# Patient Record
Sex: FEMALE | Race: WHITE | Employment: OTHER | URBAN - METROPOLITAN AREA
[De-identification: names, ages, dates, MRNs, and addresses within clinical notes are randomized per-mention and may not be internally consistent; named-entity substitution may affect disease eponyms.]

---

## 2017-01-05 ENCOUNTER — OFFICE VISIT (OUTPATIENT)
Dept: FAMILY MEDICINE CLINIC | Facility: CLINIC | Age: 67
End: 2017-01-05

## 2017-01-05 ENCOUNTER — LAB ENCOUNTER (OUTPATIENT)
Dept: LAB | Age: 67
End: 2017-01-05
Attending: FAMILY MEDICINE
Payer: MEDICARE

## 2017-01-05 ENCOUNTER — HOSPITAL ENCOUNTER (OUTPATIENT)
Dept: GENERAL RADIOLOGY | Age: 67
Discharge: HOME | End: 2017-01-05
Attending: FAMILY MEDICINE
Payer: MEDICARE

## 2017-01-05 VITALS
OXYGEN SATURATION: 98 % | HEART RATE: 92 BPM | DIASTOLIC BLOOD PRESSURE: 92 MMHG | RESPIRATION RATE: 20 BRPM | TEMPERATURE: 98 F | BODY MASS INDEX: 35 KG/M2 | WEIGHT: 232 LBS | SYSTOLIC BLOOD PRESSURE: 140 MMHG

## 2017-01-05 DIAGNOSIS — Z01.818 PRE-OP TESTING: ICD-10-CM

## 2017-01-05 DIAGNOSIS — M79.606 PAIN OF LOWER EXTREMITY, UNSPECIFIED LATERALITY: ICD-10-CM

## 2017-01-05 DIAGNOSIS — D50.0 BLOOD LOSS ANEMIA: ICD-10-CM

## 2017-01-05 DIAGNOSIS — R53.83 FATIGUE, UNSPECIFIED TYPE: ICD-10-CM

## 2017-01-05 DIAGNOSIS — E66.9 OBESITY, UNSPECIFIED: ICD-10-CM

## 2017-01-05 DIAGNOSIS — M23.91 INTERNAL DERANGEMENT OF KNEE JOINT, RIGHT: Primary | ICD-10-CM

## 2017-01-05 PROCEDURE — 87086 URINE CULTURE/COLONY COUNT: CPT

## 2017-01-05 PROCEDURE — 87081 CULTURE SCREEN ONLY: CPT

## 2017-01-05 PROCEDURE — 71020 XR CHEST PA + LAT CHEST (CPT=71020): CPT

## 2017-01-05 PROCEDURE — 99214 OFFICE O/P EST MOD 30 MIN: CPT | Performed by: FAMILY MEDICINE

## 2017-01-05 NOTE — PROGRESS NOTES
Patient has disabling and progressive right knee pain with osteoarthritis. It is failed any and all conservative remedies.   Ultimately he is scheduled for January 23 right total knee replacement by Dr. Beatris Cunningham in [place].    Patient history includes that o laboratory. Her mental status was normal.    He assessment is severe internal derangement of right knee failing any and all conservative measures. Pending normal blood work the patient will be cleared successfully for surgery.     My recommendation is

## 2017-01-06 ENCOUNTER — EKG ENCOUNTER (OUTPATIENT)
Dept: LAB | Facility: HOSPITAL | Age: 67
End: 2017-01-06
Attending: FAMILY MEDICINE
Payer: MEDICARE

## 2017-01-06 DIAGNOSIS — D50.0 BLOOD LOSS ANEMIA: ICD-10-CM

## 2017-01-06 DIAGNOSIS — R53.83 FATIGUE, UNSPECIFIED TYPE: ICD-10-CM

## 2017-01-06 DIAGNOSIS — Z01.818 PRE-OP TESTING: ICD-10-CM

## 2017-01-06 DIAGNOSIS — M79.606 PAIN OF LOWER EXTREMITY, UNSPECIFIED LATERALITY: ICD-10-CM

## 2017-01-06 LAB
APTT PPP: 29.2 SECONDS (ref 25–34)
ATRIAL RATE: 85 BPM
BASOPHILS # BLD AUTO: 0.02 X10(3) UL (ref 0–0.1)
BASOPHILS NFR BLD AUTO: 0.5 %
BUN BLD-MCNC: 20 MG/DL (ref 8–20)
C-REACTIVE PROTEIN: <0.29 MG/DL (ref ?–1)
CALCIUM BLD-MCNC: 8.9 MG/DL (ref 8.3–10.3)
CHLORIDE: 106 MMOL/L (ref 101–111)
CO2: 29 MMOL/L (ref 22–32)
CREAT BLD-MCNC: 0.83 MG/DL (ref 0.55–1.02)
EOSINOPHIL # BLD AUTO: 0.09 X10(3) UL (ref 0–0.3)
EOSINOPHIL NFR BLD AUTO: 2.3 %
ERYTHROCYTE [DISTWIDTH] IN BLOOD BY AUTOMATED COUNT: 13.2 % (ref 11.5–16)
EST. AVERAGE GLUCOSE BLD GHB EST-MCNC: 111 MG/DL (ref 68–126)
GLUCOSE BLD-MCNC: 128 MG/DL (ref 70–99)
HBA1C MFR BLD HPLC: 5.5 % (ref ?–5.7)
HCT VFR BLD AUTO: 38.3 % (ref 34–50)
HGB BLD-MCNC: 12.8 G/DL (ref 12–16)
IMMATURE GRANULOCYTE COUNT: 0 X10(3) UL (ref 0–1)
IMMATURE GRANULOCYTE RATIO %: 0 %
INR BLD: 0.95 (ref 0.89–1.12)
LYMPHOCYTES # BLD AUTO: 1.16 X10(3) UL (ref 0.9–4)
LYMPHOCYTES NFR BLD AUTO: 29.6 %
MCH RBC QN AUTO: 30.3 PG (ref 27–33.2)
MCHC RBC AUTO-ENTMCNC: 33.4 G/DL (ref 31–37)
MCV RBC AUTO: 90.8 FL (ref 81–100)
MONOCYTES # BLD AUTO: 0.3 X10(3) UL (ref 0.1–0.6)
MONOCYTES NFR BLD AUTO: 7.7 %
NEUTROPHIL ABS PRELIM: 2.35 X10 (3) UL (ref 1.3–6.7)
NEUTROPHILS # BLD AUTO: 2.35 X10(3) UL (ref 1.3–6.7)
NEUTROPHILS NFR BLD AUTO: 59.9 %
P AXIS: 48 DEGREES
P-R INTERVAL: 138 MS
PLATELET # BLD AUTO: 134 10(3)UL (ref 150–450)
POTASSIUM SERPL-SCNC: 3.3 MMOL/L (ref 3.6–5.1)
PSA SERPL DL<=0.01 NG/ML-MCNC: 13 SECONDS (ref 12.3–14.8)
Q-T INTERVAL: 392 MS
QRS DURATION: 94 MS
QTC CALCULATION (BEZET): 466 MS
R AXIS: 37 DEGREES
RBC # BLD AUTO: 4.22 X10(6)UL (ref 3.8–5.1)
RED CELL DISTRIBUTION WIDTH-SD: 43.2 FL (ref 35.1–46.3)
SED RATE-ML: 9 MM/HR (ref 0–25)
SODIUM SERPL-SCNC: 142 MMOL/L (ref 136–144)
T AXIS: -13 DEGREES
VENTRICULAR RATE: 85 BPM
WBC # BLD AUTO: 3.9 X10(3) UL (ref 4–13)

## 2017-01-06 PROCEDURE — 93010 ELECTROCARDIOGRAM REPORT: CPT | Performed by: INTERNAL MEDICINE

## 2017-01-06 PROCEDURE — 93005 ELECTROCARDIOGRAM TRACING: CPT

## 2017-01-06 PROCEDURE — 85025 COMPLETE CBC W/AUTO DIFF WBC: CPT

## 2017-01-06 PROCEDURE — 36415 COLL VENOUS BLD VENIPUNCTURE: CPT

## 2017-01-06 PROCEDURE — 80048 BASIC METABOLIC PNL TOTAL CA: CPT

## 2017-01-06 PROCEDURE — 85652 RBC SED RATE AUTOMATED: CPT

## 2017-01-06 PROCEDURE — 85730 THROMBOPLASTIN TIME PARTIAL: CPT

## 2017-01-06 PROCEDURE — 83036 HEMOGLOBIN GLYCOSYLATED A1C: CPT

## 2017-01-06 PROCEDURE — 85610 PROTHROMBIN TIME: CPT

## 2017-01-06 PROCEDURE — 86140 C-REACTIVE PROTEIN: CPT

## 2017-01-10 ENCOUNTER — OFFICE VISIT (OUTPATIENT)
Dept: FAMILY MEDICINE CLINIC | Facility: CLINIC | Age: 67
End: 2017-01-10

## 2017-01-10 VITALS
TEMPERATURE: 98 F | HEIGHT: 68 IN | WEIGHT: 229 LBS | RESPIRATION RATE: 16 BRPM | HEART RATE: 88 BPM | DIASTOLIC BLOOD PRESSURE: 86 MMHG | SYSTOLIC BLOOD PRESSURE: 136 MMHG | BODY MASS INDEX: 34.71 KG/M2

## 2017-01-10 DIAGNOSIS — J06.9 ACUTE URI: ICD-10-CM

## 2017-01-10 DIAGNOSIS — E87.6 HYPOKALEMIA, EXCESSIVE RENAL LOSSES: Primary | ICD-10-CM

## 2017-01-10 PROCEDURE — 99213 OFFICE O/P EST LOW 20 MIN: CPT | Performed by: FAMILY MEDICINE

## 2017-01-10 RX ORDER — SIMVASTATIN 10 MG
TABLET ORAL
Qty: 90 TABLET | Refills: 0 | Status: SHIPPED | OUTPATIENT
Start: 2017-01-10 | End: 2017-03-23

## 2017-01-10 NOTE — PROGRESS NOTES
Patient is here on my request to review some changes in her preoperative testing she is scheduled for January 23 left knee replacement. Patient's preoperative laboratory testing discovered a potassium of 3.3.   There was some change in her EKG that was m

## 2017-01-12 ENCOUNTER — TELEPHONE (OUTPATIENT)
Dept: FAMILY MEDICINE CLINIC | Facility: CLINIC | Age: 67
End: 2017-01-12

## 2017-01-12 NOTE — TELEPHONE ENCOUNTER
FYI- \"pt wants SEAN to know her condition is \"going in the right direction\"  feeling better and lungs are good\"

## 2017-01-17 ENCOUNTER — TELEPHONE (OUTPATIENT)
Dept: FAMILY MEDICINE CLINIC | Facility: CLINIC | Age: 67
End: 2017-01-17

## 2017-01-17 NOTE — TELEPHONE ENCOUNTER
Would like to speak with nurse regarding UA that was faxed over to pre-admission today.    Please call

## 2017-01-18 RX ORDER — TRIAMTERENE AND HYDROCHLOROTHIAZIDE 37.5; 25 MG/1; MG/1
TABLET ORAL
Qty: 90 TABLET | Refills: 0 | Status: ON HOLD | COMMUNITY
Start: 2017-01-18 | End: 2017-09-25

## 2017-01-21 ENCOUNTER — LAB ENCOUNTER (OUTPATIENT)
Dept: LAB | Facility: HOSPITAL | Age: 67
End: 2017-01-21
Attending: FAMILY MEDICINE
Payer: MEDICARE

## 2017-01-21 ENCOUNTER — TELEPHONE (OUTPATIENT)
Dept: FAMILY MEDICINE CLINIC | Facility: CLINIC | Age: 67
End: 2017-01-21

## 2017-01-21 DIAGNOSIS — E87.6 LOW BLOOD POTASSIUM: ICD-10-CM

## 2017-01-21 DIAGNOSIS — R79.0 LOW MAGNESIUM LEVEL: ICD-10-CM

## 2017-01-21 DIAGNOSIS — R79.0 LOW MAGNESIUM LEVEL: Primary | ICD-10-CM

## 2017-01-21 DIAGNOSIS — Z01.818 PRE-OP TESTING: ICD-10-CM

## 2017-01-21 LAB
ALBUMIN SERPL-MCNC: 4.1 G/DL (ref 3.5–4.8)
ALP LIVER SERPL-CCNC: 83 U/L (ref 55–142)
ALT SERPL-CCNC: 40 U/L (ref 14–54)
AST SERPL-CCNC: 32 U/L (ref 15–41)
ATRIAL RATE: 80 BPM
BILIRUB SERPL-MCNC: 0.3 MG/DL (ref 0.1–2)
BUN BLD-MCNC: 23 MG/DL (ref 8–20)
CALCIUM BLD-MCNC: 9.5 MG/DL (ref 8.3–10.3)
CHLORIDE: 105 MMOL/L (ref 101–111)
CO2: 26 MMOL/L (ref 22–32)
CREAT BLD-MCNC: 0.87 MG/DL (ref 0.55–1.02)
GLUCOSE BLD-MCNC: 98 MG/DL (ref 70–99)
HAV IGM SER QL: 1.6 MG/DL (ref 1.7–3)
M PROTEIN MFR SERPL ELPH: 7.6 G/DL (ref 6.1–8.3)
P AXIS: 57 DEGREES
P-R INTERVAL: 132 MS
POTASSIUM SERPL-SCNC: 3.3 MMOL/L (ref 3.6–5.1)
Q-T INTERVAL: 396 MS
QRS DURATION: 90 MS
QTC CALCULATION (BEZET): 456 MS
R AXIS: 27 DEGREES
SODIUM SERPL-SCNC: 140 MMOL/L (ref 136–144)
T AXIS: 8 DEGREES
VENTRICULAR RATE: 80 BPM

## 2017-01-21 PROCEDURE — 93005 ELECTROCARDIOGRAM TRACING: CPT

## 2017-01-21 PROCEDURE — 83735 ASSAY OF MAGNESIUM: CPT

## 2017-01-21 PROCEDURE — 80053 COMPREHEN METABOLIC PANEL: CPT

## 2017-01-21 PROCEDURE — 93010 ELECTROCARDIOGRAM REPORT: CPT | Performed by: INTERNAL MEDICINE

## 2017-01-21 NOTE — TELEPHONE ENCOUNTER
Pt called ; no orders placed for repeat k as per last note.   Orders placed ; pt and Dr. Jerry Barragan notified

## 2017-01-23 ENCOUNTER — ANESTHESIA (OUTPATIENT)
Dept: SURGERY | Facility: HOSPITAL | Age: 67
End: 2017-01-23

## 2017-01-23 ENCOUNTER — SURGERY (OUTPATIENT)
Age: 67
End: 2017-01-23

## 2017-01-23 ENCOUNTER — ANESTHESIA EVENT (OUTPATIENT)
Dept: SURGERY | Facility: HOSPITAL | Age: 67
End: 2017-01-23

## 2017-01-23 ENCOUNTER — APPOINTMENT (OUTPATIENT)
Dept: GENERAL RADIOLOGY | Facility: HOSPITAL | Age: 67
DRG: 470 | End: 2017-01-23
Attending: ORTHOPAEDIC SURGERY
Payer: MEDICARE

## 2017-01-23 PROBLEM — M17.9 ARTHRITIS OF KNEE, DEGENERATIVE: Status: ACTIVE | Noted: 2017-01-23

## 2017-01-23 PROBLEM — M17.10 ARTHRITIS OF KNEE, DEGENERATIVE: Status: ACTIVE | Noted: 2017-01-23

## 2017-01-23 PROCEDURE — 73560 X-RAY EXAM OF KNEE 1 OR 2: CPT

## 2017-01-23 RX ORDER — DEXAMETHASONE SODIUM PHOSPHATE 4 MG/ML
VIAL (ML) INJECTION AS NEEDED
Status: DISCONTINUED | OUTPATIENT
Start: 2017-01-23 | End: 2017-01-23 | Stop reason: SURG

## 2017-01-23 RX ORDER — NEOSTIGMINE METHYLSULFATE 0.5 MG/ML
INJECTION INTRAVENOUS AS NEEDED
Status: DISCONTINUED | OUTPATIENT
Start: 2017-01-23 | End: 2017-01-23 | Stop reason: SURG

## 2017-01-23 RX ORDER — GLYCOPYRROLATE 0.2 MG/ML
INJECTION INTRAMUSCULAR; INTRAVENOUS AS NEEDED
Status: DISCONTINUED | OUTPATIENT
Start: 2017-01-23 | End: 2017-01-23 | Stop reason: SURG

## 2017-01-23 RX ORDER — SODIUM CHLORIDE, SODIUM LACTATE, POTASSIUM CHLORIDE, CALCIUM CHLORIDE 600; 310; 30; 20 MG/100ML; MG/100ML; MG/100ML; MG/100ML
INJECTION, SOLUTION INTRAVENOUS CONTINUOUS PRN
Status: DISCONTINUED | OUTPATIENT
Start: 2017-01-23 | End: 2017-01-23 | Stop reason: SURG

## 2017-01-23 RX ORDER — ONDANSETRON 2 MG/ML
INJECTION INTRAMUSCULAR; INTRAVENOUS AS NEEDED
Status: DISCONTINUED | OUTPATIENT
Start: 2017-01-23 | End: 2017-01-23 | Stop reason: SURG

## 2017-01-23 RX ORDER — LIDOCAINE HYDROCHLORIDE 10 MG/ML
INJECTION, SOLUTION EPIDURAL; INFILTRATION; INTRACAUDAL; PERINEURAL AS NEEDED
Status: DISCONTINUED | OUTPATIENT
Start: 2017-01-23 | End: 2017-01-23 | Stop reason: SURG

## 2017-01-23 RX ORDER — MIDAZOLAM HYDROCHLORIDE 1 MG/ML
INJECTION INTRAMUSCULAR; INTRAVENOUS AS NEEDED
Status: DISCONTINUED | OUTPATIENT
Start: 2017-01-23 | End: 2017-01-23 | Stop reason: SURG

## 2017-01-23 RX ORDER — ROCURONIUM BROMIDE 10 MG/ML
INJECTION, SOLUTION INTRAVENOUS AS NEEDED
Status: DISCONTINUED | OUTPATIENT
Start: 2017-01-23 | End: 2017-01-23 | Stop reason: SURG

## 2017-01-23 RX ADMIN — SODIUM CHLORIDE, SODIUM LACTATE, POTASSIUM CHLORIDE, CALCIUM CHLORIDE: 600; 310; 30; 20 INJECTION, SOLUTION INTRAVENOUS at 15:13:00

## 2017-01-23 RX ADMIN — LIDOCAINE HYDROCHLORIDE 50 MG: 10 INJECTION, SOLUTION EPIDURAL; INFILTRATION; INTRACAUDAL; PERINEURAL at 15:17:00

## 2017-01-23 RX ADMIN — MIDAZOLAM HYDROCHLORIDE 2 MG: 1 INJECTION INTRAMUSCULAR; INTRAVENOUS at 15:13:00

## 2017-01-23 RX ADMIN — ROCURONIUM BROMIDE 30 MG: 10 INJECTION, SOLUTION INTRAVENOUS at 15:17:00

## 2017-01-23 RX ADMIN — NEOSTIGMINE METHYLSULFATE 4 MG: 0.5 INJECTION INTRAVENOUS at 16:50:00

## 2017-01-23 RX ADMIN — SODIUM CHLORIDE, SODIUM LACTATE, POTASSIUM CHLORIDE, CALCIUM CHLORIDE: 600; 310; 30; 20 INJECTION, SOLUTION INTRAVENOUS at 17:29:00

## 2017-01-23 RX ADMIN — GLYCOPYRROLATE 0.8 MG: 0.2 INJECTION INTRAMUSCULAR; INTRAVENOUS at 16:50:00

## 2017-01-23 RX ADMIN — SODIUM CHLORIDE, SODIUM LACTATE, POTASSIUM CHLORIDE, CALCIUM CHLORIDE: 600; 310; 30; 20 INJECTION, SOLUTION INTRAVENOUS at 16:45:00

## 2017-01-23 RX ADMIN — DEXAMETHASONE SODIUM PHOSPHATE 4 MG: 4 MG/ML VIAL (ML) INJECTION at 15:17:00

## 2017-01-23 RX ADMIN — ONDANSETRON 4 MG: 2 INJECTION INTRAMUSCULAR; INTRAVENOUS at 16:50:00

## 2017-01-23 NOTE — ANESTHESIA PREPROCEDURE EVALUATION
Anesthesia PreOp Note    HPI:     Mikayla Bermudez is a 78year old female who presents for preoperative consultation requested by: Maryan Charles MD    Date of Surgery: 1/23/2017    Procedure(s):  KNEE TOTAL REPLACEMENT  Indication: RIGHT KNEE OSTEOARTHRITI postmenopausal status (age-related) (natural)    • Congenital pes planus    • Unspecified essential hypertension    • Asymptomatic varicose veins    • HIGH BLOOD PRESSURE    • Breast CA ([facility] [address] 1990     Left mastctomy for DCIS   • Visual impairment      glas 100 MG Oral Cap TAKE 1 CAPSULE EVERY MORNING, 1 CAPSULE AT NOON, AND 2 CAPSULES AT BEDTIME Disp: 360 capsule Rfl: 0 1/22/2017 at 2345   Multiple Vitamins-Minerals (CENTRUM SILVER ULTRA WOMENS) Oral Tab Take  by mouth.  Disp:  Rfl:  1/15/2017   Horse Chestnu Itching, Other (See Comments)    Comment:Blistering and oozing    Family History   Problem Relation Age of Onset   • Depression Mother    • Diabetes Mother    • Breast Cancer Mother 52   • Depression Maternal Aunt    • Breast Cancer Maternal Aunt 39   • >3 FB  Neck ROM: full  Dental - normal exam     Pulmonary - normal exam   (+) asthma (cold induced),   Cardiovascular - normal exam  (+) hypertension,     ECG reviewed  ROS comment: Ekg: SR. NSSTT abn    Neuro/Psych      GI/Hepatic/Renal      Endo/Other

## 2017-01-25 ENCOUNTER — APPOINTMENT (OUTPATIENT)
Dept: PHYSICAL THERAPY | Facility: HOSPITAL | Age: 67
DRG: 470 | End: 2017-01-25
Attending: ORTHOPAEDIC SURGERY
Payer: MEDICARE

## 2017-02-27 ENCOUNTER — PATIENT OUTREACH (OUTPATIENT)
Dept: CASE MANAGEMENT | Age: 67
End: 2017-02-27

## 2017-02-27 NOTE — PROGRESS NOTES
Spoke to patient. Patient relates had a knee replacement. Patient wanted more information mailed to her and she will call once she gets information in the mail.

## 2017-03-10 ENCOUNTER — MED REC SCAN ONLY (OUTPATIENT)
Dept: FAMILY MEDICINE CLINIC | Facility: CLINIC | Age: 67
End: 2017-03-10

## 2017-03-23 RX ORDER — SERTRALINE HYDROCHLORIDE 100 MG/1
TABLET, FILM COATED ORAL
Qty: 90 TABLET | Refills: 0 | Status: SHIPPED | OUTPATIENT
Start: 2017-03-23 | End: 2017-09-18

## 2017-03-23 RX ORDER — SERTRALINE HYDROCHLORIDE 100 MG/1
50 TABLET, FILM COATED ORAL DAILY
Refills: 0 | Status: CANCELLED | OUTPATIENT
Start: 2017-03-23

## 2017-03-23 RX ORDER — CLONIDINE HYDROCHLORIDE 0.1 MG/1
TABLET ORAL
Qty: 90 TABLET | Refills: 0 | Status: SHIPPED | OUTPATIENT
Start: 2017-03-23 | End: 2017-07-25

## 2017-03-23 RX ORDER — SIMVASTATIN 10 MG
TABLET ORAL
Qty: 90 TABLET | Refills: 0 | Status: SHIPPED | OUTPATIENT
Start: 2017-03-23 | End: 2017-07-25

## 2017-03-23 RX ORDER — CLONIDINE HYDROCHLORIDE 0.1 MG/1
TABLET ORAL
Qty: 90 TABLET | Refills: 0 | Status: SHIPPED | OUTPATIENT
Start: 2017-03-23 | End: 2017-04-27

## 2017-03-23 RX ORDER — SERTRALINE HYDROCHLORIDE 100 MG/1
TABLET, FILM COATED ORAL
Qty: 90 TABLET | Refills: 0 | OUTPATIENT
Start: 2017-03-23

## 2017-04-10 RX ORDER — GABAPENTIN 100 MG/1
CAPSULE ORAL
Qty: 360 CAPSULE | Refills: 0 | Status: SHIPPED | OUTPATIENT
Start: 2017-04-10 | End: 2017-05-09 | Stop reason: DRUGHIGH

## 2017-04-24 ENCOUNTER — HOSPITAL ENCOUNTER (EMERGENCY)
Facility: HOSPITAL | Age: 67
Discharge: HOME | End: 2017-04-24
Attending: EMERGENCY MEDICINE
Payer: MEDICARE

## 2017-04-24 VITALS
WEIGHT: 202 LBS | DIASTOLIC BLOOD PRESSURE: 105 MMHG | OXYGEN SATURATION: 100 % | HEART RATE: 91 BPM | BODY MASS INDEX: 29.92 KG/M2 | HEIGHT: 69 IN | RESPIRATION RATE: 16 BRPM | SYSTOLIC BLOOD PRESSURE: 155 MMHG | TEMPERATURE: 98 F

## 2017-04-24 DIAGNOSIS — M54.40 BACK PAIN OF LUMBAR REGION WITH SCIATICA: Primary | ICD-10-CM

## 2017-04-24 PROCEDURE — 96375 TX/PRO/DX INJ NEW DRUG ADDON: CPT

## 2017-04-24 PROCEDURE — 99284 EMERGENCY DEPT VISIT MOD MDM: CPT

## 2017-04-24 PROCEDURE — 96374 THER/PROPH/DIAG INJ IV PUSH: CPT

## 2017-04-24 PROCEDURE — 96376 TX/PRO/DX INJ SAME DRUG ADON: CPT

## 2017-04-24 RX ORDER — HYDROMORPHONE HYDROCHLORIDE 1 MG/ML
0.5 INJECTION, SOLUTION INTRAMUSCULAR; INTRAVENOUS; SUBCUTANEOUS EVERY 30 MIN PRN
Status: COMPLETED | OUTPATIENT
Start: 2017-04-24 | End: 2017-04-24

## 2017-04-24 RX ORDER — KETOROLAC TROMETHAMINE 30 MG/ML
30 INJECTION, SOLUTION INTRAMUSCULAR; INTRAVENOUS ONCE
Status: COMPLETED | OUTPATIENT
Start: 2017-04-24 | End: 2017-04-24

## 2017-04-24 RX ORDER — OXYCODONE HYDROCHLORIDE AND ACETAMINOPHEN 5; 325 MG/1; MG/1
1-2 TABLET ORAL EVERY 4 HOURS PRN
Qty: 20 TABLET | Refills: 0 | Status: SHIPPED | OUTPATIENT
Start: 2017-04-24 | End: 2017-05-01

## 2017-04-24 RX ORDER — METHYLPREDNISOLONE 4 MG/1
TABLET ORAL
Qty: 1 PACKAGE | Refills: 0 | Status: SHIPPED | OUTPATIENT
Start: 2017-04-24 | End: 2017-04-29

## 2017-04-24 NOTE — ED INITIAL ASSESSMENT (HPI)
Patient had total knee replacement on her right, in January. Has had pain issues since then. Today after PT had an exacerbation of the pain. Pt unable to put weight on leg. Denies n/t. Pt states it feels like \"typical sciatica stuff. \"

## 2017-04-25 ENCOUNTER — TELEPHONE (OUTPATIENT)
Dept: FAMILY MEDICINE CLINIC | Facility: CLINIC | Age: 67
End: 2017-04-25

## 2017-04-25 NOTE — TELEPHONE ENCOUNTER
Patient was seen in the ER on 4-24-17 called patient to see if she needed a follow up appointment. Patient states that right now she would like a recommendation of another doctor to handle her sciatica.    Will check with Dr Pete Garcias and call patient back

## 2017-04-25 NOTE — TELEPHONE ENCOUNTER
Pt called stated she was at the er and while there she was recommended to go see a back doctor/ortho, pt is asking if maritza Jones can please recommend an specialist? Please call and advise.

## 2017-04-25 NOTE — ED PROVIDER NOTES
Patient Seen in: [facility] Emergency Department    History   Patient presents with:  Pain (neurologic)    Stated Complaint:     HPI  Patient is a 23-year-old female with history of knee replacement in January.   Patient has had intermittent sciatica CLONIDINE HCL 0.1 MG Oral Tab,  TAKE 1 TABLET EVERY EVENING (MD VISIT REQUIRED PRIOR TO FUTURE REFILL)   SIMVASTATIN 10 MG Oral Tab,  TAKE 1 TABLET EVERY NIGHT AT BEDTIME (MD VISIT REQUIRED PRIOR TO FUTURE REFILL)   CLONIDINE HCL 0.1 MG Oral Tab,  TAKE 1 T Vitals   BP 04/24/17 1851 155/105 mmHg   Pulse 04/24/17 1844 96   Resp 04/24/17 1844 20   Temp 04/24/17 1844 98.3 °F (36.8 °C)   Temp src --    SpO2 04/24/17 1844 100 %   O2 Device 04/24/17 1844 None (Room air)       Current:/105 mmHg  Pulse 91  Temp 4/24/2017  9:36 PM    START taking these medications    methylPREDNISolone 4 MG Oral Tablet Therapy Pack  Dosepack: use as directed on packaging, Normal, Disp-1 Package, R-0    oxyCODONE-acetaminophen 5-325 MG Oral Tab  Take 1-2 tablets by mouth every 4 (f

## 2017-04-27 ENCOUNTER — TELEPHONE (OUTPATIENT)
Dept: FAMILY MEDICINE CLINIC | Facility: CLINIC | Age: 67
End: 2017-04-27

## 2017-04-27 ENCOUNTER — OFFICE VISIT (OUTPATIENT)
Dept: FAMILY MEDICINE CLINIC | Facility: CLINIC | Age: 67
End: 2017-04-27

## 2017-04-27 VITALS
OXYGEN SATURATION: 98 % | SYSTOLIC BLOOD PRESSURE: 132 MMHG | BODY MASS INDEX: 31.22 KG/M2 | DIASTOLIC BLOOD PRESSURE: 80 MMHG | WEIGHT: 206 LBS | RESPIRATION RATE: 18 BRPM | HEART RATE: 87 BPM | HEIGHT: 68 IN

## 2017-04-27 DIAGNOSIS — M54.15 RADICULOPATHY OF THORACOLUMBAR REGION: Primary | ICD-10-CM

## 2017-04-27 PROCEDURE — 99213 OFFICE O/P EST LOW 20 MIN: CPT | Performed by: FAMILY MEDICINE

## 2017-04-27 RX ORDER — CYCLOBENZAPRINE HCL 10 MG
10 TABLET ORAL 3 TIMES DAILY PRN
Qty: 90 TABLET | Refills: 0 | Status: ON HOLD | OUTPATIENT
Start: 2017-04-27 | End: 2017-06-22

## 2017-04-27 RX ORDER — CYCLOBENZAPRINE HCL 10 MG
10 TABLET ORAL 3 TIMES DAILY PRN
Qty: 90 TABLET | Refills: 0 | Status: SHIPPED | OUTPATIENT
Start: 2017-04-27 | End: 2017-04-27

## 2017-04-27 NOTE — TELEPHONE ENCOUNTER
Flexeril was sent to mail order.  Please send 30 days to erika on [facility] in [place]

## 2017-04-27 NOTE — PROGRESS NOTES
Here with  had a right knee replacement,'s successfully, in January and has been doing reasonably well until she developed spasmodic pain in her right knee but also in the right lateral leg radiating toward the right sciatic area she was seen by joanna

## 2017-05-03 ENCOUNTER — TELEPHONE (OUTPATIENT)
Dept: FAMILY MEDICINE CLINIC | Facility: CLINIC | Age: 67
End: 2017-05-03

## 2017-05-03 DIAGNOSIS — M25.569 KNEE PAIN, UNSPECIFIED CHRONICITY, UNSPECIFIED LATERALITY: Primary | ICD-10-CM

## 2017-05-03 RX ORDER — OXYCODONE HYDROCHLORIDE AND ACETAMINOPHEN 5; 325 MG/1; MG/1
1 TABLET ORAL EVERY 4 HOURS PRN
Qty: 20 TABLET | Refills: 0 | Status: SHIPPED | OUTPATIENT
Start: 2017-05-03 | End: 2017-05-04

## 2017-05-03 NOTE — TELEPHONE ENCOUNTER
Patient was seen in the ER for knee pain. Was given Oxycodone and only has 1 pill left. She just scheduled an appointment with Dr Laine Cook for Friday for the knee pain. She would like to know if she can get a refill on the Oxycodone?     Please call

## 2017-05-03 NOTE — TELEPHONE ENCOUNTER
Leatha Erazo at [facility] physical therapy has recommended that [address] follow up with Becky Robles in regard to knee pain/ issues. Knee replacememt was done about 2-3 months ago. Her knee is seizing up. PT might not be in her best interest at this time.  He reyna

## 2017-05-03 NOTE — TELEPHONE ENCOUNTER
Patient was seen in the ER for knee pain.   Was given Oxycodone and only has 1 pill left.   She just scheduled an appointment with Dr Natacha Manrique for Friday for the knee pain.   She would like to know if she can get a refill on the Oxycodone?    Please call

## 2017-05-03 NOTE — TELEPHONE ENCOUNTER
Gómez from [facility] wants to discuss patients course of treatment with nishi or his nurse.  Please advise

## 2017-05-04 RX ORDER — OXYCODONE HYDROCHLORIDE AND ACETAMINOPHEN 5; 325 MG/1; MG/1
1 TABLET ORAL EVERY 4 HOURS PRN
Qty: 20 TABLET | Refills: 0 | Status: ON HOLD | OUTPATIENT
Start: 2017-05-04 | End: 2017-06-22

## 2017-05-04 NOTE — TELEPHONE ENCOUNTER
RX has been signed. Is up at the  for Pt to . She will pick it up on 5/5/17. She has an appointment.

## 2017-05-05 ENCOUNTER — OFFICE VISIT (OUTPATIENT)
Dept: FAMILY MEDICINE CLINIC | Facility: CLINIC | Age: 67
End: 2017-05-05

## 2017-05-05 VITALS
TEMPERATURE: 99 F | BODY MASS INDEX: 32 KG/M2 | RESPIRATION RATE: 18 BRPM | HEART RATE: 104 BPM | OXYGEN SATURATION: 99 % | WEIGHT: 208.63 LBS

## 2017-05-05 DIAGNOSIS — M71.21 BAKER'S CYST, RIGHT: Primary | ICD-10-CM

## 2017-05-05 PROCEDURE — 99213 OFFICE O/P EST LOW 20 MIN: CPT | Performed by: FAMILY MEDICINE

## 2017-05-05 RX ORDER — HYDROCODONE BITARTRATE AND ACETAMINOPHEN 10; 325 MG/1; MG/1
TABLET ORAL
Refills: 0 | Status: ON HOLD | COMMUNITY
Start: 2017-04-20 | End: 2017-05-12

## 2017-05-05 NOTE — PROGRESS NOTES
Here for recheck. Still having disappointing pain management of her right knee. External rotation of the lower extremity seems to be the worst posture for her. Denies chills fever nausea or vomiting. She is on Vicodin which seems to be helping.     She

## 2017-05-09 RX ORDER — ACETAMINOPHEN 325 MG/1
650 TABLET ORAL ONCE
Status: CANCELLED | OUTPATIENT
Start: 2017-05-09 | End: 2017-05-09

## 2017-05-09 RX ORDER — GABAPENTIN 300 MG/1
300 CAPSULE ORAL 3 TIMES DAILY
COMMUNITY
End: 2017-09-18

## 2017-05-10 ENCOUNTER — APPOINTMENT (OUTPATIENT)
Dept: GENERAL RADIOLOGY | Facility: HOSPITAL | Age: 67
DRG: 464 | End: 2017-05-10
Attending: ORTHOPAEDIC SURGERY
Payer: MEDICARE

## 2017-05-10 ENCOUNTER — SURGERY (OUTPATIENT)
Age: 67
End: 2017-05-10

## 2017-05-10 ENCOUNTER — LAB ENCOUNTER (OUTPATIENT)
Dept: LAB | Facility: HOSPITAL | Age: 67
DRG: 464 | End: 2017-05-10
Attending: ORTHOPAEDIC SURGERY
Payer: MEDICARE

## 2017-05-10 ENCOUNTER — ANESTHESIA EVENT (OUTPATIENT)
Dept: SURGERY | Facility: HOSPITAL | Age: 67
DRG: 464 | End: 2017-05-10
Payer: MEDICARE

## 2017-05-10 ENCOUNTER — ANESTHESIA (OUTPATIENT)
Dept: SURGERY | Facility: HOSPITAL | Age: 67
DRG: 464 | End: 2017-05-10
Payer: MEDICARE

## 2017-05-10 ENCOUNTER — HOSPITAL ENCOUNTER (INPATIENT)
Facility: HOSPITAL | Age: 67
LOS: 2 days | Discharge: HOME HEALTH | DRG: 464 | End: 2017-05-12
Attending: ORTHOPAEDIC SURGERY | Admitting: ORTHOPAEDIC SURGERY
Payer: MEDICARE

## 2017-05-10 DIAGNOSIS — T84.032A MECHANICAL LOOSENING OF INTERNAL RIGHT KNEE PROSTHETIC JOINT (HCC): Primary | ICD-10-CM

## 2017-05-10 DIAGNOSIS — T84.032A MECHANICAL LOOSENING OF INTERNAL RIGHT KNEE PROSTHETIC JOINT (HCC): ICD-10-CM

## 2017-05-10 PROCEDURE — 36415 COLL VENOUS BLD VENIPUNCTURE: CPT

## 2017-05-10 PROCEDURE — 0SPC09Z REMOVAL OF LINER FROM RIGHT KNEE JOINT, OPEN APPROACH: ICD-10-PCS | Performed by: ORTHOPAEDIC SURGERY

## 2017-05-10 PROCEDURE — 88300 SURGICAL PATH GROSS: CPT | Performed by: ORTHOPAEDIC SURGERY

## 2017-05-10 PROCEDURE — C9290 INJ, BUPIVACAINE LIPOSOME: HCPCS | Performed by: ORTHOPAEDIC SURGERY

## 2017-05-10 PROCEDURE — 0SBC0ZZ EXCISION OF RIGHT KNEE JOINT, OPEN APPROACH: ICD-10-PCS | Performed by: ORTHOPAEDIC SURGERY

## 2017-05-10 PROCEDURE — 73560 X-RAY EXAM OF KNEE 1 OR 2: CPT | Performed by: ORTHOPAEDIC SURGERY

## 2017-05-10 PROCEDURE — 86901 BLOOD TYPING SEROLOGIC RH(D): CPT

## 2017-05-10 PROCEDURE — 86850 RBC ANTIBODY SCREEN: CPT

## 2017-05-10 PROCEDURE — 0SCC0ZZ EXTIRPATION OF MATTER FROM RIGHT KNEE JOINT, OPEN APPROACH: ICD-10-PCS | Performed by: ORTHOPAEDIC SURGERY

## 2017-05-10 PROCEDURE — 88305 TISSUE EXAM BY PATHOLOGIST: CPT | Performed by: ORTHOPAEDIC SURGERY

## 2017-05-10 PROCEDURE — 86900 BLOOD TYPING SEROLOGIC ABO: CPT

## 2017-05-10 PROCEDURE — 0SUC09Z SUPPLEMENT RIGHT KNEE JOINT WITH LINER, OPEN APPROACH: ICD-10-PCS | Performed by: ORTHOPAEDIC SURGERY

## 2017-05-10 PROCEDURE — 87641 MR-STAPH DNA AMP PROBE: CPT

## 2017-05-10 PROCEDURE — 0JDN0ZZ EXTRACTION OF RIGHT LOWER LEG SUBCUTANEOUS TISSUE AND FASCIA, OPEN APPROACH: ICD-10-PCS | Performed by: ORTHOPAEDIC SURGERY

## 2017-05-10 PROCEDURE — 80048 BASIC METABOLIC PNL TOTAL CA: CPT

## 2017-05-10 DEVICE — TIBIAL INSERT FIXED SPHERE FLEX  #4/11 MM L
Type: IMPLANTABLE DEVICE | Site: KNEE | Status: FUNCTIONAL
Brand: GMK SPHERE TOTAL KNEE SYSTEM

## 2017-05-10 RX ORDER — ONDANSETRON 2 MG/ML
4 INJECTION INTRAMUSCULAR; INTRAVENOUS ONCE AS NEEDED
Status: DISCONTINUED | OUTPATIENT
Start: 2017-05-10 | End: 2017-05-10 | Stop reason: HOSPADM

## 2017-05-10 RX ORDER — ONDANSETRON 2 MG/ML
INJECTION INTRAMUSCULAR; INTRAVENOUS AS NEEDED
Status: DISCONTINUED | OUTPATIENT
Start: 2017-05-10 | End: 2017-05-10 | Stop reason: SURG

## 2017-05-10 RX ORDER — SODIUM CHLORIDE 9 MG/ML
INJECTION, SOLUTION INTRAVENOUS CONTINUOUS PRN
Status: DISCONTINUED | OUTPATIENT
Start: 2017-05-10 | End: 2017-05-10

## 2017-05-10 RX ORDER — HYDROCODONE BITARTRATE AND ACETAMINOPHEN 5; 325 MG/1; MG/1
1 TABLET ORAL AS NEEDED
Status: DISCONTINUED | OUTPATIENT
Start: 2017-05-10 | End: 2017-05-10 | Stop reason: HOSPADM

## 2017-05-10 RX ORDER — MORPHINE SULFATE 2 MG/ML
2 INJECTION, SOLUTION INTRAMUSCULAR; INTRAVENOUS EVERY 10 MIN PRN
Status: DISCONTINUED | OUTPATIENT
Start: 2017-05-10 | End: 2017-05-10 | Stop reason: HOSPADM

## 2017-05-10 RX ORDER — GABAPENTIN 300 MG/1
600 CAPSULE ORAL ONCE
Status: COMPLETED | OUTPATIENT
Start: 2017-05-10 | End: 2017-05-10

## 2017-05-10 RX ORDER — MORPHINE SULFATE 10 MG/ML
6 INJECTION, SOLUTION INTRAMUSCULAR; INTRAVENOUS EVERY 10 MIN PRN
Status: DISCONTINUED | OUTPATIENT
Start: 2017-05-10 | End: 2017-05-10 | Stop reason: HOSPADM

## 2017-05-10 RX ORDER — HYDROMORPHONE HYDROCHLORIDE 1 MG/ML
0.2 INJECTION, SOLUTION INTRAMUSCULAR; INTRAVENOUS; SUBCUTANEOUS EVERY 2 HOUR PRN
Status: DISCONTINUED | OUTPATIENT
Start: 2017-05-10 | End: 2017-05-12

## 2017-05-10 RX ORDER — HYDROCODONE BITARTRATE AND ACETAMINOPHEN 5; 325 MG/1; MG/1
2 TABLET ORAL AS NEEDED
Status: DISCONTINUED | OUTPATIENT
Start: 2017-05-10 | End: 2017-05-10 | Stop reason: HOSPADM

## 2017-05-10 RX ORDER — FAMOTIDINE 10 MG/ML
20 INJECTION, SOLUTION INTRAVENOUS 2 TIMES DAILY
Status: DISCONTINUED | OUTPATIENT
Start: 2017-05-10 | End: 2017-05-12

## 2017-05-10 RX ORDER — ACETAMINOPHEN 500 MG
1000 TABLET ORAL ONCE
Status: COMPLETED | OUTPATIENT
Start: 2017-05-10 | End: 2017-05-10

## 2017-05-10 RX ORDER — SODIUM CHLORIDE 9 MG/ML
INJECTION, SOLUTION INTRAVENOUS CONTINUOUS
Status: DISCONTINUED | OUTPATIENT
Start: 2017-05-10 | End: 2017-05-12

## 2017-05-10 RX ORDER — BISACODYL 10 MG
10 SUPPOSITORY, RECTAL RECTAL
Status: DISCONTINUED | OUTPATIENT
Start: 2017-05-10 | End: 2017-05-12

## 2017-05-10 RX ORDER — DOCUSATE SODIUM 100 MG/1
100 CAPSULE, LIQUID FILLED ORAL 2 TIMES DAILY
Status: DISCONTINUED | OUTPATIENT
Start: 2017-05-10 | End: 2017-05-12

## 2017-05-10 RX ORDER — DIPHENHYDRAMINE HYDROCHLORIDE 50 MG/ML
25 INJECTION INTRAMUSCULAR; INTRAVENOUS ONCE AS NEEDED
Status: ACTIVE | OUTPATIENT
Start: 2017-05-10 | End: 2017-05-10

## 2017-05-10 RX ORDER — POLYETHYLENE GLYCOL 3350 17 G/17G
17 POWDER, FOR SOLUTION ORAL DAILY PRN
Status: DISCONTINUED | OUTPATIENT
Start: 2017-05-10 | End: 2017-05-12

## 2017-05-10 RX ORDER — HYDROMORPHONE HYDROCHLORIDE 1 MG/ML
0.6 INJECTION, SOLUTION INTRAMUSCULAR; INTRAVENOUS; SUBCUTANEOUS EVERY 5 MIN PRN
Status: DISCONTINUED | OUTPATIENT
Start: 2017-05-10 | End: 2017-05-10 | Stop reason: HOSPADM

## 2017-05-10 RX ORDER — HYDROMORPHONE HYDROCHLORIDE 1 MG/ML
0.4 INJECTION, SOLUTION INTRAMUSCULAR; INTRAVENOUS; SUBCUTANEOUS EVERY 2 HOUR PRN
Status: DISCONTINUED | OUTPATIENT
Start: 2017-05-10 | End: 2017-05-12

## 2017-05-10 RX ORDER — LABETALOL HYDROCHLORIDE 5 MG/ML
10 INJECTION, SOLUTION INTRAVENOUS ONCE
Status: COMPLETED | OUTPATIENT
Start: 2017-05-10 | End: 2017-05-10

## 2017-05-10 RX ORDER — MORPHINE SULFATE 4 MG/ML
4 INJECTION, SOLUTION INTRAMUSCULAR; INTRAVENOUS EVERY 10 MIN PRN
Status: DISCONTINUED | OUTPATIENT
Start: 2017-05-10 | End: 2017-05-10 | Stop reason: HOSPADM

## 2017-05-10 RX ORDER — LIDOCAINE HYDROCHLORIDE 40 MG/ML
SOLUTION TOPICAL AS NEEDED
Status: DISCONTINUED | OUTPATIENT
Start: 2017-05-10 | End: 2017-05-10 | Stop reason: SURG

## 2017-05-10 RX ORDER — HYDROMORPHONE HYDROCHLORIDE 1 MG/ML
0.8 INJECTION, SOLUTION INTRAMUSCULAR; INTRAVENOUS; SUBCUTANEOUS EVERY 2 HOUR PRN
Status: DISCONTINUED | OUTPATIENT
Start: 2017-05-10 | End: 2017-05-12

## 2017-05-10 RX ORDER — NALOXONE HYDROCHLORIDE 0.4 MG/ML
80 INJECTION, SOLUTION INTRAMUSCULAR; INTRAVENOUS; SUBCUTANEOUS AS NEEDED
Status: DISCONTINUED | OUTPATIENT
Start: 2017-05-10 | End: 2017-05-10 | Stop reason: HOSPADM

## 2017-05-10 RX ORDER — TAMOXIFEN CITRATE 20 MG/1
20 TABLET ORAL NIGHTLY
Status: DISCONTINUED | OUTPATIENT
Start: 2017-05-10 | End: 2017-05-10

## 2017-05-10 RX ORDER — FAMOTIDINE 20 MG/1
20 TABLET ORAL 2 TIMES DAILY
Status: DISCONTINUED | OUTPATIENT
Start: 2017-05-10 | End: 2017-05-12

## 2017-05-10 RX ORDER — ONDANSETRON 2 MG/ML
4 INJECTION INTRAMUSCULAR; INTRAVENOUS EVERY 4 HOURS PRN
Status: DISCONTINUED | OUTPATIENT
Start: 2017-05-10 | End: 2017-05-12

## 2017-05-10 RX ORDER — TRIAMTERENE AND HYDROCHLOROTHIAZIDE 75; 50 MG/1; MG/1
0.5 TABLET ORAL DAILY
Status: DISCONTINUED | OUTPATIENT
Start: 2017-05-11 | End: 2017-05-12

## 2017-05-10 RX ORDER — FAMOTIDINE 20 MG/1
20 TABLET ORAL ONCE
Status: DISCONTINUED | OUTPATIENT
Start: 2017-05-10 | End: 2017-05-10 | Stop reason: HOSPADM

## 2017-05-10 RX ORDER — MIDAZOLAM HYDROCHLORIDE 1 MG/ML
INJECTION INTRAMUSCULAR; INTRAVENOUS AS NEEDED
Status: DISCONTINUED | OUTPATIENT
Start: 2017-05-10 | End: 2017-05-10 | Stop reason: SURG

## 2017-05-10 RX ORDER — DIPHENHYDRAMINE HYDROCHLORIDE 50 MG/ML
12.5 INJECTION INTRAMUSCULAR; INTRAVENOUS EVERY 4 HOURS PRN
Status: DISCONTINUED | OUTPATIENT
Start: 2017-05-10 | End: 2017-05-12

## 2017-05-10 RX ORDER — HALOPERIDOL 5 MG/ML
0.25 INJECTION INTRAMUSCULAR ONCE AS NEEDED
Status: DISCONTINUED | OUTPATIENT
Start: 2017-05-10 | End: 2017-05-10 | Stop reason: HOSPADM

## 2017-05-10 RX ORDER — HYDROCODONE BITARTRATE AND ACETAMINOPHEN 7.5; 325 MG/1; MG/1
1 TABLET ORAL EVERY 4 HOURS PRN
Status: DISCONTINUED | OUTPATIENT
Start: 2017-05-10 | End: 2017-05-12

## 2017-05-10 RX ORDER — NEOSTIGMINE METHYLSULFATE 0.5 MG/ML
INJECTION INTRAVENOUS AS NEEDED
Status: DISCONTINUED | OUTPATIENT
Start: 2017-05-10 | End: 2017-05-10 | Stop reason: SURG

## 2017-05-10 RX ORDER — GLYCOPYRROLATE 0.2 MG/ML
INJECTION INTRAMUSCULAR; INTRAVENOUS AS NEEDED
Status: DISCONTINUED | OUTPATIENT
Start: 2017-05-10 | End: 2017-05-10 | Stop reason: SURG

## 2017-05-10 RX ORDER — CYCLOBENZAPRINE HCL 10 MG
10 TABLET ORAL 3 TIMES DAILY PRN
Status: DISCONTINUED | OUTPATIENT
Start: 2017-05-10 | End: 2017-05-10

## 2017-05-10 RX ORDER — HYDROMORPHONE HYDROCHLORIDE 1 MG/ML
0.4 INJECTION, SOLUTION INTRAMUSCULAR; INTRAVENOUS; SUBCUTANEOUS EVERY 5 MIN PRN
Status: DISCONTINUED | OUTPATIENT
Start: 2017-05-10 | End: 2017-05-10 | Stop reason: HOSPADM

## 2017-05-10 RX ORDER — DEXAMETHASONE SODIUM PHOSPHATE 4 MG/ML
VIAL (ML) INJECTION AS NEEDED
Status: DISCONTINUED | OUTPATIENT
Start: 2017-05-10 | End: 2017-05-10 | Stop reason: SURG

## 2017-05-10 RX ORDER — SCOLOPAMINE TRANSDERMAL SYSTEM 1 MG/1
1 PATCH, EXTENDED RELEASE TRANSDERMAL ONCE
Status: DISCONTINUED | OUTPATIENT
Start: 2017-05-10 | End: 2017-05-10

## 2017-05-10 RX ORDER — METOCLOPRAMIDE HYDROCHLORIDE 5 MG/ML
10 INJECTION INTRAMUSCULAR; INTRAVENOUS EVERY 6 HOURS PRN
Status: DISCONTINUED | OUTPATIENT
Start: 2017-05-10 | End: 2017-05-12

## 2017-05-10 RX ORDER — ACETAMINOPHEN 325 MG/1
650 TABLET ORAL EVERY 4 HOURS PRN
Status: DISCONTINUED | OUTPATIENT
Start: 2017-05-10 | End: 2017-05-12

## 2017-05-10 RX ORDER — HYDROCODONE BITARTRATE AND ACETAMINOPHEN 7.5; 325 MG/1; MG/1
2 TABLET ORAL EVERY 4 HOURS PRN
Status: DISCONTINUED | OUTPATIENT
Start: 2017-05-10 | End: 2017-05-12

## 2017-05-10 RX ORDER — CYCLOBENZAPRINE HCL 10 MG
10 TABLET ORAL EVERY 8 HOURS PRN
Status: DISCONTINUED | OUTPATIENT
Start: 2017-05-10 | End: 2017-05-12

## 2017-05-10 RX ORDER — ROCURONIUM BROMIDE 10 MG/ML
INJECTION, SOLUTION INTRAVENOUS AS NEEDED
Status: DISCONTINUED | OUTPATIENT
Start: 2017-05-10 | End: 2017-05-10 | Stop reason: SURG

## 2017-05-10 RX ORDER — HYDROMORPHONE HYDROCHLORIDE 1 MG/ML
0.2 INJECTION, SOLUTION INTRAMUSCULAR; INTRAVENOUS; SUBCUTANEOUS EVERY 5 MIN PRN
Status: DISCONTINUED | OUTPATIENT
Start: 2017-05-10 | End: 2017-05-10 | Stop reason: HOSPADM

## 2017-05-10 RX ORDER — SODIUM PHOSPHATE, DIBASIC AND SODIUM PHOSPHATE, MONOBASIC 7; 19 G/133ML; G/133ML
1 ENEMA RECTAL ONCE AS NEEDED
Status: DISCONTINUED | OUTPATIENT
Start: 2017-05-10 | End: 2017-05-12

## 2017-05-10 RX ORDER — GABAPENTIN 300 MG/1
300 CAPSULE ORAL 3 TIMES DAILY
Status: DISCONTINUED | OUTPATIENT
Start: 2017-05-10 | End: 2017-05-12

## 2017-05-10 RX ORDER — SODIUM CHLORIDE, SODIUM LACTATE, POTASSIUM CHLORIDE, CALCIUM CHLORIDE 600; 310; 30; 20 MG/100ML; MG/100ML; MG/100ML; MG/100ML
INJECTION, SOLUTION INTRAVENOUS CONTINUOUS
Status: DISCONTINUED | OUTPATIENT
Start: 2017-05-10 | End: 2017-05-10 | Stop reason: ALTCHOICE

## 2017-05-10 RX ORDER — POTASSIUM CHLORIDE 20 MEQ/1
20 TABLET, EXTENDED RELEASE ORAL 2 TIMES DAILY
Status: DISCONTINUED | OUTPATIENT
Start: 2017-05-10 | End: 2017-05-12

## 2017-05-10 RX ORDER — CLONIDINE HYDROCHLORIDE 0.1 MG/1
0.1 TABLET ORAL EVERY MORNING
Status: DISCONTINUED | OUTPATIENT
Start: 2017-05-11 | End: 2017-05-12

## 2017-05-10 RX ORDER — PRAVASTATIN SODIUM 20 MG
20 TABLET ORAL NIGHTLY
Status: DISCONTINUED | OUTPATIENT
Start: 2017-05-10 | End: 2017-05-12

## 2017-05-10 RX ORDER — METOCLOPRAMIDE 10 MG/1
10 TABLET ORAL ONCE
Status: DISCONTINUED | OUTPATIENT
Start: 2017-05-10 | End: 2017-05-10

## 2017-05-10 RX ORDER — SODIUM CHLORIDE, SODIUM LACTATE, POTASSIUM CHLORIDE, CALCIUM CHLORIDE 600; 310; 30; 20 MG/100ML; MG/100ML; MG/100ML; MG/100ML
INJECTION, SOLUTION INTRAVENOUS CONTINUOUS
Status: DISCONTINUED | OUTPATIENT
Start: 2017-05-10 | End: 2017-05-10

## 2017-05-10 RX ORDER — SODIUM CHLORIDE 0.9 % (FLUSH) 0.9 %
10 SYRINGE (ML) INJECTION AS NEEDED
Status: DISCONTINUED | OUTPATIENT
Start: 2017-05-10 | End: 2017-05-12

## 2017-05-10 RX ORDER — DIPHENHYDRAMINE HCL 25 MG
25 CAPSULE ORAL EVERY 4 HOURS PRN
Status: DISCONTINUED | OUTPATIENT
Start: 2017-05-10 | End: 2017-05-12

## 2017-05-10 RX ORDER — MAGNESIUM HYDROXIDE 1200 MG/15ML
LIQUID ORAL CONTINUOUS PRN
Status: DISCONTINUED | OUTPATIENT
Start: 2017-05-10 | End: 2017-05-10

## 2017-05-10 RX ORDER — LIDOCAINE HYDROCHLORIDE 10 MG/ML
INJECTION, SOLUTION EPIDURAL; INFILTRATION; INTRACAUDAL; PERINEURAL AS NEEDED
Status: DISCONTINUED | OUTPATIENT
Start: 2017-05-10 | End: 2017-05-10 | Stop reason: SURG

## 2017-05-10 RX ADMIN — LIDOCAINE HYDROCHLORIDE 4 ML: 40 SOLUTION TOPICAL at 14:00:00

## 2017-05-10 RX ADMIN — NEOSTIGMINE METHYLSULFATE 4 MG: 0.5 INJECTION INTRAVENOUS at 15:06:00

## 2017-05-10 RX ADMIN — SODIUM CHLORIDE, SODIUM LACTATE, POTASSIUM CHLORIDE, CALCIUM CHLORIDE: 600; 310; 30; 20 INJECTION, SOLUTION INTRAVENOUS at 15:10:00

## 2017-05-10 RX ADMIN — DEXAMETHASONE SODIUM PHOSPHATE 4 MG: 4 MG/ML VIAL (ML) INJECTION at 14:00:00

## 2017-05-10 RX ADMIN — ONDANSETRON 4 MG: 2 INJECTION INTRAMUSCULAR; INTRAVENOUS at 14:55:00

## 2017-05-10 RX ADMIN — MIDAZOLAM HYDROCHLORIDE 2 MG: 1 INJECTION INTRAMUSCULAR; INTRAVENOUS at 13:53:00

## 2017-05-10 RX ADMIN — LIDOCAINE HYDROCHLORIDE 50 MG: 10 INJECTION, SOLUTION EPIDURAL; INFILTRATION; INTRACAUDAL; PERINEURAL at 14:00:00

## 2017-05-10 RX ADMIN — SODIUM CHLORIDE, SODIUM LACTATE, POTASSIUM CHLORIDE, CALCIUM CHLORIDE: 600; 310; 30; 20 INJECTION, SOLUTION INTRAVENOUS at 14:00:00

## 2017-05-10 RX ADMIN — ROCURONIUM BROMIDE 50 MG: 10 INJECTION, SOLUTION INTRAVENOUS at 14:00:00

## 2017-05-10 RX ADMIN — GLYCOPYRROLATE 0.4 MG: 0.2 INJECTION INTRAMUSCULAR; INTRAVENOUS at 15:06:00

## 2017-05-10 RX ADMIN — SODIUM CHLORIDE, SODIUM LACTATE, POTASSIUM CHLORIDE, CALCIUM CHLORIDE: 600; 310; 30; 20 INJECTION, SOLUTION INTRAVENOUS at 15:25:00

## 2017-05-10 NOTE — ANESTHESIA PREPROCEDURE EVALUATION
Anesthesia PreOp Note    HPI:     Tanisha Zarco is a 78year old female who presents for preoperative consultation requested by: Malika Hill MD    Date of Surgery: 5/10/2017    Procedure(s):  KNEE TOTAL REVISION  Indication: Loosening of knee joint pro Date Noted: 06/29/2012      Unspecified vitamin D deficiency         Class: Chronic         Date Noted: 06/29/2012        Past Medical History   Diagnosis Date   • Acute sinusitis, unspecified    • Unspecified asthma(493.90)    • Asymptomatic postmenopaus TO FUTURE REFILL) Disp: 90 tablet Rfl: 0 5/9/2017 at Unknown time   SERTRALINE  MG Oral Tab TAKE 1 TABLET ONE TIME DAILY (Patient taking differently: TAKE 1 TABLET 2-3 TIMES DAILY, SOMETIMES TAKES ONLY 50MG BUT MAX 150MG/DAY) Disp: 90 tablet Rfl: 0 Epic:  [DISCONTINUED] HYDROcodone-acetaminophen 5-325 MG Oral Tab Take 1 tablet by mouth every 6 (six) hours as needed for Pain.  Disp: 90 tablet Rfl: 0         Latex                   Itching, Other (See Comments)    Comment:Blistering and oozing    Family clear to auscultation  Cardiovascular - negative ROS and normal exam  Exercise tolerance: good  (+) hypertension,     Rhythm: regular  Rate: normal    Neuro/Psych - negative ROS     GI/Hepatic/Renal - negative ROS     Endo/Other - negative ROS   Abdominal

## 2017-05-10 NOTE — INTERVAL H&P NOTE
Pre-op Diagnosis: Loosening of knee joint prosthesis ([facility] [address] [Y88.817H, Z96.659]    The above referenced H&P was reviewed by Nasima Bains MD on 5/10/2017, the patient was examined and no significant changes have occurred in the patient's condition since the H

## 2017-05-10 NOTE — ANESTHESIA POSTPROCEDURE EVALUATION
Patient: Chepe Sheets    Procedure Summary     Date Anesthesia Start Anesthesia Stop Room / Location    05/10/17 1353 1540 300 [facility] MAIN OR 11 / 300 [facility] MAIN OR       Procedure Diagnosis Surgeon Responsible Provider    KNEE TOTAL REVISION (Right ) Loosening of

## 2017-05-11 PROCEDURE — 84443 ASSAY THYROID STIM HORMONE: CPT | Performed by: FAMILY MEDICINE

## 2017-05-11 PROCEDURE — 82607 VITAMIN B-12: CPT | Performed by: FAMILY MEDICINE

## 2017-05-11 PROCEDURE — 80061 LIPID PANEL: CPT | Performed by: FAMILY MEDICINE

## 2017-05-11 PROCEDURE — 97161 PT EVAL LOW COMPLEX 20 MIN: CPT

## 2017-05-11 PROCEDURE — 97535 SELF CARE MNGMENT TRAINING: CPT

## 2017-05-11 PROCEDURE — 80053 COMPREHEN METABOLIC PANEL: CPT | Performed by: ORTHOPAEDIC SURGERY

## 2017-05-11 PROCEDURE — 97110 THERAPEUTIC EXERCISES: CPT

## 2017-05-11 PROCEDURE — 97116 GAIT TRAINING THERAPY: CPT

## 2017-05-11 PROCEDURE — 85025 COMPLETE CBC W/AUTO DIFF WBC: CPT | Performed by: FAMILY MEDICINE

## 2017-05-11 PROCEDURE — 97530 THERAPEUTIC ACTIVITIES: CPT

## 2017-05-11 PROCEDURE — 97165 OT EVAL LOW COMPLEX 30 MIN: CPT

## 2017-05-11 RX ORDER — CYANOCOBALAMIN 1000 UG/ML
1000 INJECTION INTRAMUSCULAR; SUBCUTANEOUS
Status: DISCONTINUED | OUTPATIENT
Start: 2017-05-11 | End: 2017-05-12

## 2017-05-11 NOTE — PHYSICAL THERAPY NOTE
PHYSICAL THERAPY EVALUATION - INPATIENT     Room Number: 050/206-H  Evaluation Date: 5/11/2017  Type of Evaluation: Initial          Reason for Therapy: Mobility Dysfunction and Discharge Planning    PHYSICAL THERAPY ASSESSMENT     Patient is a 78 year Right mastectomy with sentinel lymph node biopsy, injection of blue dye for sentinel lymph node identification, completion axillary lymph node dissection, and advancement flap mastopexy    TOTAL KNEE REPLACEMENT Right 1/2017    TOTAL KNEE REPLACEMENT Right ABILITY STATUS  Gait Assessment   Gait Assistance: Supervision  Distance (ft):  (200)  Assistive Device: Rolling walker        Comment : decreased R knee flexion    Bed Mobility:sba    Transfers:CGA    Exercise/Education Provided:  Bed mobility  Gait train

## 2017-05-11 NOTE — PHYSICAL THERAPY NOTE
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  Pt elif decreased R knee flexion      THERAPEUTIC EXERCISES  Lower Extremity Ankle pumps  Glut sets  Heel raises  Heel slides  Knee extension  LAQ  Quad sets  10xea   Position Sitting and Supine       Patient End of Session: In bed    CURRENT GOALS   Goals to be m

## 2017-05-11 NOTE — PLAN OF CARE
This nurse paged Dr. Viviana Clifford as pt wanted her clonidine at night likes she takes it at home d/t it making her sleep, also she needed her Zoloft tonight 150 mg instead of the 50 mg that is scheduled for tomorrow a.m. like the MD ordered, and finally she wa

## 2017-05-11 NOTE — OCCUPATIONAL THERAPY NOTE
..   OCCUPATIONAL THERAPY EVALUATION - INPATIENT      Room Number: 676/546-S  Evaluation Date: 5/11/2017  Type of Evaluation: Initial  Presenting Problem: S/P I&D Rt TKA    Physician Order: IP Consult to Occupational Therapy  Reason for Therapy: ADL/IADL Dy of internal right knee prosthetic joint [facility])      Past Medical History  Past Medical History   Diagnosis Date   • Acute sinusitis, unspecified    • Unspecified asthma(493.90)    • Asymptomatic postmenopausal status (age-related) (natural)    • Congenital Fall Risk: Standard fall risk    WEIGHT BEARING RESTRICTION  Weight Bearing Restriction: R lower extremity        R Lower Extremity: Weight Bearing as Tolerated       PAIN ASSESSMENT  Ratin  Location: Rt hip       ACTIVITY TOLERANCE  Room air    COGN Patient will complete toilet transfer modified independently  Comment:     Patient will complete self care task at sink level modified independently  Comment:             Goals  on:  17  Frequency: 1-2 more sessions

## 2017-05-11 NOTE — H&P
[facility]    PATIENT'S NAME: Juan J Calvo   ATTENDING PHYSICIAN: Yassine Thornton.  Giacomo Orozco MD   PATIENT ACCOUNT#:   [id number]    LOCATION:  [address] [place] [address] #:   [id number]       YOB: 1950  ADMISSION DATE:       05/10/20 was noted. LUNGS:  Clear to auscultation and percussion. HEART:  Regular rate and rhythm, S1, S2. No murmurs. EXTREMITIES:  No cyanosis, clubbing, or edema. Right knee with dressing that was dry. ABDOMEN:  Positive bowel sounds. Soft, nontender.   NE

## 2017-05-11 NOTE — PLAN OF CARE
PAIN - ADULT    • Verbalizes/displays adequate comfort level or patient's stated pain goal Progressing    This nurse has been medicating pt through the night for pain and keeping pain score in pt goal range.     RISK FOR INFECTION - ADULT    • Absence of fe

## 2017-05-11 NOTE — DISCHARGE PLANNING
SUZAN met with the pt. At bedside. The pt. Lives with her  in a 2 level home with the bedrooms on the 2nd floor. The pt. Reports being independent prior to admission with adls, ambulation and driving. The pt's  also drives. The pt.  Has a dtr

## 2017-05-12 VITALS
TEMPERATURE: 99 F | OXYGEN SATURATION: 95 % | DIASTOLIC BLOOD PRESSURE: 58 MMHG | HEART RATE: 97 BPM | BODY MASS INDEX: 30.96 KG/M2 | HEIGHT: 69 IN | RESPIRATION RATE: 18 BRPM | SYSTOLIC BLOOD PRESSURE: 126 MMHG | WEIGHT: 209 LBS

## 2017-05-12 PROCEDURE — 97110 THERAPEUTIC EXERCISES: CPT

## 2017-05-12 PROCEDURE — 85027 COMPLETE CBC AUTOMATED: CPT | Performed by: ORTHOPAEDIC SURGERY

## 2017-05-12 PROCEDURE — 97116 GAIT TRAINING THERAPY: CPT

## 2017-05-12 PROCEDURE — 97535 SELF CARE MNGMENT TRAINING: CPT

## 2017-05-12 RX ORDER — HYDROCODONE BITARTRATE AND ACETAMINOPHEN 10; 325 MG/1; MG/1
1 TABLET ORAL EVERY 4 HOURS PRN
Qty: 1 TABLET | Refills: 0 | Status: ON HOLD | OUTPATIENT
Start: 2017-05-12 | End: 2017-09-28

## 2017-05-12 NOTE — PROGRESS NOTES
[address] Patient Status:  Inpatient    1950 MRN [id number]   Location [facility] 4W/SW/SE Attending Taylor Eng MD   [address] Day # 1 PCP Mansoor Dailey DO     Subjective:  Post-Operative Day: 2 Status Post right

## 2017-05-12 NOTE — OCCUPATIONAL THERAPY NOTE
OCCUPATIONAL THERAPY TREATMENT NOTE - INPATIENT     Room Number: 510/479-C         Presenting Problem: S/P I&D Rt TKA    Problem List  Active Problems:    Mechanical loosening of internal right knee prosthetic joint ([address]      ASSESSMENT   Pt seen bedside None    AM-PAC Score:  Score: 22  Approx Degree of Impairment: 25.8%  Standardized Score (AM-PAC Scale): 47.1  CMS Modifier (G-Code): CJ    FUNCTIONAL TRANSFER ASSESSMENT  Supine to Sit : Modified independent  Sit to Stand: Modified independent    Toilet T

## 2017-05-12 NOTE — PROGRESS NOTES
[facility]    Progress Note    Senait Osei Patient Status:  Inpatient    1950 MRN [id number]   Location [facility] 4W/SW/SE Attending Yannick Barker MD   [facility] Day # 2 PCP Sivakumar Dudleych, DO       Subjective:   Chava Stevenson 3.7 05/11/2017    05/11/2017    05/11/2017   CO2 28 05/11/2017   CO2 28 05/11/2017   * 05/11/2017   * 05/11/2017   CA 8.2* 05/11/2017   CA 8.2* 05/11/2017   ALB 2.8* 05/11/2017   ALKPHO 53 05/11/2017   BILT 0.4 05/11/2017   TP 5.3

## 2017-05-12 NOTE — CM/SW NOTE
CTL met with patient prior to discharge to explain, sign and provide copy of IM to patient; copy placed in chart.   [address] CTL [id number]

## 2017-05-12 NOTE — PHYSICAL THERAPY NOTE
PHYSICAL THERAPY KNEE TREATMENT NOTE - INPATIENT     Room Number: 682/753-Y       Number of Visits to Meet Established Goals: 6          Problem List  Active Problems:    Mechanical loosening of internal right knee prosthetic joint ([address]      ASSESSMENT reps *** reps   Hip Abd/Add *** reps *** reps   Heel slides *** reps *** reps   Saq *** reps *** reps   SLR *** reps *** reps   Sitting Knee Flexion *** reps *** reps   Standing heel/toe raises *** reps *** reps   Standing knee flexion *** reps *** reps

## 2017-05-12 NOTE — DISCHARGE PLANNING
SW was notified the pt. Will be discharging home today 5/12.   SUZAN notified VEENA [address]    VEENA [address] [facility] ext [phone]

## 2017-05-15 ENCOUNTER — TELEPHONE (OUTPATIENT)
Dept: FAMILY MEDICINE CLINIC | Facility: CLINIC | Age: 67
End: 2017-05-15

## 2017-05-15 ENCOUNTER — PATIENT OUTREACH (OUTPATIENT)
Dept: CASE MANAGEMENT | Age: 67
End: 2017-05-15

## 2017-05-15 DIAGNOSIS — Z02.9 ENCOUNTERS FOR ADMINISTRATIVE PURPOSE: Primary | ICD-10-CM

## 2017-05-15 NOTE — PROGRESS NOTES
Initial Post Discharge Follow Up   Discharge Date: 5/12/17  Contact Date: 5/15/2017    Consent Verification:  Assessment Completed With: Patient  HIPAA Verified?   Yes    Discharge Dx:  Right total knee replacement revision    General:   • How have you b (three) times daily as needed for Muscle spasms.  Disp: 90 tablet Rfl: 0   CLONIDINE HCL 0.1 MG Oral Tab TAKE 1 TABLET EVERY EVENING (MD VISIT REQUIRED PRIOR TO FUTURE REFILL) Disp: 90 tablet Rfl: 0   SIMVASTATIN 10 MG Oral Tab TAKE 1 TABLET EVERY NIGHT AT No     DX: specifics:  Please enter following SmartPhrase as applicable:  AMI:  .TCMAMITEMP  CHF:  .TCMCHFTEMP  COPD: .TCMCOPDTEMP  CVA: .TCMCVATEMP  CV Surgery:  . TCMCVSURGERY  Pneumonia: . TCMPNEUMONIATEMP  Ortho/Spine:  TCMORTHOSPINETEMP  Re-admit:  . TC Wednesday to change wound vac dressing. Reviewed when to call MD vs when to go to ER/call 911. Pt verbalized understanding. Pt states Tamoxifen was discontinued at d/c and is not sure why or when she can resume this.   TE sent to PCP office regarding med

## 2017-05-15 NOTE — TELEPHONE ENCOUNTER
Pt states she is feeling fine, not using walker or cane. No pain, she does not want to schedule appt at this time. Pt was told to hold tamoxifen for now. Advised pt to notify her oncologist as well , Dr. Claudio Brandon.   Pt would like JG to review hospital recor

## 2017-05-15 NOTE — TELEPHONE ENCOUNTER
Spoke to pt for TCM today. Pt does not have HFU appt scheduled at this time. HFU appt recommended by 5/26 as pt is a moderate risk for readmission. While on the phone, pt expressed concerns about Tamoxifen being discontinued at discharge.   Pt states she

## 2017-05-16 ENCOUNTER — TELEPHONE (OUTPATIENT)
Dept: HEMATOLOGY/ONCOLOGY | Facility: HOSPITAL | Age: 67
End: 2017-05-16

## 2017-05-16 NOTE — TELEPHONE ENCOUNTER
Spoke with pt, she has heard from Dr. Shane Hernandez nurse with Dr. Shane Hernandez input. Task completed.

## 2017-05-16 NOTE — TELEPHONE ENCOUNTER
Pt states she had knee replacement surgery on January 23. Since surgery she started having problems and fluid had to aspirated from the knee. Last week she had surgery done by Dr. Isaias Sullivan to \"clean up\" the knee.   She states she was told to hold her tamo

## 2017-05-29 NOTE — DISCHARGE SUMMARY
[facility]    PATIENT'S NAME: Leti Pritchard   ATTENDING PHYSICIAN: Brian Fernandez.  Dagmar Andrade MD   PATIENT ACCOUNT#:   [id number]    LOCATION:  [address] [place] [address] #:   [id number]       YOB: 1950  ADMISSION DATE:       05/10/20

## 2017-06-17 ENCOUNTER — APPOINTMENT (OUTPATIENT)
Dept: GENERAL RADIOLOGY | Facility: HOSPITAL | Age: 67
DRG: 464 | End: 2017-06-17
Attending: EMERGENCY MEDICINE
Payer: MEDICARE

## 2017-06-17 ENCOUNTER — HOSPITAL ENCOUNTER (INPATIENT)
Facility: HOSPITAL | Age: 67
LOS: 5 days | Discharge: HOME HEALTH | DRG: 464 | End: 2017-06-22
Attending: EMERGENCY MEDICINE | Admitting: INTERNAL MEDICINE
Payer: MEDICARE

## 2017-06-17 DIAGNOSIS — L03.115 CELLULITIS OF RIGHT LOWER EXTREMITY: Primary | ICD-10-CM

## 2017-06-17 DIAGNOSIS — T84.032D MECHANICAL LOOSENING OF INTERNAL RIGHT KNEE PROSTHETIC JOINT, SUBSEQUENT ENCOUNTER: ICD-10-CM

## 2017-06-17 DIAGNOSIS — M00.9 SEPTIC JOINT OF RIGHT KNEE JOINT (HCC): ICD-10-CM

## 2017-06-17 PROCEDURE — 99222 1ST HOSP IP/OBS MODERATE 55: CPT | Performed by: INTERNAL MEDICINE

## 2017-06-17 PROCEDURE — 73560 X-RAY EXAM OF KNEE 1 OR 2: CPT | Performed by: EMERGENCY MEDICINE

## 2017-06-17 RX ORDER — HYDROCODONE BITARTRATE AND ACETAMINOPHEN 10; 325 MG/1; MG/1
1 TABLET ORAL EVERY 6 HOURS PRN
Status: DISCONTINUED | OUTPATIENT
Start: 2017-06-17 | End: 2017-06-22

## 2017-06-17 RX ORDER — TRIAMTERENE AND HYDROCHLOROTHIAZIDE 37.5; 25 MG/1; MG/1
1 CAPSULE ORAL DAILY
Status: DISCONTINUED | OUTPATIENT
Start: 2017-06-17 | End: 2017-06-22

## 2017-06-17 RX ORDER — HEPARIN SODIUM 5000 [USP'U]/ML
5000 INJECTION, SOLUTION INTRAVENOUS; SUBCUTANEOUS EVERY 8 HOURS SCHEDULED
Status: DISCONTINUED | OUTPATIENT
Start: 2017-06-17 | End: 2017-06-17

## 2017-06-17 RX ORDER — PRAVASTATIN SODIUM 10 MG
10 TABLET ORAL NIGHTLY
Status: DISCONTINUED | OUTPATIENT
Start: 2017-06-17 | End: 2017-06-22

## 2017-06-17 RX ORDER — POTASSIUM CHLORIDE 20 MEQ/1
40 TABLET, EXTENDED RELEASE ORAL ONCE
Status: COMPLETED | OUTPATIENT
Start: 2017-06-17 | End: 2017-06-17

## 2017-06-17 RX ORDER — GABAPENTIN 300 MG/1
300 CAPSULE ORAL 3 TIMES DAILY
Status: DISCONTINUED | OUTPATIENT
Start: 2017-06-17 | End: 2017-06-18

## 2017-06-17 RX ORDER — ASPIRIN 325 MG
325 TABLET, DELAYED RELEASE (ENTERIC COATED) ORAL DAILY
Status: DISCONTINUED | OUTPATIENT
Start: 2017-06-17 | End: 2017-06-18

## 2017-06-17 RX ORDER — POTASSIUM CHLORIDE 14.9 MG/ML
20 INJECTION INTRAVENOUS ONCE
Status: DISCONTINUED | OUTPATIENT
Start: 2017-06-17 | End: 2017-06-17

## 2017-06-17 RX ORDER — POTASSIUM CHLORIDE 14.9 MG/ML
20 INJECTION INTRAVENOUS ONCE
Status: COMPLETED | OUTPATIENT
Start: 2017-06-17 | End: 2017-06-17

## 2017-06-17 RX ORDER — 0.9 % SODIUM CHLORIDE 0.9 %
VIAL (ML) INJECTION
Status: COMPLETED
Start: 2017-06-17 | End: 2017-06-17

## 2017-06-17 RX ORDER — CLONIDINE HYDROCHLORIDE 0.1 MG/1
0.1 TABLET ORAL EVERY 24 HOURS
Status: DISCONTINUED | OUTPATIENT
Start: 2017-06-17 | End: 2017-06-22

## 2017-06-17 NOTE — PROGRESS NOTES
[facility] dosing service    Initial Pharmacokinetic Consult for Vancomycin Dosing     Belle Grimes is a 78year old female who is being treated for R knee wound infection. Pharmacy has been asked to dose Vancomycin by Dr. Moe Callejas.     She is allerg Vancomycin trough level(s) prior to 4th dose. 3.  Pharmacy will need BUN/Scr daily while on Vancomycin to assess renal function. Pharmacy will continue to follow her. We appreciate the opportunity to assist in her care.     Toby Saavedra, SerafinD  6

## 2017-06-17 NOTE — H&P
[facility]    PATIENT'S NAME: Kameron HARDY   ATTENDING PHYSICIAN: Harini Cameron MD   PATIENT ACCOUNT#:   [id number]    LOCATION:  [address] RECORD #:   [id number]       YOB: 1950  ADMISSION DATE:       06/17/2017 usage.    REVIEW OF SYSTEMS:  Fever and right knee pain, worsened with ambulation. No recent fall. Denies chest pain. No dizzy spells. No abdominal pain. No difficulty with urination.       PHYSICAL EXAMINATION:    GENERAL:  She is obese, alert, orient Hyperlipidemia. Resume simvastatin 10 mg at night. 6.   Pain control. 7.   DVT prophylaxis. Dictated By Blas Cameron MD  d: 06/17/2017 14:40:24  t: 06/17/2017 17:04:13  [facility] [id number]  GDRICHARDSON/

## 2017-06-17 NOTE — ED PROVIDER NOTES
Patient Seen in: [facility] AND [facility] Emergency Department    History   Patient presents with:  Cellulitis (integumentary, infectious)    Stated Complaint:     HPI    Patient complains of increased pain, swelling and drainage from r knee surgical site done (four) hours as needed for Pain.   gabapentin 300 MG Oral Cap,  Take 300 mg by mouth 3 (three) times daily. oxyCODONE-acetaminophen 5-325 MG Oral Tab,  Take 1 tablet by mouth every 4 (four) hours as needed for Pain.    Cyclobenzaprine HCl (FLEXERIL) 10 MG BP 06/17/17 1213 133/55 mmHg   Pulse 06/17/17 1213 61   Resp 06/17/17 1213 20   Temp 06/17/17 1213 98.8 °F (37.1 °C)   Temp src 06/17/17 1213 Oral   SpO2 06/17/17 1213 97 %   O2 Device 06/17/17 1213 None (Room air)       Current:/69 mmHg  Pulse 105 Abnormality         Status                     ---------                               -----------         ------                     CBC W/ DIFFERENTIAL[[id number]]          Abnormal            Final result                 Please view results for these hailey

## 2017-06-17 NOTE — ED NOTES
Patient states she needs to eat her lunch prior to taking potassium. Endorsed to [employer], RN.

## 2017-06-17 NOTE — CONSULTS
[facility]    Report of Consultation    Noman Lynn Patient Status:  Emergency    1950 MRN [id number]   Location [address] Attending Morelia Silva MD   [facility] Day # 0 PCP Susie Arteaga DO     Da Revision-Dr. Heck       Family History  Family History   Problem Relation Age of Onset   • Depression Mother    • Diabetes Mother    • Breast Cancer Mother 52   • Depression Maternal Aunt    • Breast Cancer Maternal Aunt 39   • Heart Attack Father    • Br 01/06/2017   INR 0.95 01/06/2017   PTP 13.0 01/06/2017   T4F 0.9 11/14/2016   TSH 0.59 05/11/2017   ESRML 9 01/06/2017   CRP <0.29 01/06/2017   MG 1.6* 01/21/2017   B12 176* 05/11/2017         Imaging:  Xr Knee (1 Or 2 Views), Right ([id number])    6/17/201

## 2017-06-17 NOTE — ED INITIAL ASSESSMENT (HPI)
Pt reports knee replacement sx in January. PT reports knee was drained b/c \"filled with blood\" in May. Pt reports, \"I noticed on Monday I had blood coming out of my knee. I went and saw Dr. Heck on Tuesday\".  Pt reports increase pain, swelling, and

## 2017-06-18 ENCOUNTER — ANESTHESIA (OUTPATIENT)
Dept: SURGERY | Facility: HOSPITAL | Age: 67
End: 2017-06-18

## 2017-06-18 ENCOUNTER — SURGERY (OUTPATIENT)
Age: 67
End: 2017-06-18

## 2017-06-18 ENCOUNTER — ANESTHESIA (OUTPATIENT)
Dept: SURGERY | Facility: HOSPITAL | Age: 67
DRG: 464 | End: 2017-06-18
Payer: MEDICARE

## 2017-06-18 ENCOUNTER — ANESTHESIA EVENT (OUTPATIENT)
Dept: SURGERY | Facility: HOSPITAL | Age: 67
End: 2017-06-18

## 2017-06-18 ENCOUNTER — APPOINTMENT (OUTPATIENT)
Dept: GENERAL RADIOLOGY | Facility: HOSPITAL | Age: 67
DRG: 464 | End: 2017-06-18
Attending: ORTHOPAEDIC SURGERY
Payer: MEDICARE

## 2017-06-18 ENCOUNTER — ANESTHESIA EVENT (OUTPATIENT)
Dept: SURGERY | Facility: HOSPITAL | Age: 67
DRG: 464 | End: 2017-06-18
Payer: MEDICARE

## 2017-06-18 PROCEDURE — 0SPC0JZ REMOVAL OF SYNTHETIC SUBSTITUTE FROM RIGHT KNEE JOINT, OPEN APPROACH: ICD-10-PCS | Performed by: ORTHOPAEDIC SURGERY

## 2017-06-18 PROCEDURE — 73560 X-RAY EXAM OF KNEE 1 OR 2: CPT | Performed by: ORTHOPAEDIC SURGERY

## 2017-06-18 PROCEDURE — 0SHC08Z INSERTION OF SPACER INTO RIGHT KNEE JOINT, OPEN APPROACH: ICD-10-PCS | Performed by: ORTHOPAEDIC SURGERY

## 2017-06-18 PROCEDURE — 99232 SBSQ HOSP IP/OBS MODERATE 35: CPT | Performed by: INTERNAL MEDICINE

## 2017-06-18 DEVICE — CEMENT BONE ZIM PALICOS R +G: Type: IMPLANTABLE DEVICE | Site: KNEE | Status: FUNCTIONAL

## 2017-06-18 RX ORDER — HYDROCODONE BITARTRATE AND ACETAMINOPHEN 7.5; 325 MG/1; MG/1
2 TABLET ORAL EVERY 4 HOURS PRN
Status: DISCONTINUED | OUTPATIENT
Start: 2017-06-18 | End: 2017-06-22

## 2017-06-18 RX ORDER — MORPHINE SULFATE 2 MG/ML
2 INJECTION, SOLUTION INTRAMUSCULAR; INTRAVENOUS EVERY 10 MIN PRN
Status: DISCONTINUED | OUTPATIENT
Start: 2017-06-18 | End: 2017-06-18 | Stop reason: HOSPADM

## 2017-06-18 RX ORDER — TOBRAMYCIN 1.2 G/30ML
INJECTION, POWDER, LYOPHILIZED, FOR SOLUTION INTRAVENOUS AS NEEDED
Status: DISCONTINUED | OUTPATIENT
Start: 2017-06-18 | End: 2017-06-18 | Stop reason: HOSPADM

## 2017-06-18 RX ORDER — MORPHINE SULFATE 4 MG/ML
4 INJECTION, SOLUTION INTRAMUSCULAR; INTRAVENOUS EVERY 10 MIN PRN
Status: DISCONTINUED | OUTPATIENT
Start: 2017-06-18 | End: 2017-06-18 | Stop reason: HOSPADM

## 2017-06-18 RX ORDER — ACETAMINOPHEN 325 MG/1
650 TABLET ORAL EVERY 4 HOURS PRN
Status: DISCONTINUED | OUTPATIENT
Start: 2017-06-18 | End: 2017-06-22

## 2017-06-18 RX ORDER — MORPHINE SULFATE 10 MG/ML
6 INJECTION, SOLUTION INTRAMUSCULAR; INTRAVENOUS EVERY 10 MIN PRN
Status: DISCONTINUED | OUTPATIENT
Start: 2017-06-18 | End: 2017-06-18 | Stop reason: HOSPADM

## 2017-06-18 RX ORDER — SODIUM CHLORIDE, SODIUM LACTATE, POTASSIUM CHLORIDE, CALCIUM CHLORIDE 600; 310; 30; 20 MG/100ML; MG/100ML; MG/100ML; MG/100ML
INJECTION, SOLUTION INTRAVENOUS CONTINUOUS PRN
Status: DISCONTINUED | OUTPATIENT
Start: 2017-06-18 | End: 2017-06-18 | Stop reason: SURG

## 2017-06-18 RX ORDER — GLYCOPYRROLATE 0.2 MG/ML
INJECTION INTRAMUSCULAR; INTRAVENOUS AS NEEDED
Status: DISCONTINUED | OUTPATIENT
Start: 2017-06-18 | End: 2017-06-18 | Stop reason: SURG

## 2017-06-18 RX ORDER — HYDROMORPHONE HYDROCHLORIDE 1 MG/ML
0.4 INJECTION, SOLUTION INTRAMUSCULAR; INTRAVENOUS; SUBCUTANEOUS EVERY 2 HOUR PRN
Status: DISCONTINUED | OUTPATIENT
Start: 2017-06-18 | End: 2017-06-22

## 2017-06-18 RX ORDER — SODIUM PHOSPHATE, DIBASIC AND SODIUM PHOSPHATE, MONOBASIC 7; 19 G/133ML; G/133ML
1 ENEMA RECTAL ONCE AS NEEDED
Status: DISCONTINUED | OUTPATIENT
Start: 2017-06-18 | End: 2017-06-22

## 2017-06-18 RX ORDER — METOCLOPRAMIDE HYDROCHLORIDE 5 MG/ML
10 INJECTION INTRAMUSCULAR; INTRAVENOUS EVERY 6 HOURS PRN
Status: ACTIVE | OUTPATIENT
Start: 2017-06-18 | End: 2017-06-20

## 2017-06-18 RX ORDER — POTASSIUM CHLORIDE 14.9 MG/ML
20 INJECTION INTRAVENOUS ONCE
Status: COMPLETED | OUTPATIENT
Start: 2017-06-18 | End: 2017-06-18

## 2017-06-18 RX ORDER — FAMOTIDINE 10 MG/ML
20 INJECTION, SOLUTION INTRAVENOUS 2 TIMES DAILY
Status: DISCONTINUED | OUTPATIENT
Start: 2017-06-18 | End: 2017-06-22

## 2017-06-18 RX ORDER — BISACODYL 10 MG
10 SUPPOSITORY, RECTAL RECTAL
Status: DISCONTINUED | OUTPATIENT
Start: 2017-06-18 | End: 2017-06-22

## 2017-06-18 RX ORDER — DOCUSATE SODIUM 100 MG/1
100 CAPSULE, LIQUID FILLED ORAL 2 TIMES DAILY
Status: DISCONTINUED | OUTPATIENT
Start: 2017-06-18 | End: 2017-06-22

## 2017-06-18 RX ORDER — ONDANSETRON 2 MG/ML
INJECTION INTRAMUSCULAR; INTRAVENOUS AS NEEDED
Status: DISCONTINUED | OUTPATIENT
Start: 2017-06-18 | End: 2017-06-18 | Stop reason: SURG

## 2017-06-18 RX ORDER — HALOPERIDOL 5 MG/ML
0.25 INJECTION INTRAMUSCULAR ONCE AS NEEDED
Status: DISCONTINUED | OUTPATIENT
Start: 2017-06-18 | End: 2017-06-18 | Stop reason: HOSPADM

## 2017-06-18 RX ORDER — SODIUM CHLORIDE 0.9 % (FLUSH) 0.9 %
10 SYRINGE (ML) INJECTION AS NEEDED
Status: DISCONTINUED | OUTPATIENT
Start: 2017-06-18 | End: 2017-06-22

## 2017-06-18 RX ORDER — DIPHENHYDRAMINE HYDROCHLORIDE 50 MG/ML
12.5 INJECTION INTRAMUSCULAR; INTRAVENOUS EVERY 4 HOURS PRN
Status: DISCONTINUED | OUTPATIENT
Start: 2017-06-18 | End: 2017-06-22

## 2017-06-18 RX ORDER — MAGNESIUM OXIDE 400 MG (241.3 MG MAGNESIUM) TABLET
400 TABLET ONCE
Status: COMPLETED | OUTPATIENT
Start: 2017-06-18 | End: 2017-06-18

## 2017-06-18 RX ORDER — HYDROMORPHONE HYDROCHLORIDE 1 MG/ML
0.4 INJECTION, SOLUTION INTRAMUSCULAR; INTRAVENOUS; SUBCUTANEOUS EVERY 5 MIN PRN
Status: DISCONTINUED | OUTPATIENT
Start: 2017-06-18 | End: 2017-06-18 | Stop reason: HOSPADM

## 2017-06-18 RX ORDER — HYDROMORPHONE HYDROCHLORIDE 1 MG/ML
0.8 INJECTION, SOLUTION INTRAMUSCULAR; INTRAVENOUS; SUBCUTANEOUS EVERY 2 HOUR PRN
Status: DISCONTINUED | OUTPATIENT
Start: 2017-06-18 | End: 2017-06-22

## 2017-06-18 RX ORDER — FAMOTIDINE 20 MG/1
20 TABLET ORAL 2 TIMES DAILY
Status: DISCONTINUED | OUTPATIENT
Start: 2017-06-18 | End: 2017-06-22

## 2017-06-18 RX ORDER — POTASSIUM CHLORIDE 20 MEQ/1
40 TABLET, EXTENDED RELEASE ORAL EVERY 4 HOURS
Status: DISPENSED | OUTPATIENT
Start: 2017-06-18 | End: 2017-06-19

## 2017-06-18 RX ORDER — ONDANSETRON 2 MG/ML
4 INJECTION INTRAMUSCULAR; INTRAVENOUS ONCE AS NEEDED
Status: DISCONTINUED | OUTPATIENT
Start: 2017-06-18 | End: 2017-06-18 | Stop reason: HOSPADM

## 2017-06-18 RX ORDER — HYDROMORPHONE HYDROCHLORIDE 1 MG/ML
0.6 INJECTION, SOLUTION INTRAMUSCULAR; INTRAVENOUS; SUBCUTANEOUS EVERY 5 MIN PRN
Status: DISCONTINUED | OUTPATIENT
Start: 2017-06-18 | End: 2017-06-18 | Stop reason: HOSPADM

## 2017-06-18 RX ORDER — HYDROMORPHONE HYDROCHLORIDE 1 MG/ML
0.2 INJECTION, SOLUTION INTRAMUSCULAR; INTRAVENOUS; SUBCUTANEOUS EVERY 5 MIN PRN
Status: DISCONTINUED | OUTPATIENT
Start: 2017-06-18 | End: 2017-06-18 | Stop reason: HOSPADM

## 2017-06-18 RX ORDER — CYCLOBENZAPRINE HCL 10 MG
10 TABLET ORAL EVERY 8 HOURS PRN
Status: DISCONTINUED | OUTPATIENT
Start: 2017-06-18 | End: 2017-06-22

## 2017-06-18 RX ORDER — DEXAMETHASONE SODIUM PHOSPHATE 4 MG/ML
VIAL (ML) INJECTION AS NEEDED
Status: DISCONTINUED | OUTPATIENT
Start: 2017-06-18 | End: 2017-06-18 | Stop reason: SURG

## 2017-06-18 RX ORDER — TRANEXAMIC ACID 100 MG/ML
INJECTION, SOLUTION INTRAVENOUS AS NEEDED
Status: DISCONTINUED | OUTPATIENT
Start: 2017-06-18 | End: 2017-06-18 | Stop reason: SURG

## 2017-06-18 RX ORDER — HYDROMORPHONE HYDROCHLORIDE 1 MG/ML
0.2 INJECTION, SOLUTION INTRAMUSCULAR; INTRAVENOUS; SUBCUTANEOUS EVERY 2 HOUR PRN
Status: DISCONTINUED | OUTPATIENT
Start: 2017-06-18 | End: 2017-06-22

## 2017-06-18 RX ORDER — DIPHENHYDRAMINE HYDROCHLORIDE 50 MG/ML
25 INJECTION INTRAMUSCULAR; INTRAVENOUS ONCE AS NEEDED
Status: ACTIVE | OUTPATIENT
Start: 2017-06-18 | End: 2017-06-18

## 2017-06-18 RX ORDER — DIPHENHYDRAMINE HCL 25 MG
25 CAPSULE ORAL EVERY 4 HOURS PRN
Status: DISCONTINUED | OUTPATIENT
Start: 2017-06-18 | End: 2017-06-22

## 2017-06-18 RX ORDER — PHENYLEPHRINE HCL 10 MG/ML
VIAL (ML) INJECTION AS NEEDED
Status: DISCONTINUED | OUTPATIENT
Start: 2017-06-18 | End: 2017-06-18 | Stop reason: SURG

## 2017-06-18 RX ORDER — POLYETHYLENE GLYCOL 3350 17 G/17G
17 POWDER, FOR SOLUTION ORAL DAILY PRN
Status: DISCONTINUED | OUTPATIENT
Start: 2017-06-18 | End: 2017-06-22

## 2017-06-18 RX ORDER — HYDROCODONE BITARTRATE AND ACETAMINOPHEN 7.5; 325 MG/1; MG/1
1 TABLET ORAL EVERY 4 HOURS PRN
Status: DISCONTINUED | OUTPATIENT
Start: 2017-06-18 | End: 2017-06-22

## 2017-06-18 RX ORDER — SODIUM CHLORIDE 9 MG/ML
INJECTION, SOLUTION INTRAVENOUS CONTINUOUS
Status: DISCONTINUED | OUTPATIENT
Start: 2017-06-18 | End: 2017-06-20

## 2017-06-18 RX ORDER — LIDOCAINE HYDROCHLORIDE 10 MG/ML
INJECTION, SOLUTION EPIDURAL; INFILTRATION; INTRACAUDAL; PERINEURAL AS NEEDED
Status: DISCONTINUED | OUTPATIENT
Start: 2017-06-18 | End: 2017-06-18 | Stop reason: SURG

## 2017-06-18 RX ORDER — NALOXONE HYDROCHLORIDE 0.4 MG/ML
80 INJECTION, SOLUTION INTRAMUSCULAR; INTRAVENOUS; SUBCUTANEOUS AS NEEDED
Status: DISCONTINUED | OUTPATIENT
Start: 2017-06-18 | End: 2017-06-18 | Stop reason: HOSPADM

## 2017-06-18 RX ORDER — ONDANSETRON 2 MG/ML
4 INJECTION INTRAMUSCULAR; INTRAVENOUS EVERY 4 HOURS PRN
Status: DISCONTINUED | OUTPATIENT
Start: 2017-06-18 | End: 2017-06-22

## 2017-06-18 RX ORDER — GABAPENTIN 300 MG/1
300 CAPSULE ORAL 3 TIMES DAILY
Status: DISCONTINUED | OUTPATIENT
Start: 2017-06-18 | End: 2017-06-22

## 2017-06-18 RX ORDER — HYDROCODONE BITARTRATE AND ACETAMINOPHEN 5; 325 MG/1; MG/1
1 TABLET ORAL AS NEEDED
Status: DISCONTINUED | OUTPATIENT
Start: 2017-06-18 | End: 2017-06-18 | Stop reason: HOSPADM

## 2017-06-18 RX ORDER — ROCURONIUM BROMIDE 10 MG/ML
INJECTION, SOLUTION INTRAVENOUS AS NEEDED
Status: DISCONTINUED | OUTPATIENT
Start: 2017-06-18 | End: 2017-06-18 | Stop reason: SURG

## 2017-06-18 RX ORDER — NEOSTIGMINE METHYLSULFATE 0.5 MG/ML
INJECTION INTRAVENOUS AS NEEDED
Status: DISCONTINUED | OUTPATIENT
Start: 2017-06-18 | End: 2017-06-18 | Stop reason: SURG

## 2017-06-18 RX ORDER — HYDROCODONE BITARTRATE AND ACETAMINOPHEN 5; 325 MG/1; MG/1
2 TABLET ORAL AS NEEDED
Status: DISCONTINUED | OUTPATIENT
Start: 2017-06-18 | End: 2017-06-18 | Stop reason: HOSPADM

## 2017-06-18 RX ADMIN — SODIUM CHLORIDE, SODIUM LACTATE, POTASSIUM CHLORIDE, CALCIUM CHLORIDE: 600; 310; 30; 20 INJECTION, SOLUTION INTRAVENOUS at 09:30:00

## 2017-06-18 RX ADMIN — ROCURONIUM BROMIDE 20 MG: 10 INJECTION, SOLUTION INTRAVENOUS at 10:08:00

## 2017-06-18 RX ADMIN — NEOSTIGMINE METHYLSULFATE 3 MG: 0.5 INJECTION INTRAVENOUS at 11:45:00

## 2017-06-18 RX ADMIN — TRANEXAMIC ACID 1000 MG: 100 INJECTION, SOLUTION INTRAVENOUS at 11:25:00

## 2017-06-18 RX ADMIN — TRANEXAMIC ACID 1000 MG: 100 INJECTION, SOLUTION INTRAVENOUS at 09:45:00

## 2017-06-18 RX ADMIN — SODIUM CHLORIDE, SODIUM LACTATE, POTASSIUM CHLORIDE, CALCIUM CHLORIDE: 600; 310; 30; 20 INJECTION, SOLUTION INTRAVENOUS at 10:12:00

## 2017-06-18 RX ADMIN — ONDANSETRON 4 MG: 2 INJECTION INTRAMUSCULAR; INTRAVENOUS at 11:05:00

## 2017-06-18 RX ADMIN — DEXAMETHASONE SODIUM PHOSPHATE 4 MG: 4 MG/ML VIAL (ML) INJECTION at 10:30:00

## 2017-06-18 RX ADMIN — PHENYLEPHRINE HCL 50 MCG: 10 MG/ML VIAL (ML) INJECTION at 11:15:00

## 2017-06-18 RX ADMIN — LIDOCAINE HYDROCHLORIDE 50 MG: 10 INJECTION, SOLUTION EPIDURAL; INFILTRATION; INTRACAUDAL; PERINEURAL at 09:37:00

## 2017-06-18 RX ADMIN — GLYCOPYRROLATE 0.6 MG: 0.2 INJECTION INTRAMUSCULAR; INTRAVENOUS at 11:45:00

## 2017-06-18 RX ADMIN — PHENYLEPHRINE HCL 100 MCG: 10 MG/ML VIAL (ML) INJECTION at 10:55:00

## 2017-06-18 RX ADMIN — ROCURONIUM BROMIDE 30 MG: 10 INJECTION, SOLUTION INTRAVENOUS at 09:37:00

## 2017-06-18 RX ADMIN — SODIUM CHLORIDE, SODIUM LACTATE, POTASSIUM CHLORIDE, CALCIUM CHLORIDE: 600; 310; 30; 20 INJECTION, SOLUTION INTRAVENOUS at 11:05:00

## 2017-06-18 RX ADMIN — SODIUM CHLORIDE, SODIUM LACTATE, POTASSIUM CHLORIDE, CALCIUM CHLORIDE: 600; 310; 30; 20 INJECTION, SOLUTION INTRAVENOUS at 11:45:00

## 2017-06-18 NOTE — PROGRESS NOTES
Ancef (cefazolin) 2000 mg IV q8h was ordered for [address] by Dr. Meggan Tim. Body mass index is 29.3 kg/(m^2).   Wt Readings from Last 6 Encounters:  06/17/17 : 198 lb 8 oz  05/10/17 : 209 lb  05/05/17 : 208 lb 9.6 oz  04/27/17 : 206 lb  04/24/17 :

## 2017-06-18 NOTE — CONSULTS
[facility]    PATIENT'S NAME: Nick HARDY   ATTENDING PHYSICIAN: Harini Cameron MD   CONSULTING PHYSICIAN: Ching Paz.  MD Victoria   PATIENT ACCOUNT#:   [de-identified]    LOCATION:  [address] [place] [address] #:   [id number]       DATE OF BIRTH:  [birth year] enlarged to where it is at this time. The open wound that she describes is considerably increased over what it was 24 hours ago. She does not describe any shaking chills.   On presentation to the emergency department at this time, x-ray of the studies of pretibial area. There is a mild to moderate joint effusion. Any suprapatellar pressure on the joint produces significant increase in purulent drainage from the distal right knee wound defect.   Proximal to that defect by about 1 cm is a small pinhole defe

## 2017-06-18 NOTE — PLAN OF CARE
Problem: Patient/Family Goals  Goal: Patient/Family Long Term Goal  Patient’s Long Term Goal:Heal and go home  Interventions:  - Sx in am   -IV ABX  -MONITOR LABS  -PAIN MANAGEMENT  - See additional Care Plan goals for specific interventions   Outcome: Pro Progressing  VSS, PAIN MANAGEMENT, PATIENT CALLING APPROPRIATELY, IV ABX, DRESSING DRY AND INTACT, CHANGING DRESSING PRN, I&D SCHEDULED IN AM, NPO, WILL CONTINUE TO MONITOR.

## 2017-06-18 NOTE — ANESTHESIA POSTPROCEDURE EVALUATION
Patient: Noman Lynn    Procedure Summary     Date Anesthesia Start Anesthesia Stop Room / Location    06/18/17 0928 1203 300 [facility] MAIN OR 06 / 300 [facility] MAIN OR       Procedure Diagnosis Surgeon Responsible Provider    KNEE TOTAL REVISION (Right Knee) Kristaica

## 2017-06-18 NOTE — PROGRESS NOTES
INFECTIOUS DISEASE PROGRESS NOTE    Chepe Sheets Patient Status:  Inpatient    1950 MRN [id number]   Location [place] [address] Attending Co, Ana María Tatum MD   Hosp Day # 1 PCP Amber Reeves DO     Subjective:  Patient in [address] [place] [address] complicated by hemarthrosis R knee, s/p I&D on 5/10/17.  Developed dehiscence of lower aspect wound, developed fever, started on cephalexin on 6/14.  Now presents with increasing pain, purulent drainage from R knee wound.  Drainage sent for culture.  ID co

## 2017-06-18 NOTE — DISCHARGE PLANNING
SW met w/ pt and pt's  to discuss d/c planning. Pt lives at home w/  and has 13 steps to use. Pt reported hx of [facility] HHC. Pt reported having a walker and cane. Pt reported being independent w/ ADL's.  SW discussed pt needing LT IV Abx - SW disc

## 2017-06-18 NOTE — CONSULTS
[facility] - [facility]    Report of Consultation    Joan Flood Patient Status:  Inpatient    1950 MRN [id number]   Location [place] [address] Attending Co, Rosie King MD   Hosp Day # 1 PCP Leyla Melton,      Date of A Family History   Problem Relation Age of Onset   • Depression Mother    • Diabetes Mother    • Breast Cancer Mother 52   • Depression Maternal Aunt    • Breast Cancer Maternal Aunt 39   • Heart Attack Father    • Breast Cancer Self 39   • Ovarian Cance Intravenous, Once PRN  •  Prochlorperazine Edisylate (COMPAZINE) injection 5 mg, 5 mg, Intravenous, Once PRN  •  haloperidol lactate (HALDOL) 5 MG/ML injection 0.25 mg, 0.25 mg, Intravenous, Once PRN  •  Naloxone HCl (NARCAN) 0.4 MG/ML injection 80 mcg, 80 palpable cervical lymphadenopathy. Neurologic: This patient is alert and orientated x 3. Speech fluent. Able to follow simple commands. Face is symmetrical.  No Drift. Gait deferred.      Impression and Plan:  Patient Active Problem List:     Screening

## 2017-06-18 NOTE — OPERATIVE REPORT
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 We opened up the knee and made a medial parapatellar arthrotomy. We opened up the knee and noted severe exuberant  synovium with purulent material from the knee. We took cultures at that point.  We did a synovectomy and then knee.      IMPLANTS:  OsteoRemedies large femur and large tibia    POSTOPERATIVE PLAN:   Weight bearing as tolerated. Deep venous thrombosis prophylaxis. Immobilization. Perioperative antibiotics. ID consultation.

## 2017-06-18 NOTE — ANESTHESIA PREPROCEDURE EVALUATION
Anesthesia PreOp Note    HPI:     Kel Enriquez is a 78year old female who presents for preoperative consultation requested by: Pauly Zhou MD    Date of Surgery: 6/17/2017 - 6/18/2017    Procedure(s):  KNEE TOTAL REVISION  Indication: Mechanical lo Other screening mammogram         Class: Chronic         Date Noted: 06/29/2012      Laboratory examination ordered as part of a routine general medical examination         Class: Chronic         Date Noted: 06/29/2012      Osteoporosis, unspecified Cyclobenzaprine HCl (FLEXERIL) 10 MG Oral Tab Take 1 tablet (10 mg total) by mouth 3 (three) times daily as needed for Muscle spasms.  Disp: 90 tablet Rfl: 0 Past Week at Unknown time   CLONIDINE HCL 0.1 MG Oral Tab TAKE 1 TABLET EVERY EVENING (MD VISIT REQ [MAR Hold] Vancomycin HCl (VANCOCIN) 1,250 mg in sodium chloride 0.9 % 500 mL IVPB 15 mg/kg Intravenous Q12H Co, Harini HARDY MD Last Rate: 333.3 mL/hr at 06/18/17 0508 1,250 mg at 06/18/17 4547   aspirin EC EC tab 325 mg 325 mg Oral Daily CoSeth MD Social History Narrative       Available pre-op labs reviewed.     Lab Results  Component Value Date   WBC 5.6 06/18/2017   RBC 3.34* 06/18/2017   HGB 9.4* 06/18/2017   HCT 28.1* 06/18/2017   MCV 84.0 06/18/2017   MCH 28.3 06/18/2017   MCHC 33.7 06/18/2017

## 2017-06-18 NOTE — PROGRESS NOTES
[place] [facility]    Progress Note    Chang Renner Patient Status:  Inpatient    1950 MRN [id number]   Location [facility] 4W/SW/SE Attending Co, Palak Gasca MD   Hosp Day # 1 PCP Sally Weller DO       SUBJECTIVE:  Feels w g Oral Daily PRN   magnesium hydroxide (MILK OF MAGNESIA) 400 MG/5ML suspension 30 mL 30 mL Oral Daily PRN   bisacodyl (DULCOLAX) rectal suppository 10 mg 10 mg Rectal Daily PRN   FLEET ENEMA (FLEET) 7-19 GM/118ML enema 133 mL 1 enema Rectal Once PRN   ond HYDROcodone-acetaminophen (NORCO)  MG per tab 1 tablet 1 tablet Oral Q6H PRN   Pravastatin Sodium (PRAVACHOL) tab 10 mg 10 mg Oral Nightly   Triamterene-HCTZ (DYAZIDE) 37.5-25 MG per cap 1 capsule 1 capsule Oral Daily   Sertraline HCl (ZOLOFT) tab clinical documentation in H+P. Based on patients current state of illness, I anticipate that, after discharge, patient will require TBD.         GUERLINE GREEN  6/18/2017  2:10 PM

## 2017-06-18 NOTE — PROGRESS NOTES
ORTHO SURG:  CONSULT DICTATED. INFECTED RIGHT TOTAL KNEE. OR AT 0800 HRS FOR INCISION, DRAINAGE & DEBRIDEMENT WITH POLYETHYLENE EXCHANGE. CALLED DR. GREEN AND INFORMED HER OF SAME.

## 2017-06-19 RX ORDER — MAGNESIUM OXIDE 400 MG (241.3 MG MAGNESIUM) TABLET
400 TABLET 2 TIMES DAILY WITH MEALS
Status: DISCONTINUED | OUTPATIENT
Start: 2017-06-19 | End: 2017-06-22

## 2017-06-19 NOTE — OCCUPATIONAL THERAPY NOTE
OCCUPATIONAL THERAPY EVALUATION - INPATIENT      Room Number: 407/407-A  Evaluation Date: 6/19/2017  Type of Evaluation: Initial  Presenting Problem:  (s/p resection arthroplasty and antibiotic spacer placement)    Physician Order: IP Consult to Occupation left arm and right arm       Past Surgical History      Past Surgical History      25    KNEE ARTHROSCP HARV Left     TONSILLECTOMY      MAGGIE NEEDLE LOCALIZATION W/ SPECIMEN 1 SITE RIGHT      MASTECTOMY LEFT  1990    BREAST BIOPSY  2014    MA urinal? : A Lot  -   Putting on and taking off regular upper body clothing?: A Little  -   Taking care of personal grooming such as brushing teeth?: A Little  -   Eating meals?: None    AM-PAC Score:  Score: 16  Approx Degree of Impairment: 53.32%  Standar

## 2017-06-19 NOTE — PAYOR COMM NOTE
--------------  ADMISSION REVIEW     Payor: MEDICARE PART A&B  Subscriber #:  [id number]  Authorization Number: N/A    Admit date: 6/17/2017 12:15 PM       Admitting Physician: Xavier Alvarado MD  Attending Physician:  Xavier Alvarado MD  Primary Care Physic SITE RIGHT      MASTECTOMY LEFT  1990    BREAST BIOPSY  4/9/2014    MASTECTOMY RIGHT  04/08/2014    Comment Right mastectomy with sentinel lymph node biopsy, injection of blue dye for sentinel lymph node identification, completion axillary lymph node disse Depression Maternal Aunt    • Breast Cancer Maternal Aunt 39   • Heart Attack Father    • Breast Cancer Self 41   • Ovarian Cancer Cousin          Smoking Status: Never Smoker                      Smokeless Status: Never Used                        Alcohol vancomycin the patient is receiving    ED Course     Labs Reviewed   BASIC METABOLIC PANEL (8) - Abnormal; Notable for the following:     Glucose 171 (*)     Sodium 135 (*)     Potassium 2.7 (*)     All other components within normal limits   CBC W/ DIFFER [facility]    PATIENT'S NAME: Melo Garcia ANTONY   ATTENDING PHYSICIAN: Harini Cameron MD   PATIENT ACCOUNT#:   [id number]    LOCATION:  [address] RECORD #:   [id number]       YOB: 1950  ADMISSION DATE:       06/17/20 usage.    REVIEW OF SYSTEMS:  Fever and right knee pain, worsened with ambulation. No recent fall. Denies chest pain. No dizzy spells. No abdominal pain. No difficulty with urination.       PHYSICAL EXAMINATION:    GENERAL:  She is obese, alert, orient Hyperlipidemia. Resume simvastatin 10 mg at night. 6.   Pain control. 7.   DVT prophylaxis. Dictated By Shane Cameron MD  d: 06/17/2017 14:40:24  t: 06/17/2017 17:04:13  Job [id number]  FPS/     Electronically signed by Francie Spence Kamille Jj RN    2017 1455 Given 1 tablet Oral Yolie Nicolas RN      hydrocodone-acetaminophen [facility]) 7.5-325 MG per tab 2 tablet     Date Action Dose Route User    2017 0859 Given 2 tablet Oral Dary Leslie RN      magnesium oxide (MAG-OX) tab 40 increasing pain, purulent drainage from R knee wound.  Drainage sent for culture.  ID consulted.  Seen in ER.     Past Medical History  Past Medical History    Diagnosis  Date    •  Acute sinusitis, unspecified      •  Unspecified asthma(493.90)      •  Asy sodium chloride 0.9 % 250 mL IVPB add-vantage  1,000 mg  Intravenous  Once    Potassium Chloride ER (K-DUR M20) CR tab 40 mEq  40 mEq  Oral  Once    potassium chloride IVPB premix 20 mEq  20 mEq  Intravenous  Once    CefTRIAXone Sodium (ROCEPHIN) 1 g in so of infection in this region.  2.  Prior right knee arthroplasty.  No acute osseous abnormality of the right knee.               Impression:         79 y/o female s/p R TKR 6/93/73.  This was complicated by hemarthrosis R knee, s/p I&D on 5/10/17.  Developed IV Abx - SW discussed rehab, at home infusion, and infusion center. Due to Medicare not covering at home, pt would prefer to go to infusion center @ [facility] due to pt living in [facility], if possible. SW to follow up on recommendations.     Hosp Day #  2  P 06/19/2017    K  4.1  06/19/2017    K  4.1  06/19/2017    CL  108  06/19/2017    CO2  23  06/19/2017    GLU  155*  06/19/2017    CA  8.1*  06/19/2017    ALB  2.8*  05/11/2017    ALKPHO  53  05/11/2017    BILT  0.4  05/11/2017    TP  5.3*  05/11/2017    AST

## 2017-06-19 NOTE — PROGRESS NOTES
[facility] access hospital Pharmacy Note:  Renal Adjustment for Ancef (cefazolin)    Giorgi Maria is a 78year old female who has been prescribed Ancef (cefazolin) 2000 mg every 8 hrs. CrCl is estimated creatinine clearance is 93.5 mL/min (based on Cr of 0.61).  so the dose

## 2017-06-19 NOTE — PROGRESS NOTES
[address] Patient Status:  Inpatient    1950 MRN [id number]   Location [facility] 4W/SW/SE Attending Co, [address] [facility] Day # 2 PCP Nacho Bledsoe DO     Subjective:  Post-Operative Day: 1 Status Post rig PRN   apixaban (ELIQUIS) tab 2.5 mg 2.5 mg Oral BID   acetaminophen (TYLENOL) tab 650 mg 650 mg Oral Q4H PRN   Or      hydrocodone-acetaminophen (NORCO) 7.5-325 MG per tab 1 tablet 1 tablet Oral Q4H PRN   Or      hydrocodone-acetaminophen (NORCO) 7.5-325 M current post-op course  WBAT  PT/OT  Mobilize  Norco for pain control PRN  Eliquis for DVT prophylaxis in hospital  Continue antibiotics     LOS: 2 days

## 2017-06-19 NOTE — PROGRESS NOTES
[place] [facility] - [facility]    Progress Note    Gaurav Montoya Patient Status:  Inpatient    1950 MRN [id number]   Location [facility] 4W/SW/SE Attending Co, [address] [facility] Day # 2 PCP Keenan Dave DO       Subjective:   [address]* 06/19/2017   CA 8.1* 06/19/2017   ALB 2.8* 05/11/2017   ALKPHO 53 05/11/2017   BILT 0.4 05/11/2017   TP 5.3* 05/11/2017   AST 20 05/11/2017   ALT 11* 05/11/2017   PTT 29.2 01/06/2017   INR 0.95 01/06/2017   T4F 0.9 11/14/2016   TSH 0.59 05/11/2017   E

## 2017-06-19 NOTE — PROGRESS NOTES
INFECTIOUS DISEASE PROGRESS NOTE    Tyrone Vital Patient Status:  Inpatient    1950 MRN [id number]   [place] [address] Attending Co, Rik Blizzard, MD   Hosp Day # 2 PCP Rinku Reilly DO     Subjective:  Patient in [address] [place] [address] right knee prosthetic joint ([facility] [address]     Cellulitis of right lower extremity     Joint prosthesis infection or inflammation (HCC)     Hypokalemia      ASSESSMENT/PLAN:    Antibiotics:  Ancef 6/18; Vancomycin 6/17,  (ceftriaxone 6/17-18)    77 y/o female s/p R

## 2017-06-20 RX ORDER — MAGNESIUM OXIDE 400 MG (241.3 MG MAGNESIUM) TABLET
800 TABLET ONCE
Status: COMPLETED | OUTPATIENT
Start: 2017-06-20 | End: 2017-06-20

## 2017-06-20 RX ORDER — POTASSIUM CHLORIDE 20 MEQ/1
40 TABLET, EXTENDED RELEASE ORAL EVERY 4 HOURS
Status: DISPENSED | OUTPATIENT
Start: 2017-06-20 | End: 2017-06-20

## 2017-06-20 NOTE — PHYSICAL THERAPY NOTE
PHYSICAL THERAPY KNEE TREATMENT NOTE - INPATIENT     Room Number: 407/407-A             Presenting Problem: R TKA revision    Problem List  Principal Problem:    Cellulitis of right lower extremity  Active Problems:    Joint prosthesis infection or inflam Little   -   Climbing 3-5 steps with a railing?: A Little    AM-PAC Score:  Raw Score: 18   PT Approx Degree of Impairment Score: 46.58%   Standardized Score (AM-PAC Scale): 43.63   CMS Modifier (G-Code): CK    FUNCTIONAL ABILITY STATUS  Gait Assessment Goal #6    Goal #6  Current Status

## 2017-06-20 NOTE — DISCHARGE PLANNING
The pt. Is aware she will need IV abx upon discharge. The pt. Would like to see if she has coverage for IV abx at home. [facility] stated the pt. Is approved to come to their office, or if home with Medicare Part D provider.   Referral has been made to [facility]

## 2017-06-20 NOTE — PLAN OF CARE
DISCHARGE PLANNING    • Discharge to home or other facility with appropriate resources Progressing    Patient to discharge home with home health resources when medically cleared.      PAIN - ADULT    • Verbalizes/displays adequate comfort level or patient's

## 2017-06-20 NOTE — PHYSICAL THERAPY NOTE
PHYSICAL THERAPY KNEE EVALUATION - INPATIENT     Room Number: 407/407-A  Evaluation Date: 6/19/2017  Type of Evaluation: Initial  Physician Order: PT Eval and Treat (KI for all OOB activity, transfers & gait; NO AROM knee flex)    Presenting Problem: R TKA care/supervision    PLAN  PT Treatment Plan: Bed mobility;Transfer training;Gait training;Stair training;Strengthening;Patient education  Rehab Potential : Good  Frequency (Obs): BID       PHYSICAL THERAPY MEDICAL/SOCIAL HISTORY     History related to curr identification, completion axillary lymph node dissection, and advancement flap mastopexy    TOTAL KNEE REPLACEMENT Right 1/2017    TOTAL KNEE REPLACEMENT Right 5/10/17    Comment Revision-Dr. Saldivar Dates SITUATION  Type of Home: House   Home Layout: PT Approx Degree of Impairment Score: 50.57%   Standardized Score (AM-PAC Scale): 42.13   CMS Modifier (G-Code): CK    FUNCTIONAL ABILITY STATUS  Gait Assessment   Gait Assistance: Other (Comment) (CGA)  Distance (ft): 20  Assistive Device: Rolling walke

## 2017-06-20 NOTE — PROGRESS NOTES
[facility]    Progress Note    Tanisha Zarco Patient Status:  Inpatient    1950 MRN [id number]   Location [facility] 4W/SW/SE Attending Co, [address] [facility] Day # 3 PCP Addie Mchugh DO       Subjective:   Tamiko Roman 29.2 01/06/2017   INR 0.95 01/06/2017   T4F 0.9 11/14/2016   TSH 0.59 05/11/2017   ESRML 75* 06/17/2017   CRP 21.9* 06/17/2017   MG 1.5* 06/20/2017   B12 176* 05/11/2017       No procedure found.                 Magdalena Perez MD  6/20/2017

## 2017-06-20 NOTE — CM/SW NOTE
Patient requested update on her discharge plans. CTL informed patient that a referral has been made to [facility] and [facility] to check benefits for home IVABX w/p R knee MSSA infection.    Patient states either she or her  are willing to

## 2017-06-20 NOTE — PROGRESS NOTES
INFECTIOUS DISEASE PROGRESS NOTE    Ailynciera Love Patient Status:  Inpatient    1950 MRN [id number]   Location [place] [address] Attending Co, Charlene Green MD   Hosp Day # 3 PCP Toribio Rice DO     Subjective:  Patient in [address] [place] [address] disorder ([address]     History of breast cancer     Mechanical loosening of internal right knee prosthetic joint (HCC)     Cellulitis of right lower extremity     Joint prosthesis infection or inflammation (HCC)     Hypokalemia      ASSESSMENT/PLAN:    Antibio

## 2017-06-20 NOTE — PROGRESS NOTES
Dr Ray Laureano informed patient has bilateral mastectomies with lymphedema and bilateral arm BP restrictions.  Dr Ray Laureano informed PICC orders needed and he stated to ask PICC RN if PICC can still be inserted with bilateral mastectomy lymphedema history and i

## 2017-06-21 ENCOUNTER — APPOINTMENT (OUTPATIENT)
Dept: INTERVENTIONAL RADIOLOGY/VASCULAR | Facility: HOSPITAL | Age: 67
DRG: 464 | End: 2017-06-21
Attending: FAMILY MEDICINE
Payer: MEDICARE

## 2017-06-21 PROCEDURE — 02H633Z INSERTION OF INFUSION DEVICE INTO RIGHT ATRIUM, PERCUTANEOUS APPROACH: ICD-10-PCS | Performed by: RADIOLOGY

## 2017-06-21 RX ORDER — HEPARIN SODIUM (PORCINE) LOCK FLUSH IV SOLN 100 UNIT/ML 100 UNIT/ML
SOLUTION INTRAVENOUS
Status: COMPLETED
Start: 2017-06-21 | End: 2017-06-21

## 2017-06-21 RX ORDER — MIDAZOLAM HYDROCHLORIDE 1 MG/ML
INJECTION INTRAMUSCULAR; INTRAVENOUS
Status: COMPLETED
Start: 2017-06-21 | End: 2017-06-21

## 2017-06-21 RX ORDER — LIDOCAINE HYDROCHLORIDE 20 MG/ML
INJECTION, SOLUTION EPIDURAL; INFILTRATION; INTRACAUDAL; PERINEURAL
Status: COMPLETED
Start: 2017-06-21 | End: 2017-06-21

## 2017-06-21 RX ORDER — SODIUM CHLORIDE 9 MG/ML
INJECTION, SOLUTION INTRAVENOUS
Status: COMPLETED
Start: 2017-06-21 | End: 2017-06-21

## 2017-06-21 RX ORDER — 0.9 % SODIUM CHLORIDE 0.9 %
VIAL (ML) INJECTION
Status: COMPLETED
Start: 2017-06-21 | End: 2017-06-21

## 2017-06-21 NOTE — PROGRESS NOTES
INFECTIOUS DISEASE PROGRESS NOTE    Irena Zavala Patient Status:  Inpatient    1950 MRN [id number]   [place] [address] Attending Co, Nilda Krueger MD   [address] [facility] Day # 4 PCP Vahe Charles DO     Subjective:  Patient seen and depressive disorder ([facility] [address]     History of breast cancer     Mechanical loosening of internal right knee prosthetic joint (HCC)     Cellulitis of right lower extremity     Joint prosthesis infection or inflammation (HCC)     Hypokalemia      ASSESSMENT/PLAN:

## 2017-06-21 NOTE — PHYSICAL THERAPY NOTE
PHYSICAL THERAPY KNEE TREATMENT NOTE - INPATIENT     Room Number: 407/407-A             Presenting Problem: R TKA revision    Problem List  Principal Problem:    Cellulitis of right lower extremity  Active Problems:    Joint prosthesis infection or inflam None    AM-PAC Score:  Raw Score: 19   PT Approx Degree of Impairment Score: 41.77%   Standardized Score (AM-PAC Scale): 45.44   CMS Modifier (G-Code): CK    FUNCTIONAL ABILITY STATUS  Gait Assessment   Gait Assistance: Not tested  Distance (ft): 0  Assist

## 2017-06-21 NOTE — PHYSICAL THERAPY NOTE
Pt with RN in room complains very weak d/t NPO for procedure unable to participate with therapy activity. Will try to see her later.

## 2017-06-21 NOTE — PROGRESS NOTES
[place] [facility] - [facility]    Progress Note    Carmita Zarco Patient Status:  Inpatient    1950 MRN [id number]   Location [facility] 4W/SW/SE Attending Co, [address] [facility] # 4 PCP        Subjective:   Enmanuel Ross 05/11/2017   TP 5.3* 05/11/2017   AST 20 05/11/2017   ALT 11* 05/11/2017   PTT 26.9 06/21/2017   INR 1.1 06/21/2017   T4F 0.9 11/14/2016   TSH 0.59 05/11/2017   ESRML 75* 06/17/2017   CRP 21.9* 06/17/2017   MG 1.6* 06/21/2017   B12 176* 05/11/2017       No

## 2017-06-21 NOTE — OCCUPATIONAL THERAPY NOTE
PT not seen for OT at this time secondary to per pt, feeling weak from NPO prep for procedure this date. Will attempt to see pt next date. Nursing aware.

## 2017-06-21 NOTE — PROCEDURES
[facility] - [facility]  Procedure Note    Kel Enriquez Patient Status:  Inpatient    1950 MRN [id number]   Location [facility] Attending Co, Abena Fountain MD   [address] [facility] [address] Day # 4 PCP Sarika Garcia DO     Procedure: t

## 2017-06-22 ENCOUNTER — TELEPHONE (OUTPATIENT)
Dept: FAMILY MEDICINE CLINIC | Facility: CLINIC | Age: 67
End: 2017-06-22

## 2017-06-22 VITALS
DIASTOLIC BLOOD PRESSURE: 53 MMHG | SYSTOLIC BLOOD PRESSURE: 133 MMHG | RESPIRATION RATE: 16 BRPM | TEMPERATURE: 99 F | WEIGHT: 198.5 LBS | OXYGEN SATURATION: 94 % | BODY MASS INDEX: 29.4 KG/M2 | HEART RATE: 104 BPM | HEIGHT: 69 IN

## 2017-06-22 RX ORDER — 0.9 % SODIUM CHLORIDE 0.9 %
VIAL (ML) INJECTION
Status: COMPLETED
Start: 2017-06-22 | End: 2017-06-22

## 2017-06-22 RX ORDER — MAGNESIUM OXIDE 400 MG (241.3 MG MAGNESIUM) TABLET
400 TABLET 2 TIMES DAILY WITH MEALS
Qty: 60 TABLET | Refills: 0 | Status: SHIPPED | OUTPATIENT
Start: 2017-06-22 | End: 2019-01-07 | Stop reason: ALTCHOICE

## 2017-06-22 NOTE — DISCHARGE PLANNING
The pt. Has been set up with infusion services through [facility] the [facility] And her  and discussed pricing. The pt.  Will receive her 4p dose today prior to discharge and then will discharge home and start of care will be tomorro

## 2017-06-22 NOTE — OCCUPATIONAL THERAPY NOTE
OCCUPATIONAL THERAPY TREATMENT NOTE - INPATIENT     Room Number: 407/407-A         Presenting Problem:  (s/p resection arthroplasty and antibiotic spacer placement)    Problem List  Principal Problem:    Cellulitis of right lower extremity  Active Problems A Little  -   Bathing (including washing, rinsing, drying)?: A Little  -   Toileting, which includes using toilet, bedpan or urinal? : None  -   Putting on and taking off regular upper body clothing?: None  -   Taking care of personal grooming such as brus

## 2017-06-22 NOTE — PHYSICAL THERAPY NOTE
PHYSICAL THERAPY KNEE TREATMENT NOTE - INPATIENT     Room Number: 407/407-A             Presenting Problem: R TKA revision    Problem List  Principal Problem:    Cellulitis of right lower extremity  Active Problems:    Joint prosthesis infection or inflam wheelchair)?: A Little   -   Need to walk in hospital room?: A Little   -   Climbing 3-5 steps with a railing?: A Little    AM-PAC Score:  Raw Score: 18   PT Approx Degree of Impairment Score: 46.58%   Standardized Score (AM-PAC Scale): 43.63   CMS Modifie per the instructions provided in preparation for discharge.    Goal #5   Current Status IN PROGRESS   Goal #6    Goal #6  Current Status

## 2017-06-22 NOTE — PROGRESS NOTES
[facility]    Progress Note    Roosevelt Kyle Patient Status:  Inpatient    1950 MRN [id number]   Location [facility] 4W/SW/SE Attending Co, [address] Day # 5 PCP Trina Acosta DO       Subjective:   Nick Narayanan times daily, Zoloft 100 mg daily, simvastatin 10 mg daily, Catapres 0.1 mg daily, triamterene hydrochlorothiazide 37.5/25 every morning, potassium chloride extended release 20 mEq daily, vitamin D3 3000 IUs daily, and she is to take Ancef 2 g IV every 8 ho

## 2017-06-22 NOTE — TELEPHONE ENCOUNTER
Please advise on recommendations on \"care facilitis\" post total knee revision in [facility].

## 2017-06-22 NOTE — PROGRESS NOTES
INFECTIOUS DISEASE PROGRESS NOTE    Gloria Mildrednatali Patient Status:  Inpatient    1950 MRN [id number]   [place] [address] Attending Co, Syed Roberson MD   Hosp Day # 5 PCP Zurdo Hays DO     Subjective:  Patient seen and joint ([address]     Cellulitis of right lower extremity     Joint prosthesis infection or inflammation (HCC)     Hypokalemia      ASSESSMENT/PLAN:    Antibiotics:  Ancef 6/18;   (Vancomycin 6/17-19, ceftriaxone 6/17-18)    79 y/o female s/p R TKR 1/23/17.  Thi

## 2017-06-23 NOTE — DISCHARGE SUMMARY
[facility]    PATIENT'S NAME: Kayode HARDY   ATTENDING PHYSICIAN: Harini Cameron MD   PATIENT ACCOUNT#:   [id number]    LOCATION:  [facility] [address] RECORD #:   [id number]       YOB: 1950  ADMISSION DATE:       06/17/20 daily. ALLERGIES:  Latex. FAMILY HISTORY:  Mother had depression and diabetes and breast cancer. Maternal aunt had breast cancer. Father had coronary artery disease. Cousin has ovarian cancer.     SOCIAL HISTORY:  She denied any tobacco or alcohol vitamin D3, 3000 International Units daily; take her Ancef 2 g IV q.8 h. for an additional 38 days, ending on July 30, 2017.     DISCHARGE INSTRUCTIONS:  The plan is for the patient to be discharged today and follow up with Dr. Heck in 2 weeks, follow up w

## 2017-06-26 ENCOUNTER — LAB REQUISITION (OUTPATIENT)
Dept: LAB | Facility: HOSPITAL | Age: 67
End: 2017-06-26
Attending: INTERNAL MEDICINE
Payer: MEDICARE

## 2017-06-26 ENCOUNTER — TELEPHONE (OUTPATIENT)
Dept: FAMILY MEDICINE CLINIC | Facility: CLINIC | Age: 67
End: 2017-06-26

## 2017-06-26 ENCOUNTER — PATIENT OUTREACH (OUTPATIENT)
Dept: CASE MANAGEMENT | Age: 67
End: 2017-06-26

## 2017-06-26 DIAGNOSIS — L03.115 CELLULITIS OF RIGHT LOWER EXTREMITY: ICD-10-CM

## 2017-06-26 DIAGNOSIS — M89.8X6 OTHER SPECIFIED DISORDERS OF BONE, LOWER LEG: ICD-10-CM

## 2017-06-26 PROCEDURE — 85652 RBC SED RATE AUTOMATED: CPT | Performed by: INTERNAL MEDICINE

## 2017-06-26 PROCEDURE — 86140 C-REACTIVE PROTEIN: CPT | Performed by: INTERNAL MEDICINE

## 2017-06-26 PROCEDURE — 80053 COMPREHEN METABOLIC PANEL: CPT | Performed by: INTERNAL MEDICINE

## 2017-06-26 PROCEDURE — 85025 COMPLETE CBC W/AUTO DIFF WBC: CPT | Performed by: INTERNAL MEDICINE

## 2017-06-26 NOTE — PROGRESS NOTES
Initial Post Discharge Follow Up   Discharge Date: 6/22/17  Contact Date: 6/26/2017    Consent Verification:  Assessment Completed With: Patient  HIPAA Verified? Yes    Discharge Dx: Infected right knee;  HX: Right total knee arthroplasty in Jan. 2017 Oral Tab TAKE 1 TABLET EVERY EVENING (MD VISIT REQUIRED PRIOR TO FUTURE REFILL) Disp: 90 tablet Rfl: 0   SIMVASTATIN 10 MG Oral Tab TAKE 1 TABLET EVERY NIGHT AT BEDTIME (MD VISIT REQUIRED PRIOR TO FUTURE REFILL) Disp: 90 tablet Rfl: 0   SERTRALINE  to get up to the bathroom. [facility] sent message to PCP. Referrals/orders at D/C:  Home Health ordered at D/C? Yes   Has HH been set up?   Yes   If Yes: With Whom: [facility] [address] [facility]   When: started on 6/23/17     [phone] for [facility]    DME

## 2017-06-26 NOTE — TELEPHONE ENCOUNTER
Further note from Ky Hernandez RN:  Note: per patient she is currently not taking the potassium chloride, she has not started the Mag ox as her  just picked it up, she has not restarted her Vit D3, but plans to do so.  She has also stopped taking the her TRI

## 2017-06-26 NOTE — TELEPHONE ENCOUNTER
Note: per patient she is currently not taking the potassium chloride, she has not started the Mag ox as her  just picked it up, she has not restarted her Vit D3, but plans to do so.  She has also stopped taking the her TRIAMTERENE-HCTZ because it is

## 2017-06-27 RX ORDER — GABAPENTIN 100 MG/1
CAPSULE ORAL
Qty: 360 CAPSULE | Refills: 0 | Status: SHIPPED | OUTPATIENT
Start: 2017-06-27 | End: 2017-10-19

## 2017-07-03 ENCOUNTER — LAB REQUISITION (OUTPATIENT)
Dept: LAB | Facility: HOSPITAL | Age: 67
End: 2017-07-03
Payer: MEDICARE

## 2017-07-03 DIAGNOSIS — E78.00 PURE HYPERCHOLESTEROLEMIA: ICD-10-CM

## 2017-07-03 DIAGNOSIS — T84.53XS INFECTION AND INFLAMMATORY REACTION DUE TO INTERNAL RIGHT KNEE PROSTHESIS, SEQUELA: ICD-10-CM

## 2017-07-03 LAB
ALBUMIN SERPL-MCNC: 2.8 G/DL (ref 3.5–4.8)
ALP LIVER SERPL-CCNC: 108 U/L (ref 55–142)
ALT SERPL-CCNC: 7 U/L (ref 14–54)
AST SERPL-CCNC: 20 U/L (ref 15–41)
BASOPHILS # BLD AUTO: 0.04 X10(3) UL (ref 0–0.1)
BASOPHILS NFR BLD AUTO: 0.9 %
BILIRUB SERPL-MCNC: 0.2 MG/DL (ref 0.1–2)
BUN BLD-MCNC: 21 MG/DL (ref 8–20)
C-REACTIVE PROTEIN: 0.6 MG/DL (ref ?–1)
CALCIUM BLD-MCNC: 8.8 MG/DL (ref 8.3–10.3)
CHLORIDE: 104 MMOL/L (ref 101–111)
CO2: 29 MMOL/L (ref 22–32)
CREAT BLD-MCNC: 0.68 MG/DL (ref 0.55–1.02)
EOSINOPHIL # BLD AUTO: 0.13 X10(3) UL (ref 0–0.3)
EOSINOPHIL NFR BLD AUTO: 3.1 %
ERYTHROCYTE [DISTWIDTH] IN BLOOD BY AUTOMATED COUNT: 14.6 % (ref 11.5–16)
GLUCOSE BLD-MCNC: 85 MG/DL (ref 70–99)
HCT VFR BLD AUTO: 31.1 % (ref 34–50)
HGB BLD-MCNC: 9.5 G/DL (ref 12–16)
IMMATURE GRANULOCYTE COUNT: 0.01 X10(3) UL (ref 0–1)
IMMATURE GRANULOCYTE RATIO %: 0.2 %
LYMPHOCYTES # BLD AUTO: 0.93 X10(3) UL (ref 0.9–4)
LYMPHOCYTES NFR BLD AUTO: 21.9 %
M PROTEIN MFR SERPL ELPH: 7.7 G/DL (ref 6.1–8.3)
MCH RBC QN AUTO: 26.5 PG (ref 27–33.2)
MCHC RBC AUTO-ENTMCNC: 30.5 G/DL (ref 31–37)
MCV RBC AUTO: 86.6 FL (ref 81–100)
MONOCYTES # BLD AUTO: 0.73 X10(3) UL (ref 0.1–0.6)
MONOCYTES NFR BLD AUTO: 17.2 %
NEUTROPHIL ABS PRELIM: 2.4 X10 (3) UL (ref 1.3–6.7)
NEUTROPHILS # BLD AUTO: 2.4 X10(3) UL (ref 1.3–6.7)
NEUTROPHILS NFR BLD AUTO: 56.7 %
PLATELET # BLD AUTO: 248 10(3)UL (ref 150–450)
POTASSIUM SERPL-SCNC: 3.4 MMOL/L (ref 3.6–5.1)
RBC # BLD AUTO: 3.59 X10(6)UL (ref 3.8–5.1)
RED CELL DISTRIBUTION WIDTH-SD: 46.9 FL (ref 35.1–46.3)
SED RATE-ML: 81 MM/HR (ref 0–25)
SODIUM SERPL-SCNC: 139 MMOL/L (ref 136–144)
WBC # BLD AUTO: 4.2 X10(3) UL (ref 4–13)

## 2017-07-03 PROCEDURE — 86140 C-REACTIVE PROTEIN: CPT | Performed by: INTERNAL MEDICINE

## 2017-07-03 PROCEDURE — 85025 COMPLETE CBC W/AUTO DIFF WBC: CPT | Performed by: INTERNAL MEDICINE

## 2017-07-03 PROCEDURE — 85652 RBC SED RATE AUTOMATED: CPT | Performed by: INTERNAL MEDICINE

## 2017-07-03 PROCEDURE — 80053 COMPREHEN METABOLIC PANEL: CPT | Performed by: INTERNAL MEDICINE

## 2017-07-10 ENCOUNTER — LAB REQUISITION (OUTPATIENT)
Dept: LAB | Facility: HOSPITAL | Age: 67
End: 2017-07-10
Attending: INTERNAL MEDICINE
Payer: MEDICARE

## 2017-07-10 DIAGNOSIS — E78.00 PURE HYPERCHOLESTEROLEMIA: ICD-10-CM

## 2017-07-10 DIAGNOSIS — T84.53XS INFECTION AND INFLAMMATORY REACTION DUE TO INTERNAL RIGHT KNEE PROSTHESIS, SEQUELA: ICD-10-CM

## 2017-07-10 LAB
ALBUMIN SERPL-MCNC: 2.8 G/DL (ref 3.5–4.8)
ALP LIVER SERPL-CCNC: 110 U/L (ref 55–142)
ALT SERPL-CCNC: <6 U/L (ref 14–54)
AST SERPL-CCNC: 16 U/L (ref 15–41)
BASOPHILS # BLD AUTO: 0.02 X10(3) UL (ref 0–0.1)
BASOPHILS NFR BLD AUTO: 0.5 %
BILIRUB SERPL-MCNC: 0.2 MG/DL (ref 0.1–2)
BUN BLD-MCNC: 14 MG/DL (ref 8–20)
C-REACTIVE PROTEIN: <0.29 MG/DL (ref ?–1)
CALCIUM BLD-MCNC: 8.8 MG/DL (ref 8.3–10.3)
CHLORIDE: 107 MMOL/L (ref 101–111)
CO2: 25 MMOL/L (ref 22–32)
CREAT BLD-MCNC: 0.69 MG/DL (ref 0.55–1.02)
EOSINOPHIL # BLD AUTO: 0.14 X10(3) UL (ref 0–0.3)
EOSINOPHIL NFR BLD AUTO: 3.7 %
ERYTHROCYTE [DISTWIDTH] IN BLOOD BY AUTOMATED COUNT: 14.7 % (ref 11.5–16)
GLUCOSE BLD-MCNC: 105 MG/DL (ref 70–99)
HCT VFR BLD AUTO: 28.9 % (ref 34–50)
HGB BLD-MCNC: 9.2 G/DL (ref 12–16)
IMMATURE GRANULOCYTE COUNT: 0.01 X10(3) UL (ref 0–1)
IMMATURE GRANULOCYTE RATIO %: 0.3 %
LYMPHOCYTES # BLD AUTO: 0.81 X10(3) UL (ref 0.9–4)
LYMPHOCYTES NFR BLD AUTO: 21.4 %
M PROTEIN MFR SERPL ELPH: 7.4 G/DL (ref 6.1–8.3)
MCH RBC QN AUTO: 27.1 PG (ref 27–33.2)
MCHC RBC AUTO-ENTMCNC: 31.8 G/DL (ref 31–37)
MCV RBC AUTO: 85 FL (ref 81–100)
MONOCYTES # BLD AUTO: 0.32 X10(3) UL (ref 0.1–0.6)
MONOCYTES NFR BLD AUTO: 8.4 %
NEUTROPHIL ABS PRELIM: 2.49 X10 (3) UL (ref 1.3–6.7)
NEUTROPHILS # BLD AUTO: 2.49 X10(3) UL (ref 1.3–6.7)
NEUTROPHILS NFR BLD AUTO: 65.7 %
PLATELET # BLD AUTO: 146 10(3)UL (ref 150–450)
POTASSIUM SERPL-SCNC: 3.1 MMOL/L (ref 3.6–5.1)
RBC # BLD AUTO: 3.4 X10(6)UL (ref 3.8–5.1)
RED CELL DISTRIBUTION WIDTH-SD: 45.1 FL (ref 35.1–46.3)
SED RATE-ML: 76 MM/HR (ref 0–25)
SODIUM SERPL-SCNC: 141 MMOL/L (ref 136–144)
WBC # BLD AUTO: 3.8 X10(3) UL (ref 4–13)

## 2017-07-10 PROCEDURE — 85025 COMPLETE CBC W/AUTO DIFF WBC: CPT | Performed by: INTERNAL MEDICINE

## 2017-07-10 PROCEDURE — 80053 COMPREHEN METABOLIC PANEL: CPT | Performed by: INTERNAL MEDICINE

## 2017-07-10 PROCEDURE — 85652 RBC SED RATE AUTOMATED: CPT | Performed by: INTERNAL MEDICINE

## 2017-07-10 PROCEDURE — 86140 C-REACTIVE PROTEIN: CPT | Performed by: INTERNAL MEDICINE

## 2017-07-17 ENCOUNTER — LAB REQUISITION (OUTPATIENT)
Dept: LAB | Facility: HOSPITAL | Age: 67
End: 2017-07-17
Attending: INTERNAL MEDICINE
Payer: MEDICARE

## 2017-07-17 DIAGNOSIS — T78.00XA ANAPHYLACTIC REACTION DUE TO FOOD: ICD-10-CM

## 2017-07-17 LAB
ALBUMIN SERPL-MCNC: 2.9 G/DL (ref 3.5–4.8)
ALP LIVER SERPL-CCNC: 96 U/L (ref 55–142)
ALT SERPL-CCNC: 9 U/L (ref 14–54)
AST SERPL-CCNC: 25 U/L (ref 15–41)
BASOPHILS # BLD AUTO: 0.01 X10(3) UL (ref 0–0.1)
BASOPHILS NFR BLD AUTO: 0.3 %
BILIRUB SERPL-MCNC: 0.2 MG/DL (ref 0.1–2)
BUN BLD-MCNC: 11 MG/DL (ref 8–20)
C-REACTIVE PROTEIN: <0.29 MG/DL (ref ?–1)
CALCIUM BLD-MCNC: 9.1 MG/DL (ref 8.3–10.3)
CHLORIDE: 106 MMOL/L (ref 101–111)
CO2: 25 MMOL/L (ref 22–32)
CREAT BLD-MCNC: 0.67 MG/DL (ref 0.55–1.02)
EOSINOPHIL # BLD AUTO: 0.17 X10(3) UL (ref 0–0.3)
EOSINOPHIL NFR BLD AUTO: 5.5 %
ERYTHROCYTE [DISTWIDTH] IN BLOOD BY AUTOMATED COUNT: 15 % (ref 11.5–16)
GLUCOSE BLD-MCNC: 122 MG/DL (ref 70–99)
HCT VFR BLD AUTO: 31.3 % (ref 34–50)
HGB BLD-MCNC: 9.6 G/DL (ref 12–16)
IMMATURE GRANULOCYTE COUNT: 0 X10(3) UL (ref 0–1)
IMMATURE GRANULOCYTE RATIO %: 0 %
LYMPHOCYTES # BLD AUTO: 0.84 X10(3) UL (ref 0.9–4)
LYMPHOCYTES NFR BLD AUTO: 27.1 %
M PROTEIN MFR SERPL ELPH: 7.2 G/DL (ref 6.1–8.3)
MCH RBC QN AUTO: 26.4 PG (ref 27–33.2)
MCHC RBC AUTO-ENTMCNC: 30.7 G/DL (ref 31–37)
MCV RBC AUTO: 86 FL (ref 81–100)
MONOCYTES # BLD AUTO: 0.39 X10(3) UL (ref 0.1–0.6)
MONOCYTES NFR BLD AUTO: 12.6 %
NEUTROPHIL ABS PRELIM: 1.69 X10 (3) UL (ref 1.3–6.7)
NEUTROPHILS # BLD AUTO: 1.69 X10(3) UL (ref 1.3–6.7)
NEUTROPHILS NFR BLD AUTO: 54.5 %
PLATELET # BLD AUTO: 144 10(3)UL (ref 150–450)
POTASSIUM SERPL-SCNC: 3.1 MMOL/L (ref 3.6–5.1)
RBC # BLD AUTO: 3.64 X10(6)UL (ref 3.8–5.1)
RED CELL DISTRIBUTION WIDTH-SD: 47.1 FL (ref 35.1–46.3)
SED RATE-ML: 60 MM/HR (ref 0–25)
SODIUM SERPL-SCNC: 139 MMOL/L (ref 136–144)
WBC # BLD AUTO: 3.1 X10(3) UL (ref 4–13)

## 2017-07-17 PROCEDURE — 80053 COMPREHEN METABOLIC PANEL: CPT | Performed by: INTERNAL MEDICINE

## 2017-07-17 PROCEDURE — 86140 C-REACTIVE PROTEIN: CPT | Performed by: INTERNAL MEDICINE

## 2017-07-17 PROCEDURE — 85652 RBC SED RATE AUTOMATED: CPT | Performed by: INTERNAL MEDICINE

## 2017-07-17 PROCEDURE — 85025 COMPLETE CBC W/AUTO DIFF WBC: CPT | Performed by: INTERNAL MEDICINE

## 2017-07-25 RX ORDER — CLONIDINE HYDROCHLORIDE 0.1 MG/1
TABLET ORAL
Qty: 90 TABLET | Refills: 0 | Status: SHIPPED | OUTPATIENT
Start: 2017-07-25 | End: 2017-09-15

## 2017-07-25 RX ORDER — SIMVASTATIN 10 MG
TABLET ORAL
Qty: 90 TABLET | Refills: 0 | Status: SHIPPED | OUTPATIENT
Start: 2017-07-25 | End: 2017-09-15

## 2017-08-01 ENCOUNTER — LAB REQUISITION (OUTPATIENT)
Dept: LAB | Facility: HOSPITAL | Age: 67
End: 2017-08-01
Payer: MEDICARE

## 2017-08-01 DIAGNOSIS — M01.X61: ICD-10-CM

## 2017-08-01 LAB
ALBUMIN SERPL-MCNC: 3.1 G/DL (ref 3.5–4.8)
ALP LIVER SERPL-CCNC: 79 U/L (ref 55–142)
ALT SERPL-CCNC: 9 U/L (ref 14–54)
AST SERPL-CCNC: 25 U/L (ref 15–41)
BASOPHILS # BLD AUTO: 0.02 X10(3) UL (ref 0–0.1)
BASOPHILS NFR BLD AUTO: 0.6 %
BILIRUB SERPL-MCNC: 0.3 MG/DL (ref 0.1–2)
BUN BLD-MCNC: 12 MG/DL (ref 8–20)
C-REACTIVE PROTEIN: <0.29 MG/DL (ref ?–1)
CALCIUM BLD-MCNC: 8.4 MG/DL (ref 8.3–10.3)
CHLORIDE: 108 MMOL/L (ref 101–111)
CO2: 25 MMOL/L (ref 22–32)
CREAT BLD-MCNC: 0.62 MG/DL (ref 0.55–1.02)
EOSINOPHIL # BLD AUTO: 0.12 X10(3) UL (ref 0–0.3)
EOSINOPHIL NFR BLD AUTO: 3.7 %
ERYTHROCYTE [DISTWIDTH] IN BLOOD BY AUTOMATED COUNT: 14.7 % (ref 11.5–16)
GLUCOSE BLD-MCNC: 104 MG/DL (ref 70–99)
HCT VFR BLD AUTO: 31.4 % (ref 34–50)
HGB BLD-MCNC: 9.6 G/DL (ref 12–16)
IMMATURE GRANULOCYTE COUNT: 0.01 X10(3) UL (ref 0–1)
IMMATURE GRANULOCYTE RATIO %: 0.3 %
LYMPHOCYTES # BLD AUTO: 0.84 X10(3) UL (ref 0.9–4)
LYMPHOCYTES NFR BLD AUTO: 26 %
M PROTEIN MFR SERPL ELPH: 6.8 G/DL (ref 6.1–8.3)
MCH RBC QN AUTO: 26.7 PG (ref 27–33.2)
MCHC RBC AUTO-ENTMCNC: 30.6 G/DL (ref 31–37)
MCV RBC AUTO: 87.2 FL (ref 81–100)
MONOCYTES # BLD AUTO: 0.39 X10(3) UL (ref 0.1–0.6)
MONOCYTES NFR BLD AUTO: 12.1 %
NEUTROPHIL ABS PRELIM: 1.85 X10 (3) UL (ref 1.3–6.7)
NEUTROPHILS # BLD AUTO: 1.85 X10(3) UL (ref 1.3–6.7)
NEUTROPHILS NFR BLD AUTO: 57.3 %
PLATELET # BLD AUTO: 129 10(3)UL (ref 150–450)
POTASSIUM SERPL-SCNC: 3.5 MMOL/L (ref 3.6–5.1)
RBC # BLD AUTO: 3.6 X10(6)UL (ref 3.8–5.1)
RED CELL DISTRIBUTION WIDTH-SD: 47.4 FL (ref 35.1–46.3)
SED RATE-ML: 33 MM/HR (ref 0–25)
SODIUM SERPL-SCNC: 142 MMOL/L (ref 136–144)
WBC # BLD AUTO: 3.2 X10(3) UL (ref 4–13)

## 2017-08-01 PROCEDURE — 85652 RBC SED RATE AUTOMATED: CPT | Performed by: INTERNAL MEDICINE

## 2017-08-01 PROCEDURE — 85025 COMPLETE CBC W/AUTO DIFF WBC: CPT | Performed by: INTERNAL MEDICINE

## 2017-08-01 PROCEDURE — 86140 C-REACTIVE PROTEIN: CPT | Performed by: INTERNAL MEDICINE

## 2017-08-01 PROCEDURE — 80053 COMPREHEN METABOLIC PANEL: CPT | Performed by: INTERNAL MEDICINE

## 2017-08-31 ENCOUNTER — HOSPITAL ENCOUNTER (OUTPATIENT)
Dept: PERIOP | Facility: HOSPITAL | Age: 67
Discharge: HOME | End: 2017-08-31
Attending: INTERNAL MEDICINE
Payer: MEDICARE

## 2017-08-31 PROCEDURE — 99211 OFF/OP EST MAY X REQ PHY/QHP: CPT

## 2017-09-07 ENCOUNTER — HOSPITAL ENCOUNTER (OUTPATIENT)
Dept: PERIOP | Facility: HOSPITAL | Age: 67
Discharge: HOME | End: 2017-09-07
Attending: FAMILY MEDICINE
Payer: MEDICARE

## 2017-09-07 ENCOUNTER — LAB ENCOUNTER (OUTPATIENT)
Dept: LAB | Facility: HOSPITAL | Age: 67
End: 2017-09-07
Attending: FAMILY MEDICINE
Payer: MEDICARE

## 2017-09-07 DIAGNOSIS — Z01.818 PREOP EXAMINATION: ICD-10-CM

## 2017-09-07 DIAGNOSIS — Z01.818 PREOP EXAMINATION: Primary | ICD-10-CM

## 2017-09-07 LAB
ALBUMIN SERPL-MCNC: 3.8 G/DL (ref 3.5–4.8)
ALP LIVER SERPL-CCNC: 77 U/L (ref 55–142)
ALT SERPL-CCNC: 12 U/L (ref 14–54)
ANTIBODY SCREEN: NEGATIVE
APTT PPP: 30.3 SECONDS (ref 25–34)
AST SERPL-CCNC: 15 U/L (ref 15–41)
ATRIAL RATE: 77 BPM
BASOPHILS # BLD AUTO: 0.02 X10(3) UL (ref 0–0.1)
BASOPHILS NFR BLD AUTO: 0.5 %
BILIRUB SERPL-MCNC: 0.4 MG/DL (ref 0.1–2)
BILIRUB UR QL STRIP.AUTO: NEGATIVE
BUN BLD-MCNC: 16 MG/DL (ref 8–20)
C-REACTIVE PROTEIN: <0.29 MG/DL (ref ?–1)
CALCIUM BLD-MCNC: 9.5 MG/DL (ref 8.3–10.3)
CHLORIDE: 105 MMOL/L (ref 101–111)
CLARITY UR REFRACT.AUTO: CLEAR
CO2: 28 MMOL/L (ref 22–32)
COLOR UR AUTO: YELLOW
CREAT BLD-MCNC: 0.62 MG/DL (ref 0.55–1.02)
EOSINOPHIL # BLD AUTO: 0.11 X10(3) UL (ref 0–0.3)
EOSINOPHIL NFR BLD AUTO: 2.8 %
ERYTHROCYTE [DISTWIDTH] IN BLOOD BY AUTOMATED COUNT: 15.4 % (ref 11.5–16)
GLUCOSE BLD-MCNC: 98 MG/DL (ref 70–99)
GLUCOSE UR STRIP.AUTO-MCNC: NEGATIVE MG/DL
HCT VFR BLD AUTO: 34.6 % (ref 34–50)
HGB BLD-MCNC: 11.1 G/DL (ref 12–16)
IMMATURE GRANULOCYTE COUNT: 0.01 X10(3) UL (ref 0–1)
IMMATURE GRANULOCYTE RATIO %: 0.3 %
INR BLD: 1.04 (ref 0.89–1.11)
KETONES UR STRIP.AUTO-MCNC: NEGATIVE MG/DL
LEUKOCYTE ESTERASE UR QL STRIP.AUTO: NEGATIVE
LYMPHOCYTES # BLD AUTO: 0.98 X10(3) UL (ref 0.9–4)
LYMPHOCYTES NFR BLD AUTO: 24.5 %
M PROTEIN MFR SERPL ELPH: 7.9 G/DL (ref 6.1–8.3)
MCH RBC QN AUTO: 26.9 PG (ref 27–33.2)
MCHC RBC AUTO-ENTMCNC: 32.1 G/DL (ref 31–37)
MCV RBC AUTO: 84 FL (ref 81–100)
MONOCYTES # BLD AUTO: 0.53 X10(3) UL (ref 0.1–0.6)
MONOCYTES NFR BLD AUTO: 13.3 %
NEUTROPHIL ABS PRELIM: 2.35 X10 (3) UL (ref 1.3–6.7)
NEUTROPHILS # BLD AUTO: 2.35 X10(3) UL (ref 1.3–6.7)
NEUTROPHILS NFR BLD AUTO: 58.6 %
NITRITE UR QL STRIP.AUTO: NEGATIVE
PH UR STRIP.AUTO: 7 [PH] (ref 4.5–8)
PLATELET # BLD AUTO: 141 10(3)UL (ref 150–450)
POTASSIUM SERPL-SCNC: 4.1 MMOL/L (ref 3.6–5.1)
PROT UR STRIP.AUTO-MCNC: NEGATIVE MG/DL
PSA SERPL DL<=0.01 NG/ML-MCNC: 13.6 SECONDS (ref 12–14.3)
Q-T INTERVAL: 418 MS
QRS DURATION: 94 MS
QTC CALCULATION (BEZET): 473 MS
R AXIS: 18 DEGREES
RBC # BLD AUTO: 4.12 X10(6)UL (ref 3.8–5.1)
RBC UR QL AUTO: NEGATIVE
RED CELL DISTRIBUTION WIDTH-SD: 46.8 FL (ref 35.1–46.3)
RH BLOOD TYPE: POSITIVE
SED RATE-ML: 23 MM/HR (ref 0–25)
SODIUM SERPL-SCNC: 139 MMOL/L (ref 136–144)
SP GR UR STRIP.AUTO: 1.01 (ref 1–1.03)
T AXIS: 42 DEGREES
UROBILINOGEN UR STRIP.AUTO-MCNC: <2 MG/DL
VENTRICULAR RATE: 77 BPM
WBC # BLD AUTO: 4 X10(3) UL (ref 4–13)

## 2017-09-07 PROCEDURE — 80053 COMPREHEN METABOLIC PANEL: CPT

## 2017-09-07 PROCEDURE — 86900 BLOOD TYPING SEROLOGIC ABO: CPT

## 2017-09-07 PROCEDURE — 87086 URINE CULTURE/COLONY COUNT: CPT

## 2017-09-07 PROCEDURE — 87081 CULTURE SCREEN ONLY: CPT

## 2017-09-07 PROCEDURE — 86850 RBC ANTIBODY SCREEN: CPT

## 2017-09-07 PROCEDURE — 85730 THROMBOPLASTIN TIME PARTIAL: CPT

## 2017-09-07 PROCEDURE — 85025 COMPLETE CBC W/AUTO DIFF WBC: CPT

## 2017-09-07 PROCEDURE — 85610 PROTHROMBIN TIME: CPT

## 2017-09-07 PROCEDURE — 99211 OFF/OP EST MAY X REQ PHY/QHP: CPT

## 2017-09-07 PROCEDURE — 93005 ELECTROCARDIOGRAM TRACING: CPT

## 2017-09-07 PROCEDURE — 93010 ELECTROCARDIOGRAM REPORT: CPT | Performed by: INTERNAL MEDICINE

## 2017-09-07 PROCEDURE — 85652 RBC SED RATE AUTOMATED: CPT

## 2017-09-07 PROCEDURE — 36591 DRAW BLOOD OFF VENOUS DEVICE: CPT

## 2017-09-07 PROCEDURE — 86140 C-REACTIVE PROTEIN: CPT

## 2017-09-07 PROCEDURE — 81003 URINALYSIS AUTO W/O SCOPE: CPT

## 2017-09-07 PROCEDURE — 36415 COLL VENOUS BLD VENIPUNCTURE: CPT

## 2017-09-07 PROCEDURE — 86901 BLOOD TYPING SEROLOGIC RH(D): CPT

## 2017-09-14 ENCOUNTER — HOSPITAL ENCOUNTER (OUTPATIENT)
Dept: PERIOP | Facility: HOSPITAL | Age: 67
Discharge: HOME | End: 2017-09-14
Attending: FAMILY MEDICINE
Payer: MEDICARE

## 2017-09-14 NOTE — OR PREOP
Spoke with Michael Guan in Dr. Geoffrey Gonzalez office, patient is seeing a cardiologist for pre-op clearance, for abnormal EKG.

## 2017-09-15 RX ORDER — SIMVASTATIN 10 MG
TABLET ORAL
Qty: 90 TABLET | Refills: 0 | Status: SHIPPED | OUTPATIENT
Start: 2017-09-15 | End: 2017-09-21

## 2017-09-15 RX ORDER — CLONIDINE HYDROCHLORIDE 0.1 MG/1
TABLET ORAL
Qty: 90 TABLET | Refills: 0 | Status: SHIPPED | OUTPATIENT
Start: 2017-09-15 | End: 2017-09-21

## 2017-09-15 NOTE — TELEPHONE ENCOUNTER
REFILL SERTRALINE  150 MG QD.     90 DAYS WITH REFILLS    [facility]    PLS REFILL TODAY AND MAKE SURE IT IS 150MG MG PER DAY.

## 2017-09-15 NOTE — TELEPHONE ENCOUNTER
Approve/deny set up RX. Last refill for Sertraline was for 100mg with directions to take one tablet 2 to 3 times daily with max dose of 150. Another Rx was for 50mg TID. Last OV JG 5-5-17.

## 2017-09-16 RX ORDER — SERTRALINE HYDROCHLORIDE 100 MG/1
TABLET, FILM COATED ORAL
Qty: 135 TABLET | Refills: 0 | Status: SHIPPED | OUTPATIENT
Start: 2017-09-16 | End: 2018-02-12

## 2017-09-18 RX ORDER — TAMOXIFEN CITRATE 20 MG/1
20 TABLET ORAL DAILY
COMMUNITY
End: 2017-09-20

## 2017-09-20 ENCOUNTER — TELEPHONE (OUTPATIENT)
Dept: FAMILY MEDICINE CLINIC | Facility: CLINIC | Age: 67
End: 2017-09-20

## 2017-09-20 ENCOUNTER — TELEPHONE (OUTPATIENT)
Dept: HEMATOLOGY/ONCOLOGY | Facility: HOSPITAL | Age: 67
End: 2017-09-20

## 2017-09-20 RX ORDER — TAMOXIFEN CITRATE 20 MG/1
20 TABLET ORAL DAILY
Qty: 30 TABLET | Refills: 0 | Status: SHIPPED | OUTPATIENT
Start: 2017-09-20 | End: 2018-01-17

## 2017-09-20 NOTE — TELEPHONE ENCOUNTER
Pt has had many health issues. 30 day supply of tamoxifen sent to her pharmacy. She will call and schedule appt with Dr. Esteban Whitten.

## 2017-09-20 NOTE — TELEPHONE ENCOUNTER
What is status of Clonidine and Simvistatin RX refills? She states that her mail order pharmacy has not heard from our office. She left a message about this on 9-15-17. Pls call her back to advise.

## 2017-09-21 ENCOUNTER — HOSPITAL ENCOUNTER (OUTPATIENT)
Dept: PERIOP | Facility: HOSPITAL | Age: 67
Discharge: HOME | End: 2017-09-21
Attending: INTERNAL MEDICINE
Payer: MEDICARE

## 2017-09-21 PROBLEM — T81.43XA POSTOPERATIVE INTRA-ABDOMINAL ABSCESS (HCC): Status: ACTIVE | Noted: 2017-09-21

## 2017-09-21 PROBLEM — T81.43XA POSTOPERATIVE INTRA-ABDOMINAL ABSCESS: Status: ACTIVE | Noted: 2017-09-21

## 2017-09-21 PROBLEM — K65.1 POSTOPERATIVE INTRA-ABDOMINAL ABSCESS (HCC): Status: ACTIVE | Noted: 2017-09-21

## 2017-09-21 PROCEDURE — 99211 OFF/OP EST MAY X REQ PHY/QHP: CPT

## 2017-09-21 RX ORDER — CLONIDINE HYDROCHLORIDE 0.1 MG/1
TABLET ORAL
Qty: 90 TABLET | Refills: 0 | Status: SHIPPED | OUTPATIENT
Start: 2017-09-21 | End: 2018-02-12

## 2017-09-21 RX ORDER — SIMVASTATIN 10 MG
TABLET ORAL
Qty: 90 TABLET | Refills: 0 | Status: SHIPPED | OUTPATIENT
Start: 2017-09-21 | End: 2018-02-12

## 2017-09-21 NOTE — TELEPHONE ENCOUNTER
Pt states she spoke to her mail order pharmacy who states they did not receive the rx for simvastatin and clonidine. Rx resent pharmacy.

## 2017-09-21 NOTE — PROGRESS NOTES
Right chest Central line dressing changed using sterile technique, biopatch applied to site. Central line flushed with 10 CC NS. Good blood return noted. No redness or swelling noted around insertion site.

## 2017-09-22 RX ORDER — METOPROLOL SUCCINATE 25 MG/1
25 TABLET, EXTENDED RELEASE ORAL NIGHTLY
Status: ON HOLD | COMMUNITY
End: 2017-09-28

## 2017-09-22 NOTE — H&P
[address] Patient Status:  Surgery Admit    1950 MRN [id number]   [place] [address] Attending Hiren Hill MD   Hosp Day # 0 PCP Emeka Calvo DO     Subjective:  Patient is admit Date Noted: 06/29/2012      Unspecified vitamin D deficiency         Priority: Medium [2]         Class: Chronic         Date Noted: 06/29/2012      Onychomycosis         Priority: Low [3]         Date Noted: 01/20/2014      Ingrown left greater toenail Personal history of antineoplastic chemotherapy 2014   • PICC (peripherally inserted central catheter) in place 06/2017   • Unspecified asthma(493.90)    • Unspecified essential hypertension    • Visual impairment     glasses      Past Surgical History:  4 cyanosis. Skin: Normal texture and turgor. Neurologic: Motor strength and sensory examination is grossly normal.  No focal neurologic deficit. Imaging Review  Plain radiographs demonstrate a well fixed spacer of the right knee.  The overall alignment

## 2017-09-25 ENCOUNTER — APPOINTMENT (OUTPATIENT)
Dept: GENERAL RADIOLOGY | Facility: HOSPITAL | Age: 67
DRG: 468 | End: 2017-09-25
Attending: ORTHOPAEDIC SURGERY
Payer: MEDICARE

## 2017-09-25 ENCOUNTER — ANESTHESIA EVENT (OUTPATIENT)
Dept: SURGERY | Facility: HOSPITAL | Age: 67
DRG: 468 | End: 2017-09-25
Payer: MEDICARE

## 2017-09-25 ENCOUNTER — ANESTHESIA (OUTPATIENT)
Dept: SURGERY | Facility: HOSPITAL | Age: 67
DRG: 468 | End: 2017-09-25
Payer: MEDICARE

## 2017-09-25 ENCOUNTER — SURGERY (OUTPATIENT)
Age: 67
End: 2017-09-25

## 2017-09-25 ENCOUNTER — HOSPITAL ENCOUNTER (INPATIENT)
Facility: HOSPITAL | Age: 67
LOS: 3 days | Discharge: HOME HEALTH | DRG: 468 | End: 2017-09-28
Attending: ORTHOPAEDIC SURGERY | Admitting: ORTHOPAEDIC SURGERY
Payer: MEDICARE

## 2017-09-25 PROCEDURE — 88305 TISSUE EXAM BY PATHOLOGIST: CPT | Performed by: ORTHOPAEDIC SURGERY

## 2017-09-25 PROCEDURE — 86900 BLOOD TYPING SEROLOGIC ABO: CPT | Performed by: ORTHOPAEDIC SURGERY

## 2017-09-25 PROCEDURE — 0SRC0J9 REPLACEMENT OF RIGHT KNEE JOINT WITH SYNTHETIC SUBSTITUTE, CEMENTED, OPEN APPROACH: ICD-10-PCS | Performed by: ORTHOPAEDIC SURGERY

## 2017-09-25 PROCEDURE — 86850 RBC ANTIBODY SCREEN: CPT | Performed by: ORTHOPAEDIC SURGERY

## 2017-09-25 PROCEDURE — 73560 X-RAY EXAM OF KNEE 1 OR 2: CPT | Performed by: ORTHOPAEDIC SURGERY

## 2017-09-25 PROCEDURE — 88300 SURGICAL PATH GROSS: CPT | Performed by: ORTHOPAEDIC SURGERY

## 2017-09-25 PROCEDURE — 88331 PATH CONSLTJ SURG 1 BLK 1SPC: CPT | Performed by: ORTHOPAEDIC SURGERY

## 2017-09-25 PROCEDURE — 86901 BLOOD TYPING SEROLOGIC RH(D): CPT | Performed by: ORTHOPAEDIC SURGERY

## 2017-09-25 PROCEDURE — 0SPC08Z REMOVAL OF SPACER FROM RIGHT KNEE JOINT, OPEN APPROACH: ICD-10-PCS | Performed by: ORTHOPAEDIC SURGERY

## 2017-09-25 PROCEDURE — 88311 DECALCIFY TISSUE: CPT | Performed by: ORTHOPAEDIC SURGERY

## 2017-09-25 DEVICE — TIBIAL TRAY ROTATING PLATFORM M.B.T. REVISION SIZE 2.5 CEMENTED: Type: IMPLANTABLE DEVICE | Status: FUNCTIONAL

## 2017-09-25 DEVICE — UNIVERSAL STEM FLUTED 75MM X 16MM: Type: IMPLANTABLE DEVICE | Status: FUNCTIONAL

## 2017-09-25 DEVICE — M.B.T. REVISION METAPHYSEAL SLEEVE POROUS 37MM: Type: IMPLANTABLE DEVICE | Status: FUNCTIONAL

## 2017-09-25 DEVICE — UNIVERSAL STEM FLUTED 115MM X 16MM: Type: IMPLANTABLE DEVICE | Status: FUNCTIONAL

## 2017-09-25 RX ORDER — HYDROCODONE BITARTRATE AND ACETAMINOPHEN 5; 325 MG/1; MG/1
2 TABLET ORAL AS NEEDED
Status: DISCONTINUED | OUTPATIENT
Start: 2017-09-25 | End: 2017-09-25 | Stop reason: HOSPADM

## 2017-09-25 RX ORDER — BISACODYL 10 MG
10 SUPPOSITORY, RECTAL RECTAL
Status: DISCONTINUED | OUTPATIENT
Start: 2017-09-25 | End: 2017-09-28

## 2017-09-25 RX ORDER — POLYETHYLENE GLYCOL 3350 17 G/17G
17 POWDER, FOR SOLUTION ORAL DAILY PRN
Status: DISCONTINUED | OUTPATIENT
Start: 2017-09-25 | End: 2017-09-28

## 2017-09-25 RX ORDER — ACETAMINOPHEN 325 MG/1
650 TABLET ORAL ONCE
Status: DISCONTINUED | OUTPATIENT
Start: 2017-09-25 | End: 2017-09-25 | Stop reason: HOSPADM

## 2017-09-25 RX ORDER — MAGNESIUM OXIDE 400 MG (241.3 MG MAGNESIUM) TABLET
400 TABLET 2 TIMES DAILY WITH MEALS
Status: DISCONTINUED | OUTPATIENT
Start: 2017-09-25 | End: 2017-09-28

## 2017-09-25 RX ORDER — SODIUM CHLORIDE 0.9 % (FLUSH) 0.9 %
10 SYRINGE (ML) INJECTION AS NEEDED
Status: DISCONTINUED | OUTPATIENT
Start: 2017-09-25 | End: 2017-09-28

## 2017-09-25 RX ORDER — HYDROCODONE BITARTRATE AND ACETAMINOPHEN 5; 325 MG/1; MG/1
1 TABLET ORAL AS NEEDED
Status: DISCONTINUED | OUTPATIENT
Start: 2017-09-25 | End: 2017-09-25 | Stop reason: HOSPADM

## 2017-09-25 RX ORDER — ACETAMINOPHEN 500 MG
1000 TABLET ORAL ONCE
Status: COMPLETED | OUTPATIENT
Start: 2017-09-25 | End: 2017-09-25

## 2017-09-25 RX ORDER — FAMOTIDINE 20 MG/1
20 TABLET ORAL ONCE
Status: DISCONTINUED | OUTPATIENT
Start: 2017-09-25 | End: 2017-09-25 | Stop reason: HOSPADM

## 2017-09-25 RX ORDER — EPHEDRINE SULFATE 50 MG/ML
INJECTION, SOLUTION INTRAVENOUS AS NEEDED
Status: DISCONTINUED | OUTPATIENT
Start: 2017-09-25 | End: 2017-09-25 | Stop reason: SURG

## 2017-09-25 RX ORDER — BUPIVACAINE HYDROCHLORIDE AND EPINEPHRINE 2.5; 5 MG/ML; UG/ML
INJECTION, SOLUTION INFILTRATION; PERINEURAL AS NEEDED
Status: DISCONTINUED | OUTPATIENT
Start: 2017-09-25 | End: 2017-09-25

## 2017-09-25 RX ORDER — CYCLOBENZAPRINE HCL 10 MG
10 TABLET ORAL EVERY 8 HOURS PRN
Status: DISCONTINUED | OUTPATIENT
Start: 2017-09-25 | End: 2017-09-28

## 2017-09-25 RX ORDER — HYDROMORPHONE HYDROCHLORIDE 1 MG/ML
0.8 INJECTION, SOLUTION INTRAMUSCULAR; INTRAVENOUS; SUBCUTANEOUS EVERY 2 HOUR PRN
Status: DISCONTINUED | OUTPATIENT
Start: 2017-09-25 | End: 2017-09-28

## 2017-09-25 RX ORDER — DIPHENHYDRAMINE HYDROCHLORIDE 50 MG/ML
12.5 INJECTION INTRAMUSCULAR; INTRAVENOUS EVERY 4 HOURS PRN
Status: DISCONTINUED | OUTPATIENT
Start: 2017-09-25 | End: 2017-09-28

## 2017-09-25 RX ORDER — DEXAMETHASONE SODIUM PHOSPHATE 4 MG/ML
VIAL (ML) INJECTION AS NEEDED
Status: DISCONTINUED | OUTPATIENT
Start: 2017-09-25 | End: 2017-09-25 | Stop reason: SURG

## 2017-09-25 RX ORDER — DIPHENHYDRAMINE HCL 25 MG
25 CAPSULE ORAL EVERY 4 HOURS PRN
Status: DISCONTINUED | OUTPATIENT
Start: 2017-09-25 | End: 2017-09-28

## 2017-09-25 RX ORDER — HYDROMORPHONE HYDROCHLORIDE 1 MG/ML
0.4 INJECTION, SOLUTION INTRAMUSCULAR; INTRAVENOUS; SUBCUTANEOUS EVERY 2 HOUR PRN
Status: DISCONTINUED | OUTPATIENT
Start: 2017-09-25 | End: 2017-09-28

## 2017-09-25 RX ORDER — HALOPERIDOL 5 MG/ML
0.25 INJECTION INTRAMUSCULAR ONCE AS NEEDED
Status: DISCONTINUED | OUTPATIENT
Start: 2017-09-25 | End: 2017-09-25 | Stop reason: HOSPADM

## 2017-09-25 RX ORDER — TAMOXIFEN CITRATE 20 MG/1
20 TABLET ORAL NIGHTLY
Status: DISCONTINUED | OUTPATIENT
Start: 2017-09-25 | End: 2017-09-28

## 2017-09-25 RX ORDER — HYDROCODONE BITARTRATE AND ACETAMINOPHEN 10; 325 MG/1; MG/1
1 TABLET ORAL EVERY 4 HOURS PRN
Status: DISCONTINUED | OUTPATIENT
Start: 2017-09-25 | End: 2017-09-25 | Stop reason: HOSPADM

## 2017-09-25 RX ORDER — METOCLOPRAMIDE HYDROCHLORIDE 5 MG/ML
10 INJECTION INTRAMUSCULAR; INTRAVENOUS EVERY 6 HOURS PRN
Status: ACTIVE | OUTPATIENT
Start: 2017-09-25 | End: 2017-09-27

## 2017-09-25 RX ORDER — GABAPENTIN 300 MG/1
600 CAPSULE ORAL ONCE
Status: COMPLETED | OUTPATIENT
Start: 2017-09-25 | End: 2017-09-25

## 2017-09-25 RX ORDER — MIDAZOLAM HYDROCHLORIDE 1 MG/ML
INJECTION INTRAMUSCULAR; INTRAVENOUS AS NEEDED
Status: DISCONTINUED | OUTPATIENT
Start: 2017-09-25 | End: 2017-09-25 | Stop reason: SURG

## 2017-09-25 RX ORDER — ONDANSETRON 2 MG/ML
4 INJECTION INTRAMUSCULAR; INTRAVENOUS ONCE AS NEEDED
Status: DISCONTINUED | OUTPATIENT
Start: 2017-09-25 | End: 2017-09-25 | Stop reason: HOSPADM

## 2017-09-25 RX ORDER — CLONIDINE HYDROCHLORIDE 0.1 MG/1
0.1 TABLET ORAL NIGHTLY
Status: DISCONTINUED | OUTPATIENT
Start: 2017-09-25 | End: 2017-09-28

## 2017-09-25 RX ORDER — NALOXONE HYDROCHLORIDE 0.4 MG/ML
80 INJECTION, SOLUTION INTRAMUSCULAR; INTRAVENOUS; SUBCUTANEOUS AS NEEDED
Status: ACTIVE | OUTPATIENT
Start: 2017-09-25 | End: 2017-09-25

## 2017-09-25 RX ORDER — ACETAMINOPHEN 325 MG/1
650 TABLET ORAL EVERY 4 HOURS PRN
Status: DISCONTINUED | OUTPATIENT
Start: 2017-09-25 | End: 2017-09-28

## 2017-09-25 RX ORDER — SODIUM PHOSPHATE, DIBASIC AND SODIUM PHOSPHATE, MONOBASIC 7; 19 G/133ML; G/133ML
1 ENEMA RECTAL ONCE AS NEEDED
Status: DISCONTINUED | OUTPATIENT
Start: 2017-09-25 | End: 2017-09-28

## 2017-09-25 RX ORDER — ROCURONIUM BROMIDE 10 MG/ML
INJECTION, SOLUTION INTRAVENOUS AS NEEDED
Status: DISCONTINUED | OUTPATIENT
Start: 2017-09-25 | End: 2017-09-25 | Stop reason: SURG

## 2017-09-25 RX ORDER — GABAPENTIN 300 MG/1
300 CAPSULE ORAL 3 TIMES DAILY
Status: DISCONTINUED | OUTPATIENT
Start: 2017-09-25 | End: 2017-09-28

## 2017-09-25 RX ORDER — NEOSTIGMINE METHYLSULFATE 0.5 MG/ML
INJECTION INTRAVENOUS AS NEEDED
Status: DISCONTINUED | OUTPATIENT
Start: 2017-09-25 | End: 2017-09-25 | Stop reason: SURG

## 2017-09-25 RX ORDER — HYDROMORPHONE HYDROCHLORIDE 1 MG/ML
0.6 INJECTION, SOLUTION INTRAMUSCULAR; INTRAVENOUS; SUBCUTANEOUS EVERY 5 MIN PRN
Status: DISCONTINUED | OUTPATIENT
Start: 2017-09-25 | End: 2017-09-25 | Stop reason: HOSPADM

## 2017-09-25 RX ORDER — ONDANSETRON 2 MG/ML
INJECTION INTRAMUSCULAR; INTRAVENOUS AS NEEDED
Status: DISCONTINUED | OUTPATIENT
Start: 2017-09-25 | End: 2017-09-25 | Stop reason: SURG

## 2017-09-25 RX ORDER — HYDROMORPHONE HYDROCHLORIDE 1 MG/ML
0.4 INJECTION, SOLUTION INTRAMUSCULAR; INTRAVENOUS; SUBCUTANEOUS EVERY 5 MIN PRN
Status: DISCONTINUED | OUTPATIENT
Start: 2017-09-25 | End: 2017-09-25 | Stop reason: HOSPADM

## 2017-09-25 RX ORDER — METOPROLOL TARTRATE 5 MG/5ML
2.5 INJECTION INTRAVENOUS ONCE
Status: DISCONTINUED | OUTPATIENT
Start: 2017-09-25 | End: 2017-09-28

## 2017-09-25 RX ORDER — HYDROCODONE BITARTRATE AND ACETAMINOPHEN 7.5; 325 MG/1; MG/1
2 TABLET ORAL EVERY 4 HOURS PRN
Status: DISCONTINUED | OUTPATIENT
Start: 2017-09-25 | End: 2017-09-28

## 2017-09-25 RX ORDER — DIPHENHYDRAMINE HYDROCHLORIDE 50 MG/ML
25 INJECTION INTRAMUSCULAR; INTRAVENOUS ONCE AS NEEDED
Status: ACTIVE | OUTPATIENT
Start: 2017-09-25 | End: 2017-09-25

## 2017-09-25 RX ORDER — SODIUM CHLORIDE 9 MG/ML
INJECTION, SOLUTION INTRAVENOUS CONTINUOUS
Status: DISCONTINUED | OUTPATIENT
Start: 2017-09-25 | End: 2017-09-28

## 2017-09-25 RX ORDER — HYDROMORPHONE HYDROCHLORIDE 1 MG/ML
0.2 INJECTION, SOLUTION INTRAMUSCULAR; INTRAVENOUS; SUBCUTANEOUS EVERY 2 HOUR PRN
Status: DISCONTINUED | OUTPATIENT
Start: 2017-09-25 | End: 2017-09-28

## 2017-09-25 RX ORDER — HYDROMORPHONE HYDROCHLORIDE 1 MG/ML
0.2 INJECTION, SOLUTION INTRAMUSCULAR; INTRAVENOUS; SUBCUTANEOUS EVERY 5 MIN PRN
Status: DISCONTINUED | OUTPATIENT
Start: 2017-09-25 | End: 2017-09-25 | Stop reason: HOSPADM

## 2017-09-25 RX ORDER — PRAVASTATIN SODIUM 20 MG
20 TABLET ORAL NIGHTLY
Status: DISCONTINUED | OUTPATIENT
Start: 2017-09-25 | End: 2017-09-28

## 2017-09-25 RX ORDER — GLYCOPYRROLATE 0.2 MG/ML
INJECTION INTRAMUSCULAR; INTRAVENOUS AS NEEDED
Status: DISCONTINUED | OUTPATIENT
Start: 2017-09-25 | End: 2017-09-25 | Stop reason: SURG

## 2017-09-25 RX ORDER — SCOLOPAMINE TRANSDERMAL SYSTEM 1 MG/1
1 PATCH, EXTENDED RELEASE TRANSDERMAL ONCE
Status: COMPLETED | OUTPATIENT
Start: 2017-09-25 | End: 2017-09-28

## 2017-09-25 RX ORDER — FAMOTIDINE 20 MG/1
20 TABLET ORAL 2 TIMES DAILY
Status: DISCONTINUED | OUTPATIENT
Start: 2017-09-25 | End: 2017-09-28

## 2017-09-25 RX ORDER — TRIAMTERENE AND HYDROCHLOROTHIAZIDE 37.5; 25 MG/1; MG/1
1 CAPSULE ORAL DAILY
Status: DISCONTINUED | OUTPATIENT
Start: 2017-09-26 | End: 2017-09-28

## 2017-09-25 RX ORDER — SODIUM CHLORIDE, SODIUM LACTATE, POTASSIUM CHLORIDE, CALCIUM CHLORIDE 600; 310; 30; 20 MG/100ML; MG/100ML; MG/100ML; MG/100ML
INJECTION, SOLUTION INTRAVENOUS CONTINUOUS
Status: DISCONTINUED | OUTPATIENT
Start: 2017-09-25 | End: 2017-09-25 | Stop reason: HOSPADM

## 2017-09-25 RX ORDER — FAMOTIDINE 10 MG/ML
20 INJECTION, SOLUTION INTRAVENOUS 2 TIMES DAILY
Status: DISCONTINUED | OUTPATIENT
Start: 2017-09-25 | End: 2017-09-28

## 2017-09-25 RX ORDER — MORPHINE SULFATE 10 MG/ML
6 INJECTION, SOLUTION INTRAMUSCULAR; INTRAVENOUS EVERY 10 MIN PRN
Status: DISCONTINUED | OUTPATIENT
Start: 2017-09-25 | End: 2017-09-25 | Stop reason: HOSPADM

## 2017-09-25 RX ORDER — MORPHINE SULFATE 2 MG/ML
2 INJECTION, SOLUTION INTRAMUSCULAR; INTRAVENOUS EVERY 10 MIN PRN
Status: DISCONTINUED | OUTPATIENT
Start: 2017-09-25 | End: 2017-09-25 | Stop reason: HOSPADM

## 2017-09-25 RX ORDER — HYDROCODONE BITARTRATE AND ACETAMINOPHEN 7.5; 325 MG/1; MG/1
1 TABLET ORAL EVERY 4 HOURS PRN
Status: DISCONTINUED | OUTPATIENT
Start: 2017-09-25 | End: 2017-09-28

## 2017-09-25 RX ORDER — ONDANSETRON 2 MG/ML
4 INJECTION INTRAMUSCULAR; INTRAVENOUS EVERY 4 HOURS PRN
Status: DISCONTINUED | OUTPATIENT
Start: 2017-09-25 | End: 2017-09-28

## 2017-09-25 RX ORDER — MORPHINE SULFATE 4 MG/ML
4 INJECTION, SOLUTION INTRAMUSCULAR; INTRAVENOUS EVERY 10 MIN PRN
Status: DISCONTINUED | OUTPATIENT
Start: 2017-09-25 | End: 2017-09-25 | Stop reason: HOSPADM

## 2017-09-25 RX ORDER — LIDOCAINE HYDROCHLORIDE 10 MG/ML
INJECTION, SOLUTION EPIDURAL; INFILTRATION; INTRACAUDAL; PERINEURAL AS NEEDED
Status: DISCONTINUED | OUTPATIENT
Start: 2017-09-25 | End: 2017-09-25 | Stop reason: SURG

## 2017-09-25 RX ORDER — DOCUSATE SODIUM 100 MG/1
100 CAPSULE, LIQUID FILLED ORAL 2 TIMES DAILY
Status: DISCONTINUED | OUTPATIENT
Start: 2017-09-25 | End: 2017-09-28

## 2017-09-25 RX ADMIN — ONDANSETRON 4 MG: 2 INJECTION INTRAMUSCULAR; INTRAVENOUS at 16:38:00

## 2017-09-25 RX ADMIN — SODIUM CHLORIDE, SODIUM LACTATE, POTASSIUM CHLORIDE, CALCIUM CHLORIDE: 600; 310; 30; 20 INJECTION, SOLUTION INTRAVENOUS at 14:58:00

## 2017-09-25 RX ADMIN — NEOSTIGMINE METHYLSULFATE 3 MG: 0.5 INJECTION INTRAVENOUS at 16:46:00

## 2017-09-25 RX ADMIN — LIDOCAINE HYDROCHLORIDE 40 MG: 10 INJECTION, SOLUTION EPIDURAL; INFILTRATION; INTRACAUDAL; PERINEURAL at 15:05:00

## 2017-09-25 RX ADMIN — GLYCOPYRROLATE 0.4 MG: 0.2 INJECTION INTRAMUSCULAR; INTRAVENOUS at 16:46:00

## 2017-09-25 RX ADMIN — MIDAZOLAM HYDROCHLORIDE 2 MG: 1 INJECTION INTRAMUSCULAR; INTRAVENOUS at 15:02:00

## 2017-09-25 RX ADMIN — ROCURONIUM BROMIDE 40 MG: 10 INJECTION, SOLUTION INTRAVENOUS at 15:05:00

## 2017-09-25 RX ADMIN — DEXAMETHASONE SODIUM PHOSPHATE 4 MG: 4 MG/ML VIAL (ML) INJECTION at 15:25:00

## 2017-09-25 RX ADMIN — EPHEDRINE SULFATE 10 MG: 50 INJECTION, SOLUTION INTRAVENOUS at 15:22:00

## 2017-09-25 RX ADMIN — HYDROMORPHONE HYDROCHLORIDE 0.4 MG: 1 INJECTION, SOLUTION INTRAMUSCULAR; INTRAVENOUS; SUBCUTANEOUS at 16:54:00

## 2017-09-25 RX ADMIN — HYDROMORPHONE HYDROCHLORIDE 0.2 MG: 1 INJECTION, SOLUTION INTRAMUSCULAR; INTRAVENOUS; SUBCUTANEOUS at 16:58:00

## 2017-09-25 RX ADMIN — HYDROMORPHONE HYDROCHLORIDE 0.4 MG: 1 INJECTION, SOLUTION INTRAMUSCULAR; INTRAVENOUS; SUBCUTANEOUS at 16:13:00

## 2017-09-25 NOTE — ANESTHESIA PREPROCEDURE EVALUATION
Anesthesia PreOp Note    HPI:     Ailyn Love is a 78year old female who presents for preoperative consultation requested by: Sivakumar Garcia MD    Date of Surgery: 9/25/2017    Procedure(s):  KNEE TOTAL REVISION  Indication: Right knee failed hardware 06/29/2012      Laboratory examination ordered as part of a routine general medical examination         Class: Chronic         Date Noted: 06/29/2012      Osteoporosis, unspecified         Class: Chronic         Date Noted: 06/29/2012      Unspecified ashleigh EVERY NIGHT AT BEDTIME (MD VISIT REQUIRED PRIOR TO FUTURE REFILL) Disp: 90 tablet Rfl: 0 9/24/2017 at Unknown time   CloNIDine HCl 0.1 MG Oral Tab TAKE 1 TABLET EVERY EVENING (MD VISIT REQUIRED PRIOR TO FUTURE REFILL) Disp: 90 tablet Rfl: 0 9/24/2017 at [facility] sodium chloride 0.9 % 250 mL IVPB add-vantage 1,000 mg Intravenous Once Glynn Trevino MD   CeFAZolin Sodium (ANCEF/KEFZOL) IVPB premix 2 g 2 g Intravenous Once Glynn Trevino MD   lactated ringers infusion  Intravenous Continuous Napoleon Fernandez MD   HYDROcodon chloride 0.9 % syringe  Intra-articular Once William Moreno MD   Povidone-Iodine 10 % 17.5 mL in sodium chloride 0.9 % 500 mL irrigation  Irrigation Once William Moreno MD   Tranexamic Acid (CYKLOKAPRON) 1,000 mg in sodium chloride 0.9 % 60 mL IVPB 1,000 mg In 09/07/2017       Lab Results  Component Value Date   INR 1.04 09/07/2017       Vital Signs: Body mass index is 28.8 kg/m². height is 1.753 m (5' 9\") and weight is 88.5 kg (195 lb). Her oral temperature is 98.6 °F (37 °C).  Her blood pressure is 187/102

## 2017-09-25 NOTE — INTERVAL H&P NOTE
Pre-op Diagnosis: Right knee failed hardware     The above referenced H&P was reviewed by Pacheco Fox MD on 9/25/2017, the patient was examined and no significant changes have occurred in the patient's condition since the H&P was performed.   I discussed wi

## 2017-09-25 NOTE — OPERATIVE REPORT
Date of Surgery:  09/25/2017  Surgeon: Fatoumata Liu MD  Anesthesia Type:  General  Surgeon  Fatoumata Liu MD  Anesthesia Type:  General  Pre-Op Diagnosis:     (1) Failed right knee replacement  Post-Op Diagnosis:     (1) Failed right knee replacement  Procedure catheter was then placed. The patient's right lower extremity was then prepped and draped. We performed time out, gave antibiotics, and began our procedure. We elevated the tourniquet. Made our skin incision utilizing the prior incision.   We opened up t anterior cut, posterior cut, chamfer cut followed by our box cut. We then impacted the trial into place. I felt that given the prior surgeries the femur and bone loss, a CCK hinge TKA would be required. Her MCL ligament was patulous.   I did not believe t Extubated without complication. IMPLANTS:  DeRev knee system. Hinge revision size small femur with associated 14 x 120 stem with and a size 2.5 tibia with a 14 x 75 stem with associated sleeve as well as an 14 Hinge CCK insert.       POSTOPERATIVE PLAN

## 2017-09-25 NOTE — ANESTHESIA POSTPROCEDURE EVALUATION
Patient: Joan Flood    Procedure Summary     Date:  09/25/17 Room / Location:  [facility] OR  / [facility] OR    Anesthesia Start:  9393 Anesthesia Stop:  0189    Procedure:  KNEE TOTAL REVISION (Right ) Diagnosis:  (Right knee failed hardware )    [place]

## 2017-09-26 PROCEDURE — 97116 GAIT TRAINING THERAPY: CPT

## 2017-09-26 PROCEDURE — 97162 PT EVAL MOD COMPLEX 30 MIN: CPT

## 2017-09-26 PROCEDURE — 80048 BASIC METABOLIC PNL TOTAL CA: CPT | Performed by: ORTHOPAEDIC SURGERY

## 2017-09-26 PROCEDURE — 97535 SELF CARE MNGMENT TRAINING: CPT

## 2017-09-26 PROCEDURE — 85027 COMPLETE CBC AUTOMATED: CPT | Performed by: ORTHOPAEDIC SURGERY

## 2017-09-26 PROCEDURE — 97165 OT EVAL LOW COMPLEX 30 MIN: CPT

## 2017-09-26 PROCEDURE — 97110 THERAPEUTIC EXERCISES: CPT

## 2017-09-26 RX ORDER — METOPROLOL SUCCINATE 25 MG/1
25 TABLET, EXTENDED RELEASE ORAL
Status: DISCONTINUED | OUTPATIENT
Start: 2017-09-27 | End: 2017-09-28

## 2017-09-26 NOTE — PHYSICAL THERAPY NOTE
PHYSICAL THERAPY KNEE TREATMENT NOTE - INPATIENT     Room Number: 433/433-A             Presenting Problem: right tka revision    Problem List  Active Problems:    Arthritis of knee, degenerative      ASSESSMENT   Pt seen for PT treatment session.  Pt with Modifier (G-Code): CK    FUNCTIONAL ABILITY STATUS  Gait Assessment   Gait Assistance: Minimum assistance (cga )  Distance (ft): 180  Assistive Device: Rolling walker  Pattern: R Decreased stance time;R Foot flat (decreased tye with rt knee buckle 3-4

## 2017-09-26 NOTE — OCCUPATIONAL THERAPY NOTE
OCCUPATIONAL THERAPY QUICK EVALUATION - INPATIENT    Room Number: 433/433-A  Evaluation Date: 9/26/2017     Type of Evaluation: Initial  Presenting Problem: revision R knee    Physician Order: IP Consult to Occupational Therapy  Reason for Therapy:  ADL/IA SITUATION  Type of Home: House  Home Layout: Two level  Lives With: Spouse    Toilet and Equipment: Standard height toilet; Toilet riser  Shower/Tub and Equipment: Walk-in shower; Shower chair  Other Equipment:  (rw)    Prior Level of Function: Pta pt was li addressed    ASSESSMENT     Patient is a 78year old female admitted on 9/25/2017 for R knee revision. RN contacted prior to start of care. Treatment coordinated w/ PT. On initial contact pt required min a to transition rle supine to sit at eob.  Pt perform

## 2017-09-26 NOTE — CONSULTS
INFECTIOUS DISEASE CONSULT NOTE    Sara Sanchez Patient Status:  Inpatient    1950 MRN [id number]   Location [facility] 4W/SW/SE Attending Lisa Hernandes MD   Hosp Day # 1 PCP Tito Andrade • Asymptomatic postmenopausal status (age-related) (natural)    • Asymptomatic varicose veins    • Breast CA ([facility] [address] 1990, 2014    Left mastctomy for DCIS   • Breast CA [facility]) 2014    Right mastectomy with chemo and radiation   • Cataract    • Congenital pe drugs.     Allergies:    Latex                   Itching, Other (See Comments)    Comment:Blistering and oozing    Medications:    Current Facility-Administered Medications:   •  Cholecalciferol (VITAMIN D) 1000 units tab 3,000 Units, 3,000 Units, Oral, Hiren Silva mg, 650 mg, Oral, Q4H PRN **OR** hydrocodone-acetaminophen (NORCO) 7.5-325 MG per tab 1 tablet, 1 tablet, Oral, Q4H PRN **OR** hydrocodone-acetaminophen (NORCO) 7.5-325 MG per tab 2 tablet, 2 tablet, Oral, Q4H PRN  •  HYDROmorphone HCl PF (DILAUDID) 1 MG/M intolerance. No polyuria or polydipsia. ALLERGIES:  No history of asthma, hives, eczema or rhinitis. Physical Exam:  Vital signs: Blood pressure 143/52, pulse 74, temperature 98.6 °F (37 °C), temperature source Oral, resp.  rate 16, height 5' 9\" (1.753 IV abx on discharge  - follow fever curve, WBC  - reviewed labs, micro, imaging reports  - discussed with patient, RN, Dr. Divine Peter      Thank you for allowing me to participate in the care of this patient.  Please do not hesitate to call if you have any qu

## 2017-09-26 NOTE — CM/SW NOTE
SW received a call from [facility] regarding the pt's need for IV abx. [facility] can provided in office infusion and they are checking to see if they can provide through the pt's Medicare Part D plan. The pt. Also has a referral to [facility] for RN and PT services.

## 2017-09-26 NOTE — PLAN OF CARE
Impaired Functional Mobility    • Achieve highest/safest level of mobility/gait Progressing        PAIN - ADULT    • Verbalizes/displays adequate comfort level or patient's stated pain goal Progressing        SAFETY ADULT - FALL    • Free from fall injury

## 2017-09-26 NOTE — PLAN OF CARE
DISCHARGE PLANNING    • Discharge to home or other facility with appropriate resources Progressing        Impaired Functional Mobility    • Achieve highest/safest level of mobility/gait Progressing        MUSCULOSKELETAL - ADULT    • Return mobility to saf

## 2017-09-26 NOTE — PROGRESS NOTES
[address] Patient Status:  Inpatient    1950 MRN [id number]   Location [facility] 4W/SW/SE Attending Tiesha Casas MD   [address] Day # 1 PCP Jama Gonzalez DO     Subjective:  Post-Operative Day: 1 Status Post right Rectal Once PRN   ondansetron HCl (ZOFRAN) injection 4 mg 4 mg Intravenous Q4H PRN   Metoclopramide HCl (REGLAN) injection 10 mg 10 mg Intravenous Q6H PRN   Prochlorperazine Edisylate (COMPAZINE) injection 10 mg 10 mg Intravenous Q6H PRN   famoTIDine (PEPC PLT 97 (L) 09/26/2017       Assessment/Plan:  Status Post right Total Knee Arthroplasty revision. Doing well postoperatively.      Continues current post-op course  WBAT  PT/OT  Mobilize  Norco for pain control PRN  Eliquis for DVT prophylaxis in hospital

## 2017-09-26 NOTE — PROGRESS NOTES
[facility]    Progress Note    Dilan Ohara Patient Status:  Inpatient    1950 MRN [id number]   Location [facility] 4W/SW/SE Attending Angus Che MD   Hosp Day # 1 PCP Alex Fletcher DO       Subjective:   Clif Silva 09/07/2017   MG 1.6 (L) 06/21/2017   B12 176 (L) 05/11/2017       No procedure found.     Xr Knee (1 Or 2 Views), Right (cpt=73560)    Result Date: 9/25/2017  CONCLUSION:  Status post right knee prosthesis revision with longstem articulated femoral and tibi

## 2017-09-26 NOTE — DISCHARGE PLANNING
SUZAN met with the pt. At bedside. The pt. Lives with her  in a 2 level home with the bedrooms on the 2nd level. The pt. Reports being independent prior to admission with adls, ambulation but not driving yet.   The pt's  has been assisting with

## 2017-09-26 NOTE — PHYSICAL THERAPY NOTE
PHYSICAL THERAPY KNEE EVALUATION - INPATIENT     Room Number: 433/433-A  Evaluation Date: 9/26/2017  Type of Evaluation: Initial  Physician Order: PT Eval and Treat    Presenting Problem: right tka revision  Reason for Therapy: Mobility Dysfunction and Dis arm   • Osteoarthritis    • Personal history of antineoplastic chemotherapy 2014   • PICC (peripherally inserted central catheter) in place 06/2017   • Unspecified asthma(493.90)    • Unspecified essential hypertension    • Visual impairment     glasses extremity ROM is within functional limits except for the following: right knee 65 degs flexion    Lower extremity strength is within functional limits except for the following:  right knee 2+/5, right hip 3-/5    BALANCE  Static Sitting: Normal  Dynamic Si supine - sit EOB @ level: modified independent     Goal #1   Current Status    Goal #2 Patient is able to demonstrate transfers Sit to/from Stand at assistance level: modified independent     Goal #2  Current Status    Goal #3 Patient is able to ambulate 3

## 2017-09-26 NOTE — H&P
[facility]    PATIENT'S NAME: Fei Levi   ATTENDING PHYSICIAN: Guillermo Cooper.  Brionna Mondragon MD   PATIENT ACCOUNT#:   [id number]    LOCATION:  [address] [place] [address] Rd #:   [id number]       YOB: 1950  ADMISSION DATE:       09/25/20 moist.  NECK:  No masses. There was a PICC line tunneled in the right jugular vein. LUNGS:  Clear to auscultation and percussion. HEART:  Regular rate and rhythm. S1, S2. No murmurs. EXTREMITIES:  No cyanosis, clubbing, or edema.   Right knee with adriano

## 2017-09-27 PROCEDURE — 97110 THERAPEUTIC EXERCISES: CPT

## 2017-09-27 PROCEDURE — 97116 GAIT TRAINING THERAPY: CPT

## 2017-09-27 PROCEDURE — 36591 DRAW BLOOD OFF VENOUS DEVICE: CPT

## 2017-09-27 PROCEDURE — 85027 COMPLETE CBC AUTOMATED: CPT | Performed by: ORTHOPAEDIC SURGERY

## 2017-09-27 NOTE — PHYSICAL THERAPY NOTE
PM Session: Pt is tolerating all amb and transfers with min A with RW RLE WBAT with a step through gait pattern and improving heel strike toe off gait with VC's from PT .  Pt preferred a return to bed after PM PT session with therex performed in supine wit

## 2017-09-27 NOTE — PROGRESS NOTES
[facility]    Progress Note    Gaurav Montoya Patient Status:  Inpatient    1950 MRN [id number]   Location [facility] 4W/SW/SE Attending Elisha Dakins, MD   [facility] Day # 2 PCP Keenan Dave DO       Subjective:   Stacy Hsu TSH 0.59 05/11/2017   ESRML 23 09/07/2017   CRP <0.29 09/07/2017   MG 1.6 (L) 06/21/2017   B12 176 (L) 05/11/2017       No procedure found.     Xr Knee (1 Or 2 Views), Right (cpt=73560)    Result Date: 9/25/2017  CONCLUSION:  Status post right knee prosth

## 2017-09-27 NOTE — PHYSICAL THERAPY NOTE
PHYSICAL THERAPY TREATMENT NOTE - INPATIENT    Room Number: 433/433-A       Presenting Problem: right tka revision    Problem List  Active Problems:    Arthritis of knee, degenerative      ASSESSMENT   Pt is progressing well amb 300' with a RW with step t Sitting down on and standing up from a chair with arms (e.g., wheelchair, bedside commode, etc.): A Little   -   Moving from lying on back to sitting on the side of the bed?: A Little   How much help from another person does the patient currently need. Balbina Ramsey degs flexion   Goal #6 Patient independently performs home exercise program for ROM/strengthening per the instructions provided in preparation for discharge.    Goal #6  Current Status  in progress, see above

## 2017-09-27 NOTE — CONSULTS
Patient is a 78year old female who was admitted to the hospital for revision of R TKA. Doing well no chest pain or shortness of breath. She had PVCs, PACs during surgery. No events overnight. Tele no events. Pain is controlled.      Past Medical Histor • Psychiatric Mother    • Heart Attack Father    • Heart Disorder Father    • Depression Maternal Aunt    • Breast Cancer Maternal Aunt 39   • Cancer Maternal Aunt    • Breast Cancer Self 39   • Ovarian Cancer Cousin    • Cancer Cousin    • Diabetes Matern famoTIDine (PEPCID) tab 20 mg 20 mg Oral BID   Or      famoTIDine (PEPCID) injection 20 mg 20 mg Intravenous BID   diphenhydrAMINE (BENADRYL) cap/tab 25 mg 25 mg Oral Q4H PRN   Or      DiphenhydrAMINE HCl (BENADRYL) injection 12.5 mg 12.5 mg Intravenous Q4 HYDROcodone-acetaminophen (NORCO)  MG Oral Tab Take 1 tablet by mouth every 4 (four) hours as needed for Pain. Potassium Chloride ER 20 MEQ Oral Tab CR TAKE 1 TABLET ONE TIME DAILY (Patient taking differently: Take 20 mEq by mouth daily.  TAKE 1 TAB - On clonidine 0.1mg daily   4. Hx of breast CA s/p bilateral mastectomy   5. Dyslipidemia       Thank you very much for the consultation. Please do not hesitate to call with questions.   [facility]  [address] [place] [address]

## 2017-09-27 NOTE — PROGRESS NOTES
[address] Patient Status:  Inpatient    1950 MRN [id number]   Location [facility] 4W/SW/SE Attending Maryan Charles MD   [address] Day # 2 PCP Bebo Later, DO     Subjective:  Post-Operative Day: 2 Status Post right enema 133 mL 1 enema Rectal Once PRN   ondansetron HCl (ZOFRAN) injection 4 mg 4 mg Intravenous Q4H PRN   Metoclopramide HCl (REGLAN) injection 10 mg 10 mg Intravenous Q6H PRN   Prochlorperazine Edisylate (COMPAZINE) injection 10 mg 10 mg Intravenous Q6H P 25.9 (L) 09/27/2017   PLT 87 (L) 09/27/2017       Assessment/Plan:  Status Post right Total Knee Arthroplasty revision. Doing well postoperatively.      Continues current post-op course  WBAT  PT/OT  Mobilize  Norco for pain control PRN  Eliquis for DVT pro

## 2017-09-27 NOTE — PROGRESS NOTES
INFECTIOUS DISEASE PROGRESS NOTE    Sara Sanchez Patient Status:  Inpatient    1950 MRN [id number]   Location [facility] 4W/SW/SE Attending Lisa Hernandes MD   [facility] Day # 2 PCP Ruben Friday, DO     Subjective:  Afebrile.  Tolerating abx right knee prosthetic joint ([facility] [address]     Cellulitis of right lower extremity     Joint prosthesis infection or inflammation (HCC)     Hypokalemia     Postoperative intra-abdominal abscess (HCC)      Antibiotics: cefazolin; (vancomycin)        ASSESSMENT:  67

## 2017-09-28 VITALS
HEART RATE: 87 BPM | OXYGEN SATURATION: 98 % | TEMPERATURE: 99 F | RESPIRATION RATE: 18 BRPM | BODY MASS INDEX: 28.88 KG/M2 | HEIGHT: 69 IN | WEIGHT: 195 LBS | SYSTOLIC BLOOD PRESSURE: 141 MMHG | DIASTOLIC BLOOD PRESSURE: 51 MMHG

## 2017-09-28 PROCEDURE — 97110 THERAPEUTIC EXERCISES: CPT

## 2017-09-28 PROCEDURE — 97116 GAIT TRAINING THERAPY: CPT

## 2017-09-28 PROCEDURE — 85027 COMPLETE CBC AUTOMATED: CPT | Performed by: ORTHOPAEDIC SURGERY

## 2017-09-28 RX ORDER — 0.9 % SODIUM CHLORIDE 0.9 %
VIAL (ML) INJECTION
Status: COMPLETED
Start: 2017-09-28 | End: 2017-09-28

## 2017-09-28 RX ORDER — HYDROCODONE BITARTRATE AND ACETAMINOPHEN 10; 325 MG/1; MG/1
1 TABLET ORAL EVERY 6 HOURS PRN
Qty: 60 TABLET | Refills: 0 | Status: SHIPPED | OUTPATIENT
Start: 2017-09-28 | End: 2019-04-09

## 2017-09-28 NOTE — CM/SW NOTE
SUZAN spoke with [facility] who stated they are unable to provide IV abx for the pt. Through her Medicare part D. Referral has been made to [address] [place] [address] as she has used them in the past.  SUZAN notified VEENA Alanis of the IV abx need.       A written prescription with the d

## 2017-09-28 NOTE — DISCHARGE SUMMARY
[facility]    PATIENT'S NAME: Jacquelin Jollys   ATTENDING PHYSICIAN: Teena Colon.  Kathrine Carrel, MD   PATIENT ACCOUNT#:   [id number]    LOCATION:  [address] [place] [address] #:   [id number]       YOB: 1950  ADMISSION DATE:       09/25/20 EXAMINATION:    GENERAL:  A 39-year-old white female, alert, moderate discomfort. VITAL SIGNS:  Blood pressure 159/44, pulse 87, respirations 15, temperature 98. 6. HEENT:  Pupils equally reactive and round to light. Extraocular movements are intact.   Mu

## 2017-09-28 NOTE — PROGRESS NOTES
[address] Patient Status:  Inpatient    1950 MRN [id number]   Location [facility] 4W/SW/SE Attending Maryan Charles MD   [facility] Day # 3 PCP Pablo Gamble DO     Subjective:  Post-Operative Day: 3 Status Post right Intravenous BID   diphenhydrAMINE (BENADRYL) cap/tab 25 mg 25 mg Oral Q4H PRN   Or      DiphenhydrAMINE HCl (BENADRYL) injection 12.5 mg 12.5 mg Intravenous Q4H PRN   apixaban (ELIQUIS) tab 2.5 mg 2.5 mg Oral BID   acetaminophen (TYLENOL) tab 650 mg 650 mg days

## 2017-09-28 NOTE — PLAN OF CARE
DISCHARGE PLANNING    • Discharge to home or other facility with appropriate resources Adequate for Discharge        Impaired Functional Mobility    • Achieve highest/safest level of mobility/gait Adequate for Discharge        MUSCULOSKELETAL - ADULT    •

## 2017-09-28 NOTE — PROGRESS NOTES
[place] [facility]    Progress Note    Angy Heidi Patient Status:  Inpatient    1950 MRN [id number]   Location [facility] 4W/SW/SE Attending Kobe Porras MD   [facility] Day # 3 PCP Alexandra Robles DO       Subjective:   Ruffin Halsted 09/28/2017   PLT 90 (L) 09/28/2017   CREATSERUM 0.72 09/26/2017   BUN 12 09/26/2017    09/26/2017   K 4.1 09/26/2017    09/26/2017   CO2 26 09/26/2017    (H) 09/26/2017   CA 8.1 (L) 09/26/2017   ALB 3.8 09/07/2017   ALKPHO 77 09/07/2017

## 2017-09-28 NOTE — PHYSICAL THERAPY NOTE
Pt scheduled for a D/C this PM pt education performed to review stairs sequencing and car transfers in room. Therex HEP packet issued for LE therex per post TKR protocol. Pt owns her own RW and required no DME equipment at time of D/C.  Home with home PT an

## 2017-09-28 NOTE — PLAN OF CARE
Problem: SAFETY ADULT - FALL  Goal: Free from fall injury  INTERVENTIONS:  - Assess pt frequently for physical needs  - Identify cognitive and physical deficits and behaviors that affect risk of falls.   - [place] fall precautions as indicated by assessme

## 2017-09-28 NOTE — PHYSICAL THERAPY NOTE
PHYSICAL THERAPY TREATMENT NOTE - INPATIENT    Room Number: 433/433-A       Presenting Problem: right tka revision    Problem List  Active Problems:    Arthritis of knee, degenerative      ASSESSMENT   Pt is presenting with adequate functional knowledge a the patient currently need. ..   -   Moving to and from a bed to a chair (including a wheelchair)?: None   -   Need to walk in hospital room?: None   -   Climbing 3-5 steps with a railing?: A Little    AM-PAC Score:  Raw Score: 20   PT Approx Degree of Impa

## 2017-09-29 ENCOUNTER — PATIENT OUTREACH (OUTPATIENT)
Dept: CASE MANAGEMENT | Age: 67
End: 2017-09-29

## 2017-09-29 DIAGNOSIS — T84.032A MECHANICAL LOOSENING OF INTERNAL RIGHT KNEE PROSTHETIC JOINT, INITIAL ENCOUNTER (HCC): ICD-10-CM

## 2017-09-29 DIAGNOSIS — T84.012A FAILED TOTAL RIGHT KNEE REPLACEMENT, INITIAL ENCOUNTER (HCC): ICD-10-CM

## 2017-09-29 DIAGNOSIS — T84.50XA INFECTION OR INFLAMMATORY REACTION DUE TO INTERNAL JOINT PROSTHESIS, INITIAL ENCOUNTER (HCC): ICD-10-CM

## 2017-10-03 ENCOUNTER — LAB REQUISITION (OUTPATIENT)
Dept: LAB | Facility: HOSPITAL | Age: 67
End: 2017-10-03
Payer: MEDICARE

## 2017-10-03 DIAGNOSIS — L03.115 CELLULITIS OF RIGHT LOWER EXTREMITY: ICD-10-CM

## 2017-10-03 PROCEDURE — 80053 COMPREHEN METABOLIC PANEL: CPT | Performed by: INTERNAL MEDICINE

## 2017-10-03 PROCEDURE — 85025 COMPLETE CBC W/AUTO DIFF WBC: CPT | Performed by: INTERNAL MEDICINE

## 2017-10-06 ENCOUNTER — TELEPHONE (OUTPATIENT)
Dept: FAMILY MEDICINE CLINIC | Facility: CLINIC | Age: 67
End: 2017-10-06

## 2017-10-06 RX ORDER — METOPROLOL SUCCINATE 25 MG/1
25 TABLET, EXTENDED RELEASE ORAL DAILY
Status: ON HOLD | COMMUNITY
End: 2020-09-22

## 2017-10-06 RX ORDER — GABAPENTIN 100 MG/1
CAPSULE ORAL
Qty: 360 CAPSULE | Refills: 0 | Status: CANCELLED | OUTPATIENT
Start: 2017-10-06

## 2017-10-06 NOTE — TELEPHONE ENCOUNTER
Patient was discharge home from hospital on 9/28/17, [facility] has attempted to reach patient to set up [facility] appointment. Recommended to be seen by 10/13/17. Please call patient and try to get patient to schedule a TCM HFU appointment by 10/13/17.

## 2017-10-06 NOTE — TELEPHONE ENCOUNTER
Patient discharge home from [facility] on 9/28/17 and recommended for a TCM HFU appointment by 10/12/17. 1. NCM attempted to schedule TCM HFU patient states she will call to schedule when she is ambulating better.   2. Patient is requesting a refill

## 2017-10-06 NOTE — PROGRESS NOTES
Initial Post Discharge Follow Up   Discharge Date: 9/28/17  Contact Date: 9/29/2017    Consent Verification:  Assessment Completed With: Patient  HIPAA Verified?   Yes    Discharge Dx:   Right knee infection with failed Right total knee replacement, S/P Rfl: 0   CloNIDine HCl 0.1 MG Oral Tab TAKE 1 TABLET EVERY EVENING (MD VISIT REQUIRED PRIOR TO FUTURE REFILL) Disp: 90 tablet Rfl: 0   Tamoxifen Citrate 20 MG Oral Tab Take 1 tablet (20 mg total) by mouth daily.  Disp: 30 tablet Rfl: 0   Sertraline HCl 100 Time Appointment Department [facility])    Oct 12, 2017  7:00 AM CDT PICC Line  with [facility]/PICC [facility] (--)    Oct 19, 2017  7:00 AM CDT PICC Line  with [facility]/PICC [facility] (--)

## 2017-10-09 ENCOUNTER — LAB REQUISITION (OUTPATIENT)
Dept: LAB | Facility: HOSPITAL | Age: 67
End: 2017-10-09
Payer: MEDICARE

## 2017-10-09 DIAGNOSIS — L03.115 CELLULITIS OF RIGHT LOWER EXTREMITY: ICD-10-CM

## 2017-10-09 PROCEDURE — 85025 COMPLETE CBC W/AUTO DIFF WBC: CPT | Performed by: INTERNAL MEDICINE

## 2017-10-09 PROCEDURE — 80053 COMPREHEN METABOLIC PANEL: CPT | Performed by: INTERNAL MEDICINE

## 2017-10-17 ENCOUNTER — HOSPITAL ENCOUNTER (OUTPATIENT)
Dept: INTERVENTIONAL RADIOLOGY/VASCULAR | Facility: HOSPITAL | Age: 67
Discharge: HOME | End: 2017-10-17
Attending: INTERNAL MEDICINE | Admitting: INTERNAL MEDICINE
Payer: MEDICARE

## 2017-10-17 VITALS
RESPIRATION RATE: 22 BRPM | SYSTOLIC BLOOD PRESSURE: 145 MMHG | OXYGEN SATURATION: 97 % | HEART RATE: 74 BPM | DIASTOLIC BLOOD PRESSURE: 62 MMHG

## 2017-10-17 DIAGNOSIS — A49.01 MSSA (METHICILLIN SUSCEPTIBLE STAPHYLOCOCCUS AUREUS): ICD-10-CM

## 2017-10-17 DIAGNOSIS — T81.40XA POST OP INFECTION: ICD-10-CM

## 2017-10-17 DIAGNOSIS — T84.50XA PROSTHETIC JOINT INFECTION (HCC): ICD-10-CM

## 2017-10-17 PROCEDURE — 05PY33Z REMOVAL OF INFUSION DEVICE FROM UPPER VEIN, PERCUTANEOUS APPROACH: ICD-10-PCS | Performed by: RADIOLOGY

## 2017-10-17 PROCEDURE — 0JPV3WZ REMOVAL OF TOTALLY IMPLANTABLE VASCULAR ACCESS DEVICE FROM UPPER EXTREMITY SUBCUTANEOUS TISSUE AND FASCIA, PERCUTANEOUS APPROACH: ICD-10-PCS | Performed by: RADIOLOGY

## 2017-10-17 PROCEDURE — 36589 REMOVAL TUNNELED CV CATH: CPT

## 2017-10-17 RX ORDER — LIDOCAINE HYDROCHLORIDE 20 MG/ML
INJECTION, SOLUTION EPIDURAL; INFILTRATION; INTRACAUDAL; PERINEURAL
Status: COMPLETED
Start: 2017-10-17 | End: 2017-10-17

## 2017-10-20 RX ORDER — GABAPENTIN 100 MG/1
CAPSULE ORAL
Qty: 360 CAPSULE | Refills: 0 | Status: SHIPPED | OUTPATIENT
Start: 2017-10-20 | End: 2018-02-12

## 2017-11-10 ENCOUNTER — TELEPHONE (OUTPATIENT)
Dept: FAMILY MEDICINE CLINIC | Facility: CLINIC | Age: 67
End: 2017-11-10

## 2017-12-04 ENCOUNTER — TELEPHONE (OUTPATIENT)
Dept: FAMILY MEDICINE CLINIC | Facility: CLINIC | Age: 67
End: 2017-12-04

## 2018-01-08 ENCOUNTER — OFFICE VISIT (OUTPATIENT)
Dept: SURGERY | Facility: CLINIC | Age: 68
End: 2018-01-08

## 2018-01-08 DIAGNOSIS — T81.31XA DISRUPTION OF EXTERNAL SURGICAL WOUND, INITIAL ENCOUNTER: Primary | ICD-10-CM

## 2018-01-08 PROCEDURE — G0463 HOSPITAL OUTPT CLINIC VISIT: HCPCS | Performed by: PLASTIC SURGERY

## 2018-01-08 PROCEDURE — 99203 OFFICE O/P NEW LOW 30 MIN: CPT | Performed by: PLASTIC SURGERY

## 2018-01-08 NOTE — H&P
Jesus Garcia is a 75year old female that presents with Patient presents with:  Wound: R knee  .     REFERRED BY:  [employer]      Pacemaker: No  Latex Allergy: yes  Coumadin: No  Plavix: no  Other anticoagulants: No  Cardiac stents: No    HAND [facility] sanguinous drainage. Wound 2 cm from distal scar with 1 suture CDI without s/s of infection. Per verbal order from Dr Marivel Arreola gauze and ace bandage applied to R knee wound. Pt request for surgery signed by pt and witnessed and signed by RN.   Pt has Comments)    Comment:Blistering and oozing    Past Medical History:   Diagnosis Date   • Acute sinusitis, unspecified    • Arrhythmia     Irregular heart beat   • Asymptomatic postmenopausal status (age-related) (natural)    • Asymptomatic varicose veins Exercise Yes     Social History Narrative   None on file     Family History   Problem Relation Age of Onset   • Depression Mother    • Diabetes Mother    • Breast Cancer Mother 52   • Cancer Mother    • Psychiatric Mother    • Heart Attack Father    • H heal.    TREATMENT:      This needs surgery:  Surgical wound excision and Flap reconstruction    She is at high risk of wound dehiscence and failure surgery because of having had multiple procedures in the area.   We may not be able to close, which case we

## 2018-01-17 ENCOUNTER — OFFICE VISIT (OUTPATIENT)
Dept: HEMATOLOGY/ONCOLOGY | Facility: HOSPITAL | Age: 68
End: 2018-01-17
Attending: INTERNAL MEDICINE
Payer: MEDICARE

## 2018-01-17 VITALS
DIASTOLIC BLOOD PRESSURE: 74 MMHG | HEART RATE: 98 BPM | TEMPERATURE: 98 F | WEIGHT: 196.63 LBS | SYSTOLIC BLOOD PRESSURE: 143 MMHG | BODY MASS INDEX: 29.12 KG/M2 | OXYGEN SATURATION: 99 % | RESPIRATION RATE: 18 BRPM | HEIGHT: 69.02 IN

## 2018-01-17 DIAGNOSIS — C50.811 MALIGNANT NEOPLASM OF OVERLAPPING SITES OF RIGHT BREAST IN FEMALE, ESTROGEN RECEPTOR POSITIVE (HCC): Primary | ICD-10-CM

## 2018-01-17 DIAGNOSIS — Z17.0 MALIGNANT NEOPLASM OF OVERLAPPING SITES OF RIGHT BREAST IN FEMALE, ESTROGEN RECEPTOR POSITIVE (HCC): Primary | ICD-10-CM

## 2018-01-17 PROCEDURE — 99214 OFFICE O/P EST MOD 30 MIN: CPT | Performed by: INTERNAL MEDICINE

## 2018-01-17 RX ORDER — TRIAMTERENE AND HYDROCHLOROTHIAZIDE 37.5; 25 MG/1; MG/1
1 CAPSULE ORAL EVERY MORNING
COMMUNITY
End: 2018-11-20

## 2018-01-17 RX ORDER — BUPRENORPHINE HCL 8 MG/1
1 TABLET SUBLINGUAL DAILY
COMMUNITY

## 2018-01-17 RX ORDER — DOXYCYCLINE HYCLATE 100 MG/1
100 CAPSULE ORAL 2 TIMES DAILY
COMMUNITY
End: 2018-02-12 | Stop reason: ALTCHOICE

## 2018-01-17 RX ORDER — TAMOXIFEN CITRATE 20 MG/1
20 TABLET ORAL DAILY
Qty: 90 TABLET | Refills: 3 | Status: SHIPPED | OUTPATIENT
Start: 2018-01-17 | End: 2018-08-03

## 2018-01-17 NOTE — PROGRESS NOTES
Cancer Center Progress Note    Problem List:      Patient Active Problem List:     Screening for malignant neoplasm of the cervix     Depressive disorder, not elsewhere classified     Obesity, unspecified     Other screening mammogram     Laboratory examin surgery with removal of hardware. She had IV antibiotics. She then had had a right knee revision surgery in 9/2017. She still has a little bit of drainage from the right knee incision site. She has seen plastic surgery.  The plastic surgeon is planning a pr (Patient taking differently: Take 20 mEq by mouth daily. TAKE 1 TABLET ONE TIME DAILY ) Disp: 90 tablet Rfl: 3   Cholecalciferol (VITAMIN D3) 3000 UNITS Oral Tab Take 1 tablet by mouth daily.  Disp:  Rfl:          Performance Status:  ECOG 1: Restricted in 9/2014. She is tolerating Tamoxifen great. She has SIOBHAN. She will see me at six month intervals. She has had right arm lymphedema. She will continue to follow with ortho. She will see me in six months.       Evan Correia MD

## 2018-01-18 ENCOUNTER — TELEPHONE (OUTPATIENT)
Dept: SURGERY | Facility: CLINIC | Age: 68
End: 2018-01-18

## 2018-01-18 DIAGNOSIS — T81.31XA DISRUPTION OF EXTERNAL SURGICAL WOUND, INITIAL ENCOUNTER: Primary | ICD-10-CM

## 2018-01-19 ENCOUNTER — TELEPHONE (OUTPATIENT)
Dept: HEMATOLOGY/ONCOLOGY | Facility: HOSPITAL | Age: 68
End: 2018-01-19

## 2018-01-22 ENCOUNTER — TELEPHONE (OUTPATIENT)
Dept: SURGERY | Facility: CLINIC | Age: 68
End: 2018-01-22

## 2018-01-22 NOTE — TELEPHONE ENCOUNTER
Pt called to cx her surgery scheduled 1/26/18 (roselia r knee, flap closure)  Per pt she states she has the flu and per her PCP, Dr Mario Martinez it is best to postpone surgery. Pt adds she will call back and reschedule once she is feeling better. Thank You.

## 2018-01-30 ENCOUNTER — OFFICE VISIT (OUTPATIENT)
Dept: FAMILY MEDICINE CLINIC | Facility: CLINIC | Age: 68
End: 2018-01-30

## 2018-01-30 VITALS
TEMPERATURE: 99 F | OXYGEN SATURATION: 99 % | HEART RATE: 110 BPM | RESPIRATION RATE: 20 BRPM | BODY MASS INDEX: 29.03 KG/M2 | SYSTOLIC BLOOD PRESSURE: 134 MMHG | DIASTOLIC BLOOD PRESSURE: 70 MMHG | HEIGHT: 69 IN | WEIGHT: 196 LBS

## 2018-01-30 DIAGNOSIS — J01.10 ACUTE NON-RECURRENT FRONTAL SINUSITIS: Primary | ICD-10-CM

## 2018-01-30 PROCEDURE — 99213 OFFICE O/P EST LOW 20 MIN: CPT | Performed by: FAMILY MEDICINE

## 2018-01-30 RX ORDER — AMOXICILLIN AND CLAVULANATE POTASSIUM 875; 125 MG/1; MG/1
1 TABLET, FILM COATED ORAL 2 TIMES DAILY
Qty: 20 TABLET | Refills: 0 | Status: SHIPPED | OUTPATIENT
Start: 2018-01-30 | End: 2018-01-31

## 2018-01-30 RX ORDER — FLUTICASONE PROPIONATE 50 MCG
2 SPRAY, SUSPENSION (ML) NASAL DAILY
Qty: 1 BOTTLE | Refills: 1 | Status: SHIPPED | OUTPATIENT
Start: 2018-01-30 | End: 2018-01-31

## 2018-01-30 NOTE — PROGRESS NOTES
HPI:    Patient ID: Fide Sauceda is a 75year old female. Cough   This is a chronic problem. Episode onset: 3-4 weeks ago. The problem has been gradually improving. The problem occurs every few minutes. The cough is productive of sputum.  Associated REQUIRED PRIOR TO FUTURE REFILL) Disp: 90 tablet Rfl: 0   CloNIDine HCl 0.1 MG Oral Tab TAKE 1 TABLET EVERY EVENING (MD VISIT REQUIRED PRIOR TO FUTURE REFILL) Disp: 90 tablet Rfl: 0   Sertraline HCl 100 MG Oral Tab Take 1 and 1/2 tabs daily Disp: 135 table orders of the defined types were placed in this encounter. Meds This Visit:  Signed Prescriptions Disp Refills    Amoxicillin-Pot Clavulanate 875-125 MG Oral Tab 20 tablet 0      Sig: Take 1 tablet by mouth 2 (two) times daily.       Fluticasone Propio

## 2018-01-31 ENCOUNTER — TELEPHONE (OUTPATIENT)
Dept: FAMILY MEDICINE CLINIC | Facility: CLINIC | Age: 68
End: 2018-01-31

## 2018-01-31 DIAGNOSIS — J01.10 ACUTE NON-RECURRENT FRONTAL SINUSITIS: ICD-10-CM

## 2018-01-31 RX ORDER — FLUTICASONE PROPIONATE 50 MCG
SPRAY, SUSPENSION (ML) NASAL
Qty: 1 BOTTLE | Refills: 1 | Status: SHIPPED | OUTPATIENT
Start: 2018-01-31 | End: 2018-02-12 | Stop reason: ALTCHOICE

## 2018-01-31 RX ORDER — TAMOXIFEN CITRATE 20 MG/1
TABLET ORAL
Qty: 90 TABLET | Refills: 3 | OUTPATIENT
Start: 2018-01-31

## 2018-01-31 RX ORDER — FLUTICASONE PROPIONATE 50 MCG
2 SPRAY, SUSPENSION (ML) NASAL DAILY
Qty: 1 BOTTLE | Refills: 1 | Status: SHIPPED | OUTPATIENT
Start: 2018-01-31 | End: 2018-01-31

## 2018-01-31 RX ORDER — AMOXICILLIN AND CLAVULANATE POTASSIUM 875; 125 MG/1; MG/1
1 TABLET, FILM COATED ORAL 2 TIMES DAILY
Qty: 20 TABLET | Refills: 0 | Status: SHIPPED | OUTPATIENT
Start: 2018-01-31 | End: 2018-02-10

## 2018-01-31 NOTE — TELEPHONE ENCOUNTER
Pt called stated she was here to see maritza Conti yesterday because she was sick, pt was supposed to get medication for her sickness/ treatment send [facility], however pt went to the pharmacy and there is no medications.  Pt is calling to

## 2018-02-12 NOTE — PROGRESS NOTES
Ongoing issues of being unable to stay asleep once she falls asleep. Began around the time of her taking chemotherapy for her breast cancer.   Otherwise she has been doing very well she takes aggressive doses of sertraline that has helped her anxiety and d

## 2018-02-22 ENCOUNTER — OFFICE VISIT (OUTPATIENT)
Dept: FAMILY MEDICINE CLINIC | Facility: CLINIC | Age: 68
End: 2018-02-22

## 2018-02-22 VITALS
WEIGHT: 206 LBS | SYSTOLIC BLOOD PRESSURE: 130 MMHG | TEMPERATURE: 98 F | OXYGEN SATURATION: 98 % | RESPIRATION RATE: 18 BRPM | HEIGHT: 69 IN | DIASTOLIC BLOOD PRESSURE: 70 MMHG | BODY MASS INDEX: 30.51 KG/M2 | HEART RATE: 80 BPM

## 2018-02-22 DIAGNOSIS — I10 ESSENTIAL HYPERTENSION: ICD-10-CM

## 2018-02-22 DIAGNOSIS — Z85.3 HX OF BREAST CANCER: ICD-10-CM

## 2018-02-22 DIAGNOSIS — Z00.00 ROUTINE GENERAL MEDICAL EXAMINATION AT A HEALTH CARE FACILITY: ICD-10-CM

## 2018-02-22 DIAGNOSIS — E66.3 OVERWEIGHT: ICD-10-CM

## 2018-02-22 DIAGNOSIS — E04.9 GOITER: Primary | ICD-10-CM

## 2018-02-22 DIAGNOSIS — M00.9 PYOGENIC ARTHRITIS OF RIGHT KNEE JOINT, DUE TO UNSPECIFIED ORGANISM (HCC): ICD-10-CM

## 2018-02-22 DIAGNOSIS — E87.6 HYPOKALEMIA: ICD-10-CM

## 2018-02-22 PROCEDURE — 99213 OFFICE O/P EST LOW 20 MIN: CPT | Performed by: FAMILY MEDICINE

## 2018-02-22 PROCEDURE — G0439 PPPS, SUBSEQ VISIT: HCPCS | Performed by: FAMILY MEDICINE

## 2018-02-22 NOTE — PROGRESS NOTES
Here for Medicare annual wellness. She is a breast cancer survivor. She recently had a septic right knee joint in a joint that had been replaced. Please refer to the template. Tuning fork test was normal.    She is up-to-date on colonoscopy.

## 2018-02-26 ENCOUNTER — TELEPHONE (OUTPATIENT)
Dept: FAMILY MEDICINE CLINIC | Facility: CLINIC | Age: 68
End: 2018-02-26

## 2018-02-26 NOTE — TELEPHONE ENCOUNTER
Pt has been on zolpidem for the last 10 days. She has had a very bad headache since day 2 of taking it. Does nishi want to switch medications.   Please  advise

## 2018-02-27 RX ORDER — TEMAZEPAM 15 MG/1
CAPSULE ORAL
Qty: 14 CAPSULE | Refills: 0 | COMMUNITY
Start: 2018-02-27 | End: 2018-11-20 | Stop reason: ALTCHOICE

## 2018-02-27 NOTE — PROGRESS NOTES
John Pantoja is a 75year old female who presents for a Medicare Annual Wellness visit.     Patient Care Team: Patient Care Team:  Jason Deutsch DO as PCP - Juno Rehman MD (Radiation Oncology)  Nazia Gacria RN as Registere tablet Rfl: 0   Sertraline HCl 100 MG Oral Tab Take 1 and 1/2 tabs daily Disp: 90 tablet Rfl: 0   Triamterene-HCTZ 37.5-25 MG Oral Cap Take 1 capsule by mouth every morning.  Disp:  Rfl:    [place] ER (VENASTAT) 300 MG Oral Cap CR Take 600 mg by mout  (H) 03/21/2014       Lab Results  Component Value Date   AST 21 10/09/2017   AST 34 10/03/2017   AST 15 09/07/2017       Lab Results  Component Value Date   ALT 11 (L) 10/09/2017   ALT 21 10/03/2017   ALT 12 (L) 09/07/2017       Lab Results  Comp have difficulty seeing?: 0-No    Do you have any difficulty walking or getting up?: 0-No    Do you have any tripping hazards?: 1-Yes    Are you on multiple medications?: 0-No    Does pain affect your day to day activities?: 1-Yes     Have you had any memor Occult Blood Annually No results found for: FOB, OCCULTSTOOL No flowsheet data found.     Glaucoma Screening      Ophthalmology Visit Annually Within 6 months    Bone Density Screening      Dexascan Every two years Last Dexa Scan:   XR DEXA BONE [place] Value   10/09/2017 0.77    No flowsheet data found. Digoxin Serum Conc  Annually No results found for: DIGOXIN No flowsheet data found.     Diabetes      HgbA1C  Annually HEMOGLOBIN A1c (% of total Hgb)   Date Value   03/21/2014 5.6     HgbA1C (%)   Date 24 Hr Take 25 mg by mouth daily. Disp:  Rfl:    HYDROcodone-acetaminophen (NORCO)  MG Oral Tab Take 1 tablet by mouth every 6 (six) hours as needed for Pain.  Disp: 60 tablet Rfl: 0   magnesium oxide 400 MG Oral Tab Take 1 tablet (400 mg total) by santi node dissection, and advancement flap                mastopexy  No date: TONSILLECTOMY  1/2017: TOTAL KNEE REPLACEMENT Right  5/10/17: TOTAL KNEE REPLACEMENT Right      Comment: Revision-Dr. Carlos Will  09/25/2017: TOTAL KNEE REPLACEMENT Right      Comment: Rev WITH DIFFERENTIAL WITH PLATELET; Future  -     COMP METABOLIC PANEL (14); Future  -     ASSAY, THYROID STIM HORMONE; Future  -     LIPID PANEL;  Future    Pyogenic arthritis of right knee joint, due to unspecified organism [facility])  Is now essentially and almo

## 2018-03-09 ENCOUNTER — TELEPHONE (OUTPATIENT)
Dept: SURGERY | Facility: CLINIC | Age: 68
End: 2018-03-09

## 2018-03-09 NOTE — TELEPHONE ENCOUNTER
Called pt per Dr. Berenice Nation' request to f/u on recommendation for debridement to wound of R knee. Pt states that the wound has closed and that Dr. Lara Guzman has been following it and that she no longer needs surgery.   Pt informed to call our office if she ha

## 2018-03-20 ENCOUNTER — OFFICE VISIT (OUTPATIENT)
Dept: FAMILY MEDICINE CLINIC | Facility: CLINIC | Age: 68
End: 2018-03-20

## 2018-03-20 VITALS
DIASTOLIC BLOOD PRESSURE: 62 MMHG | HEART RATE: 110 BPM | WEIGHT: 199 LBS | OXYGEN SATURATION: 97 % | SYSTOLIC BLOOD PRESSURE: 130 MMHG | TEMPERATURE: 98 F | BODY MASS INDEX: 29 KG/M2

## 2018-03-20 DIAGNOSIS — J01.40 ACUTE NON-RECURRENT PANSINUSITIS: Primary | ICD-10-CM

## 2018-03-20 PROCEDURE — 99213 OFFICE O/P EST LOW 20 MIN: CPT | Performed by: FAMILY MEDICINE

## 2018-03-20 RX ORDER — CODEINE PHOSPHATE AND GUAIFENESIN 10; 100 MG/5ML; MG/5ML
SOLUTION ORAL
Qty: 180 ML | Refills: 0 | Status: SHIPPED | OUTPATIENT
Start: 2018-03-20 | End: 2018-03-29

## 2018-03-20 RX ORDER — CEFDINIR 300 MG/1
300 CAPSULE ORAL 2 TIMES DAILY
Qty: 20 CAPSULE | Refills: 0 | Status: SHIPPED | OUTPATIENT
Start: 2018-03-20 | End: 2018-03-29

## 2018-03-29 ENCOUNTER — OFFICE VISIT (OUTPATIENT)
Dept: FAMILY MEDICINE CLINIC | Facility: CLINIC | Age: 68
End: 2018-03-29

## 2018-03-29 VITALS
DIASTOLIC BLOOD PRESSURE: 66 MMHG | WEIGHT: 199 LBS | HEIGHT: 69 IN | SYSTOLIC BLOOD PRESSURE: 132 MMHG | TEMPERATURE: 99 F | HEART RATE: 94 BPM | BODY MASS INDEX: 29.47 KG/M2 | RESPIRATION RATE: 16 BRPM

## 2018-03-29 DIAGNOSIS — J20.9 BRONCHITIS WITH BRONCHOSPASM: Primary | ICD-10-CM

## 2018-03-29 PROCEDURE — 99213 OFFICE O/P EST LOW 20 MIN: CPT | Performed by: FAMILY MEDICINE

## 2018-03-29 RX ORDER — CODEINE PHOSPHATE AND GUAIFENESIN 10; 100 MG/5ML; MG/5ML
SOLUTION ORAL
Qty: 180 ML | Refills: 0 | Status: SHIPPED | OUTPATIENT
Start: 2018-03-29 | End: 2018-04-26

## 2018-03-29 RX ORDER — CEFDINIR 300 MG/1
CAPSULE ORAL
Refills: 0 | COMMUNITY
Start: 2018-03-20 | End: 2018-04-26

## 2018-03-29 RX ORDER — ZOLPIDEM TARTRATE 10 MG/1
10 TABLET ORAL NIGHTLY
Qty: 90 TABLET | Refills: 3 | Status: SHIPPED | OUTPATIENT
Start: 2018-03-29 | End: 2018-04-26

## 2018-03-29 RX ORDER — ZOLPIDEM TARTRATE 10 MG/1
10 TABLET ORAL NIGHTLY PRN
Qty: 30 TABLET | Refills: 1 | Status: SHIPPED | OUTPATIENT
Start: 2018-03-29 | End: 2018-11-20 | Stop reason: ALTCHOICE

## 2018-03-29 NOTE — PROGRESS NOTES
Final visit for her severe recent bronchitis. She feels much . She would like her Cheratussin with codeine cough syrup renewed and her Ambien for sleep. These were given to her.     Exam she is in no distress but still has a slightly hoarse voice quality

## 2018-04-26 ENCOUNTER — TELEPHONE (OUTPATIENT)
Dept: FAMILY MEDICINE CLINIC | Facility: CLINIC | Age: 68
End: 2018-04-26

## 2018-04-26 ENCOUNTER — OFFICE VISIT (OUTPATIENT)
Dept: FAMILY MEDICINE CLINIC | Facility: CLINIC | Age: 68
End: 2018-04-26

## 2018-04-26 VITALS
BODY MASS INDEX: 29.62 KG/M2 | RESPIRATION RATE: 16 BRPM | TEMPERATURE: 99 F | DIASTOLIC BLOOD PRESSURE: 68 MMHG | OXYGEN SATURATION: 98 % | SYSTOLIC BLOOD PRESSURE: 130 MMHG | HEIGHT: 69 IN | HEART RATE: 104 BPM | WEIGHT: 200 LBS

## 2018-04-26 DIAGNOSIS — J01.80 ACUTE NON-RECURRENT SINUSITIS OF OTHER SINUS: Primary | ICD-10-CM

## 2018-04-26 PROBLEM — J32.4 PANSINUSITIS: Status: ACTIVE | Noted: 2018-04-26

## 2018-04-26 PROBLEM — J01.90 ACUTE NON-RECURRENT SINUSITIS: Status: ACTIVE | Noted: 2018-04-26

## 2018-04-26 PROCEDURE — 99213 OFFICE O/P EST LOW 20 MIN: CPT | Performed by: FAMILY MEDICINE

## 2018-04-26 NOTE — TELEPHONE ENCOUNTER
Spoke to pharmacy - verbal rx given for Omnicef 300mg #20 and Fluticasone nasal spray per  JG chart notes.

## 2018-04-26 NOTE — PROGRESS NOTES
Has been struggling with back to back sinus type infection since January. Was treated at least once with an antibiotic. She is a non-smoker. She is a breast cancer survivor.   Today's exam vital signs normal strongly nasal quality to her voice is noted n

## 2018-05-07 ENCOUNTER — HOSPITAL ENCOUNTER (OUTPATIENT)
Dept: CT IMAGING | Facility: HOSPITAL | Age: 68
Discharge: HOME | End: 2018-05-07
Attending: FAMILY MEDICINE

## 2018-05-07 DIAGNOSIS — Z13.9 VISIT FOR SCREENING: ICD-10-CM

## 2018-05-08 RX ORDER — CLONIDINE HYDROCHLORIDE 0.1 MG/1
TABLET ORAL
Qty: 90 TABLET | Refills: 0 | Status: SHIPPED | OUTPATIENT
Start: 2018-05-08 | End: 2018-08-06

## 2018-05-08 RX ORDER — SERTRALINE HYDROCHLORIDE 100 MG/1
TABLET, FILM COATED ORAL
Qty: 90 TABLET | Refills: 0 | Status: SHIPPED | OUTPATIENT
Start: 2018-05-08 | End: 2018-08-06

## 2018-05-29 ENCOUNTER — OFFICE VISIT (OUTPATIENT)
Dept: FAMILY MEDICINE CLINIC | Facility: CLINIC | Age: 68
End: 2018-05-29

## 2018-05-29 ENCOUNTER — HOSPITAL ENCOUNTER (OUTPATIENT)
Dept: GENERAL RADIOLOGY | Age: 68
Discharge: HOME | End: 2018-05-29
Attending: FAMILY MEDICINE
Payer: MEDICARE

## 2018-05-29 VITALS
TEMPERATURE: 98 F | RESPIRATION RATE: 18 BRPM | HEART RATE: 110 BPM | WEIGHT: 205 LBS | BODY MASS INDEX: 31.07 KG/M2 | DIASTOLIC BLOOD PRESSURE: 82 MMHG | HEIGHT: 68 IN | OXYGEN SATURATION: 97 % | SYSTOLIC BLOOD PRESSURE: 124 MMHG

## 2018-05-29 DIAGNOSIS — R91.1 LUNG NODULE: ICD-10-CM

## 2018-05-29 DIAGNOSIS — J18.9 PNEUMONIA DUE TO INFECTIOUS ORGANISM, UNSPECIFIED LATERALITY, UNSPECIFIED PART OF LUNG: Primary | ICD-10-CM

## 2018-05-29 DIAGNOSIS — J18.9 PNEUMONIA DUE TO INFECTIOUS ORGANISM, UNSPECIFIED LATERALITY, UNSPECIFIED PART OF LUNG: ICD-10-CM

## 2018-05-29 PROCEDURE — 71046 X-RAY EXAM CHEST 2 VIEWS: CPT | Performed by: FAMILY MEDICINE

## 2018-05-29 PROCEDURE — 99213 OFFICE O/P EST LOW 20 MIN: CPT | Performed by: FAMILY MEDICINE

## 2018-05-29 NOTE — PROGRESS NOTES
Complicated case originally came in because she is sensing increased mucus production immediately on lying down in her bedroom at night. There is no chest pain or shortness of breath.   She is a woman who endured 2 or more episodes of complicated bronchiti

## 2018-06-01 ENCOUNTER — TELEPHONE (OUTPATIENT)
Dept: FAMILY MEDICINE CLINIC | Facility: CLINIC | Age: 68
End: 2018-06-01

## 2018-06-01 NOTE — TELEPHONE ENCOUNTER
See previous note pt called requesting recommendations to come from Dr Maxwell Sers only pt states he knows how sick she was. Please advise pt looking for recommendations to treat her bronchitis.

## 2018-06-01 NOTE — TELEPHONE ENCOUNTER
Patient had chest x-ray on 5/29. Was informed she has bronchitis but was not told about how to treat it. Please advise.

## 2018-06-13 ENCOUNTER — HOSPITAL ENCOUNTER (OUTPATIENT)
Dept: CARDIOLOGY CLINIC | Facility: HOSPITAL | Age: 68
Discharge: HOME | End: 2018-06-13
Attending: INTERNAL MEDICINE

## 2018-06-13 DIAGNOSIS — Z13.9 ENCOUNTER FOR SCREENING: ICD-10-CM

## 2018-08-03 ENCOUNTER — TELEPHONE (OUTPATIENT)
Dept: FAMILY MEDICINE CLINIC | Facility: CLINIC | Age: 68
End: 2018-08-03

## 2018-08-03 ENCOUNTER — TELEPHONE (OUTPATIENT)
Dept: HEMATOLOGY/ONCOLOGY | Facility: HOSPITAL | Age: 68
End: 2018-08-03

## 2018-08-03 RX ORDER — TAMOXIFEN CITRATE 20 MG/1
20 TABLET ORAL DAILY
Qty: 90 TABLET | Refills: 0 | Status: SHIPPED | OUTPATIENT
Start: 2018-08-03 | End: 2018-08-06

## 2018-08-03 NOTE — TELEPHONE ENCOUNTER
Patient would like a refill for Tamoixifen.  Patient was informed that she is overdue for f/u and a 3 month supply was sent to her pharmacy

## 2018-08-03 NOTE — TELEPHONE ENCOUNTER
REFILL ON SERTRALINE 100MG   1 1/2 TABS PER DAY      SIMVISTATIN 10 MG   1 TAB  HS      CLONIDINE .1MG  1 TAB Q EVENING    GABAPENTIN 100MG    1 CAP Q AM AND NOON    2 CAPS AT [facility].     PLS CALL THEM [address]    [address]

## 2018-08-06 ENCOUNTER — TELEPHONE (OUTPATIENT)
Dept: FAMILY MEDICINE CLINIC | Facility: CLINIC | Age: 68
End: 2018-08-06

## 2018-08-06 RX ORDER — CLONIDINE HYDROCHLORIDE 0.1 MG/1
TABLET ORAL
Qty: 90 TABLET | Refills: 0 | Status: SHIPPED | OUTPATIENT
Start: 2018-08-06 | End: 2018-08-06

## 2018-08-06 RX ORDER — SIMVASTATIN 10 MG
TABLET ORAL
Qty: 90 TABLET | Refills: 0 | Status: SHIPPED | OUTPATIENT
Start: 2018-08-06 | End: 2018-08-06

## 2018-08-06 RX ORDER — TAMOXIFEN CITRATE 20 MG/1
20 TABLET ORAL DAILY
Qty: 90 TABLET | Refills: 0 | Status: SHIPPED | OUTPATIENT
Start: 2018-08-06 | End: 2019-03-07

## 2018-08-06 RX ORDER — SERTRALINE HYDROCHLORIDE 100 MG/1
TABLET, FILM COATED ORAL
Qty: 135 TABLET | Refills: 0 | Status: SHIPPED | OUTPATIENT
Start: 2018-08-06 | End: 2018-11-20

## 2018-08-06 RX ORDER — GABAPENTIN 100 MG/1
CAPSULE ORAL
Qty: 360 CAPSULE | Refills: 0 | Status: SHIPPED | OUTPATIENT
Start: 2018-08-06 | End: 2018-08-06

## 2018-08-06 RX ORDER — GABAPENTIN 100 MG/1
CAPSULE ORAL
Qty: 360 CAPSULE | Refills: 0 | Status: SHIPPED | OUTPATIENT
Start: 2018-08-06 | End: 2018-11-20

## 2018-08-06 RX ORDER — CLONIDINE HYDROCHLORIDE 0.1 MG/1
TABLET ORAL
Qty: 90 TABLET | Refills: 0 | Status: SHIPPED | OUTPATIENT
Start: 2018-08-06 | End: 2018-11-20

## 2018-08-06 RX ORDER — SIMVASTATIN 10 MG
TABLET ORAL
Qty: 90 TABLET | Refills: 0 | Status: SHIPPED | OUTPATIENT
Start: 2018-08-06 | End: 2018-11-26

## 2018-08-06 NOTE — TELEPHONE ENCOUNTER
Pt wants rxs sent to [facility] is going to cost too much  Clonidine  simvistatin  Gabapentin  Sertraline- for cost pt need 2 tablets per day

## 2018-10-23 ENCOUNTER — EKG ENCOUNTER (OUTPATIENT)
Dept: LAB | Facility: HOSPITAL | Age: 68
End: 2018-10-23
Attending: ORTHOPAEDIC SURGERY
Payer: MEDICARE

## 2018-10-23 PROCEDURE — 93010 ELECTROCARDIOGRAM REPORT: CPT | Performed by: INTERNAL MEDICINE

## 2018-10-23 PROCEDURE — 93005 ELECTROCARDIOGRAM TRACING: CPT

## 2018-10-25 ENCOUNTER — HOSPITAL ENCOUNTER (OUTPATIENT)
Dept: CT IMAGING | Facility: HOSPITAL | Age: 68
Discharge: HOME | End: 2018-10-25
Attending: ORTHOPAEDIC SURGERY
Payer: MEDICARE

## 2018-10-25 DIAGNOSIS — M17.12 UNILATERAL PRIMARY OSTEOARTHRITIS, LEFT KNEE: ICD-10-CM

## 2018-10-25 PROCEDURE — 73700 CT LOWER EXTREMITY W/O DYE: CPT | Performed by: ORTHOPAEDIC SURGERY

## 2018-11-15 ENCOUNTER — LAB ENCOUNTER (OUTPATIENT)
Dept: LAB | Facility: HOSPITAL | Age: 68
End: 2018-11-15
Attending: ORTHOPAEDIC SURGERY
Payer: MEDICARE

## 2018-11-15 DIAGNOSIS — I10 ESSENTIAL HYPERTENSION: ICD-10-CM

## 2018-11-15 DIAGNOSIS — M17.12 DEGENERATIVE ARTHRITIS OF LEFT KNEE: ICD-10-CM

## 2018-11-15 DIAGNOSIS — Z01.818 PRE-OP TESTING: ICD-10-CM

## 2018-11-15 DIAGNOSIS — E87.6 HYPOKALEMIA: ICD-10-CM

## 2018-11-15 DIAGNOSIS — M25.562 LEFT KNEE PAIN: Primary | ICD-10-CM

## 2018-11-15 PROCEDURE — 85025 COMPLETE CBC W/AUTO DIFF WBC: CPT

## 2018-11-15 PROCEDURE — 86901 BLOOD TYPING SEROLOGIC RH(D): CPT

## 2018-11-15 PROCEDURE — 86850 RBC ANTIBODY SCREEN: CPT

## 2018-11-15 PROCEDURE — 86900 BLOOD TYPING SEROLOGIC ABO: CPT

## 2018-11-15 PROCEDURE — 81001 URINALYSIS AUTO W/SCOPE: CPT

## 2018-11-15 PROCEDURE — 36415 COLL VENOUS BLD VENIPUNCTURE: CPT

## 2018-11-15 PROCEDURE — 84443 ASSAY THYROID STIM HORMONE: CPT

## 2018-11-15 PROCEDURE — 80053 COMPREHEN METABOLIC PANEL: CPT

## 2018-11-15 PROCEDURE — 87081 CULTURE SCREEN ONLY: CPT

## 2018-11-20 ENCOUNTER — OFFICE VISIT (OUTPATIENT)
Dept: FAMILY MEDICINE CLINIC | Facility: CLINIC | Age: 68
End: 2018-11-20
Payer: MEDICARE

## 2018-11-20 VITALS
DIASTOLIC BLOOD PRESSURE: 74 MMHG | HEIGHT: 68 IN | OXYGEN SATURATION: 97 % | TEMPERATURE: 98 F | RESPIRATION RATE: 16 BRPM | HEART RATE: 104 BPM | SYSTOLIC BLOOD PRESSURE: 124 MMHG | BODY MASS INDEX: 31.83 KG/M2 | WEIGHT: 210 LBS

## 2018-11-20 DIAGNOSIS — E87.6 HYPOKALEMIA: ICD-10-CM

## 2018-11-20 DIAGNOSIS — I49.02 VENTRICULAR FLUTTER (HCC): Primary | ICD-10-CM

## 2018-11-20 DIAGNOSIS — E04.9 GOITER: ICD-10-CM

## 2018-11-20 DIAGNOSIS — R31.9 HEMATURIA, UNSPECIFIED TYPE: ICD-10-CM

## 2018-11-20 PROCEDURE — 99214 OFFICE O/P EST MOD 30 MIN: CPT | Performed by: FAMILY MEDICINE

## 2018-11-20 RX ORDER — GABAPENTIN 100 MG/1
CAPSULE ORAL
Qty: 360 CAPSULE | Refills: 0 | Status: SHIPPED | OUTPATIENT
Start: 2018-11-20 | End: 2019-03-07

## 2018-11-20 RX ORDER — CLONIDINE HYDROCHLORIDE 0.1 MG/1
TABLET ORAL
Qty: 90 TABLET | Refills: 1 | Status: SHIPPED | OUTPATIENT
Start: 2018-11-20 | End: 2019-07-08

## 2018-11-20 RX ORDER — SERTRALINE HYDROCHLORIDE 100 MG/1
TABLET, FILM COATED ORAL
Qty: 135 TABLET | Refills: 1 | Status: SHIPPED | OUTPATIENT
Start: 2018-11-20 | End: 2019-07-08

## 2018-11-20 RX ORDER — POTASSIUM CHLORIDE 20 MEQ/1
TABLET, EXTENDED RELEASE ORAL
Qty: 180 TABLET | Refills: 3 | Status: SHIPPED | OUTPATIENT
Start: 2018-11-20 | End: 2020-09-14 | Stop reason: CLARIF

## 2018-11-20 RX ORDER — TRIAMTERENE AND HYDROCHLOROTHIAZIDE 37.5; 25 MG/1; MG/1
1 CAPSULE ORAL EVERY MORNING
Qty: 90 CAPSULE | Refills: 1 | Status: SHIPPED | OUTPATIENT
Start: 2018-11-20 | End: 2019-07-08

## 2018-11-20 NOTE — PROGRESS NOTES
I have been asked to service medical reviewer for this patient who is scheduled for November 30 left knee replacement by Dr. Rickey Dominguez in [facility].  Surgery will be done at [facility].    The patient is a breast cancer survivor and is otherwise very healt disability. The assessment is goiter without airway compromise    The assessment asymptomatic hypokalemia. The assessment cardiac arrhythmia. The assessment hematuria of unknown significance no evidence of infection at this time.     He assessment

## 2018-11-23 ENCOUNTER — TELEPHONE (OUTPATIENT)
Dept: FAMILY MEDICINE CLINIC | Facility: CLINIC | Age: 68
End: 2018-11-23

## 2018-11-23 NOTE — TELEPHONE ENCOUNTER
Pt requesting to talk to [employer], pt did not relate the reason stated Brittanyelaine Amaya has been speaking to her this morning and she will know for what. Please call pt and advise.

## 2018-11-26 ENCOUNTER — HOSPITAL ENCOUNTER (OUTPATIENT)
Dept: ULTRASOUND IMAGING | Facility: HOSPITAL | Age: 68
Discharge: HOME | End: 2018-11-26
Attending: FAMILY MEDICINE
Payer: MEDICARE

## 2018-11-26 ENCOUNTER — APPOINTMENT (OUTPATIENT)
Dept: LAB | Facility: HOSPITAL | Age: 68
End: 2018-11-26
Attending: FAMILY MEDICINE
Payer: MEDICARE

## 2018-11-26 DIAGNOSIS — E04.9 GOITER: ICD-10-CM

## 2018-11-26 DIAGNOSIS — R31.9 HEMATURIA, UNSPECIFIED TYPE: ICD-10-CM

## 2018-11-26 DIAGNOSIS — I10 ESSENTIAL HYPERTENSION: ICD-10-CM

## 2018-11-26 DIAGNOSIS — E87.6 HYPOKALEMIA: ICD-10-CM

## 2018-11-26 PROCEDURE — 80061 LIPID PANEL: CPT

## 2018-11-26 PROCEDURE — 83735 ASSAY OF MAGNESIUM: CPT

## 2018-11-26 PROCEDURE — 76536 US EXAM OF HEAD AND NECK: CPT | Performed by: FAMILY MEDICINE

## 2018-11-26 PROCEDURE — 81001 URINALYSIS AUTO W/SCOPE: CPT

## 2018-11-26 PROCEDURE — 80048 BASIC METABOLIC PNL TOTAL CA: CPT

## 2018-11-26 PROCEDURE — 36415 COLL VENOUS BLD VENIPUNCTURE: CPT

## 2018-12-03 ENCOUNTER — OFFICE VISIT (OUTPATIENT)
Dept: FAMILY MEDICINE CLINIC | Facility: CLINIC | Age: 68
End: 2018-12-03
Payer: MEDICARE

## 2018-12-03 VITALS
BODY MASS INDEX: 32.74 KG/M2 | WEIGHT: 216 LBS | SYSTOLIC BLOOD PRESSURE: 138 MMHG | TEMPERATURE: 98 F | RESPIRATION RATE: 16 BRPM | HEIGHT: 68 IN | HEART RATE: 86 BPM | OXYGEN SATURATION: 96 % | DIASTOLIC BLOOD PRESSURE: 72 MMHG

## 2018-12-03 DIAGNOSIS — M23.92 INTERNAL DERANGEMENT OF LEFT KNEE: Primary | ICD-10-CM

## 2018-12-03 PROCEDURE — 90732 PPSV23 VACC 2 YRS+ SUBQ/IM: CPT | Performed by: FAMILY MEDICINE

## 2018-12-03 PROCEDURE — 99214 OFFICE O/P EST MOD 30 MIN: CPT | Performed by: FAMILY MEDICINE

## 2018-12-03 PROCEDURE — 90653 IIV ADJUVANT VACCINE IM: CPT | Performed by: FAMILY MEDICINE

## 2018-12-03 PROCEDURE — G0009 ADMIN PNEUMOCOCCAL VACCINE: HCPCS | Performed by: FAMILY MEDICINE

## 2018-12-03 PROCEDURE — G0008 ADMIN INFLUENZA VIRUS VAC: HCPCS | Performed by: FAMILY MEDICINE

## 2018-12-03 RX ORDER — KETOCONAZOLE 20 MG/G
2 CREAM TOPICAL DAILY
Qty: 60 G | Refills: 0 | Status: SHIPPED | OUTPATIENT
Start: 2018-12-03 | End: 2020-09-14 | Stop reason: CLARIF

## 2018-12-03 RX ORDER — DOXYCYCLINE HYCLATE 100 MG/1
CAPSULE ORAL
Refills: 0 | COMMUNITY
Start: 2018-11-29 | End: 2019-01-07 | Stop reason: ALTCHOICE

## 2018-12-03 NOTE — PROGRESS NOTES
This is an extensive review of her current situation she is eagerly awaiting replacement of her left knee which is failed any and all conservative measures. Woman is a survivor of breast cancer and has been doing very very well.   She is scheduled to have uncomplicated to be treated now with ketoconazole 2% cream twice a day for the next week. Vaccines administered. Assessment is occult blood without symptoms we will simply repeat Bimal her urinalysis in the postop.   She does understand the need and im

## 2018-12-14 RX ORDER — CYCLOBENZAPRINE HCL 10 MG
TABLET ORAL
Qty: 40 TABLET | Refills: 0 | Status: SHIPPED | OUTPATIENT
Start: 2018-12-14 | End: 2018-12-28

## 2018-12-31 RX ORDER — CYCLOBENZAPRINE HCL 10 MG
TABLET ORAL
Qty: 40 TABLET | Refills: 0 | Status: SHIPPED | OUTPATIENT
Start: 2018-12-31 | End: 2019-02-09

## 2019-01-07 ENCOUNTER — OFFICE VISIT (OUTPATIENT)
Dept: FAMILY MEDICINE CLINIC | Facility: CLINIC | Age: 69
End: 2019-01-07
Payer: MEDICARE

## 2019-01-07 VITALS
BODY MASS INDEX: 30.95 KG/M2 | SYSTOLIC BLOOD PRESSURE: 130 MMHG | DIASTOLIC BLOOD PRESSURE: 76 MMHG | HEART RATE: 90 BPM | WEIGHT: 204.19 LBS | OXYGEN SATURATION: 98 % | HEIGHT: 68 IN | RESPIRATION RATE: 18 BRPM | TEMPERATURE: 98 F

## 2019-01-07 DIAGNOSIS — J06.9 UPPER RESPIRATORY TRACT INFECTION, UNSPECIFIED TYPE: ICD-10-CM

## 2019-01-07 DIAGNOSIS — R05.9 COUGH: Primary | ICD-10-CM

## 2019-01-07 PROCEDURE — 99213 OFFICE O/P EST LOW 20 MIN: CPT | Performed by: PHYSICIAN ASSISTANT

## 2019-01-07 RX ORDER — CEFDINIR 300 MG/1
300 CAPSULE ORAL 2 TIMES DAILY
Qty: 20 CAPSULE | Refills: 0 | Status: SHIPPED | OUTPATIENT
Start: 2019-01-07 | End: 2019-02-07 | Stop reason: ALTCHOICE

## 2019-01-07 RX ORDER — BENZONATATE 100 MG/1
100 CAPSULE ORAL 3 TIMES DAILY PRN
Qty: 15 CAPSULE | Refills: 0 | Status: SHIPPED | OUTPATIENT
Start: 2019-01-07 | End: 2019-02-07 | Stop reason: ALTCHOICE

## 2019-01-07 NOTE — PROGRESS NOTES
HPI:   Fide Sauceda is a 70year old female who presents for cold symptoms for  1  weeks. Patient reports sore throat only at the beginning of sx's, cough with clear colored sputum, sinus pain, denies fever, denies sinus pain. Denies wheezing or SOB. Vitamins-Minerals (CENTRUM SILVER) Oral Tab Take 1 tablet by mouth daily. Disp:  Rfl:    Metoprolol Succinate ER 25 MG Oral Tablet 24 Hr Take 25 mg by mouth daily.  Disp:  Rfl:    HYDROcodone-acetaminophen (NORCO)  MG Oral Tab Take 1 tablet by mouth e BMI 31.05 kg/m²   GENERAL: well developed and in no apparent distress  SKIN: warm & dry, no rash  EYES:PERRLA, conjunctiva are clear  HEENT: atraumatic, normocephalic, TMs pearly gray without erythema or effusion, nares patent, posterior pharynx without er

## 2019-01-16 DIAGNOSIS — R05.9 COUGH: ICD-10-CM

## 2019-02-01 ENCOUNTER — PATIENT OUTREACH (OUTPATIENT)
Dept: FAMILY MEDICINE CLINIC | Facility: CLINIC | Age: 69
End: 2019-02-01

## 2019-02-07 ENCOUNTER — OFFICE VISIT (OUTPATIENT)
Dept: FAMILY MEDICINE CLINIC | Facility: CLINIC | Age: 69
End: 2019-02-07
Payer: MEDICARE

## 2019-02-07 VITALS
SYSTOLIC BLOOD PRESSURE: 120 MMHG | HEIGHT: 68 IN | BODY MASS INDEX: 29.7 KG/M2 | HEART RATE: 90 BPM | DIASTOLIC BLOOD PRESSURE: 68 MMHG | WEIGHT: 196 LBS | TEMPERATURE: 99 F | RESPIRATION RATE: 16 BRPM | OXYGEN SATURATION: 97 %

## 2019-02-07 DIAGNOSIS — J01.00 ACUTE NON-RECURRENT MAXILLARY SINUSITIS: Primary | ICD-10-CM

## 2019-02-07 PROCEDURE — 99213 OFFICE O/P EST LOW 20 MIN: CPT | Performed by: NURSE PRACTITIONER

## 2019-02-07 RX ORDER — FLUTICASONE PROPIONATE 50 MCG
2 SPRAY, SUSPENSION (ML) NASAL DAILY
Qty: 1 BOTTLE | Refills: 0 | Status: SHIPPED | OUTPATIENT
Start: 2019-02-07 | End: 2019-03-18

## 2019-02-07 RX ORDER — AMOXICILLIN AND CLAVULANATE POTASSIUM 875; 125 MG/1; MG/1
1 TABLET, FILM COATED ORAL 2 TIMES DAILY
Qty: 20 TABLET | Refills: 0 | Status: SHIPPED | OUTPATIENT
Start: 2019-02-07 | End: 2019-02-17

## 2019-02-07 RX ORDER — BENZONATATE 200 MG/1
200 CAPSULE ORAL 3 TIMES DAILY PRN
Qty: 20 CAPSULE | Refills: 0 | Status: SHIPPED | OUTPATIENT
Start: 2019-02-07 | End: 2019-03-14 | Stop reason: ALTCHOICE

## 2019-02-07 NOTE — PROGRESS NOTES
CHIEF COMPLAINT:   Patient presents with:  Cough: cough is with phlegm x 2 mons and felt it was going away and started again, has taken an antibiotic and OTC but it did not help, says she still has cough and congestion      HPI:   Catia Reeves is a 70 Potassium Chloride ER 20 MEQ Oral Tab CR TAKE 2 TABLETS DAILY Disp: 180 tablet Rfl: 3   Tamoxifen Citrate 20 MG Oral Tab Take 1 tablet (20 mg total) by mouth daily.  Disp: 90 tablet Rfl: 0   Multiple Vitamins-Minerals (CENTRUM SILVER) Oral Tab Take 1 tablet Performed by Home Neal MD at [address] [place] [address] OR   • KNEE TOTAL REVISION Right 9/25/2017    Performed by Home Neal MD at [address] [place] [address] [place]   • [address] Right 6/18/2017    Performed by Home Neal MD at [address] [place] [address] OR   • [address] Right 5/10/20 SKIN: no rashes,no suspicious lesions  HEAD: atraumatic, normocephalic   EYES: conjunctiva clear, EOM intact  EARS: TM's clear gray, no bulging, no retraction, + fluid, bony landmarks visualized  NOSE: nostrils patent, + nasal mucous, nasal mucosa reddened -   Mucinex DM, or generic equivalent for cough as packet insert  -   Return to clinic if symptoms persist or worsen in the next 3-5 days  -   Flonase for nasal symptoms      -   If increased coughing at night consider elevating head with pillows, or try t The sinuses are air-filled spaces within the bones of the face. They connect to the inside of the nose. Sinusitis is an inflammation of the tissue that lines the sinuses. Sinusitis can occur during a cold.  It can also happen due to allergies to pollens and · Do not use nasal rinses or irrigation during an acute sinus infection, unless your healthcare provider tells you to. Rinsing may spread the infection to other areas in your sinuses.   · Use acetaminophen or ibuprofen to control pain, unless another pain m

## 2019-02-07 NOTE — PATIENT INSTRUCTIONS
May try Zyrtec for nasal symptoms as packet insert   Benzonatate for cough as packet insert  Rest and fluids      -   Increase oral fluids to loosen and thin secretions, eat a nutritious diet  -   Tylenol or ibuprofen for pain as packet insert   -   Mucine inflammation of the tissue that lines the sinuses. Sinusitis can occur during a cold. It can also happen due to allergies to pollens and other particles in the air. Sinusitis can cause symptoms of sinus congestion and a feeling of fullness.  A sinus infecti your sinuses. · Use acetaminophen or ibuprofen to control pain, unless another pain medicine was prescribed to you. If you have chronic liver or kidney disease or ever had a stomach ulcer, talk with your healthcare provider before using these medicines.  (

## 2019-02-11 RX ORDER — CYCLOBENZAPRINE HCL 10 MG
TABLET ORAL
Qty: 40 TABLET | Refills: 0 | Status: SHIPPED | OUTPATIENT
Start: 2019-02-11 | End: 2019-06-10

## 2019-03-08 RX ORDER — TAMOXIFEN CITRATE 20 MG/1
TABLET ORAL
Qty: 90 TABLET | Refills: 0 | Status: SHIPPED | OUTPATIENT
Start: 2019-03-08 | End: 2019-07-08

## 2019-03-08 RX ORDER — GABAPENTIN 100 MG/1
CAPSULE ORAL
Qty: 360 CAPSULE | Refills: 1 | Status: SHIPPED | OUTPATIENT
Start: 2019-03-08 | End: 2019-10-16

## 2019-03-08 NOTE — TELEPHONE ENCOUNTER
I left a message that a small supply of tamoxifen was sent to the pharmacy and she is overdue to see Dr Rochelle Mace

## 2019-03-14 ENCOUNTER — OFFICE VISIT (OUTPATIENT)
Dept: FAMILY MEDICINE CLINIC | Facility: CLINIC | Age: 69
End: 2019-03-14
Payer: MEDICARE

## 2019-03-14 VITALS
DIASTOLIC BLOOD PRESSURE: 72 MMHG | RESPIRATION RATE: 16 BRPM | BODY MASS INDEX: 28.88 KG/M2 | SYSTOLIC BLOOD PRESSURE: 138 MMHG | TEMPERATURE: 98 F | HEART RATE: 91 BPM | HEIGHT: 69 IN | WEIGHT: 195 LBS | OXYGEN SATURATION: 97 %

## 2019-03-14 DIAGNOSIS — J01.40 ACUTE NON-RECURRENT PANSINUSITIS: Primary | ICD-10-CM

## 2019-03-14 PROCEDURE — 99213 OFFICE O/P EST LOW 20 MIN: CPT | Performed by: NURSE PRACTITIONER

## 2019-03-14 RX ORDER — DOXYCYCLINE HYCLATE 100 MG/1
100 CAPSULE ORAL 2 TIMES DAILY
Qty: 20 CAPSULE | Refills: 0 | Status: SHIPPED | OUTPATIENT
Start: 2019-03-14 | End: 2019-03-24

## 2019-03-14 RX ORDER — PREDNISONE 20 MG/1
40 TABLET ORAL DAILY
Qty: 10 TABLET | Refills: 0 | Status: SHIPPED | OUTPATIENT
Start: 2019-03-14 | End: 2019-03-19

## 2019-03-14 RX ORDER — BENZONATATE 200 MG/1
200 CAPSULE ORAL 3 TIMES DAILY PRN
Qty: 20 CAPSULE | Refills: 0 | Status: SHIPPED | OUTPATIENT
Start: 2019-03-14 | End: 2019-04-09 | Stop reason: ALTCHOICE

## 2019-03-14 NOTE — PATIENT INSTRUCTIONS
-   Increase oral fluids to loosen and thin secretions, eat a nutritious diet  -   Tylenol or ibuprofen for pain as packet insert   -   Mucinex DM, or generic equivalent for cough as packet insert  -   Return to clinic if symptoms persist or worsen in the occur during a cold. It can also happen due to allergies to pollens and other particles in the air. Sinusitis can cause symptoms of sinus congestion and a feeling of fullness. A sinus infection causes fever, headache, and facial pain.  There is often green unless another pain medicine was prescribed to you. If you have chronic liver or kidney disease or ever had a stomach ulcer, talk with your healthcare provider before using these medicines.  (Aspirin should never be taken by anyone under age 25 who is ill w

## 2019-03-14 NOTE — PROGRESS NOTES
CHIEF COMPLAINT:   Patient presents with:  Sinus Problem: sinus pressure, ear discomfort, drainage, runny nose x 5wks was seen in [facility] and states she is till not better       HPI:   Irena Zavala is a 70year old female who presents for sinus congestion Sertraline HCl 100 MG Oral Tab TAKE 1 AND 1/2 TABLETS ONE TIME DAILY Disp: 135 tablet Rfl: 1   Multiple Vitamins-Minerals (CENTRUM SILVER) Oral Tab Take 1 tablet by mouth daily.  Disp:  Rfl:    Metoprolol Succinate ER 25 MG Oral Tablet 24 Hr Take 25 mg by m Performed by Krystal Hernandez MD at [address] [place] [address] [place]   • KNEE TOTAL REVISION Right 9/25/2017    Performed by Krystal Hernandez MD at [address] [place] [address] OR   • [address] Right 6/18/2017    Performed by Krystal Hernandez MD at [address] [place] [address] OR   • [address] Right 5/10/20 SKIN: no rashes,no suspicious lesions  HEAD: atraumatic, normocephalic,  + tenderness on palpation of frontal and maxillary sinuses  EYES: conjunctiva clear, EOM intact  EARS: TM's clear gray, no bulging, no retraction, + fluid, bony landmarks visualized -   Mucinex DM, or generic equivalent for cough as packet insert  -   Return to clinic if symptoms persist or worsen in the next 3 days  -   Flonase for nasal symptoms as packet insert     -   If increased coughing at night consider elevating head with pil The sinuses are air-filled spaces within the bones of the face. They connect to the inside of the nose. Sinusitis is an inflammation of the tissue that lines the sinuses. Sinusitis can occur during a cold.  It can also happen due to allergies to pollens and · Do not use nasal rinses or irrigation during an acute sinus infection, unless your healthcare provider tells you to. Rinsing may spread the infection to other areas in your sinuses.   · Use acetaminophen or ibuprofen to control pain, unless another pain m

## 2019-03-18 ENCOUNTER — OFFICE VISIT (OUTPATIENT)
Dept: HEMATOLOGY/ONCOLOGY | Facility: HOSPITAL | Age: 69
End: 2019-03-18
Attending: INTERNAL MEDICINE
Payer: MEDICARE

## 2019-03-18 VITALS
RESPIRATION RATE: 18 BRPM | BODY MASS INDEX: 30.66 KG/M2 | WEIGHT: 207 LBS | HEIGHT: 69.02 IN | TEMPERATURE: 98 F | OXYGEN SATURATION: 97 % | HEART RATE: 114 BPM

## 2019-03-18 DIAGNOSIS — Z17.0 MALIGNANT NEOPLASM OF OVERLAPPING SITES OF RIGHT BREAST IN FEMALE, ESTROGEN RECEPTOR POSITIVE (HCC): Primary | ICD-10-CM

## 2019-03-18 DIAGNOSIS — C50.811 MALIGNANT NEOPLASM OF OVERLAPPING SITES OF RIGHT BREAST IN FEMALE, ESTROGEN RECEPTOR POSITIVE (HCC): Primary | ICD-10-CM

## 2019-03-18 PROCEDURE — 99214 OFFICE O/P EST MOD 30 MIN: CPT | Performed by: INTERNAL MEDICINE

## 2019-03-18 NOTE — PROGRESS NOTES
Cancer Center Progress Note    Problem List:      Patient Active Problem List:     Screening for malignant neoplasm of the cervix     Depressive disorder, not elsewhere classified     Obesity, unspecified     Other screening mammogram     Laboratory examin loss.  Respiratory: Negative for cough, hemoptysis, chest pain, or dyspnea on exertion. Hematologic/lymphatic: Negative for easy bruising, bleeding, and lymphadenopathy.       Allergies:       Latex                   ITCHING, OTHER (SEE COMMENTS)    Commen FUTURE REFILL) Disp: 90 tablet Rfl: 1   Sertraline HCl 100 MG Oral Tab TAKE 1 AND 1/2 TABLETS ONE TIME DAILY Disp: 135 tablet Rfl: 1   Multiple Vitamins-Minerals (CENTRUM SILVER) Oral Tab Take 1 tablet by mouth daily.  Disp:  Rfl:    Metoprolol Succinate ER Impression:     Stage IIIA right breast (6 o'clock position) cancer with a 5.5 cm IDC and 3 of 6 positive nodes: The ER/AL are strongly positive. The HER2 is negative.      She had four cycles of AC and a fourth cycle of paclitaxel completed in 9/2014

## 2019-03-20 ENCOUNTER — PATIENT OUTREACH (OUTPATIENT)
Dept: FAMILY MEDICINE CLINIC | Facility: CLINIC | Age: 69
End: 2019-03-20

## 2019-04-09 ENCOUNTER — OFFICE VISIT (OUTPATIENT)
Dept: FAMILY MEDICINE CLINIC | Facility: CLINIC | Age: 69
End: 2019-04-09
Payer: MEDICARE

## 2019-04-09 VITALS
WEIGHT: 215 LBS | TEMPERATURE: 99 F | SYSTOLIC BLOOD PRESSURE: 116 MMHG | HEART RATE: 90 BPM | OXYGEN SATURATION: 98 % | DIASTOLIC BLOOD PRESSURE: 80 MMHG | RESPIRATION RATE: 16 BRPM | BODY MASS INDEX: 31.84 KG/M2 | HEIGHT: 69.02 IN

## 2019-04-09 DIAGNOSIS — Z12.4 SCREENING FOR MALIGNANT NEOPLASM OF CERVIX: ICD-10-CM

## 2019-04-09 DIAGNOSIS — Z00.00 MEDICARE ANNUAL WELLNESS VISIT, SUBSEQUENT: Primary | ICD-10-CM

## 2019-04-09 DIAGNOSIS — I10 ESSENTIAL HYPERTENSION: ICD-10-CM

## 2019-04-09 DIAGNOSIS — E55.9 HYPOVITAMINOSIS D: ICD-10-CM

## 2019-04-09 DIAGNOSIS — Z13.220 ENCOUNTER FOR LIPID SCREENING FOR CARDIOVASCULAR DISEASE: ICD-10-CM

## 2019-04-09 DIAGNOSIS — N95.0 POST-MENOPAUSAL BLEEDING: ICD-10-CM

## 2019-04-09 DIAGNOSIS — Z13.6 ENCOUNTER FOR LIPID SCREENING FOR CARDIOVASCULAR DISEASE: ICD-10-CM

## 2019-04-09 PROCEDURE — 88175 CYTOPATH C/V AUTO FLUID REDO: CPT | Performed by: FAMILY MEDICINE

## 2019-04-09 PROCEDURE — 87624 HPV HI-RISK TYP POOLED RSLT: CPT | Performed by: FAMILY MEDICINE

## 2019-04-09 PROCEDURE — G0439 PPPS, SUBSEQ VISIT: HCPCS | Performed by: FAMILY MEDICINE

## 2019-04-09 RX ORDER — HYDROCODONE BITARTRATE AND ACETAMINOPHEN 10; 325 MG/1; MG/1
1 TABLET ORAL EVERY 6 HOURS PRN
Qty: 60 TABLET | Refills: 0 | Status: SHIPPED | OUTPATIENT
Start: 2019-04-09 | End: 2019-06-10

## 2019-04-09 NOTE — PROGRESS NOTES
Patient presents for a wellness exam with Pap and for evaluation of painless discharge of scant mucus and blood per vagina. She is a breast cancer survivor and has been doing well. She is on tamoxifen and has been for the past 5 years.     Rest of review

## 2019-04-09 NOTE — PROGRESS NOTES
Home [facility] REASON FOR VISIT:    Jesus Garcia is a 70year old female who presents for a Medicare Annual Wellness visit.          Patient Care Team: Patient Care Team:  Lindsay Vincent DO as PCP - General  Lindsay Vincent [facility] M[facility] THREE TIMES DAILY AS NEEDED FOR MUSCLE SPASMS(CAUTION DROWSINESS) Disp: 40 tablet Rfl: 0   aspirin 325 MG Oral Tab EC TK 1 T PO QD Disp:  Rfl: 0   ketoconazole 2 % External Cream Apply 2 Application topically daily.  Disp: 60 g Rfl: 0   simvastatin 20 & Units 11/26/2018 11/15/2018 10/9/2017 10/3/2017 9/26/2017 9/7/2017 8/1/2017   BUN 8 - 20 mg/dL 13 21(H) 14 13 12 16 12   Creatinine 0.55 - 1.02 mg/dL 0.77 0.80 0.77 0.62 0.72 0.62 0.62   Some recent data might be hidden     AST and ALT Latest Ref Rng & U (PHQ-2/PHQ-9): Over the LAST 2 WEEKS   Little interest or pleasure in doing things (over the last two weeks)?: Not at all  Feeling down, depressed, or hopeless (over the last two weeks)?: Not at all  PHQ-2 SCORE: 0        Advance Directives     Do you have 03/21/2014 0.68     Creatinine (mg/dL)   Date Value   11/26/2018 0.77       Digoxin Serum Conc  Annually No results found for: DIGOXIN          ALLERGIES:     Latex                   ITCHING, OTHER (SEE COMMENTS)    Comment:Blistering and oozing  MEDICAL identification, completion axillary lymph node dissection, and advancement flap mastopexy   • TONSILLECTOMY     • TOTAL KNEE REPLACEMENT Right 1/2017   • TOTAL KNEE REPLACEMENT Right 5/10/17    Revision-Dr. Sabiha Tsang   • TOTAL KNEE REPLACEMENT Right 09/25/2017 for this visit:    Medicare annual wellness visit, subsequent    Screening for malignant neoplasm of cervix  -     THINPREP PAP SMEAR B; Future  -     HPV HIGH RISK , THIN PREP COLLECTION;  Future    Post-menopausal bleeding  -     THINPREP PAP SMEAR B; Fut

## 2019-04-10 RX ORDER — BENZONATATE 100 MG/1
CAPSULE ORAL
Qty: 15 CAPSULE | Refills: 0 | OUTPATIENT
Start: 2019-04-10

## 2019-04-22 ENCOUNTER — TELEPHONE (OUTPATIENT)
Dept: FAMILY MEDICINE CLINIC | Facility: CLINIC | Age: 69
End: 2019-04-22

## 2019-06-10 RX ORDER — HYDROCODONE BITARTRATE AND ACETAMINOPHEN 10; 325 MG/1; MG/1
1 TABLET ORAL EVERY 6 HOURS PRN
Qty: 60 TABLET | Refills: 0 | Status: SHIPPED | OUTPATIENT
Start: 2019-06-10 | End: 2019-08-21

## 2019-06-10 RX ORDER — CYCLOBENZAPRINE HCL 10 MG
TABLET ORAL
Qty: 40 TABLET | Refills: 0 | Status: SHIPPED | OUTPATIENT
Start: 2019-06-10 | End: 2020-09-14 | Stop reason: CLARIF

## 2019-07-09 ENCOUNTER — TELEPHONE (OUTPATIENT)
Dept: FAMILY MEDICINE CLINIC | Facility: CLINIC | Age: 69
End: 2019-07-09

## 2019-07-09 DIAGNOSIS — C50.912 BILATERAL MALIGNANT NEOPLASM OF BREAST IN FEMALE, UNSPECIFIED ESTROGEN RECEPTOR STATUS, UNSPECIFIED SITE OF BREAST (HCC): Primary | ICD-10-CM

## 2019-07-09 DIAGNOSIS — C50.911 BILATERAL MALIGNANT NEOPLASM OF BREAST IN FEMALE, UNSPECIFIED ESTROGEN RECEPTOR STATUS, UNSPECIFIED SITE OF BREAST (HCC): Primary | ICD-10-CM

## 2019-07-09 RX ORDER — TAMOXIFEN CITRATE 20 MG/1
TABLET ORAL
Qty: 90 TABLET | Refills: 0 | Status: SHIPPED | OUTPATIENT
Start: 2019-07-09 | End: 2019-10-16

## 2019-07-09 RX ORDER — TRIAMTERENE AND HYDROCHLOROTHIAZIDE 37.5; 25 MG/1; MG/1
CAPSULE ORAL
Qty: 90 CAPSULE | Refills: 1 | Status: SHIPPED | OUTPATIENT
Start: 2019-07-09 | End: 2020-09-14 | Stop reason: CLARIF

## 2019-07-09 RX ORDER — CLONIDINE HYDROCHLORIDE 0.1 MG/1
TABLET ORAL
Qty: 90 TABLET | Refills: 1 | Status: SHIPPED | OUTPATIENT
Start: 2019-07-09 | End: 2020-01-14

## 2019-07-09 RX ORDER — SERTRALINE HYDROCHLORIDE 100 MG/1
TABLET, FILM COATED ORAL
Qty: 135 TABLET | Refills: 1 | Status: SHIPPED | OUTPATIENT
Start: 2019-07-09 | End: 2020-01-14

## 2019-07-10 NOTE — TELEPHONE ENCOUNTER
Per supplier below the RX needs to read as follows:    Breast prostheses  Bracket 2  Prosthetic Bras Bracket 3  Refills Bracket 3    If you have any questions about the above please contact:    [facility]   Address: [facility] [address], [address] [address]

## 2019-08-22 RX ORDER — HYDROCODONE BITARTRATE AND ACETAMINOPHEN 10; 325 MG/1; MG/1
1 TABLET ORAL EVERY 6 HOURS PRN
Qty: 60 TABLET | Refills: 0 | Status: SHIPPED | OUTPATIENT
Start: 2019-08-22 | End: 2020-01-09

## 2019-10-17 RX ORDER — GABAPENTIN 100 MG/1
CAPSULE ORAL
Qty: 360 CAPSULE | Refills: 1 | Status: SHIPPED | OUTPATIENT
Start: 2019-10-17 | End: 2020-07-14

## 2019-10-17 RX ORDER — TAMOXIFEN CITRATE 20 MG/1
TABLET ORAL
Qty: 90 TABLET | Refills: 0 | Status: SHIPPED | OUTPATIENT
Start: 2019-10-17 | End: 2020-01-15

## 2019-10-25 ENCOUNTER — HOSPITAL ENCOUNTER (OUTPATIENT)
Dept: MRI IMAGING | Facility: HOSPITAL | Age: 69
Discharge: HOME | End: 2019-10-25
Attending: ORTHOPAEDIC SURGERY
Payer: MEDICARE

## 2019-10-25 DIAGNOSIS — M25.512 LEFT SHOULDER PAIN, UNSPECIFIED CHRONICITY: ICD-10-CM

## 2019-10-25 DIAGNOSIS — M25.511 RIGHT SHOULDER PAIN, UNSPECIFIED CHRONICITY: ICD-10-CM

## 2019-10-25 PROCEDURE — 73221 MRI JOINT UPR EXTREM W/O DYE: CPT | Performed by: ORTHOPAEDIC SURGERY

## 2019-11-20 ENCOUNTER — TELEPHONE (OUTPATIENT)
Dept: FAMILY MEDICINE CLINIC | Facility: CLINIC | Age: 69
End: 2019-11-20

## 2019-11-20 DIAGNOSIS — C50.912 MALIGNANT NEOPLASM OF LEFT FEMALE BREAST, UNSPECIFIED ESTROGEN RECEPTOR STATUS, UNSPECIFIED SITE OF BREAST (HCC): ICD-10-CM

## 2019-11-20 DIAGNOSIS — C50.911 MALIGNANT NEOPLASM OF RIGHT FEMALE BREAST, UNSPECIFIED ESTROGEN RECEPTOR STATUS, UNSPECIFIED SITE OF BREAST (HCC): Primary | ICD-10-CM

## 2019-11-20 NOTE — TELEPHONE ENCOUNTER
She called the referral line on Monday and never heard back from anyone. Had referral in July for same request.  Due to circumstances it  without getting used.   Referral needs to list [facility] in [place] with:      Breast prosthesis(2)

## 2020-01-09 RX ORDER — HYDROCODONE BITARTRATE AND ACETAMINOPHEN 10; 325 MG/1; MG/1
1 TABLET ORAL EVERY 6 HOURS PRN
Qty: 60 TABLET | Refills: 0 | Status: SHIPPED | OUTPATIENT
Start: 2020-01-09 | End: 2020-07-24

## 2020-01-14 RX ORDER — SERTRALINE HYDROCHLORIDE 100 MG/1
TABLET, FILM COATED ORAL
Qty: 135 TABLET | Refills: 1 | Status: SHIPPED | OUTPATIENT
Start: 2020-01-14 | End: 2020-07-15

## 2020-01-14 RX ORDER — CLONIDINE HYDROCHLORIDE 0.1 MG/1
TABLET ORAL
Qty: 90 TABLET | Refills: 1 | Status: SHIPPED | OUTPATIENT
Start: 2020-01-14 | End: 2020-07-14

## 2020-01-15 ENCOUNTER — OFFICE VISIT (OUTPATIENT)
Dept: HEMATOLOGY/ONCOLOGY | Facility: HOSPITAL | Age: 70
End: 2020-01-15
Attending: INTERNAL MEDICINE
Payer: MEDICARE

## 2020-01-15 VITALS
RESPIRATION RATE: 16 BRPM | HEIGHT: 69.02 IN | BODY MASS INDEX: 32.44 KG/M2 | DIASTOLIC BLOOD PRESSURE: 74 MMHG | SYSTOLIC BLOOD PRESSURE: 124 MMHG | WEIGHT: 219 LBS | TEMPERATURE: 99 F | HEART RATE: 93 BPM | OXYGEN SATURATION: 96 %

## 2020-01-15 DIAGNOSIS — Z17.0 MALIGNANT NEOPLASM OF OVERLAPPING SITES OF RIGHT BREAST IN FEMALE, ESTROGEN RECEPTOR POSITIVE (HCC): Primary | ICD-10-CM

## 2020-01-15 DIAGNOSIS — C50.811 MALIGNANT NEOPLASM OF OVERLAPPING SITES OF RIGHT BREAST IN FEMALE, ESTROGEN RECEPTOR POSITIVE (HCC): Primary | ICD-10-CM

## 2020-01-15 PROCEDURE — 99214 OFFICE O/P EST MOD 30 MIN: CPT | Performed by: INTERNAL MEDICINE

## 2020-01-15 RX ORDER — TAMOXIFEN CITRATE 20 MG/1
20 TABLET ORAL
Qty: 90 TABLET | Refills: 3 | Status: ON HOLD | OUTPATIENT
Start: 2020-01-15 | End: 2020-09-22

## 2020-01-15 NOTE — PROGRESS NOTES
Cancer Center Progress Note    Problem List:      Patient Active Problem List:     Screening for malignant neoplasm of the cervix     Depressive disorder, not elsewhere classified     Obesity, unspecified     Other screening mammogram     Laboratory examin good. She has no weight loss. She has no fever. Review of Systems:   Constitutional: Negative for anorexia, chills, fatigue, fevers, night sweats and weight loss. Respiratory: Negative for cough, hemoptysis, chest pain, or dyspnea on exertion.   Hemato Physical Examination:    General: Patient is alert and not in acute distress.   Vital Signs: /74 (Patient Position: Sitting, Cuff Size: large)   Pulse 93   Temp 98.7 °F (37.1 °C) (Oral)   Resp 16   Ht 1.753 m (5' 9.02\")   Wt 99.3 kg (219 lb)   Sp cuff is intact.  Type II acromion.      =====  CONCLUSION:  Marked glenohumeral joint degenerative arthritis with high-grade chondral loss and osteophyte formation involving the opposing articular surfaces of the glenohumeral articulation with intact rotato She will see me in 6 to 12 months. Bilateral shoulder DJD:    She will continue with ortho follow up. Her pain has worsened overall. There is no evidence of malignant disease.        Robert Sanchez MD

## 2020-02-04 ENCOUNTER — TELEPHONE (OUTPATIENT)
Dept: HEMATOLOGY/ONCOLOGY | Facility: HOSPITAL | Age: 70
End: 2020-02-04

## 2020-02-04 NOTE — TELEPHONE ENCOUNTER
Patient needs to talk to Dr. Aurora Hill regarding her upcoming shoulder surgery. Her orth Dr. Billy Perez with her history of breast cancer they have questions.  Thank you

## 2020-02-05 ENCOUNTER — TELEPHONE (OUTPATIENT)
Dept: HEMATOLOGY/ONCOLOGY | Facility: HOSPITAL | Age: 70
End: 2020-02-05

## 2020-02-05 NOTE — TELEPHONE ENCOUNTER
Patient not available at the time of the call. Spoke with patient's  and informed that Dr. Sal Mast was ok for Khalif Reyes to go to [facility] and that the referral has been ordered.  May call Central Scheduling at [phone] to schedule an appointm

## 2020-02-20 ENCOUNTER — OFFICE VISIT (OUTPATIENT)
Dept: OCCUPATIONAL MEDICINE | Facility: HOSPITAL | Age: 70
End: 2020-02-20
Attending: INTERNAL MEDICINE
Payer: MEDICARE

## 2020-02-20 DIAGNOSIS — C50.811 MALIGNANT NEOPLASM OF OVERLAPPING SITES OF RIGHT BREAST IN FEMALE, ESTROGEN RECEPTOR POSITIVE (HCC): ICD-10-CM

## 2020-02-20 DIAGNOSIS — Z17.0 MALIGNANT NEOPLASM OF OVERLAPPING SITES OF RIGHT BREAST IN FEMALE, ESTROGEN RECEPTOR POSITIVE (HCC): ICD-10-CM

## 2020-02-20 PROCEDURE — 97166 OT EVAL MOD COMPLEX 45 MIN: CPT

## 2020-02-20 PROCEDURE — 97535 SELF CARE MNGMENT TRAINING: CPT

## 2020-02-20 NOTE — PROGRESS NOTES
UE LYMPHEDEMA EVALUATION:   Referring Physician: Dr. Vivi Kennedy  Diagnosis: bilateral UE lymphedema (I97.2) Date of Service: 2/20/2020     PATIENT SUMMARY   Tyrone Vital is a 78year old female who presents to therapy today with report that she is scheduled scheduled for her first TSR and her lymphedema may be exacerbated following sx. Due to this, it is highly recommended that she obtain compression garments prior to her sxs.  Given her long limb length, expected post-op mobility restrictions and possible inc degrees; int/ext rotation limited. Lymphedema Life Impact Scale 2: Score: 12. Percent of impairment: 18%. Patient's main area of concern= obtain compression sleeves.  (Score range= 0-68, where 0= No effect)      Today’s Treatment and Response:   Self-c [phone]. Sincerely,  Electronically signed by therapist: April Montana. ARIADNA StoverR/L, TIMBO   Physician's certification required: Yes  I certify the need for these services furnished under this plan of treatment and while under my care.     X_________

## 2020-02-25 ENCOUNTER — APPOINTMENT (OUTPATIENT)
Dept: LAB | Age: 70
End: 2020-02-25
Attending: FAMILY MEDICINE
Payer: MEDICARE

## 2020-02-25 ENCOUNTER — OFFICE VISIT (OUTPATIENT)
Dept: FAMILY MEDICINE CLINIC | Facility: CLINIC | Age: 70
End: 2020-02-25
Payer: MEDICARE

## 2020-02-25 ENCOUNTER — HOSPITAL ENCOUNTER (OUTPATIENT)
Dept: GENERAL RADIOLOGY | Age: 70
Discharge: HOME | End: 2020-02-25
Attending: FAMILY MEDICINE
Payer: MEDICARE

## 2020-02-25 ENCOUNTER — LABORATORY ENCOUNTER (OUTPATIENT)
Dept: LAB | Age: 70
End: 2020-02-25
Attending: FAMILY MEDICINE
Payer: MEDICARE

## 2020-02-25 VITALS
BODY MASS INDEX: 32.29 KG/M2 | SYSTOLIC BLOOD PRESSURE: 128 MMHG | OXYGEN SATURATION: 98 % | RESPIRATION RATE: 18 BRPM | DIASTOLIC BLOOD PRESSURE: 64 MMHG | WEIGHT: 218 LBS | HEIGHT: 69 IN | TEMPERATURE: 98 F | HEART RATE: 77 BPM

## 2020-02-25 DIAGNOSIS — Z01.818 PREOP EXAMINATION: ICD-10-CM

## 2020-02-25 DIAGNOSIS — Z00.00 LABORATORY EXAMINATION ORDERED AS PART OF A COMPLETE PHYSICAL EXAMINATION: ICD-10-CM

## 2020-02-25 DIAGNOSIS — I10 ESSENTIAL HYPERTENSION: ICD-10-CM

## 2020-02-25 DIAGNOSIS — Z13.6 ENCOUNTER FOR LIPID SCREENING FOR CARDIOVASCULAR DISEASE: ICD-10-CM

## 2020-02-25 DIAGNOSIS — M19.012 OSTEOARTHRITIS OF LEFT SHOULDER, UNSPECIFIED OSTEOARTHRITIS TYPE: ICD-10-CM

## 2020-02-25 DIAGNOSIS — E78.5 HYPERLIPIDEMIA, UNSPECIFIED HYPERLIPIDEMIA TYPE: ICD-10-CM

## 2020-02-25 DIAGNOSIS — F32.0 MILD SINGLE CURRENT EPISODE OF MAJOR DEPRESSIVE DISORDER (HCC): ICD-10-CM

## 2020-02-25 DIAGNOSIS — Z13.220 ENCOUNTER FOR LIPID SCREENING FOR CARDIOVASCULAR DISEASE: ICD-10-CM

## 2020-02-25 DIAGNOSIS — E55.9 HYPOVITAMINOSIS D: ICD-10-CM

## 2020-02-25 DIAGNOSIS — Z01.818 PREOP EXAMINATION: Primary | ICD-10-CM

## 2020-02-25 DIAGNOSIS — H91.93 DECREASED HEARING OF BOTH EARS: ICD-10-CM

## 2020-02-25 PROBLEM — I49.02 VENTRICULAR FLUTTER (HCC): Status: RESOLVED | Noted: 2020-02-25 | Resolved: 2020-02-25

## 2020-02-25 PROBLEM — I49.02 VENTRICULAR FLUTTER (HCC): Status: ACTIVE | Noted: 2020-02-25

## 2020-02-25 LAB
ALBUMIN SERPL-MCNC: 4.1 G/DL (ref 3.4–5)
ALBUMIN/GLOB SERPL: 1.1 {RATIO} (ref 1–2)
ALP LIVER SERPL-CCNC: 75 U/L (ref 55–142)
ALT SERPL-CCNC: 19 U/L (ref 13–56)
ANION GAP SERPL CALC-SCNC: 4 MMOL/L (ref 0–18)
APTT PPP: 33 SECONDS (ref 25.4–36.1)
AST SERPL-CCNC: 22 U/L (ref 15–37)
ATRIAL RATE: 93 BPM
BASOPHILS # BLD AUTO: 0.02 X10(3) UL (ref 0–0.2)
BASOPHILS NFR BLD AUTO: 0.6 %
BILIRUB SERPL-MCNC: 0.4 MG/DL (ref 0.1–2)
BILIRUB UR QL STRIP.AUTO: NEGATIVE
BUN BLD-MCNC: 28 MG/DL (ref 7–18)
BUN/CREAT SERPL: 24.3 (ref 10–20)
CALCIUM BLD-MCNC: 9.2 MG/DL (ref 8.5–10.1)
CHLORIDE SERPL-SCNC: 107 MMOL/L (ref 98–112)
CHOLEST SMN-MCNC: 202 MG/DL (ref ?–200)
CLARITY UR REFRACT.AUTO: CLEAR
CO2 SERPL-SCNC: 29 MMOL/L (ref 21–32)
COLOR UR AUTO: YELLOW
CREAT BLD-MCNC: 1.15 MG/DL (ref 0.55–1.02)
CRP SERPL-MCNC: <0.29 MG/DL (ref ?–0.3)
DEPRECATED RDW RBC AUTO: 44 FL (ref 35.1–46.3)
EOSINOPHIL # BLD AUTO: 0.16 X10(3) UL (ref 0–0.7)
EOSINOPHIL NFR BLD AUTO: 4.7 %
ERYTHROCYTE [DISTWIDTH] IN BLOOD BY AUTOMATED COUNT: 13.6 % (ref 11–15)
GLOBULIN PLAS-MCNC: 3.9 G/DL (ref 2.8–4.4)
GLUCOSE BLD-MCNC: 107 MG/DL (ref 70–99)
GLUCOSE UR STRIP.AUTO-MCNC: NEGATIVE MG/DL
HCT VFR BLD AUTO: 42.1 % (ref 35–48)
HDLC SERPL-MCNC: 54 MG/DL (ref 40–59)
HGB BLD-MCNC: 13.9 G/DL (ref 12–16)
IMM GRANULOCYTES # BLD AUTO: 0 X10(3) UL (ref 0–1)
IMM GRANULOCYTES NFR BLD: 0 %
INR BLD: 0.96 (ref 0.9–1.1)
KETONES UR STRIP.AUTO-MCNC: NEGATIVE MG/DL
LDLC SERPL CALC-MCNC: 122 MG/DL (ref ?–100)
LEUKOCYTE ESTERASE UR QL STRIP.AUTO: NEGATIVE
LYMPHOCYTES # BLD AUTO: 1.06 X10(3) UL (ref 1–4)
LYMPHOCYTES NFR BLD AUTO: 30.8 %
M PROTEIN MFR SERPL ELPH: 8 G/DL (ref 6.4–8.2)
MCH RBC QN AUTO: 29.8 PG (ref 26–34)
MCHC RBC AUTO-ENTMCNC: 33 G/DL (ref 31–37)
MCV RBC AUTO: 90.1 FL (ref 80–100)
MONOCYTES # BLD AUTO: 0.42 X10(3) UL (ref 0.1–1)
MONOCYTES NFR BLD AUTO: 12.2 %
NEUTROPHILS # BLD AUTO: 1.78 X10 (3) UL (ref 1.5–7.7)
NEUTROPHILS # BLD AUTO: 1.78 X10(3) UL (ref 1.5–7.7)
NEUTROPHILS NFR BLD AUTO: 51.7 %
NITRITE UR QL STRIP.AUTO: NEGATIVE
NONHDLC SERPL-MCNC: 148 MG/DL (ref ?–130)
OSMOLALITY SERPL CALC.SUM OF ELEC: 296 MOSM/KG (ref 275–295)
P AXIS: 55 DEGREES
P-R INTERVAL: 144 MS
PATIENT FASTING Y/N/NP: YES
PATIENT FASTING Y/N/NP: YES
PH UR STRIP.AUTO: 5 [PH] (ref 4.5–8)
PLATELET # BLD AUTO: 126 10(3)UL (ref 150–450)
POTASSIUM SERPL-SCNC: 3.7 MMOL/L (ref 3.5–5.1)
PROT UR STRIP.AUTO-MCNC: NEGATIVE MG/DL
PSA SERPL DL<=0.01 NG/ML-MCNC: 13.2 SECONDS (ref 12.5–14.7)
Q-T INTERVAL: 378 MS
QRS DURATION: 98 MS
QTC CALCULATION (BEZET): 469 MS
R AXIS: 7 DEGREES
RBC # BLD AUTO: 4.67 X10(6)UL (ref 3.8–5.3)
SED RATE-ML: 12 MM/HR (ref 0–25)
SODIUM SERPL-SCNC: 140 MMOL/L (ref 136–145)
SP GR UR STRIP.AUTO: 1.02 (ref 1–1.03)
T AXIS: 68 DEGREES
TRIGL SERPL-MCNC: 128 MG/DL (ref 30–149)
TSI SER-ACNC: 0.81 MIU/ML (ref 0.36–3.74)
UROBILINOGEN UR STRIP.AUTO-MCNC: <2 MG/DL
VENTRICULAR RATE: 93 BPM
VIT D+METAB SERPL-MCNC: 37.4 NG/ML (ref 30–100)
VLDLC SERPL CALC-MCNC: 26 MG/DL (ref 0–30)
WBC # BLD AUTO: 3.4 X10(3) UL (ref 4–11)

## 2020-02-25 PROCEDURE — 80053 COMPREHEN METABOLIC PANEL: CPT

## 2020-02-25 PROCEDURE — 81001 URINALYSIS AUTO W/SCOPE: CPT

## 2020-02-25 PROCEDURE — 93010 ELECTROCARDIOGRAM REPORT: CPT | Performed by: INTERNAL MEDICINE

## 2020-02-25 PROCEDURE — 85025 COMPLETE CBC W/AUTO DIFF WBC: CPT

## 2020-02-25 PROCEDURE — 84443 ASSAY THYROID STIM HORMONE: CPT

## 2020-02-25 PROCEDURE — 71046 X-RAY EXAM CHEST 2 VIEWS: CPT | Performed by: FAMILY MEDICINE

## 2020-02-25 PROCEDURE — 99214 OFFICE O/P EST MOD 30 MIN: CPT | Performed by: FAMILY MEDICINE

## 2020-02-25 PROCEDURE — 82306 VITAMIN D 25 HYDROXY: CPT

## 2020-02-25 PROCEDURE — 85610 PROTHROMBIN TIME: CPT

## 2020-02-25 PROCEDURE — 93005 ELECTROCARDIOGRAM TRACING: CPT

## 2020-02-25 PROCEDURE — 87081 CULTURE SCREEN ONLY: CPT

## 2020-02-25 PROCEDURE — 36415 COLL VENOUS BLD VENIPUNCTURE: CPT

## 2020-02-25 PROCEDURE — 85652 RBC SED RATE AUTOMATED: CPT

## 2020-02-25 PROCEDURE — 80061 LIPID PANEL: CPT

## 2020-02-25 PROCEDURE — 85730 THROMBOPLASTIN TIME PARTIAL: CPT

## 2020-02-25 PROCEDURE — 86140 C-REACTIVE PROTEIN: CPT

## 2020-02-25 RX ORDER — AMOXICILLIN 500 MG/1
CAPSULE ORAL
COMMUNITY
Start: 2020-02-05 | End: 2020-09-14 | Stop reason: CLARIF

## 2020-02-25 NOTE — H&P
HPI:   Gerardo Weir is a 78year old female who presents for a pre-op exam.    Pt is scheduled for complete left shoulder repair on 3/6/2020 with Dr. Jason Redmond.  Primary osteoarthritis of the left shoulder has been a chronic problem which has onset ove SPASMS(CAUTION DROWSINESS) 40 tablet 0   • ketoconazole 2 % External Cream Apply 2 Application topically daily. 60 g 0   • Ibuprofen-Diphenhydramine Cit (ADVIL PM OR) Take by mouth.      • Potassium Chloride ER 20 MEQ Oral Tab CR TAKE 2 TABLETS DAILY 180 ta Andrew Crabtree MD at [facility] MAIN OR   •      • KNEE ARTHROSCP HARV Left    • KNEE TOTAL REPLACEMENT Right 2017    Performed by Yannick Barker MD at [facility] MAIN OR   • [address] Right 2017    Performed by Yannick Barker MD at [address] [place] [address] back pain  NEURO: denies headaches or dizziness  PSYCH: denies depression or anxiety  HEME: denies hx of anemia  ENDOCRINE: denies thyroid history, denies excessive thirst, denies significant weight change    EXAM:   /64   Pulse 77   Temp 98.3 °F (36 FREE T4; Future  - C-REACTIVE PROTEIN; Future  - SED RATE, WESTERGREN (AUTOMATED); Future  - PROTHROMBIN TIME (PT); Future  - PTT, ACTIVATED; Future  - MRSA CULTURE ONLY; Future  - EKG 12-LEAD; Future  - XR CHEST PA + LAT CHEST (CPT=71046);  Future  - URINA

## 2020-03-03 ENCOUNTER — OFFICE VISIT (OUTPATIENT)
Dept: OCCUPATIONAL MEDICINE | Facility: HOSPITAL | Age: 70
End: 2020-03-03
Attending: INTERNAL MEDICINE
Payer: MEDICARE

## 2020-03-03 PROCEDURE — 97140 MANUAL THERAPY 1/> REGIONS: CPT

## 2020-03-04 NOTE — PROGRESS NOTES
Dx: bilateral UE lymphedema (I97.2)          Insurance (Authorized # of Visits): 2/2-3     Next MD/Plan Renewal Date: N/A    Authorizing Physician: Dr. Curt Silverman:  Standard         Precautions:  Lymphedema; HTN; latex allergy         Subjective:

## 2020-03-05 ENCOUNTER — TELEPHONE (OUTPATIENT)
Dept: OCCUPATIONAL MEDICINE | Facility: HOSPITAL | Age: 70
End: 2020-03-05

## 2020-03-05 NOTE — TELEPHONE ENCOUNTER
Contacted pt to discuss use of Circaid arm reduction garments. Pt stating she wore garments on both her UEs on 3/3 and 3/4, but today has only been using her Left UE garments as she found it difficult to get things done when wearing both garments.  Has not

## 2020-07-14 RX ORDER — CLONIDINE HYDROCHLORIDE 0.1 MG/1
TABLET ORAL
Qty: 90 TABLET | Refills: 1 | Status: SHIPPED | OUTPATIENT
Start: 2020-07-14 | End: 2020-09-14 | Stop reason: CLARIF

## 2020-07-14 RX ORDER — GABAPENTIN 100 MG/1
CAPSULE ORAL
Qty: 360 CAPSULE | Refills: 1 | Status: SHIPPED | OUTPATIENT
Start: 2020-07-14 | End: 2020-09-14 | Stop reason: CLARIF

## 2020-07-15 RX ORDER — SERTRALINE HYDROCHLORIDE 100 MG/1
150 TABLET, FILM COATED ORAL DAILY
Qty: 135 TABLET | Refills: 1 | Status: SHIPPED | OUTPATIENT
Start: 2020-07-15 | End: 2021-02-22

## 2020-07-23 ENCOUNTER — VIRTUAL PHONE E/M (OUTPATIENT)
Dept: FAMILY MEDICINE CLINIC | Facility: CLINIC | Age: 70
End: 2020-07-23
Payer: MEDICARE

## 2020-07-23 DIAGNOSIS — R52 PAIN MANAGEMENT: Primary | ICD-10-CM

## 2020-07-23 DIAGNOSIS — Z01.810 PREOP CARDIOVASCULAR EXAM: ICD-10-CM

## 2020-07-23 DIAGNOSIS — E78.5 DYSLIPIDEMIA: ICD-10-CM

## 2020-07-23 DIAGNOSIS — I49.8 VENTRICULAR BIGEMINY: ICD-10-CM

## 2020-07-23 DIAGNOSIS — N95.0 POST-MENOPAUSAL BLEEDING: ICD-10-CM

## 2020-07-23 DIAGNOSIS — I10 ESSENTIAL HYPERTENSION: ICD-10-CM

## 2020-07-23 PROCEDURE — G2012 BRIEF CHECK IN BY MD/QHP: HCPCS | Performed by: FAMILY MEDICINE

## 2020-07-23 RX ORDER — HYDROCODONE BITARTRATE AND ACETAMINOPHEN 10; 325 MG/1; MG/1
1 TABLET ORAL EVERY 6 HOURS PRN
Qty: 120 TABLET | Refills: 0 | Status: CANCELLED | OUTPATIENT
Start: 2020-07-23

## 2020-07-23 RX ORDER — SIMVASTATIN 20 MG
20 TABLET ORAL NIGHTLY
Qty: 90 TABLET | Refills: 3 | Status: SHIPPED | OUTPATIENT
Start: 2020-07-23 | End: 2020-09-14 | Stop reason: CLARIF

## 2020-07-23 NOTE — PROGRESS NOTES
HPI:    Patient ID: Radha Ramos is a 78year old female. Patient calls for virtual phone visit. .  The main reason for her call is very necessary refill of her medications and to discuss some ongoing postmenopausal bleeding.     Her first medication tablet 0   • ketoconazole 2 % External Cream Apply 2 Application topically daily. 60 g 0   • simvastatin 20 MG Oral Tab Take 1 tablet (20 mg total) by mouth nightly.  TAKE 1 TABLET EVERY NIGHT AT BEDTIME (MD VISIT REQUIRED PRIOR TO FUTURE REFILL) (Patient t

## 2020-07-24 RX ORDER — HYDROCODONE BITARTRATE AND ACETAMINOPHEN 10; 325 MG/1; MG/1
1 TABLET ORAL EVERY 6 HOURS PRN
Qty: 60 TABLET | Refills: 0 | Status: SHIPPED | OUTPATIENT
Start: 2020-07-24 | End: 2020-12-14

## 2020-07-24 NOTE — TELEPHONE ENCOUNTER
Pt said Dr. Mario Martinez was going to send a norco script to her local [facility] advise

## 2020-07-24 NOTE — TELEPHONE ENCOUNTER
Patient called and is checking status of RX as she is heading out and would really like to pick it up and reception is bad on her phone.

## 2020-07-24 NOTE — TELEPHONE ENCOUNTER
7/23/2020 telephone visit:   Her first medication that needs refill for managing chronic pain is that of hydro-codon 10/325 she takes it infrequently and tolerates it well. A prescription was given to her with no refill and advice as far as safety of use.

## 2020-07-24 NOTE — TELEPHONE ENCOUNTER
Patient made aware message was given to Dr. Pete Garcias, he is with a patient, and pharmacy will notify her when it is ready to be picked up, patient verbalized understanding.

## 2020-08-11 ENCOUNTER — PATIENT OUTREACH (OUTPATIENT)
Dept: FAMILY MEDICINE CLINIC | Facility: CLINIC | Age: 70
End: 2020-08-11

## 2020-08-11 NOTE — PROGRESS NOTES
Spoke to patient about scheduling AWV. Patient states that right now she is doing physical therapy and wants to wait.

## 2020-08-17 ENCOUNTER — VIRTUAL PHONE E/M (OUTPATIENT)
Dept: FAMILY MEDICINE CLINIC | Facility: CLINIC | Age: 70
End: 2020-08-17
Payer: MEDICARE

## 2020-08-17 DIAGNOSIS — J06.9 VIRAL UPPER RESPIRATORY TRACT INFECTION: Primary | ICD-10-CM

## 2020-08-17 PROCEDURE — 99442 PHONE E/M BY PHYS 11-20 MIN: CPT | Performed by: FAMILY MEDICINE

## 2020-08-17 RX ORDER — FLUTICASONE PROPIONATE 50 MCG
2 SPRAY, SUSPENSION (ML) NASAL DAILY
Qty: 1 BOTTLE | Refills: 1 | Status: SHIPPED | OUTPATIENT
Start: 2020-08-17 | End: 2020-09-25

## 2020-08-17 RX ORDER — AMOXICILLIN AND CLAVULANATE POTASSIUM 875; 125 MG/1; MG/1
1 TABLET, FILM COATED ORAL 2 TIMES DAILY
Qty: 20 TABLET | Refills: 0 | Status: SHIPPED | OUTPATIENT
Start: 2020-08-17 | End: 2020-08-27

## 2020-08-17 RX ORDER — PREDNISONE 20 MG/1
TABLET ORAL
Qty: 13 TABLET | Refills: 0 | Status: SHIPPED | OUTPATIENT
Start: 2020-08-17 | End: 2020-09-14 | Stop reason: CLARIF

## 2020-08-17 NOTE — PROGRESS NOTES
HPI:    Patient ID: Fide Sauceda is a 78year old female. Ptwas with grandson on Friday, 8/7/2020. Her grandson began to be sick on 8/8/2020. He had a COVID test which returned negative.  She notes that on Monday,  8/10/2020 she developed a headache, CLONIDINE HCL 0.1 MG Oral Tab TAKE 1 TABLET EVERY EVENING 90 tablet 1   • amoxicillin 500 MG Oral Cap      • Tamoxifen Citrate 20 MG Oral Tab Take 1 tablet (20 mg total) by mouth once daily.  90 tablet 3   • TRIAMTERENE-HCTZ 37.5-25 MG Oral Cap TAKE 1 CAPSU predniSONE 20 MG Oral Tab; 2.5 tab day 1-3, 2 tab day 4, 1.5 tab day 5, 1 tab day 6, 0.5 tab day 7  Dispense: 13 tablet;  Refill: 0    Orders Placed This Encounter      COVID-19 Testing [E]      Meds This Visit:  Requested Prescriptions     Signed Prescript

## 2020-08-18 ENCOUNTER — LAB ENCOUNTER (OUTPATIENT)
Dept: LAB | Facility: HOSPITAL | Age: 70
End: 2020-08-18
Attending: FAMILY MEDICINE
Payer: MEDICARE

## 2020-08-18 DIAGNOSIS — J06.9 VIRAL UPPER RESPIRATORY TRACT INFECTION: ICD-10-CM

## 2020-08-20 ENCOUNTER — TELEPHONE (OUTPATIENT)
Dept: FAMILY MEDICINE CLINIC | Facility: CLINIC | Age: 70
End: 2020-08-20

## 2020-08-20 DIAGNOSIS — R53.83 FATIGUE, UNSPECIFIED TYPE: ICD-10-CM

## 2020-08-20 DIAGNOSIS — R05.9 COUGH: Primary | ICD-10-CM

## 2020-08-20 LAB — SARS-COV-2 RNA RESP QL NAA+PROBE: NOT DETECTED

## 2020-08-20 NOTE — TELEPHONE ENCOUNTER
Patient provided update:  Cough- phlegm coming up unable to see color, should she take the elderberry now? 98.0f temp, tired, eating-decreased appetite/pushing fluids. Prednisone 3rd day not as helpful. 8/17/2020:  1.  Viral upper respiratory tract inf

## 2020-08-20 NOTE — TELEPHONE ENCOUNTER
Had phone visit on Tuesday. Still waiting on Covid test results. States prednisone worked great on Tuesday but does not seem to be working now. Still very congested, coughing more and more sinus drainage. Please advise.

## 2020-08-24 NOTE — TELEPHONE ENCOUNTER
Pt states she is not feeling a lot better, she is very fatigued and weak. She has no fever, however the congestion in head better but chest is heavy. She would like to know if the antibiotic can be changed to zpack.  States this happens when she gets sick

## 2020-08-24 NOTE — TELEPHONE ENCOUNTER
Pt does not think antibiotic is doing good, pt is very tired and weak.   Herminio Stein will work better, please call

## 2020-08-25 ENCOUNTER — TELEPHONE (OUTPATIENT)
Dept: FAMILY MEDICINE CLINIC | Facility: CLINIC | Age: 70
End: 2020-08-25

## 2020-08-25 RX ORDER — AZITHROMYCIN 250 MG/1
TABLET, FILM COATED ORAL
Qty: 6 TABLET | Refills: 0 | Status: SHIPPED | OUTPATIENT
Start: 2020-08-25 | End: 2020-08-29

## 2020-08-25 RX ORDER — AZITHROMYCIN 250 MG/1
TABLET, FILM COATED ORAL
Qty: 6 TABLET | Refills: 0 | Status: SHIPPED | OUTPATIENT
Start: 2020-08-25 | End: 2020-08-25

## 2020-08-25 NOTE — TELEPHONE ENCOUNTER
YP just sent zpack in for her but he sent it to her mail order instead of [employer]. Please resend.

## 2020-08-25 NOTE — TELEPHONE ENCOUNTER
See message:        Pt states she is not feeling a lot better, she is very fatigued and weak. She has no fever, however the congestion in head better but chest is heavy. She would like to know if the antibiotic can be changed to zpack.  States this happen

## 2020-08-25 NOTE — TELEPHONE ENCOUNTER
Patient is calling back checking on status of getting a z-rosenda for her symptoms. Please advise. She is not doing any better.

## 2020-08-26 ENCOUNTER — APPOINTMENT (OUTPATIENT)
Dept: LAB | Facility: HOSPITAL | Age: 70
End: 2020-08-26
Attending: FAMILY MEDICINE
Payer: MEDICARE

## 2020-08-26 ENCOUNTER — HOSPITAL ENCOUNTER (OUTPATIENT)
Dept: GENERAL RADIOLOGY | Facility: HOSPITAL | Age: 70
Discharge: HOME | End: 2020-08-26
Attending: FAMILY MEDICINE
Payer: MEDICARE

## 2020-08-26 DIAGNOSIS — R05.9 COUGH: ICD-10-CM

## 2020-08-26 DIAGNOSIS — R53.83 FATIGUE, UNSPECIFIED TYPE: ICD-10-CM

## 2020-08-26 PROCEDURE — 71046 X-RAY EXAM CHEST 2 VIEWS: CPT | Performed by: FAMILY MEDICINE

## 2020-08-28 LAB — SARS-COV-2 RNA RESP QL NAA+PROBE: NOT DETECTED

## 2020-08-31 ENCOUNTER — TELEPHONE (OUTPATIENT)
Dept: FAMILY MEDICINE CLINIC | Facility: CLINIC | Age: 70
End: 2020-08-31

## 2020-08-31 RX ORDER — ALBUTEROL SULFATE 90 UG/1
2 AEROSOL, METERED RESPIRATORY (INHALATION) EVERY 4 HOURS PRN
Qty: 1 INHALER | Refills: 0 | Status: SHIPPED | OUTPATIENT
Start: 2020-08-31 | End: 2020-09-25

## 2020-08-31 NOTE — TELEPHONE ENCOUNTER
Pt calling to let dr Mimi Blas know covid test came out negative but still not feeling well    Please advise

## 2020-08-31 NOTE — TELEPHONE ENCOUNTER
See previous message Covid negative. Pt states occasional productive cough but still feeling a heaviness. Divya Basurtos yesterday but pt wondering if she needs inhaler. Yovani wheezing pt states it feels like she is not getting enough air. Please advise.

## 2020-08-31 NOTE — TELEPHONE ENCOUNTER
Per Dr Abhinav carrillo for Proair and Spiriva 1.25 inhalers. Pt informed understanding verbalized. Scripts to pharmacy. Pt instructed to Emergency Room for any worsening symptoms.

## 2020-09-11 ENCOUNTER — TELEPHONE (OUTPATIENT)
Dept: FAMILY MEDICINE CLINIC | Facility: CLINIC | Age: 70
End: 2020-09-11

## 2020-09-11 NOTE — TELEPHONE ENCOUNTER
USING INHALER FROM DR CLINE AND STILL HAVING PROBLEMS BREATHING. SOB UP AND DOWN STAIRS. FEELING TIRED, NO ENERGY. CHEST FEELS HEAVY AND TIGHT. NO FEVER. TESTED FOR COVID TWICE AND NEGATIVE.     HAD CHEST X RAY, WAS ON PREDNISONE AND AN ANTI BIOTIC

## 2020-09-14 ENCOUNTER — TELEPHONE (OUTPATIENT)
Dept: FAMILY MEDICINE CLINIC | Facility: CLINIC | Age: 70
End: 2020-09-14

## 2020-09-14 ENCOUNTER — HOSPITAL ENCOUNTER (INPATIENT)
Facility: HOSPITAL | Age: 70
LOS: 8 days | Discharge: HOME | DRG: 246 | End: 2020-09-22
Attending: EMERGENCY MEDICINE | Admitting: HOSPITALIST
Payer: MEDICARE

## 2020-09-14 ENCOUNTER — APPOINTMENT (OUTPATIENT)
Dept: GENERAL RADIOLOGY | Facility: HOSPITAL | Age: 70
DRG: 246 | End: 2020-09-14
Attending: EMERGENCY MEDICINE
Payer: MEDICARE

## 2020-09-14 ENCOUNTER — APPOINTMENT (OUTPATIENT)
Dept: CT IMAGING | Facility: HOSPITAL | Age: 70
DRG: 246 | End: 2020-09-14
Attending: EMERGENCY MEDICINE
Payer: MEDICARE

## 2020-09-14 DIAGNOSIS — I50.9 ACUTE ON CHRONIC CONGESTIVE HEART FAILURE, UNSPECIFIED HEART FAILURE TYPE (HCC): ICD-10-CM

## 2020-09-14 DIAGNOSIS — R06.03 RESPIRATORY DISTRESS: Primary | ICD-10-CM

## 2020-09-14 DIAGNOSIS — J40 BRONCHITIS: ICD-10-CM

## 2020-09-14 PROBLEM — D69.6 THROMBOCYTOPENIA: Status: ACTIVE | Noted: 2020-09-14

## 2020-09-14 PROBLEM — R73.9 HYPERGLYCEMIA: Status: ACTIVE | Noted: 2020-09-14

## 2020-09-14 PROBLEM — D69.6 THROMBOCYTOPENIA (HCC): Status: ACTIVE | Noted: 2020-09-14

## 2020-09-14 LAB
ADENOVIRUS PCR:: NEGATIVE
ALBUMIN SERPL-MCNC: 3.6 G/DL (ref 3.4–5)
ALBUMIN/GLOB SERPL: 0.9 {RATIO} (ref 1–2)
ALP LIVER SERPL-CCNC: 82 U/L (ref 55–142)
ALT SERPL-CCNC: 47 U/L (ref 13–56)
ANION GAP SERPL CALC-SCNC: 4 MMOL/L (ref 0–18)
AST SERPL-CCNC: 34 U/L (ref 15–37)
ATRIAL RATE: 121 BPM
B PERT DNA SPEC QL NAA+PROBE: NEGATIVE
BASOPHILS # BLD AUTO: 0.03 X10(3) UL (ref 0–0.2)
BASOPHILS NFR BLD AUTO: 0.6 %
BILIRUB SERPL-MCNC: 0.6 MG/DL (ref 0.1–2)
BUN BLD-MCNC: 16 MG/DL (ref 7–18)
BUN/CREAT SERPL: 17.4 (ref 10–20)
C PNEUM DNA SPEC QL NAA+PROBE: NEGATIVE
CALCIUM BLD-MCNC: 9 MG/DL (ref 8.5–10.1)
CHLORIDE SERPL-SCNC: 108 MMOL/L (ref 98–112)
CO2 SERPL-SCNC: 28 MMOL/L (ref 21–32)
CORONAVIRUS 229E PCR:: NEGATIVE
CORONAVIRUS HKU1 PCR:: NEGATIVE
CORONAVIRUS NL63 PCR:: NEGATIVE
CORONAVIRUS OC43 PCR:: NEGATIVE
CREAT BLD-MCNC: 0.92 MG/DL (ref 0.55–1.02)
D-DIMER: 2.09 UG/ML FEU (ref ?–0.7)
DEPRECATED RDW RBC AUTO: 48.5 FL (ref 35.1–46.3)
EOSINOPHIL # BLD AUTO: 0.17 X10(3) UL (ref 0–0.7)
EOSINOPHIL NFR BLD AUTO: 3.4 %
ERYTHROCYTE [DISTWIDTH] IN BLOOD BY AUTOMATED COUNT: 14.6 % (ref 11–15)
FLUAV RNA SPEC QL NAA+PROBE: NEGATIVE
FLUBV RNA SPEC QL NAA+PROBE: NEGATIVE
GLOBULIN PLAS-MCNC: 4 G/DL (ref 2.8–4.4)
GLUCOSE BLD-MCNC: 143 MG/DL (ref 70–99)
HCT VFR BLD AUTO: 37 % (ref 35–48)
HGB BLD-MCNC: 11.8 G/DL (ref 12–16)
IMM GRANULOCYTES # BLD AUTO: 0.02 X10(3) UL (ref 0–1)
IMM GRANULOCYTES NFR BLD: 0.4 %
LYMPHOCYTES # BLD AUTO: 0.7 X10(3) UL (ref 1–4)
LYMPHOCYTES NFR BLD AUTO: 14 %
M PROTEIN MFR SERPL ELPH: 7.6 G/DL (ref 6.4–8.2)
MCH RBC QN AUTO: 29.3 PG (ref 26–34)
MCHC RBC AUTO-ENTMCNC: 31.9 G/DL (ref 31–37)
MCV RBC AUTO: 91.8 FL (ref 80–100)
METAPNEUMOVIRUS PCR:: NEGATIVE
MONOCYTES # BLD AUTO: 0.42 X10(3) UL (ref 0.1–1)
MONOCYTES NFR BLD AUTO: 8.4 %
MYCOPLASMA PNEUMONIA PCR:: NEGATIVE
NEUTROPHILS # BLD AUTO: 3.67 X10 (3) UL (ref 1.5–7.7)
NEUTROPHILS # BLD AUTO: 3.67 X10(3) UL (ref 1.5–7.7)
NEUTROPHILS NFR BLD AUTO: 73.2 %
NT-PROBNP SERPL-MCNC: 1824 PG/ML (ref ?–125)
OSMOLALITY SERPL CALC.SUM OF ELEC: 294 MOSM/KG (ref 275–295)
P AXIS: 55 DEGREES
P-R INTERVAL: 114 MS
PARAINFLUENZA 1 PCR:: NEGATIVE
PARAINFLUENZA 2 PCR:: NEGATIVE
PARAINFLUENZA 3 PCR:: NEGATIVE
PARAINFLUENZA 4 PCR:: NEGATIVE
PLATELET # BLD AUTO: 125 10(3)UL (ref 150–450)
POTASSIUM SERPL-SCNC: 3.2 MMOL/L (ref 3.5–5.1)
PROCALCITONIN SERPL-MCNC: <0.05 NG/ML (ref ?–0.16)
Q-T INTERVAL: 322 MS
QRS DURATION: 100 MS
QTC CALCULATION (BEZET): 457 MS
R AXIS: 37 DEGREES
RBC # BLD AUTO: 4.03 X10(6)UL (ref 3.8–5.3)
RHINOVIRUS/ENTERO PCR:: NEGATIVE
RSV RNA SPEC QL NAA+PROBE: NEGATIVE
SARS-COV-2 IGG SERPLBLD QL IA.RAPID: NEGATIVE
SODIUM SERPL-SCNC: 140 MMOL/L (ref 136–145)
T AXIS: 86 DEGREES
TROPONIN I SERPL-MCNC: <0.045 NG/ML (ref ?–0.04)
TSI SER-ACNC: 0.58 MIU/ML (ref 0.36–3.74)
VENTRICULAR RATE: 121 BPM
WBC # BLD AUTO: 5 X10(3) UL (ref 4–11)

## 2020-09-14 PROCEDURE — 71260 CT THORAX DX C+: CPT | Performed by: EMERGENCY MEDICINE

## 2020-09-14 PROCEDURE — 71045 X-RAY EXAM CHEST 1 VIEW: CPT | Performed by: EMERGENCY MEDICINE

## 2020-09-14 PROCEDURE — 99223 1ST HOSP IP/OBS HIGH 75: CPT | Performed by: INTERNAL MEDICINE

## 2020-09-14 RX ORDER — CLONIDINE HYDROCHLORIDE 0.1 MG/1
0.1 TABLET ORAL EVERY EVENING
Status: ON HOLD | COMMUNITY
End: 2020-09-22

## 2020-09-14 RX ORDER — FLUTICASONE PROPIONATE 50 MCG
2 SPRAY, SUSPENSION (ML) NASAL DAILY
Status: DISCONTINUED | OUTPATIENT
Start: 2020-09-14 | End: 2020-09-22

## 2020-09-14 RX ORDER — ALBUTEROL SULFATE 90 UG/1
8 AEROSOL, METERED RESPIRATORY (INHALATION) 4 TIMES DAILY
Status: DISCONTINUED | OUTPATIENT
Start: 2020-09-14 | End: 2020-09-15

## 2020-09-14 RX ORDER — POLYETHYLENE GLYCOL 3350 17 G/17G
17 POWDER, FOR SOLUTION ORAL DAILY PRN
Status: DISCONTINUED | OUTPATIENT
Start: 2020-09-14 | End: 2020-09-22

## 2020-09-14 RX ORDER — ATORVASTATIN CALCIUM 10 MG/1
10 TABLET, FILM COATED ORAL NIGHTLY
Status: DISCONTINUED | OUTPATIENT
Start: 2020-09-14 | End: 2020-09-22

## 2020-09-14 RX ORDER — ACETAMINOPHEN 325 MG/1
650 TABLET ORAL EVERY 6 HOURS PRN
Status: DISCONTINUED | OUTPATIENT
Start: 2020-09-14 | End: 2020-09-22

## 2020-09-14 RX ORDER — POTASSIUM CHLORIDE 1500 MG/1
2 TABLET, FILM COATED, EXTENDED RELEASE ORAL DAILY
COMMUNITY
End: 2020-11-19

## 2020-09-14 RX ORDER — TAMOXIFEN CITRATE 10 MG/1
20 TABLET ORAL
Status: DISCONTINUED | OUTPATIENT
Start: 2020-09-15 | End: 2020-09-14

## 2020-09-14 RX ORDER — SIMVASTATIN 20 MG
20 TABLET ORAL NIGHTLY
COMMUNITY
End: 2021-02-19

## 2020-09-14 RX ORDER — GABAPENTIN 100 MG/1
200 CAPSULE ORAL NIGHTLY
Status: DISCONTINUED | OUTPATIENT
Start: 2020-09-14 | End: 2020-09-22

## 2020-09-14 RX ORDER — GABAPENTIN 100 MG/1
100 CAPSULE ORAL 2 TIMES DAILY
Status: DISCONTINUED | OUTPATIENT
Start: 2020-09-15 | End: 2020-09-22

## 2020-09-14 RX ORDER — BISACODYL 10 MG
10 SUPPOSITORY, RECTAL RECTAL
Status: DISCONTINUED | OUTPATIENT
Start: 2020-09-14 | End: 2020-09-22

## 2020-09-14 RX ORDER — TRIAMTERENE AND HYDROCHLOROTHIAZIDE 37.5; 25 MG/1; MG/1
1 CAPSULE ORAL EVERY MORNING
Status: ON HOLD | COMMUNITY
End: 2020-09-22

## 2020-09-14 RX ORDER — METHYLPREDNISOLONE SODIUM SUCCINATE 125 MG/2ML
125 INJECTION, POWDER, LYOPHILIZED, FOR SOLUTION INTRAMUSCULAR; INTRAVENOUS ONCE
Status: COMPLETED | OUTPATIENT
Start: 2020-09-14 | End: 2020-09-14

## 2020-09-14 RX ORDER — LABETALOL HYDROCHLORIDE 5 MG/ML
10 INJECTION, SOLUTION INTRAVENOUS EVERY 4 HOURS PRN
Status: DISCONTINUED | OUTPATIENT
Start: 2020-09-14 | End: 2020-09-22

## 2020-09-14 RX ORDER — ALBUTEROL SULFATE 90 UG/1
2 AEROSOL, METERED RESPIRATORY (INHALATION) EVERY 4 HOURS PRN
Status: DISCONTINUED | OUTPATIENT
Start: 2020-09-14 | End: 2020-09-22

## 2020-09-14 RX ORDER — TAMOXIFEN CITRATE 10 MG/1
20 TABLET ORAL NIGHTLY
Status: DISCONTINUED | OUTPATIENT
Start: 2020-09-15 | End: 2020-09-22

## 2020-09-14 RX ORDER — METHYLPREDNISOLONE SODIUM SUCCINATE 125 MG/2ML
80 INJECTION, POWDER, LYOPHILIZED, FOR SOLUTION INTRAMUSCULAR; INTRAVENOUS EVERY 8 HOURS
Status: COMPLETED | OUTPATIENT
Start: 2020-09-15 | End: 2020-09-15

## 2020-09-14 RX ORDER — ONDANSETRON 2 MG/ML
4 INJECTION INTRAMUSCULAR; INTRAVENOUS EVERY 6 HOURS PRN
Status: DISCONTINUED | OUTPATIENT
Start: 2020-09-14 | End: 2020-09-22

## 2020-09-14 RX ORDER — GABAPENTIN 100 MG/1
100 CAPSULE ORAL 2 TIMES DAILY PRN
COMMUNITY
End: 2021-02-23

## 2020-09-14 RX ORDER — NITROGLYCERIN 20 MG/100ML
50 INJECTION INTRAVENOUS CONTINUOUS
Status: DISCONTINUED | OUTPATIENT
Start: 2020-09-14 | End: 2020-09-22

## 2020-09-14 RX ORDER — TRIAMTERENE AND HYDROCHLOROTHIAZIDE 37.5; 25 MG/1; MG/1
1 CAPSULE ORAL EVERY MORNING
Status: DISCONTINUED | OUTPATIENT
Start: 2020-09-15 | End: 2020-09-15

## 2020-09-14 RX ORDER — POTASSIUM CHLORIDE 14.9 MG/ML
20 INJECTION INTRAVENOUS ONCE
Status: COMPLETED | OUTPATIENT
Start: 2020-09-15 | End: 2020-09-15

## 2020-09-14 RX ORDER — METOPROLOL SUCCINATE 25 MG/1
25 TABLET, EXTENDED RELEASE ORAL
Status: DISCONTINUED | OUTPATIENT
Start: 2020-09-14 | End: 2020-09-16

## 2020-09-14 RX ORDER — CLONIDINE HYDROCHLORIDE 0.1 MG/1
0.1 TABLET ORAL EVERY EVENING
Status: DISCONTINUED | OUTPATIENT
Start: 2020-09-14 | End: 2020-09-18

## 2020-09-14 RX ORDER — SODIUM PHOSPHATE, DIBASIC AND SODIUM PHOSPHATE, MONOBASIC 7; 19 G/133ML; G/133ML
1 ENEMA RECTAL ONCE AS NEEDED
Status: DISCONTINUED | OUTPATIENT
Start: 2020-09-14 | End: 2020-09-22

## 2020-09-14 RX ORDER — DOCUSATE SODIUM 100 MG/1
100 CAPSULE, LIQUID FILLED ORAL 2 TIMES DAILY
Status: DISCONTINUED | OUTPATIENT
Start: 2020-09-14 | End: 2020-09-22

## 2020-09-14 RX ORDER — ENOXAPARIN SODIUM 100 MG/ML
40 INJECTION SUBCUTANEOUS DAILY
Status: DISCONTINUED | OUTPATIENT
Start: 2020-09-14 | End: 2020-09-22

## 2020-09-14 RX ORDER — FUROSEMIDE 10 MG/ML
40 INJECTION INTRAMUSCULAR; INTRAVENOUS ONCE
Status: COMPLETED | OUTPATIENT
Start: 2020-09-14 | End: 2020-09-14

## 2020-09-14 RX ORDER — METOCLOPRAMIDE HYDROCHLORIDE 5 MG/ML
5 INJECTION INTRAMUSCULAR; INTRAVENOUS EVERY 8 HOURS PRN
Status: DISCONTINUED | OUTPATIENT
Start: 2020-09-14 | End: 2020-09-22

## 2020-09-14 RX ORDER — GLUCOSAMINE HCL 500 MG
3000 TABLET ORAL DAILY
COMMUNITY

## 2020-09-14 RX ORDER — POTASSIUM CHLORIDE 20 MEQ/1
40 TABLET, EXTENDED RELEASE ORAL EVERY 4 HOURS
Status: DISCONTINUED | OUTPATIENT
Start: 2020-09-14 | End: 2020-09-14

## 2020-09-14 RX ORDER — GABAPENTIN 100 MG/1
200 CAPSULE ORAL NIGHTLY
COMMUNITY
End: 2021-04-23

## 2020-09-14 NOTE — ED INITIAL ASSESSMENT (HPI)
Pt presents to ED with complaint of shortness of breath and fatigue x1 month. Pt reports she initial thought she had a cold but reports she has been unable to catch her breath. ELISABETH with exertion.  Reports she has seen her doctor and has had 2 negative COVID

## 2020-09-14 NOTE — ED PROVIDER NOTES
Patient Seen in: [facility] Emergency Department      History   Patient presents with:  Dyspnea ELISABETH SOB  Cough/URI    Stated Complaint: cough, dyspnea x 1 months, on inhalers for past week without help. worsening sh*    HPI    78year-old with histo Unspecified essential hypertension    • Visual impairment     glasses              Past Surgical History:   Procedure Laterality Date   • BREAST BIOPSY Right 4/9/2014    Performed by Kylah Martin MD at [address] 90.7 kg   SpO2 94%   BMI 29.53 kg/m²         Physical Exam    General: Patient is awake, alert, sitting upright in bed. HEENT: Sclera are not icteric. Conjunctivae within normal limits.     Cardiovascular: Tachycardia, regular rhythm  Respiratory: Diffus and axes as noted on EKG Report. Rate: 121  Rhythm: Sinus Rhythm  Reading: Minimal inferior lateral ST depression. Possible left atrial enlargement.          Chest x-ray: Cardiomegaly, prominent pulmonary vessel seen in both lungs, perihilar interstitial pressure elevated and her heart rate elevated. She had increased oxygen requirements. I was concerned about increased CHF/flash pulmonary edema. IV nitroglycerin initiated. On reevaluation patient appears more comfortable.   However at this point she congestive heart failure, unspecified heart failure type [facility])    Disposition:  Admit  9/14/2020  4:50 pm    Follow-up:  No follow-up provider specified.         Medications Prescribed:  Current Discharge Medication List                       Present on Adm

## 2020-09-14 NOTE — CONSULTS
Pulmonary Consult     Assessment / Plan:  1. Acute respiratory failure - concern for PNA and volume overload.  I'm also pretty sure I walked in while she is having an anxiety attack as well  - wean supplemental O2; currently on 10 lpm  - abx  - diuresis  - Laterality Date   • BREAST BIOPSY Right 2014    Performed by Addison Willard MD at [facility] MAIN OR   • BREAST SENTINEL LYMPH NODE BIOPSY Right 2014    Performed by Addison Willard MD at [facility] MAIN OR   •      • KNEE ARTHROSCP HARV Left • Psychiatric Mother    • Heart Attack Father    • Heart Disorder Father    • Depression Maternal Aunt    • Breast Cancer Maternal Aunt 39   • Cancer Maternal Aunt    • Breast Cancer Self 39   • Ovarian Cancer Cousin    • Cancer Cousin    • Diabetes Mate

## 2020-09-14 NOTE — H&P
[facility]  History and Physical     Gerardo Carmella Patient Status:  Emergency    1950 MRN [id number]   Location [facility] [facility] Attending Brook Rocha MD   Hosp Day # 0 PCP Viet Jones DO     Chief Compla essential hypertension    • Visual impairment     glasses        Past Surgical History:   Past Surgical History:   Procedure Laterality Date   • BREAST BIOPSY Right 4/9/2014    Performed by Shivam Hannah MD at [address] [place] [address] facility-administered medications on file prior to encounter.    Albuterol Sulfate HFA (PROAIR HFA) 108 (90 Base) MCG/ACT Inhalation Aero Soln, Inhale 2 puffs into the lungs every 4 (four) hours as needed for Wheezing., Disp: 1 Inhaler, Rfl: 0  Tiotropium B Take 600 mg by mouth daily. , Disp: , Rfl:   Multiple Vitamins-Minerals (CENTRUM SILVER) Oral Tab, Take 1 tablet by mouth daily. , Disp: , Rfl:   Metoprolol Succinate ER 25 MG Oral Tablet 24 Hr, Take 25 mg by mouth daily. , Disp: , Rfl:   Cholecalciferol (VIT PLAN:     1. Acute hypoxic respiratory failure - imaging concerning for volume overload and early bronchopneumonia. Concern for flash pulmonary edema in ED. May be exacerbated by enlarging thyroid masses/nodules  1.  Supplemental oxygen, steroids, duonebs,

## 2020-09-14 NOTE — ED NOTES
Dr Ardon Johnson bedside. Patient was very anxious in room. During neb treatment. Increased O2 to 10 L. Instructed patient on breathing practices to help calm down.

## 2020-09-15 ENCOUNTER — APPOINTMENT (OUTPATIENT)
Dept: CV DIAGNOSTICS | Facility: HOSPITAL | Age: 70
DRG: 246 | End: 2020-09-15
Attending: INTERNAL MEDICINE
Payer: MEDICARE

## 2020-09-15 LAB
ANION GAP SERPL CALC-SCNC: 2 MMOL/L (ref 0–18)
BASOPHILS # BLD AUTO: 0.02 X10(3) UL (ref 0–0.2)
BASOPHILS NFR BLD AUTO: 0.5 %
BUN BLD-MCNC: 16 MG/DL (ref 7–18)
BUN/CREAT SERPL: 19.3 (ref 10–20)
CALCIUM BLD-MCNC: 8.3 MG/DL (ref 8.5–10.1)
CHLORIDE SERPL-SCNC: 109 MMOL/L (ref 98–112)
CO2 SERPL-SCNC: 31 MMOL/L (ref 21–32)
CREAT BLD-MCNC: 0.83 MG/DL (ref 0.55–1.02)
DEPRECATED RDW RBC AUTO: 47.9 FL (ref 35.1–46.3)
EOSINOPHIL # BLD AUTO: 0.01 X10(3) UL (ref 0–0.7)
EOSINOPHIL NFR BLD AUTO: 0.2 %
ERYTHROCYTE [DISTWIDTH] IN BLOOD BY AUTOMATED COUNT: 14.3 % (ref 11–15)
GLUCOSE BLD-MCNC: 112 MG/DL (ref 70–99)
HCT VFR BLD AUTO: 32.6 % (ref 35–48)
HGB BLD-MCNC: 10.4 G/DL (ref 12–16)
IMM GRANULOCYTES # BLD AUTO: 0.02 X10(3) UL (ref 0–1)
IMM GRANULOCYTES NFR BLD: 0.5 %
LYMPHOCYTES # BLD AUTO: 0.55 X10(3) UL (ref 1–4)
LYMPHOCYTES NFR BLD AUTO: 13.3 %
MCH RBC QN AUTO: 29.3 PG (ref 26–34)
MCHC RBC AUTO-ENTMCNC: 31.9 G/DL (ref 31–37)
MCV RBC AUTO: 91.8 FL (ref 80–100)
MONOCYTES # BLD AUTO: 0.51 X10(3) UL (ref 0.1–1)
MONOCYTES NFR BLD AUTO: 12.3 %
NEUTROPHILS # BLD AUTO: 3.02 X10 (3) UL (ref 1.5–7.7)
NEUTROPHILS # BLD AUTO: 3.02 X10(3) UL (ref 1.5–7.7)
NEUTROPHILS NFR BLD AUTO: 73.2 %
OSMOLALITY SERPL CALC.SUM OF ELEC: 296 MOSM/KG (ref 275–295)
PLATELET # BLD AUTO: 127 10(3)UL (ref 150–450)
POTASSIUM SERPL-SCNC: 3.6 MMOL/L (ref 3.5–5.1)
POTASSIUM SERPL-SCNC: 3.9 MMOL/L (ref 3.5–5.1)
POTASSIUM SERPL-SCNC: 4.3 MMOL/L (ref 3.5–5.1)
RBC # BLD AUTO: 3.55 X10(6)UL (ref 3.8–5.3)
SARS-COV-2 RNA RESP QL NAA+PROBE: NOT DETECTED
SARS-COV-2 RNA RESP QL NAA+PROBE: NOT DETECTED
SODIUM SERPL-SCNC: 142 MMOL/L (ref 136–145)
WBC # BLD AUTO: 4.1 X10(3) UL (ref 4–11)

## 2020-09-15 PROCEDURE — 93306 TTE W/DOPPLER COMPLETE: CPT | Performed by: INTERNAL MEDICINE

## 2020-09-15 PROCEDURE — 99232 SBSQ HOSP IP/OBS MODERATE 35: CPT | Performed by: HOSPITALIST

## 2020-09-15 RX ORDER — AZITHROMYCIN 250 MG/1
500 TABLET, FILM COATED ORAL EVERY 24 HOURS
Status: DISCONTINUED | OUTPATIENT
Start: 2020-09-15 | End: 2020-09-16

## 2020-09-15 RX ORDER — FUROSEMIDE 10 MG/ML
20 INJECTION INTRAMUSCULAR; INTRAVENOUS ONCE
Status: COMPLETED | OUTPATIENT
Start: 2020-09-15 | End: 2020-09-15

## 2020-09-15 RX ORDER — FUROSEMIDE 10 MG/ML
40 INJECTION INTRAMUSCULAR; INTRAVENOUS
Status: DISCONTINUED | OUTPATIENT
Start: 2020-09-15 | End: 2020-09-21

## 2020-09-15 RX ORDER — PREDNISONE 20 MG/1
40 TABLET ORAL
Status: DISCONTINUED | OUTPATIENT
Start: 2020-09-16 | End: 2020-09-17

## 2020-09-15 RX ORDER — POTASSIUM CHLORIDE 20 MEQ/1
40 TABLET, EXTENDED RELEASE ORAL EVERY 4 HOURS
Status: COMPLETED | OUTPATIENT
Start: 2020-09-15 | End: 2020-09-15

## 2020-09-15 RX ORDER — ALBUTEROL SULFATE 90 UG/1
2 AEROSOL, METERED RESPIRATORY (INHALATION) 4 TIMES DAILY
Status: DISCONTINUED | OUTPATIENT
Start: 2020-09-15 | End: 2020-09-22

## 2020-09-15 NOTE — DIETARY NOTE
[facility]    NUTRITION INITIAL ASSESSMENT    Pt does not meet malnutrition criteria.     NUTRITION DIAGNOSIS/PROBLEM:    Possible inadequate energy intake related to physiologic causes as evidenced by nutrition consult for at risk and MST score of 4 f PHYSICAL FINDINGS:    Unable to conduct nutrition focused physical exam due to isolation precautions for r/o COVID-19.      NUTRITION PRESCRIPTION: 94 kg  Calories: 2803-7332 calories/day (20-23 calories per kg)  Protein: 86-99 grams protein/day (1.3-1.5 gr

## 2020-09-15 NOTE — PROGRESS NOTES
Critical Care Progress Note     Assessment / Plan:  1. Acute respiratory failure - concern for PNA, bronchospasm and pulm edema. Improved  - wean supplemental O2 as able  - abx  - IV steroids to po tomorrow  - lasix  - TTE  2.  Possible PNA - PCT is normal.

## 2020-09-15 NOTE — PROGRESS NOTES
[facility] HOSPITALIST  Progress Note     Stephanie Riding Patient Status:  Inpatient    1950 MRN [id number]   [facility] 4SW-A [facility] PCP Mario Faust DO     Chief Complaint: SOB    S: [facility] mg/dL). No results for input(s): PTP, INR in the last 168 hours. Recent Labs   Lab 09/14/20  1319   TROP <0.045       Lab Results   Component Value Date    TSH 0.579 09/14/2020       Imaging: Imaging data reviewed in Epic.     Medications:   • Albuter cardiology. Awaiting COVID PCR.     Quality:  · DVT Prophylaxis: Lovenox  · CODE status: Full  · Pradhan: N/A  · Central line: N/A    Will the patient be referred to TCC on discharge?: No  Estimated date of discharge: tbd  Discharge is dependent on: clinical

## 2020-09-15 NOTE — PLAN OF CARE
Admitted from ER last night at 2200H, alert and oriented x 4, noted with SOB during exertion, O2 at 5L/NC, diminished lung sounds noted. Nitroglycerin drip ongoing at 50 mcg/min for CHF symptoms and for BP above 988 mm Hg systolic. ICU admission care done.

## 2020-09-15 NOTE — PHYSICAL THERAPY NOTE
PHYSICAL THERAPY EVALUATION - INPATIENT     Room Number: 460/460-A  Evaluation Date: 9/15/2020  Type of Evaluation: Initial  Physician Order: PT Eval and Treat    Presenting Problem: ARF  Reason for Therapy: Mobility Dysfunction and Discharge Plannin Performed by Renu Murrieta MD at [facility] MAIN OR   •      • KNEE ARTHROSCP HARV Left    • KNEE TOTAL REPLACEMENT Right 2017    Performed by Basilia Harada, MD at [address] [place] [address] MAIN OR   • [address] Right 2017    Performed by Basilia Harada NEUROLOGICAL FINDINGS                      ACTIVITY TOLERANCE                         O2 WALK        SPO2 Ambulation on Oxygen: 80         AM-PAC '6-Clicks' INPATIENT SHORT FORM - BASIC MOBILITY  How much difficulty does the patient cu patient questions and concerns addressed;SCDs in place    ASSESSMENT   Patient is a 78year old female admitted on 9/14/2020 with dyspnea and ARF with concern for pneumonia, bronchospasm, and pulmonary edema.  Pertinent comorbidities and personal factors im

## 2020-09-15 NOTE — PLAN OF CARE
0730 ASSUMED CARE OF PT, TOLERATING 4L NC, PUREWICK IN PLACE, UP TO BATHROOM X2, NITROGYLCERIN GTT STOPPED 1130, ECHO COMPLETED - CARDIOLOGY AWARE, ORDERS FOR CARDIAC CATH 9/16, OKAY TO TRANSFER TO FLOOR. AWAITING BED PLACEMENT, PCR STILL PENDING.   Problem rhythm and repeat lab results as appropriate  - Fluid restriction as ordered  - Instruct patient on fluid and nutrition restrictions as appropriate  Outcome: Progressing

## 2020-09-15 NOTE — PROGRESS NOTES
[facility] Dosing Service  Antibiotic Dosing    Sumit Richard is a 78year old for whom pharmacy is dosing azithromycin and ceftriaxone for treatment of  pneumonia. Other antibiotics (Not dosed by pharmacy): none    Allergies: is allergic to latex.

## 2020-09-15 NOTE — CONSULTS
[facility]  Consultation Note      John Pantoja Patient Status:  Inpatient    1950 MRN [id number]   [facility] 4SW-A Attending [facility] Day # 1 PCP Toby Trejo, DO     Reason for co failure. I met 2018 prior to knee replacement, noted to have ventricular bigeminy on ECG, resolved with k supplementation. No CV-related post-op issues.   For the past month, she's been struggling with constellation of a productive cough, headache, sinus c g in sodium chloride 0.9% 100 mL MBP/ADD-vantage, 1 g, Intravenous, Q24H  cloNIDine HCl (CATAPRES) tab 0.1 mg, 0.1 mg, Oral, QPM  Fluticasone Propionate (FLONASE) 50 MCG/ACT nasal spray 2 spray, 2 spray, Each Nare, Daily  gabapentin (NEURONTIN) cap 100 mg, Left    • KNEE TOTAL REPLACEMENT Right 1/23/2017    Performed by William Moreno MD at [address] [place] [address] OR   • [address] Right 9/25/2017    Performed by William Moreno MD at [address] [place] [address] OR   • [address] Right 6/18/2017    Performed by William Moreno MD a bleeding  Musculoskeletal:negative for myalgias  Neurological: negative for dizziness and headaches  Endocrine: negative for temperature intolerance      Physical Exam:  Blood pressure 142/81, pulse (!) 126, temperature 98 °F (36.7 °C), temperature source Thank you for allowing our practice to participate in the care of your patient. Please do not hesitate to contact me if you have any questions.     Sam Carrasco MD  9/15/2020  9:22 AM

## 2020-09-15 NOTE — PROGRESS NOTES
Pharmacy Note:  Route Optimization for Azithromycin (Zithromax)         Patient is currently on azithromycin  500mg IV q24hr.   The patient meets the criteria to convert to the oral equivalent as established by the IV to oral conversion protocol approved

## 2020-09-16 ENCOUNTER — APPOINTMENT (OUTPATIENT)
Dept: INTERVENTIONAL RADIOLOGY/VASCULAR | Facility: HOSPITAL | Age: 70
DRG: 246 | End: 2020-09-16
Attending: INTERNAL MEDICINE
Payer: MEDICARE

## 2020-09-16 LAB
ANION GAP SERPL CALC-SCNC: 6 MMOL/L (ref 0–18)
APTT PPP: 30.1 SECONDS (ref 25.4–36.1)
BUN BLD-MCNC: 23 MG/DL (ref 7–18)
BUN/CREAT SERPL: 28.4 (ref 10–20)
CALCIUM BLD-MCNC: 9 MG/DL (ref 8.5–10.1)
CHLORIDE SERPL-SCNC: 103 MMOL/L (ref 98–112)
CO2 SERPL-SCNC: 31 MMOL/L (ref 21–32)
CREAT BLD-MCNC: 0.81 MG/DL (ref 0.55–1.02)
GLUCOSE BLD-MCNC: 104 MG/DL (ref 70–99)
HAV IGM SER QL: 1.9 MG/DL (ref 1.6–2.6)
INR BLD: 1.06 (ref 0.89–1.11)
OSMOLALITY SERPL CALC.SUM OF ELEC: 294 MOSM/KG (ref 275–295)
POTASSIUM SERPL-SCNC: 3.5 MMOL/L (ref 3.5–5.1)
PSA SERPL DL<=0.01 NG/ML-MCNC: 14.1 SECONDS (ref 12.4–14.6)
SODIUM SERPL-SCNC: 140 MMOL/L (ref 136–145)

## 2020-09-16 PROCEDURE — 99232 SBSQ HOSP IP/OBS MODERATE 35: CPT | Performed by: HOSPITALIST

## 2020-09-16 PROCEDURE — B2151ZZ FLUOROSCOPY OF LEFT HEART USING LOW OSMOLAR CONTRAST: ICD-10-PCS | Performed by: INTERNAL MEDICINE

## 2020-09-16 PROCEDURE — 4A023N8 MEASUREMENT OF CARDIAC SAMPLING AND PRESSURE, BILATERAL, PERCUTANEOUS APPROACH: ICD-10-PCS | Performed by: INTERNAL MEDICINE

## 2020-09-16 PROCEDURE — B2111ZZ FLUOROSCOPY OF MULTIPLE CORONARY ARTERIES USING LOW OSMOLAR CONTRAST: ICD-10-PCS | Performed by: INTERNAL MEDICINE

## 2020-09-16 RX ORDER — VERAPAMIL HYDROCHLORIDE 2.5 MG/ML
INJECTION, SOLUTION INTRAVENOUS
Status: COMPLETED
Start: 2020-09-16 | End: 2020-09-16

## 2020-09-16 RX ORDER — POTASSIUM CHLORIDE 20 MEQ/1
40 TABLET, EXTENDED RELEASE ORAL EVERY 4 HOURS
Status: COMPLETED | OUTPATIENT
Start: 2020-09-16 | End: 2020-09-16

## 2020-09-16 RX ORDER — HEPARIN SODIUM 5000 [USP'U]/ML
INJECTION, SOLUTION INTRAVENOUS; SUBCUTANEOUS
Status: COMPLETED
Start: 2020-09-16 | End: 2020-09-16

## 2020-09-16 RX ORDER — MIDAZOLAM HYDROCHLORIDE 1 MG/ML
INJECTION INTRAMUSCULAR; INTRAVENOUS
Status: COMPLETED
Start: 2020-09-16 | End: 2020-09-16

## 2020-09-16 RX ORDER — MIDAZOLAM HYDROCHLORIDE 1 MG/ML
INJECTION INTRAMUSCULAR; INTRAVENOUS
Status: DISCONTINUED
Start: 2020-09-16 | End: 2020-09-16 | Stop reason: WASHOUT

## 2020-09-16 RX ORDER — LIDOCAINE HYDROCHLORIDE 10 MG/ML
INJECTION, SOLUTION EPIDURAL; INFILTRATION; INTRACAUDAL; PERINEURAL
Status: COMPLETED
Start: 2020-09-16 | End: 2020-09-16

## 2020-09-16 RX ORDER — METOPROLOL TARTRATE 50 MG/1
50 TABLET, FILM COATED ORAL
Status: DISCONTINUED | OUTPATIENT
Start: 2020-09-16 | End: 2020-09-17

## 2020-09-16 NOTE — PLAN OF CARE
Assumed care for pt. At 2330. Pt. Awake, A&Ox4. Discussed plan for angiogram, aware to remain NPO after midnight. Groins to be shaved. Lungs dim. , placed on 2L oxygen for desats to 87% on RA. Dry cough. NSR/ST on tele, hr 's. Diuresing with Lasix.  Pl ordered  Outcome: Progressing     Problem: RESPIRATORY - ADULT  Goal: Achieves optimal ventilation and oxygenation  Description  INTERVENTIONS:  - Assess for changes in respiratory status  - Assess for changes in mentation and behavior  - Position to facil

## 2020-09-16 NOTE — PHYSICAL THERAPY NOTE
PHYSICAL THERAPY TREATMENT NOTE - INPATIENT    Room Number: 8733/8154-X     Session: 1   Number of Visits to Meet Established Goals: 3    Presenting Problem: ARF  History related to current admission: Pt is a 78 y.o. female admitted 9/14/20 with dyspnea a MAIN OR   • BREAST SENTINEL LYMPH NODE BIOPSY Right 2014    Performed by Sherill Peabody, MD at [facility] MAIN OR   •      • KNEE ARTHROSCP HARV Left    • KNEE TOTAL REPLACEMENT Right 2017    Performed by Angus Che MD at [address] [place] [address] MAIN OR   • None   How much help from another person does the patient currently need. ..   -   Moving to and from a bed to a chair (including a wheelchair)?: A Little   -   Need to walk in hospital room?: A Little   -   Climbing 3-5 steps with a railing?: A Little Up in chair;Needs met;Call light within reach;RN aware of session/findings; All patient questions and concerns addressed    ASSESSMENT   Pt improving with mobility/endurance s/p admission for ARF. Pt is now s/p cardia cath 9/16/20.  Pt will continue to benef

## 2020-09-16 NOTE — PLAN OF CARE
Heart rate 115 at rest and 125 max w/ walking to bathroom  deny chest pain deny dyspnea at rest  Exertional dyspnea less  per patient   spo2 on 2l/nc stable 94-95%     Seen/examined by phillip villagomez  ; isolation discontinued  Problem: Olamide Bowie indicated  - Manage/alleviate anxiety  - Monitor for signs/symptoms of CO2 retention  Outcome: Progressing     Problem: Impaired Functional Mobility  Goal: Achieve highest/safest level of mobility/gait  Description  Interventions:  - Assess patient's funct

## 2020-09-16 NOTE — PROGRESS NOTES
Pulmonary Progress Note      NAME: Searcy Spurling - ROOM:  IVS HOLD/ Billie Haney - MRN: [id number] - Age: 78year old - : 1950    Assessment/Plan:  1. Acute respiratory failure - concern for PNA, bronchospasm and pulm edema.  Improved  - wean sup 9/16/2020 0500  Gross per 24 hour   Intake 240 ml   Output 2220 ml   Net -1980 ml       Physical Exam:  /73 (BP Location: Right arm)   Pulse 109   Temp 98.5 °F (36.9 °C) (Oral)   Resp 19   Ht 5' 9\" (1.753 m)   Wt 211 lb 9.6 oz (96 kg)   SpO2 96%   B

## 2020-09-16 NOTE — PLAN OF CARE
Alert and oriented x 4, no SOB noted, assisted in the bathroom, voiding a lot post Lasix, verbalized she would like to use the purewick suction  during the night to contain urine. Aware that she will be transferred to the floor Room 2603 tonight.   She sign

## 2020-09-16 NOTE — OCCUPATIONAL THERAPY NOTE
OCCUPATIONAL THERAPY QUICK EVALUATION - INPATIENT    Room Number: 1122/8189-E  Evaluation Date: 9/16/2020     Type of Evaluation: Initial  Presenting Problem: resp distress    Physician Order: IP Consult to Occupational Therapy  Reason for Therapy:  ADL/IA History  Past Surgical History:   Procedure Laterality Date   • BREAST BIOPSY Right 4/9/2014    Performed by Delilah Neal MD at [facility] MAIN OR   • [address] Right 4/8/2014    Performed by Delilah Neal MD at [facility] MAIN OR   • C- functional limits     Upper extremity strength is within functional limits     NEUROLOGICAL FINDINGS                   ACTIVITY TOLERANCE                         O2 SATURATIONS                ACTIVITIES OF DAILY LIVING ASSESSMENT  AM-PAC ‘6-Clicks’ Inpatie balance, and at supervision level or above. Patient reports no further questions or concerns about safe return to I/ADL tasks. No further OT services needed at this time.       Patient Complexity  Occupational Profile/Medical History  LOW - Brief history in

## 2020-09-16 NOTE — BH PROGRESS NOTE
1017 am-back from cath lab post right and left heart cath; patient alert/orientd x 4; follows commands. right wrist w/ TR   Band  Intact no bleeding cms rue normal .tele,vital signs and  Monitor  Neuro vascular status and bleeding of cath site-right wrist

## 2020-09-16 NOTE — PLAN OF CARE
Problem: CARDIOVASCULAR - ADULT  Goal: Maintains optimal cardiac output and hemodynamic stability  Description  INTERVENTIONS:  - Monitor vital signs, rhythm, and trends  - Monitor for bleeding, hypotension and signs of decreased cardiac output  - Evalua PRECAUTIONS CONTINUED. BUN 23, CREAT 0.81, K+ 3.5 TODAY, . WILL CONTINUE TO MONITOR. LABS AND MEDS AS ORDERED. BB, CORLANOR PO AS ORDERED. MONITR R WRIST SITE FOR ANY OOZING OR HEMATOMA. IV DIURETICS AS ORDERED. CONTINUE FALL PRECAUTIONS.  K+ REPLAC

## 2020-09-16 NOTE — PROGRESS NOTES
[facility]  Progress Note    Gerardo Weir Patient Status:  Inpatient    1950 MRN [id number]   [facility] 2NE-A Attending Mai Carmichael MD   Hosp Day # 2 PCP Viet Jones DO     Reason for consultation: d normoactive  Extremities: No clubbing/cyanosis; moves all 4 extremities normally    azithromycin (ZITHROMAX) tab 500 mg, 500 mg, Oral, Q24H  Albuterol Sulfate  (90 Base) MCG/ACT inhaler 2 puff, 2 puff, Inhalation, QID  predniSONE (DELTASONE) tab 40 20 mg, Oral, Nightly        Laboratory Data:     Lab Results   Component Value Date    INR 1.06 09/16/2020    INR 0.96 02/25/2020    INR 1.04 09/07/2017     Lab Results   Component Value Date     09/16/2020    K 3.5 09/16/2020     09/16/2020

## 2020-09-16 NOTE — PLAN OF CARE
Problem: CARDIOVASCULAR - ADULT  Goal: Maintains optimal cardiac output and hemodynamic stability  Description  INTERVENTIONS:  - Monitor vital signs, rhythm, and trends  - Monitor for bleeding, hypotension and signs of decreased cardiac output  - Evalua for changes in mentation and behavior  - Position to facilitate oxygenation and minimize respiratory effort  - Oxygen supplementation based on oxygen saturation or ABGs  - Provide Smoking Cessation handout, if applicable  - Encourage broncho-pulmonary hygi

## 2020-09-16 NOTE — PROCEDURES
[facility] Location: Cath Lab    CSN [id number] MRN [id number]   Admission Date 9/14/2020 Procedure Date 9/16/2020   Attending Physician Elin Allan MD Procedure Physician Eric Gates MD         CARDIAC CATHETERIZATION REPO manually. FINDINGS:      1.  Right heart catheterization  RA pressure: 10/6/5 mmHg  RV pressure: 37/7 mmHg  PA pressure: 40/13/29 mmHg  PA wedge pressure: 18/18/16 mmHg  Cardiac output:  6.2 L/min  Cardiac index: 2.9 L/min/m2  Pulmonary vascular resista LVEF 30-35% with moderate-severe global hypokinesis, 3+ mitral regurgitation.   3.  Coronary angiography:  right dominant system  - LM: 40% proximal stenosis  - LAD: 70% mid stenosis  - LCx: mild diffuse disease  - RCA: 80% ostial stenosis     RECOMMENDATIO

## 2020-09-16 NOTE — PROGRESS NOTES
[facility]  Progress Note     Geoffrey Martinez Patient Status:  Inpatient    1950 MRN [id number]   [facility] 4SW-A Attending Rossy Valenzuela MD    Hosp Day # 2 PCP Kirti Oreilly DO     Chief Complaint: SOB    S: Patient  Wa Creatinine Clearance: 65.9 mL/min (based on SCr of 0.83 mg/dL). No results for input(s): PTP, INR in the last 168 hours. Recent Labs   Lab 09/14/20  1319   TROP <0.045            Imaging: Imaging data reviewed in Epic.     Medications:   • azithromyci  can visit   Quality:  · DVT Prophylaxis: Lovenox hold this am   · CODE status: Full  · Pradhan: N/A  · Central line: N/A    Will the patient be referred to TCC on discharge?: No  Estimated date of discharge: tbd  Discharge is dependent on: clinical i

## 2020-09-17 LAB
ANION GAP SERPL CALC-SCNC: 4 MMOL/L (ref 0–18)
BASOPHILS # BLD AUTO: 0.04 X10(3) UL (ref 0–0.2)
BASOPHILS NFR BLD AUTO: 0.6 %
BUN BLD-MCNC: 26 MG/DL (ref 7–18)
BUN/CREAT SERPL: 27.7 (ref 10–20)
CALCIUM BLD-MCNC: 8.6 MG/DL (ref 8.5–10.1)
CHLORIDE SERPL-SCNC: 104 MMOL/L (ref 98–112)
CO2 SERPL-SCNC: 32 MMOL/L (ref 21–32)
CREAT BLD-MCNC: 0.94 MG/DL (ref 0.55–1.02)
DEPRECATED RDW RBC AUTO: 47.4 FL (ref 35.1–46.3)
EOSINOPHIL # BLD AUTO: 0.16 X10(3) UL (ref 0–0.7)
EOSINOPHIL NFR BLD AUTO: 2.4 %
ERYTHROCYTE [DISTWIDTH] IN BLOOD BY AUTOMATED COUNT: 14.2 % (ref 11–15)
GLUCOSE BLD-MCNC: 107 MG/DL (ref 70–99)
HCT VFR BLD AUTO: 35.4 % (ref 35–48)
HGB BLD-MCNC: 11.3 G/DL (ref 12–16)
IMM GRANULOCYTES # BLD AUTO: 0.04 X10(3) UL (ref 0–1)
IMM GRANULOCYTES NFR BLD: 0.6 %
LYMPHOCYTES # BLD AUTO: 1.83 X10(3) UL (ref 1–4)
LYMPHOCYTES NFR BLD AUTO: 27 %
MCH RBC QN AUTO: 29.3 PG (ref 26–34)
MCHC RBC AUTO-ENTMCNC: 31.9 G/DL (ref 31–37)
MCV RBC AUTO: 91.7 FL (ref 80–100)
MONOCYTES # BLD AUTO: 0.57 X10(3) UL (ref 0.1–1)
MONOCYTES NFR BLD AUTO: 8.4 %
NEUTROPHILS # BLD AUTO: 4.15 X10 (3) UL (ref 1.5–7.7)
NEUTROPHILS # BLD AUTO: 4.15 X10(3) UL (ref 1.5–7.7)
NEUTROPHILS NFR BLD AUTO: 61 %
OSMOLALITY SERPL CALC.SUM OF ELEC: 295 MOSM/KG (ref 275–295)
PLATELET # BLD AUTO: 152 10(3)UL (ref 150–450)
POTASSIUM SERPL-SCNC: 3.9 MMOL/L (ref 3.5–5.1)
RBC # BLD AUTO: 3.86 X10(6)UL (ref 3.8–5.3)
SODIUM SERPL-SCNC: 140 MMOL/L (ref 136–145)
WBC # BLD AUTO: 6.8 X10(3) UL (ref 4–11)

## 2020-09-17 PROCEDURE — 99232 SBSQ HOSP IP/OBS MODERATE 35: CPT | Performed by: HOSPITALIST

## 2020-09-17 RX ORDER — SODIUM CHLORIDE 9 MG/ML
INJECTION, SOLUTION INTRAVENOUS
Status: COMPLETED | OUTPATIENT
Start: 2020-09-18 | End: 2020-09-18

## 2020-09-17 RX ORDER — SODIUM CHLORIDE 9 MG/ML
INJECTION, SOLUTION INTRAVENOUS
Status: DISCONTINUED | OUTPATIENT
Start: 2020-09-18 | End: 2020-09-17

## 2020-09-17 RX ORDER — PREDNISONE 20 MG/1
20 TABLET ORAL
Status: DISCONTINUED | OUTPATIENT
Start: 2020-09-18 | End: 2020-09-17

## 2020-09-17 RX ORDER — LISINOPRIL 5 MG/1
5 TABLET ORAL DAILY
Status: DISCONTINUED | OUTPATIENT
Start: 2020-09-17 | End: 2020-09-22

## 2020-09-17 RX ORDER — BENZONATATE 100 MG/1
100 CAPSULE ORAL 3 TIMES DAILY PRN
Status: DISCONTINUED | OUTPATIENT
Start: 2020-09-17 | End: 2020-09-22

## 2020-09-17 NOTE — PROGRESS NOTES
[facility] Cardiology  Progress Note    Angyrenata Gordon Patient Status:  Inpatient    1950 MRN [id number]   [facility] 2NE-A Attending Niko Robles MD   Hosp Day # 3 PCP Alexandra Robles DO     Reason for consultation: d Soft, non-tender; bowel sounds are normoactive  Extremities: No clubbing/cyanosis; moves all 4 extremities normally    Metoprolol Tartrate (LOPRESSOR) tab 50 mg, 50 mg, Oral, 2x Daily(Beta Blocker)  ivabradine (CORLANOR) tablet 5 mg, 5 mg, Oral, BID  Albut WBC 6.8 09/17/2020    HGB 11.3 09/17/2020    HCT 35.4 09/17/2020    .0 09/17/2020     Lab Results   Component Value Date    INR 1.06 09/16/2020    INR 0.96 02/25/2020    INR 1.04 09/07/2017     Lab Results   Component Value Date     09/17/2

## 2020-09-17 NOTE — PROGRESS NOTES
[address] Patient Status:  Inpatient    1950 MRN [id number]   [facility] 2NE-A Attending Stevan Tse MD   [facility] Day # 3 PCP Susie Arteaga DO     SUBJECTIVE: Pt states that SOB is improving.       OBJECTI 50 MCG/ACT nasal spray 2 spray, 2 spray, Each Nare, Daily  •  gabapentin (NEURONTIN) cap 100 mg, 100 mg, Oral, BID  •  gabapentin (NEURONTIN) cap 200 mg, 200 mg, Oral, Nightly  •  Sertraline HCl (ZOLOFT) tab 150 mg, 150 mg, Oral, Daily  •  atorvastatin (LI bronchospasm and pulm edema. Improved  - wean supplemental O2 as able - was on RA then on 2 LPM overnight - will need o/p PSG  - abx  - steroid taper  2.  Cardiomyopathy with CHF  - lasix  - Echo with EF 45-50% with elevated PAP (est 43) with global hypokin

## 2020-09-17 NOTE — PLAN OF CARE
Received patient at 0730. Alert and Oriented x4. Tele Rhythm NSR. O2 saturation 88-93% on room air. O2 walk charted. Breath sounds diminished. Pt c/o \"tightening at the bases with a deep breath\", pt stated the albuterol inhaler seems to help.  Bed is lock temperature  - Assess for signs of decreased coronary artery perfusion - ex.  Angina  - Evaluate fluid balance, assess for edema, trend weights  Outcome: Progressing  Goal: Absence of cardiac arrhythmias or at baseline  Description  INTERVENTIONS:  - Contin mobility/gait  Description  Interventions:  - Assess patient's functional ability and stability  - Promote increasing activity/tolerance for mobility and gait  - Educate and engage patient/family in tolerated activity level and precautions  -ambulate with

## 2020-09-17 NOTE — PROGRESS NOTES
[facility]ITAL[facility]  Progress Note     Jose Luis Vizcainopaul Patient Status:  Inpatient    1950 MRN [id number]   [facility] 4SW-A Attending Laura Oneill MD    Hosp Day # 3 PCP Juan Majano DO     Chief Complaint: SOB    S: Patient   R --  140 140   K 3.2* 3.9   < > 4.3 3.5 3.9    109  --   --  103 104   CO2 28.0 31.0  --   --  31.0 32.0   ALKPHO 82  --   --   --   --   --    AST 34  --   --   --   --   --    ALT 47  --   --   --   --   --    BILT 0.6  --   --   --   --   --    T following   3. Taper steriods   6. HTN -    Controlled   1. clonidine, BB, dyazide  2. Labetalol PRN for sBP > 180  3. Lasix  Per cards   4. Ef  45%   7. HLD  1. statin  8. Depression  1. Sertraline  9. Hx of breast cancer - s/p BL mastectomy  1.  PTA tamox

## 2020-09-17 NOTE — PLAN OF CARE
Received patient, alert and oriented. Claimed she felt better. Had a short run of PAT, asymptomatic. O2 sat dropped to 87% on room air while sleeping. Discussed POC. Due meds given. Monitored I&O. Safety measures reinforced, call light within reach.  Bed al RESPIRATORY - ADULT  Goal: Achieves optimal ventilation and oxygenation  Description  INTERVENTIONS:  - Assess for changes in respiratory status  - Assess for changes in mentation and behavior  - Position to facilitate oxygenation and minimize respiratory Educate and encourage patient/family in tolerated functional activity level and precautions during self-care     Outcome: Progressing

## 2020-09-17 NOTE — PLAN OF CARE
Pt's O2 sats on room air at rest: 95%  Pt's O2 sats on room air while ambulatin%  Pt's O2 sats with oxygen (2L NC) while ambulatin%

## 2020-09-18 ENCOUNTER — APPOINTMENT (OUTPATIENT)
Dept: CV DIAGNOSTICS | Facility: HOSPITAL | Age: 70
DRG: 246 | End: 2020-09-18
Attending: PHYSICIAN ASSISTANT
Payer: MEDICARE

## 2020-09-18 ENCOUNTER — APPOINTMENT (OUTPATIENT)
Dept: GENERAL RADIOLOGY | Facility: HOSPITAL | Age: 70
DRG: 246 | End: 2020-09-18
Attending: INTERNAL MEDICINE
Payer: MEDICARE

## 2020-09-18 LAB
HAV IGM SER QL: 1.8 MG/DL (ref 1.6–2.6)
POTASSIUM SERPL-SCNC: 3.3 MMOL/L (ref 3.5–5.1)

## 2020-09-18 PROCEDURE — 71045 X-RAY EXAM CHEST 1 VIEW: CPT | Performed by: INTERNAL MEDICINE

## 2020-09-18 PROCEDURE — 93325 DOPPLER ECHO COLOR FLOW MAPG: CPT | Performed by: PHYSICIAN ASSISTANT

## 2020-09-18 PROCEDURE — 93320 DOPPLER ECHO COMPLETE: CPT | Performed by: PHYSICIAN ASSISTANT

## 2020-09-18 PROCEDURE — 99232 SBSQ HOSP IP/OBS MODERATE 35: CPT | Performed by: HOSPITALIST

## 2020-09-18 PROCEDURE — B245ZZ4 ULTRASONOGRAPHY OF LEFT HEART, TRANSESOPHAGEAL: ICD-10-PCS | Performed by: INTERNAL MEDICINE

## 2020-09-18 RX ORDER — MIDAZOLAM HYDROCHLORIDE 1 MG/ML
INJECTION INTRAMUSCULAR; INTRAVENOUS
Status: DISCONTINUED
Start: 2020-09-18 | End: 2020-09-18 | Stop reason: WASHOUT

## 2020-09-18 RX ORDER — MIDAZOLAM HYDROCHLORIDE 1 MG/ML
5 INJECTION INTRAMUSCULAR; INTRAVENOUS ONCE
Status: COMPLETED | OUTPATIENT
Start: 2020-09-18 | End: 2020-09-18

## 2020-09-18 RX ORDER — POTASSIUM CHLORIDE 20 MEQ/1
40 TABLET, EXTENDED RELEASE ORAL EVERY 4 HOURS
Status: COMPLETED | OUTPATIENT
Start: 2020-09-18 | End: 2020-09-18

## 2020-09-18 RX ORDER — MAGNESIUM OXIDE 400 MG (241.3 MG MAGNESIUM) TABLET
400 TABLET ONCE
Status: COMPLETED | OUTPATIENT
Start: 2020-09-18 | End: 2020-09-18

## 2020-09-18 RX ORDER — MIDAZOLAM HYDROCHLORIDE 1 MG/ML
INJECTION INTRAMUSCULAR; INTRAVENOUS
Status: COMPLETED
Start: 2020-09-18 | End: 2020-09-18

## 2020-09-18 NOTE — PLAN OF CARE
Pt is AOx4, denies chest pain or dyspnea. 2L nasal cannula. SR/ST on tele. Up with SBA. Right arm precaution. Plan for TOSIN tomorrow.

## 2020-09-18 NOTE — PROGRESS NOTES
S/P TOSIN . Hemodynamics remain stable. Patient A and O x 4. Dr Jarad Jeffrey  at bedside . Versed and Fentanyl given for sedation. Please see Bedside Procedure sheet. Plan to keep patient in floor until savana. Possible discharge tomorrow .  Plan to discuss the c

## 2020-09-18 NOTE — PROGRESS NOTES
[facility] Card[facility]  Progress Note    Angelita Huddleston Patient Status:  Inpatient    1950 MRN [id number]   [facility] 2NE-A Attending Marguerite Bennett MD   Hosp Day # 4 PCP Mansoor Dailey DO     Reason for consultation: d bilaterally; no accessory muscle use  Abdomen: Soft, non-tender; bowel sounds are normoactive  Extremities: No clubbing/cyanosis; moves all 4 extremities normally    metoprolol Tartrate (LOPRESSOR) tab 75 mg, 75 mg, Oral, 2x Daily(Beta Blocker)  lisinopril Oral, Nightly  Umeclidinium Bromide (INCRUSE ELLIPTA) 62.5 MCG/INH inhaler 1 puff, 1 puff, Inhalation, Daily  Labetalol HCl (TRANDATE) injection 10 mg, 10 mg, Intravenous, Q4H PRN  Tamoxifen Citrate (NOLVADEX) tab 20 mg, 20 mg, Oral, Nightly        Laborat

## 2020-09-18 NOTE — CM/SW NOTE
Patient recently started on corlanor 5 mg bid--call to patient's [facility]'s --cost with insurance--requires PA    Referred to   Pt ID #[id number]--[facility]  And PA initiated--reference # [id number]--TJEIUYG of rev

## 2020-09-18 NOTE — PROGRESS NOTES
[facility] PSYCHIATRIC [place] HOSPITALIST  Progress Note     Catia Reeves Patient Status:  Inpatient    1950 MRN [id number]   [facility] 4SW-A Attending Jacbo Chang MD    Hosp Day # 4 PCP Emeka Calvo DO     Chief Complaint: SOB    S: Patient   p 09/17/20  0559   * 112*  --   --  104* 107*   BUN 16 16  --   --  23* 26*   CREATSERUM 0.92 0.83  --   --  0.81 0.94   GFRAA 73 83  --   --  85 71   GFRNAA 63 72  --   --  74 62   CA 9.0 8.3*  --   --  9.0 8.6   ALB 3.6  --   --   --   --   --    NA US   3. CAD 2 vessel disease plan PCI/stenting on Monday with Dr. Lea Sorenson to load with Plavix over the weekend  4. Severe MR- LVEF 45-50%     1. Cards following  2. S/p  R/ LHC s/p 9/16  3. S/p TOSIN  9/18   4.  Plan PCI stenting on Monday and bring back for [facility] patient.     Radha Shaffer MD  [facility]  Internal Medicine Hospitalist  Pager [phone] cell [phone]

## 2020-09-18 NOTE — PROGRESS NOTES
Pulmonary Progress Note        NAME: Searcy Spurling - ROOM: [id number] - MRN: [id number] - Age: 78year old - : 1950        Last 24hrs: No events overnight, resting comfortably in bed, no issues w/ her breathing    OBJECTIVE:   20  0406 09 reviewed as noted below      Imaging: chest x-ray reviewed- no appreciable effusions toshia, toshia pulm congestion noted    ASSESSMENT/PLAN:  1. Acute respiratory failure - concern for PNA, bronchospasm and pulm edema. Improved  - Back on RA  - abx  2.  Cardiomy

## 2020-09-18 NOTE — PLAN OF CARE
Received patient at 0730. Alert and Oriented x4. Tele Rhythm NSR. O2 saturation 92-95% on room air. Pt's O2 sats drop to 87-89% with ambulation. Breath sounds clear/diminished. Nonproductive cough noted, cough meds PRN. Incentive spirometer at bedside.  Bed fluid balance, assess for edema, trend weights  Outcome: Progressing  Goal: Absence of cardiac arrhythmias or at baseline  Description  INTERVENTIONS:  - Continuous cardiac monitoring, monitor vital signs, obtain 12 lead EKG if indicated  - Evaluate effect activity/tolerance for mobility and gait  - Educate and engage patient/family in tolerated activity level and precautions  -ambulate with staff in halls 3 x day      Outcome: Progressing     Problem: Impaired Activities of Daily Living  Goal: Achieve highe

## 2020-09-19 LAB
ANION GAP SERPL CALC-SCNC: 4 MMOL/L (ref 0–18)
BUN BLD-MCNC: 37 MG/DL (ref 7–18)
BUN/CREAT SERPL: 37 (ref 10–20)
CALCIUM BLD-MCNC: 8.7 MG/DL (ref 8.5–10.1)
CHLORIDE SERPL-SCNC: 101 MMOL/L (ref 98–112)
CO2 SERPL-SCNC: 33 MMOL/L (ref 21–32)
CREAT BLD-MCNC: 1 MG/DL
GLUCOSE BLD-MCNC: 102 MG/DL (ref 70–99)
HAV IGM SER QL: 1.9 MG/DL (ref 1.6–2.6)
OSMOLALITY SERPL CALC.SUM OF ELEC: 295 MOSM/KG (ref 275–295)
POTASSIUM SERPL-SCNC: 3.5 MMOL/L (ref 3.5–5.1)
POTASSIUM SERPL-SCNC: 4.2 MMOL/L (ref 3.5–5.1)
SODIUM SERPL-SCNC: 138 MMOL/L (ref 136–145)

## 2020-09-19 PROCEDURE — 99232 SBSQ HOSP IP/OBS MODERATE 35: CPT | Performed by: HOSPITALIST

## 2020-09-19 RX ORDER — ECHINACEA PURPUREA EXTRACT 125 MG
1 TABLET ORAL
Status: DISCONTINUED | OUTPATIENT
Start: 2020-09-19 | End: 2020-09-22

## 2020-09-19 RX ORDER — POTASSIUM CHLORIDE 20 MEQ/1
40 TABLET, EXTENDED RELEASE ORAL EVERY 4 HOURS
Status: COMPLETED | OUTPATIENT
Start: 2020-09-19 | End: 2020-09-19

## 2020-09-19 RX ORDER — CALCIUM CARBONATE 200(500)MG
1000 TABLET,CHEWABLE ORAL EVERY 6 HOURS PRN
Status: DISCONTINUED | OUTPATIENT
Start: 2020-09-19 | End: 2020-09-22

## 2020-09-19 NOTE — PROGRESS NOTES
[facility] Pulmonary, Critical Care and Sleep    Kel Enriquez Patient Status:  Inpatient    1950 MRN [id number]   [facility] 2NE-A Attending Padmini Decker MD   Hosp Day # 5 PCP Sarika Garcia DO       Date of Admission: 2020  1: RA  General: NAD. Neuro: Alert, no focal deficits. HEENT: PERRL  Neck : No LAD  CV: RRR, nl S1, S2, no S4, S3 or murmur. Lungs: Clear bilaterally. Abd: Nontender, non distended. Ext: No edema. Skin: No rashes.      Recent Labs   Lab 09/14/20  1 1. Acute respiratory failure - concern for PNA, bronchospasm and pulm edema. Improved  - Back on RA  Diuresis as below.    2. Cardiomyopathy with CHF  - Echo with EF 45-50% with elevated PAP (est 43) with global hypokinesis  - RHC with PA 40/13 ; PCWP 1

## 2020-09-19 NOTE — PROGRESS NOTES
[facility]  Progress Note     Roosevelt Kyle Patient Status:  Inpatient    1950 MRN [id number]   [facility] 4SW-A Attending Sherley Benson MD    Hosp Day # 5 PCP Trina Acosta DO     Chief Complaint: SOB    S: Patient  fe --   --    BILT 0.6  --   --   --   --   --    TP 7.6  --   --   --   --   --     < > = values in this interval not displayed. Estimated Creatinine Clearance: 54.7 mL/min (based on SCr of 1 mg/dL).     Recent Labs   Lab 09/16/20  0727   PTP 14.1   I 6. Ef  45%   7. HLD  1. statin  8. Depression  1. Sertraline  9. Hx of breast cancer - s/p BL mastectomy  1. PTA tamoxifen.       Quality:  · DVT Prophylaxis: Lovenox    · CODE status: Full  · Pradhan: N/A  · Central line: N/A    Will the patient be referre

## 2020-09-19 NOTE — PLAN OF CARE
Patient alert and oriented x4. Vital signs stable, NSR on telemetry with PVCs. Saturating at 93% on room air. Patient denies any pain. Tessalon administered for non-productive cough.  R wrist site clean dry and intact, open to air, bruising noted with no pa therapy as ordered  Outcome: Progressing     Problem: RESPIRATORY - ADULT  Goal: Achieves optimal ventilation and oxygenation  Description: INTERVENTIONS:  - Assess for changes in respiratory status  - Assess for changes in mentation and behavior  - Positi

## 2020-09-19 NOTE — PLAN OF CARE
Received patient, alert and oriented. Denied chest pain, denied SOB. Up with SBA. Voiding. Discussed POC. Due meds given. Monitored I&O. Safety measures reinforced, call light within reach. Bed alarm on. Needs attended to. Will continue to monitor.       Pr oxygenation  Description  INTERVENTIONS:  - Assess for changes in respiratory status  - Assess for changes in mentation and behavior  - Position to facilitate oxygenation and minimize respiratory effort  - Oxygen supplementation based on oxygen saturation activity level and precautions during self-care     Outcome: Progressing

## 2020-09-19 NOTE — PROCEDURES
[facility] [address] [place] [address] Cardiology  Transesophageal Echocardiogram Report    PREOPERATIVE DIAGNOSIS: Mitral regurgitation    POSTOPERATIVE DIAGNOSIS: Mitral regurgitation    PROCEDURE PERFORMED: Transesophageal echocardiogram  (CPT code 38206) with Doppler ec imaging was obtained. It was then slowly withdrawn orally. No complications were noted at the end of the procedure.     3D rendering with interpretation not requiring image postprocessing on an independent workstation with volume rendering, along with max discharge.     Mario Arriaga MD, [facility]  9/18/2020  7:13 PM

## 2020-09-19 NOTE — PROGRESS NOTES
[facility]  Progress Note    Kel Enriquez Patient Status:  Inpatient    1950 MRN [id number]   [facility] 2NE-A Attending Padmini Decker MD   Hosp Day # 5 PCP Sarika Garcia DO     Reason for consultation: d accessory muscle use  Abdomen: Soft, non-tender; bowel sounds are normoactive  Extremities: No clubbing/cyanosis; moves all 4 extremities normally      •  Potassium Chloride ER (K-DUR M20) CR tab 40 mEq, 40 mEq, Oral, Q4H    •  Saline Nasal Spray (SALINE M Oral, Daily    •  atorvastatin (LIPITOR) tab 10 mg, 10 mg, Oral, Nightly    •  Umeclidinium Bromide (INCRUSE ELLIPTA) 62.5 MCG/INH inhaler 1 puff, 1 puff, Inhalation, Daily    •  Labetalol HCl (TRANDATE) injection 10 mg, 10 mg, Intravenous, Q4H PRN    •  T

## 2020-09-19 NOTE — CM/SW NOTE
CM attempted to call for determination of PA. Insurance office closed throughout weekend. Await determination on Monday.

## 2020-09-20 LAB
ANION GAP SERPL CALC-SCNC: 6 MMOL/L (ref 0–18)
BUN BLD-MCNC: 34 MG/DL (ref 7–18)
BUN/CREAT SERPL: 36.2 (ref 10–20)
CALCIUM BLD-MCNC: 9.3 MG/DL (ref 8.5–10.1)
CHLORIDE SERPL-SCNC: 100 MMOL/L (ref 98–112)
CO2 SERPL-SCNC: 32 MMOL/L (ref 21–32)
CREAT BLD-MCNC: 0.94 MG/DL
GLUCOSE BLD-MCNC: 99 MG/DL (ref 70–99)
OSMOLALITY SERPL CALC.SUM OF ELEC: 294 MOSM/KG (ref 275–295)
POTASSIUM SERPL-SCNC: 3.8 MMOL/L (ref 3.5–5.1)
SODIUM SERPL-SCNC: 138 MMOL/L (ref 136–145)

## 2020-09-20 PROCEDURE — 99232 SBSQ HOSP IP/OBS MODERATE 35: CPT | Performed by: HOSPITALIST

## 2020-09-20 RX ORDER — POTASSIUM CHLORIDE 20 MEQ/1
40 TABLET, EXTENDED RELEASE ORAL ONCE
Status: COMPLETED | OUTPATIENT
Start: 2020-09-20 | End: 2020-09-20

## 2020-09-20 NOTE — PROGRESS NOTES
[facility] PSYCHIATRIC [place] HOSPITALIST  Progress Note     Carmita Zarco Patient Status:  Inpatient    1950 MRN [id number]   [facility] 4SW-A Attending Indiana Arana MD    Hosp Day # 6 PCP      Chief Complaint: SOB    S: Patient  fe --    AST 34  --   --   --   --   --   --    ALT 47  --   --   --   --   --   --    BILT 0.6  --   --   --   --   --   --    TP 7.6  --   --   --   --   --   --     < > = values in this interval not displayed.        Estimated Creatinine Clearance: 58.2 m 4. Labetalol PRN for sBP > 180  5. Lasix  Per cards   6. Ef  45%   7. HLD  1. statin  8. Depression  1. Sertraline  9. Hx of breast cancer - s/p BL mastectomy  1. PTA tamoxifen.       Quality:  · DVT Prophylaxis: Lovenox    · CODE status: Full  · Pradhan: N

## 2020-09-20 NOTE — PLAN OF CARE
Problem: Impaired Functional Mobility  Goal: Achieve highest/safest level of mobility/gait  Description: Interventions:  - Assess patient's functional ability and stability  - Promote increasing activity/tolerance for mobility and gait  - Educate and eng

## 2020-09-20 NOTE — PHYSICAL THERAPY NOTE
PHYSICAL THERAPY TREATMENT NOTE - INPATIENT    Room Number: 9536/2689-L     Session: 2   Number of Visits to Meet Established Goals: 3    Presenting Problem: ARF  History related to current admission: Pt is a 78 y.o. female admitted 9/14/20 with dyspnea a Krzysztof Frost MD at [facility] MAIN OR   • BREAST SENTINEL LYMPH NODE BIOPSY Right 2014    Performed by Krzysztof Frost MD at [facility] MAIN OR   •      • KNEE ARTHROSCP HARV Left    • KNEE TOTAL REPLACEMENT Right 2017    Performed by Brionna Mondragon, None   How much help from another person does the patient currently need. ..   -   Moving to and from a bed to a chair (including a wheelchair)?: None   -   Need to walk in hospital room?: None   -   Climbing 3-5 steps with a railing?: A Little       AM-PAC functional mobility and endurance. Pt denied any sob or fatigue. Pt is mod ind in transfers, gait and bed mobility. Pt plans to be in the hospital for next two days for stent procedure.  Pt was educated on activity recommendation of amb 4 times a day and HE

## 2020-09-20 NOTE — PROGRESS NOTES
[facility] Card[facility]  Progress Note    Senait Osei Patient Status:  Inpatient    1950 MRN [id number]   [facility] 2NE-A Attending Bekah Hunter MD   Hosp Day # 6 PCP Sivakumar Bee, DO       Cardiology attending:  Malgorzata Cm °C)      Intake/Output Summary (Last 24 hours) at 9/20/2020 0742  Last data filed at 9/20/2020 0458  Gross per 24 hour   Intake 840 ml   Output 3100 ml   Net -2260 ml     Wt Readings from Last 3 Encounters:  09/20/20 : 207 lb 8 oz (94.1 kg)  02/25/20 : 218 mg, Subcutaneous, Daily    •  docusate sodium (COLACE) cap 100 mg, 100 mg, Oral, BID    •  PEG 3350 (MIRALAX) powder packet 17 g, 17 g, Oral, Daily PRN    •  magnesium hydroxide (MILK OF MAGNESIA) 400 MG/5ML suspension 30 mL, 30 mL, Oral, Daily PRN    •  b 9/16/20   IMPRESSION:     1.  Right heart catheterization:   - high normal right heart filling pressure   - mild pulmonary hypertension   - mildly elevated pulmonary capillary wedge pressure   - normal cardiac output/index   - no intracardiac shunt by satu

## 2020-09-20 NOTE — PLAN OF CARE
Sinus rhythm less pvcs deny pain   Dee is less per patient iv lasix as ordered  Potassium replaced per protocol  Accurate I/O  Sba w/ walking   Patient does more of adls  Plan PCI tomorrow  Problem: CARDIOVASCULAR - ADULT  Goal: Absence of cardiac arrhythm signs/symptoms of CO2 retention  Outcome: Progressing     Problem: METABOLIC/FLUID AND ELECTROLYTES - ADULT  Goal: Electrolytes maintained within normal limits  Description: INTERVENTIONS:  - Monitor labs and rhythm and assess patient for signs and symptom

## 2020-09-20 NOTE — PLAN OF CARE
Assumed care of pt at 1930 a/o x4 in no apparent distress. On RA sats are low to mid 90's in no distress but does have a non-prod cough tonight that can be productive at times. I/S at 1500 and robitussin given.   Sterling Read in place and functioning well wit Provide Smoking Cessation handout, if applicable  - Encourage broncho-pulmonary hygiene including cough, deep breathe, Incentive Spirometry  - Assess the need for suctioning and perform as needed  - Assess and instruct to report SOB or any respiratory diff

## 2020-09-21 ENCOUNTER — APPOINTMENT (OUTPATIENT)
Dept: INTERVENTIONAL RADIOLOGY/VASCULAR | Facility: HOSPITAL | Age: 70
DRG: 246 | End: 2020-09-21
Attending: PHYSICIAN ASSISTANT
Payer: MEDICARE

## 2020-09-21 LAB — POTASSIUM SERPL-SCNC: 3.4 MMOL/L (ref 3.5–5.1)

## 2020-09-21 PROCEDURE — 4A033BC MEASUREMENT OF ARTERIAL PRESSURE, CORONARY, PERCUTANEOUS APPROACH: ICD-10-PCS | Performed by: INTERNAL MEDICINE

## 2020-09-21 PROCEDURE — B41F1ZZ FLUOROSCOPY OF RIGHT LOWER EXTREMITY ARTERIES USING LOW OSMOLAR CONTRAST: ICD-10-PCS | Performed by: INTERNAL MEDICINE

## 2020-09-21 PROCEDURE — 99232 SBSQ HOSP IP/OBS MODERATE 35: CPT | Performed by: HOSPITALIST

## 2020-09-21 PROCEDURE — B2111ZZ FLUOROSCOPY OF MULTIPLE CORONARY ARTERIES USING LOW OSMOLAR CONTRAST: ICD-10-PCS | Performed by: INTERNAL MEDICINE

## 2020-09-21 PROCEDURE — 027034Z DILATION OF CORONARY ARTERY, ONE ARTERY WITH DRUG-ELUTING INTRALUMINAL DEVICE, PERCUTANEOUS APPROACH: ICD-10-PCS | Performed by: INTERNAL MEDICINE

## 2020-09-21 RX ORDER — POTASSIUM CHLORIDE 20 MEQ/1
40 TABLET, EXTENDED RELEASE ORAL EVERY 4 HOURS
Status: DISCONTINUED | OUTPATIENT
Start: 2020-09-21 | End: 2020-09-21

## 2020-09-21 RX ORDER — FUROSEMIDE 10 MG/ML
40 INJECTION INTRAMUSCULAR; INTRAVENOUS DAILY
Status: DISCONTINUED | OUTPATIENT
Start: 2020-09-21 | End: 2020-09-22

## 2020-09-21 RX ORDER — CLOPIDOGREL BISULFATE 75 MG/1
TABLET ORAL
Status: COMPLETED
Start: 2020-09-21 | End: 2020-09-21

## 2020-09-21 RX ORDER — SODIUM CHLORIDE 9 MG/ML
INJECTION, SOLUTION INTRAVENOUS CONTINUOUS
Status: ACTIVE | OUTPATIENT
Start: 2020-09-21 | End: 2020-09-21

## 2020-09-21 RX ORDER — LIDOCAINE HYDROCHLORIDE 10 MG/ML
INJECTION, SOLUTION EPIDURAL; INFILTRATION; INTRACAUDAL; PERINEURAL
Status: COMPLETED
Start: 2020-09-21 | End: 2020-09-21

## 2020-09-21 RX ORDER — POTASSIUM CHLORIDE 20 MEQ/1
40 TABLET, EXTENDED RELEASE ORAL EVERY 4 HOURS
Status: COMPLETED | OUTPATIENT
Start: 2020-09-21 | End: 2020-09-21

## 2020-09-21 RX ORDER — MIDAZOLAM HYDROCHLORIDE 1 MG/ML
INJECTION INTRAMUSCULAR; INTRAVENOUS
Status: COMPLETED
Start: 2020-09-21 | End: 2020-09-21

## 2020-09-21 RX ORDER — CLOPIDOGREL BISULFATE 75 MG/1
75 TABLET ORAL DAILY
Status: DISCONTINUED | OUTPATIENT
Start: 2020-09-22 | End: 2020-09-22

## 2020-09-21 RX ORDER — ASPIRIN 81 MG/1
81 TABLET ORAL DAILY
Status: DISCONTINUED | OUTPATIENT
Start: 2020-09-22 | End: 2020-09-22

## 2020-09-21 RX ORDER — HEPARIN SODIUM 5000 [USP'U]/ML
INJECTION, SOLUTION INTRAVENOUS; SUBCUTANEOUS
Status: COMPLETED
Start: 2020-09-21 | End: 2020-09-21

## 2020-09-21 NOTE — PROGRESS NOTES
Seen and examined; full note to follow by Salvador Maki with my cosign. Cath today for patient. dispo TBD.

## 2020-09-21 NOTE — PROGRESS NOTES
[facility] Cardiology  Progress Note    Jesus Garcia Patient Status:  Inpatient    1950 MRN [id number]   [facility] 2NE-A Attending Chintan Dee MD   [facility] Day # 7 PCP Jama Gonzalez DO     Reason for consultation: d Clear to auscultation bilaterally; no accessory muscle use  Abdomen: Soft, non-tender; bowel sounds are normoactive  Extremities: No clubbing/cyanosis; moves all 4 extremities normally      •  Potassium Chloride ER (K-DUR M20) CR tab 40 mEq, 40 mEq, Oral, Oral, BID    •  gabapentin (NEURONTIN) cap 200 mg, 200 mg, Oral, Nightly    •  Sertraline HCl (ZOLOFT) tab 150 mg, 150 mg, Oral, Daily    •  atorvastatin (LIPITOR) tab 10 mg, 10 mg, Oral, Nightly    •  Umeclidinium Bromide (INCRUSE ELLIPTA) 62.5 MCG/INH in

## 2020-09-21 NOTE — DIETARY NOTE
Pt chart reviewed d/t consult received per cardiac rehab standing order. Pt to be educated by cardiac rehab staff and encouraged to attend outpatient classes taught by ANDREW. RD available PRN.

## 2020-09-21 NOTE — DIETARY NOTE
[facility]    NUTRITION ASSESSMENT    Pt does not meet malnutrition criteria.     NUTRITION DIAGNOSIS/PROBLEM:    Possible inadequate energy intake related to physiologic causes as evidenced by nutrition consult for at risk and MST score of 4 for wt lo lb)  01/07/19 : 92.6 kg (204 lb 3.2 oz)  12/03/18 : 98 kg (216 lb)  11/26/18 : 98 kg (216 lb)    NUTRITION:  Diet: Cardiac, 3-4 gm Sodium  Oral Supplements: N/A- pt does not want    FOOD/NUTRITION RELATED HISTORY:  Appetite:KODY  Intake: no PO intakes recor

## 2020-09-21 NOTE — PROGRESS NOTES
[facility] PSYCHIATRIC [place] HOSPITALIST  Progress Note     Ivan Hagen Patient Status:  Inpatient    1950 MRN [id number]   [facility] 4SW-A Attending Neo Granger MD    Hosp Day # 7 PCP Tammy Lama DO     Chief Complaint: SOB    S: Patient   D 3.8 3.4*      < > 104  --  101  --  100  --    CO2 28.0   < > 32.0  --  33.0*  --  32.0  --    ALKPHO 82  --   --   --   --   --   --   --    AST 34  --   --   --   --   --   --   --    ALT 47  --   --   --   --   --   --   --    BILT 0.6  --   -- 6. HTN -    Controlled   1.  BB,  ACE    2. Started on corlanor was given samples / insurance refused will need appeal   3. Labetalol PRN for sBP > 180  4. Lasix  Per cards   Held this am   7. HLD  1. statin  8. Depression  1. Sertraline  9.  Hx of breast

## 2020-09-21 NOTE — CM/SW NOTE
Care Progression Note:  Active Acute Medical Issue:   Respiratory distress     Other Contributing Medical Factors/Dx. :     Length of stay: 7  Avoidable Delays:   Discharge barriers: On room air, creat stable, cut back on iv lasix.   Started on corlanor, in

## 2020-09-21 NOTE — CM/SW NOTE
Call placed to [facility] clinical review [phone] to follow up with status of PA reference # [id number]--[id number] for corfaridehor denied--physician can appeal (up to 60 days from denial date of 9 18 20) with response known in 7 to 10 days--updated santo barrera

## 2020-09-21 NOTE — PLAN OF CARE
Received patient at 0730. Patient A/O x 4. Tele Rhythm NSR. O2 Saturation 95%. On RA. Breath sounds diminished. No C/O chest pain or SOB. Non-pitting edema on BUE. Patient voiding with no issue. Scheduled for PCI this afternoon.  Lisinopril and Lasix arrhythmias  - Monitor electrolytes and administer replacement therapy as ordered  Outcome: Progressing     Problem: RESPIRATORY - ADULT  Goal: Achieves optimal ventilation and oxygenation  Description: INTERVENTIONS:  - Assess for changes in respiratory s

## 2020-09-21 NOTE — PROCEDURES
[address] [facility] Location: Cath Lab    [facility] [id number] MRN [id number]   Admission Date 9/14/2020 Procedure Date 9/21/2020   Attending Physician Eugenio Catherine MD Procedure Physician Ramirez Castillo MD         PERCUTANEOUS CORONARY INTERV anterior descending artery is a medium caliber vessel that wraps around the apex and gives rise to three small diagonal branches. There is a 70% discrete stenosis of the mid LAD.     LCx: The left circumflex artery is a medium caliber vessel that gives ris of severe mitral regurgitation and neeraj-clip repair.

## 2020-09-21 NOTE — PLAN OF CARE
Assumed care at [address]. A&Ox4. 95% on RA. Mild dry cough relieved with tessalon PRN. IS 1500, goal 2000. Continent of B&B. Denies pain at this time. Up with SBA. POC: PCI on Monday, F up for mitral clip. Lasix 40 mg IV BID.     Problem: Patient/Family Go oxygenation  Description: INTERVENTIONS:  - Assess for changes in respiratory status  - Assess for changes in mentation and behavior  - Position to facilitate oxygenation and minimize respiratory effort  - Oxygen supplementation based on oxygen saturation

## 2020-09-22 VITALS
WEIGHT: 205.69 LBS | RESPIRATION RATE: 18 BRPM | HEIGHT: 69 IN | SYSTOLIC BLOOD PRESSURE: 122 MMHG | BODY MASS INDEX: 30.47 KG/M2 | OXYGEN SATURATION: 93 % | HEART RATE: 74 BPM | DIASTOLIC BLOOD PRESSURE: 54 MMHG | TEMPERATURE: 98 F

## 2020-09-22 LAB
ANION GAP SERPL CALC-SCNC: 10 MMOL/L (ref 0–18)
BUN BLD-MCNC: 24 MG/DL (ref 7–18)
BUN/CREAT SERPL: 22.4 (ref 10–20)
CALCIUM BLD-MCNC: 9.3 MG/DL (ref 8.5–10.1)
CHLORIDE SERPL-SCNC: 103 MMOL/L (ref 98–112)
CO2 SERPL-SCNC: 23 MMOL/L (ref 21–32)
CREAT BLD-MCNC: 1.07 MG/DL
DEPRECATED RDW RBC AUTO: 46.6 FL (ref 35.1–46.3)
ERYTHROCYTE [DISTWIDTH] IN BLOOD BY AUTOMATED COUNT: 14.2 % (ref 11–15)
GLUCOSE BLD-MCNC: 219 MG/DL (ref 70–99)
HAV IGM SER QL: 2 MG/DL (ref 1.6–2.6)
HCT VFR BLD AUTO: 37.7 %
HGB BLD-MCNC: 12.3 G/DL
MCH RBC QN AUTO: 29.6 PG (ref 26–34)
MCHC RBC AUTO-ENTMCNC: 32.6 G/DL (ref 31–37)
MCV RBC AUTO: 90.8 FL
OSMOLALITY SERPL CALC.SUM OF ELEC: 293 MOSM/KG (ref 275–295)
PLATELET # BLD AUTO: 173 10(3)UL (ref 150–450)
POTASSIUM SERPL-SCNC: 3.9 MMOL/L (ref 3.5–5.1)
RBC # BLD AUTO: 4.15 X10(6)UL
SODIUM SERPL-SCNC: 136 MMOL/L (ref 136–145)
WBC # BLD AUTO: 4.5 X10(3) UL (ref 4–11)

## 2020-09-22 PROCEDURE — 99239 HOSP IP/OBS DSCHRG MGMT >30: CPT | Performed by: HOSPITALIST

## 2020-09-22 RX ORDER — ASPIRIN 81 MG/1
81 TABLET ORAL DAILY
Refills: 0 | Status: SHIPPED | COMMUNITY
Start: 2020-09-23

## 2020-09-22 RX ORDER — CLOPIDOGREL BISULFATE 75 MG/1
75 TABLET ORAL DAILY
Qty: 90 TABLET | Refills: 3 | Status: SHIPPED | OUTPATIENT
Start: 2020-09-23 | End: 2022-01-13

## 2020-09-22 RX ORDER — FUROSEMIDE 40 MG/1
40 TABLET ORAL DAILY
Qty: 30 TABLET | Refills: 11 | Status: SHIPPED | OUTPATIENT
Start: 2020-09-22 | End: 2020-11-23

## 2020-09-22 RX ORDER — METOPROLOL TARTRATE 100 MG/1
100 TABLET ORAL
Qty: 60 TABLET | Refills: 1 | Status: SHIPPED | OUTPATIENT
Start: 2020-09-22 | End: 2020-11-05 | Stop reason: DRUGHIGH

## 2020-09-22 RX ORDER — TAMOXIFEN CITRATE 20 MG/1
20 TABLET ORAL NIGHTLY
Qty: 90 TABLET | Refills: 3 | Status: SHIPPED | COMMUNITY
Start: 2020-09-22 | End: 2021-02-22

## 2020-09-22 RX ORDER — LISINOPRIL 5 MG/1
5 TABLET ORAL DAILY
Qty: 90 TABLET | Refills: 3 | Status: SHIPPED | OUTPATIENT
Start: 2020-09-23 | End: 2020-12-02

## 2020-09-22 NOTE — CARDIAC REHAB
CAD, and HF education completed with pt. Written instructions left at bedside. Patient anticipating treatment for MR, and so discussed option of cardiac rehab program following the next procedure or surgery. Stent card given.

## 2020-09-22 NOTE — DISCHARGE PLANNING
Discharge     Patient cleared for discharge by all services. Discharge education and handout provided to patient. Follow-up appointments reviewed. Medications reviewed with handouts. All questions answered. Patient verbalized understanding.    Telem

## 2020-09-22 NOTE — PLAN OF CARE
AOx4.  Calm, cooperative. Independent. Up with standby assist.   NSR on cardiac monitor, frequent PVCs. Saturating mid 90's on RA. Occasional sob. Lung sounds clear, diminished at bases.   Dry cough, occasional.  Denies chest discomfort, n/v.  Denies arrhythmias  - Monitor electrolytes and administer replacement therapy as ordered  Outcome: Progressing     Problem: RESPIRATORY - ADULT  Goal: Achieves optimal ventilation and oxygenation  Description: INTERVENTIONS:  - Assess for changes in respiratory s

## 2020-09-22 NOTE — PROGRESS NOTES
[address] Patient Status:  Inpatient    1950 MRN [id number]   [facility] 2NE-A Attending Ronda Sorenson MD   [facility] Day # 8 PCP Alex Fletcher DO     Pulm / Critical Care Progress Note     S: underwent cath Exam:   General: alert, cooperative,  No respiratory distress. Head: Normocephalic, without obvious abnormality, atraumatic. Lungs: ctab   Chest wall: No tenderness or deformity. Heart: Regular rate and rhythm, normal S1S2, no murmur.    Abdomen: soft lovenox  7. Dispo  - will follow peripherally for now.  Should f/u with me in 3 weeks       Tiffani Mobley M.D.  [facility] pulmonary / critical care  9/22/2020  10:25 AM

## 2020-09-22 NOTE — PROGRESS NOTES
[address] [facility] Cardiology  Progress Note    Gaurav Montoya Patient Status:  Inpatient    1950 MRN [id number]   [facility] 2NE-A Attending Ban Felder MD   [address] Day # 8 PCP Keenan Dave DO     Impression:  Gloriadonnie Mcdonough on (93.3 kg)   SpO2 93%   BMI 30.38 kg/m²   Temp (24hrs), Av.6 °F (37 °C), Min:98.1 °F (36.7 °C), Max:99.6 °F (37.6 °C)      Intake/Output Summary (Last 24 hours) at 2020 1223  Last data filed at 2020 1210  Gross per 24 hour   Intake 480 ml   Nakul Key (LOVENOX) 40 MG/0.4ML injection 40 mg, 40 mg, Subcutaneous, Daily    •  docusate sodium (COLACE) cap 100 mg, 100 mg, Oral, BID    •  PEG 3350 (MIRALAX) powder packet 17 g, 17 g, Oral, Daily PRN    •  magnesium hydroxide (MILK OF MAGNESIA) 400 MG/5ML suspen

## 2020-09-22 NOTE — PROGRESS NOTES
[facility]IATR[facility] [place] HOSPITALIST  Progress Note     Selinda Console Patient Status:  Inpatient    1950 MRN [id number]   [facility] 4SW-A Attending Nella Cameron MD    Hosp Day # 8 PCP Camilo Reyes DO     Chief Complaint: SOB    S: Patient 09/16/20  0727   PTP 14.1   INR 1.06       No results for input(s): TROP, CK in the last 168 hours. Imaging: Imaging data reviewed in Epic.     Medications:   • furosemide  40 mg Intravenous Daily   • Clopidogrel Bisulfate  75 mg Oral Daily   • aspi Full  · Pradhan: N/A  · Central line: N/A    Will the patient be referred to TCC on discharge?: No  Estimated date of discharge:  tues   Discharge is dependent on: clinical improvement  At this point Ms. Hyacinth Lopes is expected to be discharge to: home   Gwenyth Shock

## 2020-09-22 NOTE — PLAN OF CARE
Assumed care at [address]. S/p PCI. A&Ox4. 95% on RA. Mild dry cough relieved with tessalon PRN. IS 1500, goal 2000. Continent of B&B. Denies pain at this time. Up with SBA. Pt groin is tender and slightly more bruised at end of shift than at start, CTM. Problem: RESPIRATORY - ADULT  Goal: Achieves optimal ventilation and oxygenation  Description: INTERVENTIONS:  - Assess for changes in respiratory status  - Assess for changes in mentation and behavior  - Position to facilitate oxygenation and minimize r

## 2020-09-23 ENCOUNTER — PATIENT OUTREACH (OUTPATIENT)
Dept: CASE MANAGEMENT | Age: 70
End: 2020-09-23

## 2020-09-23 DIAGNOSIS — Z02.9 ENCOUNTERS FOR UNSPECIFIED ADMINISTRATIVE PURPOSE: ICD-10-CM

## 2020-09-23 DIAGNOSIS — J45.909 ASTHMA, UNSPECIFIED ASTHMA SEVERITY, UNSPECIFIED WHETHER COMPLICATED, UNSPECIFIED WHETHER PERSISTENT: ICD-10-CM

## 2020-09-23 DIAGNOSIS — J06.9 VIRAL UPPER RESPIRATORY TRACT INFECTION: ICD-10-CM

## 2020-09-23 DIAGNOSIS — I50.9 ACUTE ON CHRONIC CONGESTIVE HEART FAILURE, UNSPECIFIED HEART FAILURE TYPE (HCC): Primary | ICD-10-CM

## 2020-09-23 DIAGNOSIS — I10 ESSENTIAL HYPERTENSION: ICD-10-CM

## 2020-09-23 PROCEDURE — 1111F DSCHRG MED/CURRENT MED MERGE: CPT

## 2020-09-23 NOTE — DISCHARGE SUMMARY
[facility] HOSPITALIST  DISCHARGE SUMMARY     Carmita Zarco Patient Status:  Inpatient    1950 MRN [id number]   [facility] 2NE-A Attending No att. providers found   Hosp Day # 8 PCP      Date of Admission: 2020 antibiotics. Was also found to have murmur is severe MR and likely flash pulmonary edema. Patient was diuresed left heart catheterization and right heart catheterization which revealed two-vessel coronary disease and severe MR.   Underwent successful plac 112/34 mmHg  Aortic pressure: 112/62/83 mmHg   Left ventriculogram demonstrated a LV ejection fraction of 30-35% with  3+mitral regurgitation; moderate-severe global hypokinesis.   There was no aortic stenosis upon pullback of the catheter.     3.  Selectiv ultrasound of the thyroid and possible follow-up with ENT depending on findings  • Started on multiple new medications including aspirin Plavix Lasix lisinopril beta-blocker: raquel received samples from cardiology office, statin  •     Lab/Test results MCG/ACT Susp  Commonly known as: FLONASE      2 sprays by Each Nare route daily. Quantity: 1 Bottle  Refills: 1     gabapentin 100 MG Caps  Commonly known as: NEURONTIN      Take 100 mg by mouth 2 (two) times daily.  In the morning and afternoon   Refills [address]    In 1 week      [facility], [place]  [address] 72 [phone]    On 10/8/2020  cardiology follow up 10/8/20 at 8 a    1001 [facility]  [address] subjective.     Objective:  Constitutional: no signs of distress; vitals stable  Cardiovascular: regular rate and rhythm, no murmurs, rubs, or gallops  Respiratory: clear to ausculation bilaterally; no labored breathing pattern  Abdomen: nontender; nondist

## 2020-09-24 LAB
ATRIAL RATE: 69 BPM
P AXIS: 44 DEGREES
P-R INTERVAL: 140 MS
Q-T INTERVAL: 456 MS
QRS DURATION: 98 MS
QTC CALCULATION (BEZET): 488 MS
R AXIS: 12 DEGREES
T AXIS: 74 DEGREES
VENTRICULAR RATE: 69 BPM

## 2020-09-24 NOTE — PROGRESS NOTES
Attempted to reach the patient to complete [facility] FU call. Left a message for the pt to call NCM back at, [phone]. The number for the TCC was also given to the patient to schedule at, [phone].

## 2020-09-25 ENCOUNTER — PATIENT OUTREACH (OUTPATIENT)
Dept: CASE MANAGEMENT | Age: 70
End: 2020-09-25

## 2020-09-25 DIAGNOSIS — J06.9 VIRAL UPPER RESPIRATORY TRACT INFECTION: ICD-10-CM

## 2020-09-25 RX ORDER — FLUTICASONE PROPIONATE 50 MCG
2 SPRAY, SUSPENSION (ML) NASAL DAILY
Qty: 1 BOTTLE | Refills: 1 | Status: SHIPPED | OUTPATIENT
Start: 2020-09-25 | End: 2021-04-23

## 2020-09-25 RX ORDER — TIOTROPIUM BROMIDE INHALATION SPRAY 1.56 UG/1
2 SPRAY, METERED RESPIRATORY (INHALATION) DAILY
Qty: 1 INHALER | Refills: 0 | Status: SHIPPED | OUTPATIENT
Start: 2020-09-25 | End: 2020-09-30 | Stop reason: CLARIF

## 2020-09-25 RX ORDER — ALBUTEROL SULFATE 90 UG/1
2 AEROSOL, METERED RESPIRATORY (INHALATION) EVERY 4 HOURS PRN
Qty: 1 INHALER | Refills: 0 | Status: SHIPPED | OUTPATIENT
Start: 2020-09-25 | End: 2021-01-26

## 2020-09-25 NOTE — TELEPHONE ENCOUNTER
Approve/deny refills?      Also requesting thyroid u/s , The patient was recommended to have a thyroid US completed outpatient to follow up on the newly discovered thyroid mass/nodules affecting the trachea

## 2020-09-25 NOTE — PROGRESS NOTES
The patient is requesting assistance with scheduling for the following locations:       Schedule an appointment with [address] [place] [address] as  soon as possible for a visit  with in 2-4 days of discharge  [address]  [address]

## 2020-09-25 NOTE — PROGRESS NOTES
Initial Post Discharge Follow Up   Discharge Date: 9/22/20  Contact Date: 9/25/2020    Consent Verification:  Assessment Completed With: Patient  HIPAA Verified?   Yes    Discharge Dx:     Acute hypoxic respiratory failure due to flash pulmonary edema, t explained to you?   o On a scale of 1-5   1- Very Poor and 5- Very well   o Very well  • Do you have any questions about your discharge instructions? No  • Before leaving the hospital was your diagnoses explained to you?  Yes  • Do you have any questions a Cholecalciferol (VITAMIN D3) 75 MCG (3000 UT) Oral Tab Take by mouth. • Potassium Chloride ER 20 MEQ Oral Tab CR Take 2 tablets by mouth daily.      • Albuterol Sulfate HFA (PROAIR HFA) 108 (90 Base) MCG/ACT Inhalation Aero Soln Inhale 2 puffs into the understanding. Are you having any concerns with constipation? No    Referrals/orders at D/C:  Home Health/Services ordered at D/C?   No     Except for [facility] mentioned above, have you scheduled these other services?    no NCM did review wi ([place])        Oct 07, 2020  9:30 AM CDT Hospital Surgery with MD Brenda Garcia 2 Cardiology [facility] [address] [facility])            Cardiology - [facility], [address] [place] [address] Scheduling for further scheduling assistance. The patient verbalized understanding and will contact the office with any further questions or concerns.      CCM referral placed:  Yes    BOOK BY DATE: 10/6/2020    [x]  Discharge Summary, Discharge medications

## 2020-09-25 NOTE — TELEPHONE ENCOUNTER
NCM spoke with the patient today for TCM to follow up on the recent hospitalization and care for pulmonary edema/PNA and severe MR. The patient does plan to follow up with the TCC for a TCM-HFU appointment.       Order request:     The patient was recommend

## 2020-09-25 NOTE — PROGRESS NOTES
Apt made with TCC for Wed. Sept. 30th @10:30am      's office will call patient to schedule apt time.

## 2020-09-28 LAB — ISTAT ACTIVATED CLOTTING TIME: 252 SECONDS (ref 74–137)

## 2020-09-30 ENCOUNTER — VIRTUAL PHONE E/M (OUTPATIENT)
Dept: INTERNAL MEDICINE CLINIC | Facility: CLINIC | Age: 70
End: 2020-09-30
Payer: MEDICARE

## 2020-09-30 ENCOUNTER — TELEPHONE (OUTPATIENT)
Dept: CARDIOLOGY | Age: 70
End: 2020-09-30

## 2020-09-30 DIAGNOSIS — I25.5 ISCHEMIC CARDIOMYOPATHY: ICD-10-CM

## 2020-09-30 DIAGNOSIS — I10 ESSENTIAL HYPERTENSION: ICD-10-CM

## 2020-09-30 DIAGNOSIS — I34.0 NONRHEUMATIC MITRAL VALVE REGURGITATION: ICD-10-CM

## 2020-09-30 DIAGNOSIS — J96.01 ACUTE RESPIRATORY FAILURE WITH HYPOXIA (HCC): Primary | ICD-10-CM

## 2020-09-30 DIAGNOSIS — E78.5 DYSLIPIDEMIA: ICD-10-CM

## 2020-09-30 DIAGNOSIS — C50.811 MALIGNANT NEOPLASM OF OVERLAPPING SITES OF RIGHT FEMALE BREAST, UNSPECIFIED ESTROGEN RECEPTOR STATUS (HCC): ICD-10-CM

## 2020-09-30 DIAGNOSIS — E07.9 THYROID MASS: ICD-10-CM

## 2020-09-30 DIAGNOSIS — F32.A DEPRESSION, UNSPECIFIED DEPRESSION TYPE: ICD-10-CM

## 2020-09-30 DIAGNOSIS — I25.10 CORONARY ARTERY DISEASE INVOLVING NATIVE HEART WITHOUT ANGINA PECTORIS, UNSPECIFIED VESSEL OR LESION TYPE: ICD-10-CM

## 2020-09-30 DIAGNOSIS — J81.0 FLASH PULMONARY EDEMA (HCC): ICD-10-CM

## 2020-09-30 PROCEDURE — 99443 PHONE E/M BY PHYS 21-30 MIN: CPT | Performed by: CLINICAL NURSE SPECIALIST

## 2020-09-30 PROCEDURE — 1111F DSCHRG MED/CURRENT MED MERGE: CPT | Performed by: CLINICAL NURSE SPECIALIST

## 2020-09-30 RX ORDER — B-COMPLEX WITH VITAMIN C
1 TABLET ORAL DAILY
COMMUNITY

## 2020-09-30 NOTE — PROGRESS NOTES
TRANSITIONAL CARE CLINIC PHARMACIST MEDICATION RECONCILIATION        Kel Enriquez MRN [id number]    1950 PCP Sarika Garcia DO       Comments: Medication history completed by the [address] [place] [address] Pharmacist with the patient on the ph simvastatin 20 MG Oral Tab Take 20 mg by mouth nightly. • Cholecalciferol (VITAMIN D3) 75 MCG (3000 UT) Oral Tab Take 3,000 Units by mouth daily. • Potassium Chloride ER 20 MEQ Oral Tab CR Take 2 tablets by mouth daily.    • HYDROcodone-acetaminophen

## 2020-09-30 NOTE — PROGRESS NOTES
Virtual/Telephone Check-In  AUDIO ONLY    Jonh Kaplan verbally consents to a Virtual/Telephone Check-In service on 09/30/20.   Patient understands and accepts financial responsibility for any deductible, co-insurance and/or co-pays associated with this point  · Follow up with pulm: 10/22/2020  · PTFs as OP: 10/22/2020    8. H/O total shoulder replacement  · Norco  Follow Up Plan:   Your appointments     Date & Time Appointment Department [facility])    Oct 01, 2020  9:00 AM CDT  (Arrive by 8:45 AM) Follow U

## 2020-10-01 ENCOUNTER — HOSPITAL ENCOUNTER (OUTPATIENT)
Dept: GENERAL RADIOLOGY | Age: 70
Discharge: HOME | End: 2020-10-01
Attending: NURSE PRACTITIONER

## 2020-10-01 ENCOUNTER — OFFICE VISIT (OUTPATIENT)
Dept: CARDIOLOGY | Age: 70
End: 2020-10-01
Attending: INTERNAL MEDICINE

## 2020-10-01 ENCOUNTER — HOSPITAL ENCOUNTER (OUTPATIENT)
Dept: RESPIRATORY THERAPY | Age: 70
Discharge: HOME | End: 2020-10-01
Attending: INTERNAL MEDICINE

## 2020-10-01 VITALS
HEART RATE: 61 BPM | BODY MASS INDEX: 31.91 KG/M2 | DIASTOLIC BLOOD PRESSURE: 60 MMHG | SYSTOLIC BLOOD PRESSURE: 110 MMHG | OXYGEN SATURATION: 96 % | RESPIRATION RATE: 18 BRPM | WEIGHT: 210.54 LBS | HEIGHT: 68 IN

## 2020-10-01 DIAGNOSIS — I25.10 CAD S/P PERCUTANEOUS CORONARY ANGIOPLASTY: ICD-10-CM

## 2020-10-01 DIAGNOSIS — I10 HYPERTENSION, UNSPECIFIED TYPE: ICD-10-CM

## 2020-10-01 DIAGNOSIS — J45.20 INTERMITTENT ASTHMA, UNSPECIFIED ASTHMA SEVERITY, UNSPECIFIED WHETHER COMPLICATED: ICD-10-CM

## 2020-10-01 DIAGNOSIS — I25.5 ISCHEMIC CARDIOMYOPATHY: ICD-10-CM

## 2020-10-01 DIAGNOSIS — I34.0 MITRAL VALVE INSUFFICIENCY, UNSPECIFIED ETIOLOGY: Primary | ICD-10-CM

## 2020-10-01 DIAGNOSIS — E78.5 HYPERLIPIDEMIA, UNSPECIFIED HYPERLIPIDEMIA TYPE: ICD-10-CM

## 2020-10-01 DIAGNOSIS — Z85.3 HX: BREAST CANCER: ICD-10-CM

## 2020-10-01 DIAGNOSIS — Z98.61 CAD S/P PERCUTANEOUS CORONARY ANGIOPLASTY: ICD-10-CM

## 2020-10-01 DIAGNOSIS — D69.6 THROMBOCYTOPENIA (CMD): ICD-10-CM

## 2020-10-01 DIAGNOSIS — J18.9 PNEUMONIA DUE TO INFECTIOUS ORGANISM, UNSPECIFIED LATERALITY, UNSPECIFIED PART OF LUNG: ICD-10-CM

## 2020-10-01 DIAGNOSIS — I50.9 CONGESTIVE HEART FAILURE, UNSPECIFIED HF CHRONICITY, UNSPECIFIED HEART FAILURE TYPE (CMD): ICD-10-CM

## 2020-10-01 DIAGNOSIS — E07.89 THYROID MASS OF UNCLEAR ETIOLOGY: ICD-10-CM

## 2020-10-01 DIAGNOSIS — Z01.812 PRE-PROCEDURE LAB EXAM: Primary | ICD-10-CM

## 2020-10-01 LAB
ABO + RH BLD: NORMAL
ALBUMIN SERPL-MCNC: 3.4 G/DL (ref 3.6–5.1)
ALBUMIN/GLOB SERPL: 1 {RATIO} (ref 1–2.4)
ALP SERPL-CCNC: 57 UNITS/L (ref 45–117)
ALT SERPL-CCNC: 17 UNITS/L
ANION GAP SERPL CALC-SCNC: 10 MMOL/L (ref 10–20)
AST SERPL-CCNC: 19 UNITS/L
ATRIAL RATE (BPM): 70
BASOPHILS # BLD: 0 K/MCL (ref 0–0.3)
BASOPHILS NFR BLD: 1 %
BILIRUB SERPL-MCNC: 0.4 MG/DL (ref 0.2–1)
BLD GP AB SCN SERPL QL GEL: NEGATIVE
BLOOD BANK CMNT PATIENT-IMP: NORMAL
BUN SERPL-MCNC: 33 MG/DL (ref 6–20)
BUN/CREAT SERPL: 33 (ref 7–25)
CALCIUM SERPL-MCNC: 8.8 MG/DL (ref 8.4–10.2)
CHLORIDE SERPL-SCNC: 110 MMOL/L (ref 98–107)
CO2 SERPL-SCNC: 29 MMOL/L (ref 21–32)
CREAT SERPL-MCNC: 1.01 MG/DL (ref 0.51–0.95)
CROSSMATCH EXPIRE: NORMAL
DIFFERENTIAL METHOD BLD: ABNORMAL
EOSINOPHIL # BLD: 0.1 K/MCL (ref 0.1–0.5)
EOSINOPHIL NFR BLD: 5 %
ERYTHROCYTE [DISTWIDTH] IN BLOOD: 13.8 % (ref 11–15)
GLOBULIN SER-MCNC: 3.4 G/DL (ref 2–4)
GLUCOSE SERPL-MCNC: 99 MG/DL (ref 65–99)
HCT VFR BLD CALC: 34.3 % (ref 36–46.5)
HGB BLD-MCNC: 11 G/DL (ref 12–15.5)
IMM GRANULOCYTES # BLD AUTO: 0 K/MCL (ref 0–0.2)
IMM GRANULOCYTES NFR BLD: 0 %
LYMPHOCYTES # BLD: 0.9 K/MCL (ref 1–4)
LYMPHOCYTES NFR BLD: 30 %
MCH RBC QN AUTO: 29.3 PG (ref 26–34)
MCHC RBC AUTO-ENTMCNC: 32.1 G/DL (ref 32–36.5)
MCV RBC AUTO: 91.5 FL (ref 78–100)
MONOCYTES # BLD: 0.5 K/MCL (ref 0.3–0.9)
MONOCYTES NFR BLD: 17 %
NEUTROPHILS # BLD: 1.4 K/MCL (ref 1.8–7.7)
NEUTROPHILS NFR BLD: 47 %
NRBC BLD MANUAL-RTO: 0 /100 WBC
NT-PROBNP SERPL-MCNC: 2448 PG/ML
NUM BPU REQUESTED: 2
P AXIS (DEGREES): 54
PLATELET # BLD: 116 K/MCL (ref 140–450)
POTASSIUM SERPL-SCNC: 3.7 MMOL/L (ref 3.4–5.1)
PR-INTERVAL (MSEC): 148
PROT SERPL-MCNC: 6.8 G/DL (ref 6.4–8.2)
QRS-INTERVAL (MSEC): 104
QT-INTERVAL (MSEC): 436
QTC: 470
R AXIS (DEGREES): 4
RBC # BLD: 3.75 MIL/MCL (ref 4–5.2)
REPORT TEXT: NORMAL
SODIUM SERPL-SCNC: 145 MMOL/L (ref 135–145)
T AXIS (DEGREES): 41
VENTRICULAR RATE EKG/MIN (BPM): 70
WBC # BLD: 3 K/MCL (ref 4.2–11)

## 2020-10-01 PROCEDURE — 83880 ASSAY OF NATRIURETIC PEPTIDE: CPT

## 2020-10-01 PROCEDURE — 10004281 HB COUNTER-STAFF TIME PER 15 MIN

## 2020-10-01 PROCEDURE — 94010 BREATHING CAPACITY TEST: CPT

## 2020-10-01 PROCEDURE — 71046 X-RAY EXAM CHEST 2 VIEWS: CPT

## 2020-10-01 PROCEDURE — 13001086 HB INCENTIVE SPIROMETER W INSTRUCT

## 2020-10-01 PROCEDURE — 93005 ELECTROCARDIOGRAM TRACING: CPT | Performed by: NURSE PRACTITIONER

## 2020-10-01 PROCEDURE — 85025 COMPLETE CBC W/AUTO DIFF WBC: CPT

## 2020-10-01 PROCEDURE — 86900 BLOOD TYPING SEROLOGIC ABO: CPT

## 2020-10-01 PROCEDURE — 94618 PULMONARY STRESS TESTING: CPT

## 2020-10-01 PROCEDURE — 36415 COLL VENOUS BLD VENIPUNCTURE: CPT

## 2020-10-01 PROCEDURE — 80053 COMPREHEN METABOLIC PANEL: CPT

## 2020-10-01 RX ORDER — MULTIVIT WITH MINERALS/LUTEIN
1 TABLET ORAL DAILY
COMMUNITY

## 2020-10-01 RX ORDER — SIMVASTATIN 20 MG
20 TABLET ORAL NIGHTLY
COMMUNITY

## 2020-10-01 RX ORDER — TIOTROPIUM BROMIDE INHALATION SPRAY 1.56 UG/1
2 SPRAY, METERED RESPIRATORY (INHALATION) DAILY
Status: ON HOLD | COMMUNITY
Start: 2020-09-08 | End: 2020-10-07 | Stop reason: ALTCHOICE

## 2020-10-01 RX ORDER — POTASSIUM CHLORIDE 1500 MG/1
40 TABLET, EXTENDED RELEASE ORAL DAILY
COMMUNITY

## 2020-10-01 RX ORDER — METOPROLOL TARTRATE 100 MG/1
100 TABLET ORAL 2 TIMES DAILY
COMMUNITY
Start: 2020-09-22 | End: 2020-11-05 | Stop reason: DRUGHIGH

## 2020-10-01 RX ORDER — TAMOXIFEN CITRATE 20 MG/1
20 TABLET ORAL NIGHTLY
COMMUNITY
Start: 2020-09-22

## 2020-10-01 RX ORDER — GABAPENTIN 100 MG/1
200 CAPSULE ORAL NIGHTLY
COMMUNITY
Start: 2020-07-14 | End: 2020-10-06 | Stop reason: SDUPTHER

## 2020-10-01 RX ORDER — FUROSEMIDE 40 MG/1
40 TABLET ORAL DAILY
COMMUNITY
Start: 2020-09-22 | End: 2021-09-22

## 2020-10-01 RX ORDER — ALBUTEROL SULFATE 90 UG/1
2 AEROSOL, METERED RESPIRATORY (INHALATION) PRN
Status: ON HOLD | COMMUNITY
Start: 2020-09-25 | End: 2020-10-07 | Stop reason: ALTCHOICE

## 2020-10-01 RX ORDER — CLOPIDOGREL BISULFATE 75 MG/1
75 TABLET ORAL DAILY
COMMUNITY
Start: 2020-09-23

## 2020-10-01 RX ORDER — ASPIRIN 81 MG/1
81 TABLET ORAL DAILY
COMMUNITY
Start: 2020-09-23

## 2020-10-01 RX ORDER — LISINOPRIL 5 MG/1
5 TABLET ORAL DAILY
COMMUNITY
Start: 2020-09-23

## 2020-10-01 RX ORDER — FLUTICASONE PROPIONATE 50 MCG
2 SPRAY, SUSPENSION (ML) NASAL DAILY
COMMUNITY
Start: 2020-09-25 | End: 2020-11-05 | Stop reason: ALTCHOICE

## 2020-10-01 RX ORDER — HYDROCODONE BITARTRATE AND ACETAMINOPHEN 10; 325 MG/1; MG/1
1 TABLET ORAL EVERY 6 HOURS PRN
COMMUNITY
Start: 2020-07-24

## 2020-10-01 RX ORDER — SERTRALINE HYDROCHLORIDE 100 MG/1
150 TABLET, FILM COATED ORAL DAILY
COMMUNITY
Start: 2020-07-15

## 2020-10-01 RX ORDER — GABAPENTIN 100 MG/1
100 CAPSULE ORAL 2 TIMES DAILY
COMMUNITY

## 2020-10-01 RX ORDER — GLUCOSAMINE HCL 500 MG
3000 TABLET ORAL DAILY
COMMUNITY

## 2020-10-05 ENCOUNTER — LAB SERVICES (OUTPATIENT)
Dept: LAB | Age: 70
End: 2020-10-05

## 2020-10-05 DIAGNOSIS — Z01.812 PRE-PROCEDURE LAB EXAM: ICD-10-CM

## 2020-10-06 LAB
SARS-COV-2 RNA RESP QL NAA+PROBE: NOT DETECTED
SERVICE CMNT-IMP: NORMAL
SPECIMEN SOURCE: NORMAL

## 2020-10-06 SDOH — HEALTH STABILITY: MENTAL HEALTH: HOW OFTEN DO YOU HAVE A DRINK CONTAINING ALCOHOL?: NEVER

## 2020-10-07 ENCOUNTER — ANESTHESIA (OUTPATIENT)
Dept: CARDIOLOGY | Age: 70
DRG: 266 | End: 2020-10-07

## 2020-10-07 ENCOUNTER — HOSPITAL ENCOUNTER (OUTPATIENT)
Dept: CARDIOLOGY | Age: 70
Discharge: HOME | DRG: 266 | End: 2020-10-07
Attending: INTERNAL MEDICINE

## 2020-10-07 ENCOUNTER — APPOINTMENT (OUTPATIENT)
Dept: CARDIOLOGY | Age: 70
DRG: 266 | End: 2020-10-07
Attending: NURSE PRACTITIONER

## 2020-10-07 ENCOUNTER — HOSPITAL ENCOUNTER (INPATIENT)
Age: 70
LOS: 1 days | Discharge: HOME | DRG: 266 | End: 2020-10-08
Attending: INTERNAL MEDICINE | Admitting: INTERNAL MEDICINE

## 2020-10-07 ENCOUNTER — ANESTHESIA EVENT (OUTPATIENT)
Dept: CARDIOLOGY | Age: 70
DRG: 266 | End: 2020-10-07

## 2020-10-07 DIAGNOSIS — I34.0 SEVERE MITRAL REGURGITATION: ICD-10-CM

## 2020-10-07 DIAGNOSIS — I34.0 MITRAL VALVE INSUFFICIENCY, UNSPECIFIED ETIOLOGY: ICD-10-CM

## 2020-10-07 DIAGNOSIS — Z00.6 ENCOUNTER FOR EXAMINATION FOR NORMAL COMPARISON OR CONTROL IN CLINICAL RESEARCH PROGRAM: ICD-10-CM

## 2020-10-07 LAB
ACT BLD: 309 BASELINE/TARGET RANGES ARE SET BY CLINICIANS FOR EACH PATIENT/PROCEDURE
ACT BLD: 314 BASELINE/TARGET RANGES ARE SET BY CLINICIANS FOR EACH PATIENT/PROCEDURE
ANION GAP SERPL CALC-SCNC: 12 MMOL/L (ref 10–20)
ATRIAL RATE (BPM): 69
BUN SERPL-MCNC: 24 MG/DL (ref 6–20)
BUN/CREAT SERPL: 28 (ref 7–25)
CALCIUM SERPL-MCNC: 7.9 MG/DL (ref 8.4–10.2)
CHLORIDE SERPL-SCNC: 112 MMOL/L (ref 98–107)
CO2 SERPL-SCNC: 26 MMOL/L (ref 21–32)
CREAT SERPL-MCNC: 0.85 MG/DL (ref 0.51–0.95)
ERYTHROCYTE [DISTWIDTH] IN BLOOD: 14 % (ref 11–15)
GLUCOSE SERPL-MCNC: 138 MG/DL (ref 65–99)
HCT VFR BLD CALC: 31.9 % (ref 36–46.5)
HGB BLD-MCNC: 10.4 G/DL (ref 12–15.5)
MAGNESIUM SERPL-MCNC: 1.7 MG/DL (ref 1.7–2.4)
MCH RBC QN AUTO: 29.2 PG (ref 26–34)
MCHC RBC AUTO-ENTMCNC: 32.6 G/DL (ref 32–36.5)
MCV RBC AUTO: 89.6 FL (ref 78–100)
NRBC BLD MANUAL-RTO: 0 /100 WBC
P AXIS (DEGREES): 53
PLATELET # BLD: 80 K/MCL (ref 140–450)
POTASSIUM SERPL-SCNC: 2.8 MMOL/L (ref 3.4–5.1)
POTASSIUM SERPL-SCNC: 3.6 MMOL/L (ref 3.4–5.1)
PR-INTERVAL (MSEC): 144
QRS-INTERVAL (MSEC): 102
QT-INTERVAL (MSEC): 486
QTC: 521
R AXIS (DEGREES): 2
RBC # BLD: 3.56 MIL/MCL (ref 4–5.2)
REPORT TEXT: NORMAL
SODIUM SERPL-SCNC: 147 MMOL/L (ref 135–145)
T AXIS (DEGREES): 36
VENTRICULAR RATE EKG/MIN (BPM): 69
WBC # BLD: 3.7 K/MCL (ref 4.2–11)

## 2020-10-07 PROCEDURE — 10002803 HB RX 637: Performed by: NURSE PRACTITIONER

## 2020-10-07 PROCEDURE — 10002800 HB RX 250 W HCPCS: Performed by: NURSE PRACTITIONER

## 2020-10-07 PROCEDURE — 10002800 HB RX 250 W HCPCS: Performed by: NURSE ANESTHETIST, CERTIFIED REGISTERED

## 2020-10-07 PROCEDURE — 93355 ECHO TRANSESOPHAGEAL (TEE): CPT

## 2020-10-07 PROCEDURE — 93308 TTE F-UP OR LMTD: CPT

## 2020-10-07 PROCEDURE — 80048 BASIC METABOLIC PNL TOTAL CA: CPT

## 2020-10-07 PROCEDURE — 10003585 HB ROOM CHARGE INTERMEDIATE CARE

## 2020-10-07 PROCEDURE — C1893 INTRO/SHEATH, FIXED,NON-PEEL: HCPCS | Performed by: INTERNAL MEDICINE

## 2020-10-07 PROCEDURE — C1894 INTRO/SHEATH, NON-LASER: HCPCS | Performed by: INTERNAL MEDICINE

## 2020-10-07 PROCEDURE — 10004651 HB RX, NO CHARGE ITEM: Performed by: NURSE PRACTITIONER

## 2020-10-07 PROCEDURE — 85027 COMPLETE CBC AUTOMATED: CPT

## 2020-10-07 PROCEDURE — 85347 COAGULATION TIME ACTIVATED: CPT | Performed by: INTERNAL MEDICINE

## 2020-10-07 PROCEDURE — 10002800 HB RX 250 W HCPCS: Performed by: ANESTHESIOLOGY

## 2020-10-07 PROCEDURE — 33418 REPAIR TCAT MITRAL VALVE: CPT | Performed by: INTERNAL MEDICINE

## 2020-10-07 PROCEDURE — 13000004 HB  ANESTHESIA  GENERAL OUTSIDE OR: Performed by: INTERNAL MEDICINE

## 2020-10-07 PROCEDURE — C1760 CLOSURE DEV, VASC: HCPCS | Performed by: INTERNAL MEDICINE

## 2020-10-07 PROCEDURE — 10002801 HB RX 250 W/O HCPCS: Performed by: NURSE ANESTHETIST, CERTIFIED REGISTERED

## 2020-10-07 PROCEDURE — 83735 ASSAY OF MAGNESIUM: CPT

## 2020-10-07 PROCEDURE — C1887 CATHETER, GUIDING: HCPCS | Performed by: INTERNAL MEDICINE

## 2020-10-07 PROCEDURE — 10006023 HB SUPPLY 272: Performed by: INTERNAL MEDICINE

## 2020-10-07 PROCEDURE — 84132 ASSAY OF SERUM POTASSIUM: CPT

## 2020-10-07 PROCEDURE — 02UG3JZ SUPPLEMENT MITRAL VALVE WITH SYNTHETIC SUBSTITUTE, PERCUTANEOUS APPROACH: ICD-10-PCS | Performed by: INTERNAL MEDICINE

## 2020-10-07 PROCEDURE — 10002807 HB RX 258: Performed by: ANESTHESIOLOGY

## 2020-10-07 PROCEDURE — 10002803 HB RX 637

## 2020-10-07 PROCEDURE — 10002807 HB RX 258: Performed by: NURSE PRACTITIONER

## 2020-10-07 PROCEDURE — 36415 COLL VENOUS BLD VENIPUNCTURE: CPT

## 2020-10-07 PROCEDURE — 10004451 HB PACU RECOVERY 1ST 30 MINUTES: Performed by: INTERNAL MEDICINE

## 2020-10-07 PROCEDURE — C1769 GUIDE WIRE: HCPCS | Performed by: INTERNAL MEDICINE

## 2020-10-07 PROCEDURE — 93005 ELECTROCARDIOGRAM TRACING: CPT | Performed by: NURSE PRACTITIONER

## 2020-10-07 PROCEDURE — 10002803 HB RX 637: Performed by: INTERNAL MEDICINE

## 2020-10-07 PROCEDURE — 10004452 HB PACU ADDL 30 MINUTES: Performed by: INTERNAL MEDICINE

## 2020-10-07 PROCEDURE — 10006027 HB SUPPLY 278: Performed by: INTERNAL MEDICINE

## 2020-10-07 PROCEDURE — 10002801 HB RX 250 W/O HCPCS: Performed by: ANESTHESIOLOGY

## 2020-10-07 DEVICE — PERCLOSE PROGLIDE™ SUTURE-MEDIATED CLOSURE SYSTEM
Type: IMPLANTABLE DEVICE | Site: FEMORAL VEIN | Status: FUNCTIONAL
Brand: PERCLOSE PROGLIDE™

## 2020-10-07 DEVICE — IMPLANTABLE DEVICE: Type: IMPLANTABLE DEVICE | Site: MITRAL VALVE | Status: FUNCTIONAL

## 2020-10-07 RX ORDER — ACETAMINOPHEN 325 MG/1
650 TABLET ORAL EVERY 4 HOURS PRN
Status: DISCONTINUED | OUTPATIENT
Start: 2020-10-07 | End: 2020-10-08 | Stop reason: HOSPADM

## 2020-10-07 RX ORDER — LIDOCAINE HYDROCHLORIDE 20 MG/ML
INJECTION, SOLUTION INFILTRATION; PERINEURAL PRN
Status: DISCONTINUED | OUTPATIENT
Start: 2020-10-07 | End: 2020-10-07

## 2020-10-07 RX ORDER — POTASSIUM CHLORIDE 20 MEQ/1
40 TABLET, EXTENDED RELEASE ORAL ONCE
Status: COMPLETED | OUTPATIENT
Start: 2020-10-07 | End: 2020-10-07

## 2020-10-07 RX ORDER — POTASSIUM CHLORIDE 20 MEQ/1
TABLET, EXTENDED RELEASE ORAL
Status: COMPLETED
Start: 2020-10-07 | End: 2020-10-07

## 2020-10-07 RX ORDER — LISINOPRIL 2.5 MG/1
5 TABLET ORAL DAILY
Status: DISCONTINUED | OUTPATIENT
Start: 2020-10-07 | End: 2020-10-08 | Stop reason: HOSPADM

## 2020-10-07 RX ORDER — POTASSIUM CHLORIDE 1500 MG/1
20 TABLET, EXTENDED RELEASE ORAL DAILY
Status: DISCONTINUED | OUTPATIENT
Start: 2020-10-07 | End: 2020-10-07

## 2020-10-07 RX ORDER — 0.9 % SODIUM CHLORIDE 0.9 %
2 VIAL (ML) INJECTION EVERY 12 HOURS SCHEDULED
Status: DISCONTINUED | OUTPATIENT
Start: 2020-10-07 | End: 2020-10-07 | Stop reason: HOSPADM

## 2020-10-07 RX ORDER — CLOPIDOGREL BISULFATE 75 MG/1
75 TABLET ORAL DAILY
Status: DISCONTINUED | OUTPATIENT
Start: 2020-10-07 | End: 2020-10-08 | Stop reason: HOSPADM

## 2020-10-07 RX ORDER — POLYETHYLENE GLYCOL 3350 17 G/17G
17 POWDER, FOR SOLUTION ORAL 2 TIMES DAILY
Status: DISCONTINUED | OUTPATIENT
Start: 2020-10-11 | End: 2020-10-08 | Stop reason: HOSPADM

## 2020-10-07 RX ORDER — MIDAZOLAM HYDROCHLORIDE 1 MG/ML
INJECTION, SOLUTION INTRAMUSCULAR; INTRAVENOUS PRN
Status: DISCONTINUED | OUTPATIENT
Start: 2020-10-07 | End: 2020-10-07

## 2020-10-07 RX ORDER — SODIUM CHLORIDE 9 MG/ML
INJECTION, SOLUTION INTRAVENOUS CONTINUOUS PRN
Status: DISCONTINUED | OUTPATIENT
Start: 2020-10-07 | End: 2020-10-08 | Stop reason: HOSPADM

## 2020-10-07 RX ORDER — ROCURONIUM BROMIDE 10 MG/ML
INJECTION, SOLUTION INTRAVENOUS PRN
Status: DISCONTINUED | OUTPATIENT
Start: 2020-10-07 | End: 2020-10-07

## 2020-10-07 RX ORDER — AMOXICILLIN 250 MG
2 CAPSULE ORAL DAILY
Status: DISCONTINUED | OUTPATIENT
Start: 2020-10-08 | End: 2020-10-08 | Stop reason: HOSPADM

## 2020-10-07 RX ORDER — HYDRALAZINE HYDROCHLORIDE 20 MG/ML
10 INJECTION INTRAMUSCULAR; INTRAVENOUS
Status: DISCONTINUED | OUTPATIENT
Start: 2020-10-07 | End: 2020-10-07

## 2020-10-07 RX ORDER — ATORVASTATIN CALCIUM 10 MG/1
10 TABLET, FILM COATED ORAL NIGHTLY
Status: DISCONTINUED | OUTPATIENT
Start: 2020-10-07 | End: 2020-10-08 | Stop reason: HOSPADM

## 2020-10-07 RX ORDER — GLYCOPYRROLATE 0.2 MG/ML
INJECTION, SOLUTION INTRAMUSCULAR; INTRAVENOUS PRN
Status: DISCONTINUED | OUTPATIENT
Start: 2020-10-07 | End: 2020-10-07

## 2020-10-07 RX ORDER — PROPOFOL 10 MG/ML
INJECTION, EMULSION INTRAVENOUS PRN
Status: DISCONTINUED | OUTPATIENT
Start: 2020-10-07 | End: 2020-10-07

## 2020-10-07 RX ORDER — NEOSTIGMINE METHYLSULFATE 4 MG/4 ML
SYRINGE (ML) INTRAVENOUS PRN
Status: DISCONTINUED | OUTPATIENT
Start: 2020-10-07 | End: 2020-10-07

## 2020-10-07 RX ORDER — METOPROLOL TARTRATE 50 MG/1
100 TABLET, FILM COATED ORAL 2 TIMES DAILY
Status: DISCONTINUED | OUTPATIENT
Start: 2020-10-07 | End: 2020-10-08 | Stop reason: HOSPADM

## 2020-10-07 RX ORDER — ONDANSETRON 2 MG/ML
4 INJECTION INTRAMUSCULAR; INTRAVENOUS
Status: DISCONTINUED | OUTPATIENT
Start: 2020-10-07 | End: 2020-10-07 | Stop reason: HOSPADM

## 2020-10-07 RX ORDER — NITROGLYCERIN 0.4 MG/1
0.4 TABLET SUBLINGUAL EVERY 5 MIN PRN
Status: DISCONTINUED | OUTPATIENT
Start: 2020-10-07 | End: 2020-10-08 | Stop reason: HOSPADM

## 2020-10-07 RX ORDER — POTASSIUM CHLORIDE 20 MEQ/1
20 TABLET, EXTENDED RELEASE ORAL
Status: DISCONTINUED | OUTPATIENT
Start: 2020-10-08 | End: 2020-10-08 | Stop reason: HOSPADM

## 2020-10-07 RX ORDER — SODIUM CHLORIDE 9 MG/ML
INJECTION, SOLUTION INTRAVENOUS CONTINUOUS
Status: DISCONTINUED | OUTPATIENT
Start: 2020-10-07 | End: 2020-10-07 | Stop reason: HOSPADM

## 2020-10-07 RX ORDER — LANOLIN ALCOHOL/MO/W.PET/CERES
400 CREAM (GRAM) TOPICAL ONCE
Status: COMPLETED | OUTPATIENT
Start: 2020-10-07 | End: 2020-10-07

## 2020-10-07 RX ORDER — BISACODYL 10 MG
10 SUPPOSITORY, RECTAL RECTAL ONCE
Status: DISCONTINUED | OUTPATIENT
Start: 2020-10-09 | End: 2020-10-08 | Stop reason: HOSPADM

## 2020-10-07 RX ORDER — DEXAMETHASONE SODIUM PHOSPHATE 4 MG/ML
INJECTION, SOLUTION INTRA-ARTICULAR; INTRALESIONAL; INTRAMUSCULAR; INTRAVENOUS; SOFT TISSUE PRN
Status: DISCONTINUED | OUTPATIENT
Start: 2020-10-07 | End: 2020-10-07

## 2020-10-07 RX ORDER — SODIUM CHLORIDE 9 MG/ML
INJECTION, SOLUTION INTRAVENOUS CONTINUOUS
Status: DISCONTINUED | OUTPATIENT
Start: 2020-10-07 | End: 2020-10-08 | Stop reason: HOSPADM

## 2020-10-07 RX ORDER — SODIUM CHLORIDE 9 MG/ML
INJECTION, SOLUTION INTRAVENOUS
Status: COMPLETED
Start: 2020-10-07 | End: 2020-10-07

## 2020-10-07 RX ORDER — HYDRALAZINE HYDROCHLORIDE 20 MG/ML
5 INJECTION INTRAMUSCULAR; INTRAVENOUS EVERY 10 MIN PRN
Status: DISCONTINUED | OUTPATIENT
Start: 2020-10-07 | End: 2020-10-07 | Stop reason: HOSPADM

## 2020-10-07 RX ORDER — HEPARIN SODIUM 1000 [USP'U]/ML
INJECTION, SOLUTION INTRAVENOUS; SUBCUTANEOUS PRN
Status: DISCONTINUED | OUTPATIENT
Start: 2020-10-07 | End: 2020-10-07

## 2020-10-07 RX ORDER — ONDANSETRON 2 MG/ML
INJECTION INTRAMUSCULAR; INTRAVENOUS PRN
Status: DISCONTINUED | OUTPATIENT
Start: 2020-10-07 | End: 2020-10-07

## 2020-10-07 RX ORDER — GABAPENTIN 100 MG/1
200 CAPSULE ORAL NIGHTLY
COMMUNITY

## 2020-10-07 RX ORDER — PROCHLORPERAZINE EDISYLATE 5 MG/ML
5 INJECTION INTRAMUSCULAR; INTRAVENOUS
Status: DISCONTINUED | OUTPATIENT
Start: 2020-10-07 | End: 2020-10-07 | Stop reason: HOSPADM

## 2020-10-07 RX ORDER — FUROSEMIDE 40 MG/1
40 TABLET ORAL DAILY
Status: DISCONTINUED | OUTPATIENT
Start: 2020-10-07 | End: 2020-10-08 | Stop reason: HOSPADM

## 2020-10-07 RX ORDER — AMOXICILLIN 500 MG/1
2000 CAPSULE ORAL PRN
Qty: 4 CAPSULE | Refills: 3 | Status: SHIPPED | COMMUNITY
Start: 2020-10-07

## 2020-10-07 RX ORDER — ENEMA 19; 7 G/133ML; G/133ML
1 ENEMA RECTAL ONCE
Status: DISCONTINUED | OUTPATIENT
Start: 2020-10-10 | End: 2020-10-08 | Stop reason: HOSPADM

## 2020-10-07 RX ORDER — PROTAMINE SULFATE 10 MG/ML
INJECTION, SOLUTION INTRAVENOUS PRN
Status: DISCONTINUED | OUTPATIENT
Start: 2020-10-07 | End: 2020-10-07

## 2020-10-07 RX ORDER — NITROGLYCERIN 0.4 MG/1
0.4 TABLET SUBLINGUAL EVERY 5 MIN PRN
Status: DISCONTINUED | OUTPATIENT
Start: 2020-10-07 | End: 2020-10-07 | Stop reason: HOSPADM

## 2020-10-07 RX ORDER — AMOXICILLIN 500 MG/1
2000 CAPSULE ORAL PRN
Status: DISCONTINUED | OUTPATIENT
Start: 2020-10-07 | End: 2020-10-07

## 2020-10-07 RX ORDER — EPHEDRINE SULFATE/0.9% NACL/PF 50 MG/10ML
SYRINGE (ML) INTRAVENOUS PRN
Status: DISCONTINUED | OUTPATIENT
Start: 2020-10-07 | End: 2020-10-07

## 2020-10-07 RX ORDER — LANOLIN ALCOHOL/MO/W.PET/CERES
CREAM (GRAM) TOPICAL
Status: COMPLETED
Start: 2020-10-07 | End: 2020-10-07

## 2020-10-07 RX ADMIN — PHENYLEPHRINE HYDROCHLORIDE 10 MCG/MIN: 10 INJECTION, SOLUTION INTRAMUSCULAR; INTRAVENOUS; SUBCUTANEOUS at 10:15

## 2020-10-07 RX ADMIN — POTASSIUM CHLORIDE 40 MEQ: 20 TABLET, EXTENDED RELEASE ORAL at 13:55

## 2020-10-07 RX ADMIN — CLOPIDOGREL BISULFATE 75 MG: 75 TABLET, FILM COATED ORAL at 21:50

## 2020-10-07 RX ADMIN — Medication 4 MG: at 11:32

## 2020-10-07 RX ADMIN — LIDOCAINE HYDROCHLORIDE 2.5 ML: 20 INJECTION, SOLUTION INFILTRATION; PERINEURAL at 10:07

## 2020-10-07 RX ADMIN — SODIUM CHLORIDE: 0.9 INJECTION, SOLUTION INTRAVENOUS at 22:49

## 2020-10-07 RX ADMIN — MIDAZOLAM HYDROCHLORIDE 2 MG: 1 INJECTION, SOLUTION INTRAMUSCULAR; INTRAVENOUS at 08:56

## 2020-10-07 RX ADMIN — PROPOFOL 100 MG: 10 INJECTION, EMULSION INTRAVENOUS at 10:07

## 2020-10-07 RX ADMIN — FUROSEMIDE 40 MG: 40 TABLET ORAL at 17:52

## 2020-10-07 RX ADMIN — FENTANYL CITRATE 50 MCG: 50 INJECTION, SOLUTION INTRAMUSCULAR; INTRAVENOUS at 10:34

## 2020-10-07 RX ADMIN — ASPIRIN 81 MG: 81 TABLET, COATED ORAL at 21:50

## 2020-10-07 RX ADMIN — CEFAZOLIN SODIUM 2000 MG: 300 INJECTION, POWDER, LYOPHILIZED, FOR SOLUTION INTRAVENOUS at 10:15

## 2020-10-07 RX ADMIN — GLYCOPYRROLATE 0.4 MG: 0.2 INJECTION, SOLUTION INTRAMUSCULAR; INTRAVENOUS at 11:32

## 2020-10-07 RX ADMIN — Medication 400 MG: at 13:56

## 2020-10-07 RX ADMIN — FENTANYL CITRATE 50 MCG: 50 INJECTION, SOLUTION INTRAMUSCULAR; INTRAVENOUS at 10:07

## 2020-10-07 RX ADMIN — CEFAZOLIN SODIUM 2000 MG: 300 INJECTION, POWDER, LYOPHILIZED, FOR SOLUTION INTRAVENOUS at 17:52

## 2020-10-07 RX ADMIN — HEPARIN SODIUM 10000 UNITS: 1000 INJECTION, SOLUTION INTRAVENOUS; SUBCUTANEOUS at 10:40

## 2020-10-07 RX ADMIN — PROTAMINE SULFATE 50 MG: 10 INJECTION, SOLUTION INTRAVENOUS at 11:31

## 2020-10-07 RX ADMIN — FENTANYL CITRATE 50 MCG: 50 INJECTION, SOLUTION INTRAMUSCULAR; INTRAVENOUS at 12:00

## 2020-10-07 RX ADMIN — ROCURONIUM BROMIDE 50 MG: 50 INJECTION, SOLUTION INTRAVENOUS at 10:07

## 2020-10-07 RX ADMIN — ATORVASTATIN CALCIUM 10 MG: 10 TABLET, FILM COATED ORAL at 21:49

## 2020-10-07 RX ADMIN — Medication 10 MG: at 10:43

## 2020-10-07 RX ADMIN — FENTANYL CITRATE 50 MCG: 50 INJECTION, SOLUTION INTRAMUSCULAR; INTRAVENOUS at 08:56

## 2020-10-07 RX ADMIN — SODIUM CHLORIDE: 9 INJECTION, SOLUTION INTRAVENOUS at 09:57

## 2020-10-07 RX ADMIN — POTASSIUM CHLORIDE 40 MEQ: 1500 TABLET, EXTENDED RELEASE ORAL at 13:55

## 2020-10-07 RX ADMIN — DEXAMETHASONE SODIUM PHOSPHATE 4 MG: 4 INJECTION, SOLUTION INTRAMUSCULAR; INTRAVENOUS at 10:39

## 2020-10-07 RX ADMIN — LISINOPRIL 5 MG: 2.5 TABLET ORAL at 17:52

## 2020-10-07 RX ADMIN — ROCURONIUM BROMIDE 10 MG: 50 INJECTION, SOLUTION INTRAVENOUS at 11:01

## 2020-10-07 RX ADMIN — Medication 400 MG: at 22:22

## 2020-10-07 RX ADMIN — IVABRADINE 5 MG: 5 TABLET, FILM COATED ORAL at 22:44

## 2020-10-07 RX ADMIN — ONDANSETRON 4 MG: 2 INJECTION INTRAMUSCULAR; INTRAVENOUS at 11:19

## 2020-10-07 RX ADMIN — POTASSIUM CHLORIDE 40 MEQ: 1500 TABLET, EXTENDED RELEASE ORAL at 22:21

## 2020-10-07 RX ADMIN — Medication 15 MG: at 11:12

## 2020-10-07 RX ADMIN — METOPROLOL TARTRATE 100 MG: 50 TABLET, FILM COATED ORAL at 21:50

## 2020-10-07 SDOH — HEALTH STABILITY: MENTAL HEALTH: HOW OFTEN DO YOU HAVE A DRINK CONTAINING ALCOHOL?: NEVER

## 2020-10-07 ASSESSMENT — LIFESTYLE VARIABLES
HOW MANY STANDARD DRINKS CONTAINING ALCOHOL DO YOU HAVE ON A TYPICAL DAY: 0,1 OR 2
AUDIT-C TOTAL SCORE: 0
ALCOHOL_USE_STATUS: NO OR LOW RISK WITH VALIDATED TOOL
HOW OFTEN DO YOU HAVE 6 OR MORE DRINKS ON ONE OCCASION: NEVER
HOW OFTEN DO YOU HAVE A DRINK CONTAINING ALCOHOL: NEVER

## 2020-10-07 ASSESSMENT — COGNITIVE AND FUNCTIONAL STATUS - GENERAL
BECAUSE OF A PHYSICAL, MENTAL, OR EMOTIONAL CONDITION, DO YOU HAVE DIFFICULTY DOING ERRANDS ALONE: NO
ARE YOU DEAF OR DO YOU HAVE SERIOUS DIFFICULTY  HEARING: NO
DO YOU HAVE DIFFICULTY DRESSING OR BATHING: NO
ARE YOU BLIND OR DO YOU HAVE SERIOUS DIFFICULTY SEEING, EVEN WHEN WEARING GLASSES: NO
BECAUSE OF A PHYSICAL, MENTAL, OR EMOTIONAL CONDITION, DO YOU HAVE SERIOUS DIFFICULTY CONCENTRATING, REMEMBERING OR MAKING DECISIONS: NO
DO YOU HAVE SERIOUS DIFFICULTY WALKING OR CLIMBING STAIRS: NO

## 2020-10-07 ASSESSMENT — COLUMBIA-SUICIDE SEVERITY RATING SCALE - C-SSRS
6. HAVE YOU EVER DONE ANYTHING, STARTED TO DO ANYTHING, OR PREPARED TO DO ANYTHING TO END YOUR LIFE?: NO
IS THE PATIENT ABLE TO COMPLETE C-SSRS: YES
2. HAVE YOU ACTUALLY HAD ANY THOUGHTS OF KILLING YOURSELF?: NO
1. WITHIN THE PAST MONTH, HAVE YOU WISHED YOU WERE DEAD OR WISHED YOU COULD GO TO SLEEP AND NOT WAKE UP?: NO

## 2020-10-07 ASSESSMENT — PATIENT HEALTH QUESTIONNAIRE - PHQ9
IS PATIENT ABLE TO COMPLETE PHQ2 OR PHQ9: YES
SUM OF ALL RESPONSES TO PHQ9 QUESTIONS 1 AND 2: 0
1. LITTLE INTEREST OR PLEASURE IN DOING THINGS: NOT AT ALL
2. FEELING DOWN, DEPRESSED OR HOPELESS: NOT AT ALL
CLINICAL INTERPRETATION OF PHQ2 SCORE: NO FURTHER SCREENING NEEDED
IS PATIENT ABLE TO COMPLETE PHQ2 OR PHQ9: NO, DEFER TO LATER TIME
CLINICAL INTERPRETATION OF PHQ2 SCORE: NO FURTHER SCREENING NEEDED

## 2020-10-07 ASSESSMENT — ACTIVITIES OF DAILY LIVING (ADL)
ADL_SHORT_OF_BREATH: NO
RECENT_DECLINE_ADL: NO
CHRONIC_PAIN_PRESENT: NO
ADL_BEFORE_ADMISSION: INDEPENDENT
ADL_SCORE: 12

## 2020-10-07 ASSESSMENT — PAIN SCALES - GENERAL
PAINLEVEL_OUTOF10: 0

## 2020-10-08 VITALS
HEART RATE: 77 BPM | DIASTOLIC BLOOD PRESSURE: 64 MMHG | SYSTOLIC BLOOD PRESSURE: 104 MMHG | BODY MASS INDEX: 31.84 KG/M2 | HEIGHT: 68 IN | WEIGHT: 210.1 LBS | RESPIRATION RATE: 16 BRPM | TEMPERATURE: 98.8 F | OXYGEN SATURATION: 94 %

## 2020-10-08 LAB
ANION GAP SERPL CALC-SCNC: 8 MMOL/L (ref 10–20)
ATRIAL RATE (BPM): 75
BUN SERPL-MCNC: 17 MG/DL (ref 6–20)
BUN/CREAT SERPL: 21 (ref 7–25)
CALCIUM SERPL-MCNC: 7.9 MG/DL (ref 8.4–10.2)
CHLORIDE SERPL-SCNC: 111 MMOL/L (ref 98–107)
CO2 SERPL-SCNC: 27 MMOL/L (ref 21–32)
CREAT SERPL-MCNC: 0.8 MG/DL (ref 0.51–0.95)
ERYTHROCYTE [DISTWIDTH] IN BLOOD: 14.4 % (ref 11–15)
GLUCOSE SERPL-MCNC: 110 MG/DL (ref 65–99)
HCT VFR BLD CALC: 32.3 % (ref 36–46.5)
HGB BLD-MCNC: 10.3 G/DL (ref 12–15.5)
MAGNESIUM SERPL-MCNC: 1.8 MG/DL (ref 1.7–2.4)
MCH RBC QN AUTO: 29.2 PG (ref 26–34)
MCHC RBC AUTO-ENTMCNC: 31.9 G/DL (ref 32–36.5)
MCV RBC AUTO: 91.5 FL (ref 78–100)
NRBC BLD MANUAL-RTO: 0 /100 WBC
P AXIS (DEGREES): 44
PLATELET # BLD: 81 K/MCL (ref 140–450)
POTASSIUM SERPL-SCNC: 3.5 MMOL/L (ref 3.4–5.1)
POTASSIUM SERPL-SCNC: 3.6 MMOL/L (ref 3.4–5.1)
PR-INTERVAL (MSEC): 142
QRS-INTERVAL (MSEC): 98
QT-INTERVAL (MSEC): 430
QTC: 480
R AXIS (DEGREES): 1
RBC # BLD: 3.53 MIL/MCL (ref 4–5.2)
REPORT TEXT: NORMAL
SODIUM SERPL-SCNC: 143 MMOL/L (ref 135–145)
T AXIS (DEGREES): 62
VENTRICULAR RATE EKG/MIN (BPM): 75
WBC # BLD: 5.2 K/MCL (ref 4.2–11)

## 2020-10-08 PROCEDURE — 83735 ASSAY OF MAGNESIUM: CPT

## 2020-10-08 PROCEDURE — 93005 ELECTROCARDIOGRAM TRACING: CPT | Performed by: NURSE PRACTITIONER

## 2020-10-08 PROCEDURE — 10002803 HB RX 637: Performed by: HOSPITALIST

## 2020-10-08 PROCEDURE — 10004651 HB RX, NO CHARGE ITEM: Performed by: NURSE PRACTITIONER

## 2020-10-08 PROCEDURE — 84132 ASSAY OF SERUM POTASSIUM: CPT

## 2020-10-08 PROCEDURE — 10002800 HB RX 250 W HCPCS: Performed by: NURSE PRACTITIONER

## 2020-10-08 PROCEDURE — 36415 COLL VENOUS BLD VENIPUNCTURE: CPT

## 2020-10-08 PROCEDURE — 85027 COMPLETE CBC AUTOMATED: CPT

## 2020-10-08 PROCEDURE — 10002803 HB RX 637: Performed by: NURSE PRACTITIONER

## 2020-10-08 PROCEDURE — 80048 BASIC METABOLIC PNL TOTAL CA: CPT

## 2020-10-08 PROCEDURE — 10002803 HB RX 637: Performed by: INTERNAL MEDICINE

## 2020-10-08 RX ORDER — POTASSIUM CHLORIDE 20 MEQ/1
40 TABLET, EXTENDED RELEASE ORAL ONCE
Status: COMPLETED | OUTPATIENT
Start: 2020-10-08 | End: 2020-10-08

## 2020-10-08 RX ORDER — CEPHALEXIN 500 MG/1
500 CAPSULE ORAL 4 TIMES DAILY
Qty: 28 CAPSULE | Refills: 0 | Status: SHIPPED | OUTPATIENT
Start: 2020-10-08 | End: 2020-10-15

## 2020-10-08 RX ORDER — POTASSIUM CHLORIDE 20 MEQ/1
40 TABLET, EXTENDED RELEASE ORAL ONCE
Status: DISCONTINUED | OUTPATIENT
Start: 2020-10-08 | End: 2020-10-08 | Stop reason: HOSPADM

## 2020-10-08 RX ORDER — POTASSIUM CHLORIDE 20 MEQ/1
40 TABLET, EXTENDED RELEASE ORAL ONCE
Status: DISCONTINUED | OUTPATIENT
Start: 2020-10-08 | End: 2020-10-08

## 2020-10-08 RX ORDER — SERTRALINE HYDROCHLORIDE 100 MG/1
150 TABLET, FILM COATED ORAL DAILY
Status: DISCONTINUED | OUTPATIENT
Start: 2020-10-08 | End: 2020-10-08 | Stop reason: HOSPADM

## 2020-10-08 RX ADMIN — CLOPIDOGREL BISULFATE 75 MG: 75 TABLET, FILM COATED ORAL at 09:37

## 2020-10-08 RX ADMIN — METOPROLOL TARTRATE 100 MG: 50 TABLET, FILM COATED ORAL at 09:38

## 2020-10-08 RX ADMIN — DOCUSATE SODIUM AND SENNOSIDES 2 TABLET: 8.6; 5 TABLET, FILM COATED ORAL at 09:37

## 2020-10-08 RX ADMIN — POTASSIUM CHLORIDE 40 MEQ: 1500 TABLET, EXTENDED RELEASE ORAL at 09:52

## 2020-10-08 RX ADMIN — CEFAZOLIN SODIUM 2000 MG: 300 INJECTION, POWDER, LYOPHILIZED, FOR SOLUTION INTRAVENOUS at 06:10

## 2020-10-08 RX ADMIN — FUROSEMIDE 40 MG: 40 TABLET ORAL at 09:38

## 2020-10-08 RX ADMIN — ASPIRIN 81 MG: 81 TABLET, COATED ORAL at 09:37

## 2020-10-08 RX ADMIN — LISINOPRIL 5 MG: 2.5 TABLET ORAL at 09:37

## 2020-10-08 RX ADMIN — IVABRADINE 5 MG: 5 TABLET, FILM COATED ORAL at 09:37

## 2020-10-08 RX ADMIN — POTASSIUM CHLORIDE 20 MEQ: 1500 TABLET, EXTENDED RELEASE ORAL at 09:38

## 2020-10-08 RX ADMIN — SERTRALINE HYDROCHLORIDE 150 MG: 100 TABLET, FILM COATED ORAL at 12:09

## 2020-10-08 ASSESSMENT — COGNITIVE AND FUNCTIONAL STATUS - GENERAL
DO YOU HAVE DIFFICULTY DRESSING OR BATHING: NO
BECAUSE OF A PHYSICAL, MENTAL, OR EMOTIONAL CONDITION, DO YOU HAVE SERIOUS DIFFICULTY CONCENTRATING, REMEMBERING OR MAKING DECISIONS: NO
DO YOU HAVE SERIOUS DIFFICULTY WALKING OR CLIMBING STAIRS: NO
BECAUSE OF A PHYSICAL, MENTAL, OR EMOTIONAL CONDITION, DO YOU HAVE DIFFICULTY DOING ERRANDS ALONE: NO

## 2020-10-08 ASSESSMENT — PATIENT HEALTH QUESTIONNAIRE - PHQ9
SUM OF ALL RESPONSES TO PHQ9 QUESTIONS 1 AND 2: 0
IS PATIENT ABLE TO COMPLETE PHQ2 OR PHQ9: YES
CLINICAL INTERPRETATION OF PHQ9 SCORE: NO FURTHER SCREENING NEEDED
CLINICAL INTERPRETATION OF PHQ2 SCORE: NO FURTHER SCREENING NEEDED

## 2020-10-08 ASSESSMENT — PAIN SCALES - GENERAL: PAINLEVEL_OUTOF10: 0

## 2020-10-12 PROBLEM — Z98.890 S/P MITRAL VALVE CLIP IMPLANTATION: Status: ACTIVE | Noted: 2020-10-12

## 2020-10-12 PROBLEM — Z95.818 S/P MITRAL VALVE CLIP IMPLANTATION: Status: ACTIVE | Noted: 2020-10-12

## 2020-10-12 PROBLEM — I50.22 CHRONIC SYSTOLIC CHF (CONGESTIVE HEART FAILURE) (HCC): Status: ACTIVE | Noted: 2020-10-12

## 2020-11-04 ENCOUNTER — TELEPHONE (OUTPATIENT)
Dept: CARDIOLOGY | Age: 70
End: 2020-11-04

## 2020-11-05 ENCOUNTER — HOSPITAL ENCOUNTER (OUTPATIENT)
Dept: CARDIOLOGY | Age: 70
Discharge: HOME | End: 2020-11-05
Attending: NURSE PRACTITIONER

## 2020-11-05 ENCOUNTER — OFFICE VISIT (OUTPATIENT)
Dept: CARDIOLOGY | Age: 70
End: 2020-11-05
Attending: NURSE PRACTITIONER

## 2020-11-05 VITALS
DIASTOLIC BLOOD PRESSURE: 44 MMHG | RESPIRATION RATE: 16 BRPM | BODY MASS INDEX: 31.51 KG/M2 | WEIGHT: 212.74 LBS | OXYGEN SATURATION: 97 % | SYSTOLIC BLOOD PRESSURE: 108 MMHG | HEIGHT: 69 IN | HEART RATE: 40 BPM

## 2020-11-05 DIAGNOSIS — I34.0 NONRHEUMATIC MITRAL (VALVE) INSUFFICIENCY: Primary | ICD-10-CM

## 2020-11-05 DIAGNOSIS — I25.10 CAD S/P PERCUTANEOUS CORONARY ANGIOPLASTY: ICD-10-CM

## 2020-11-05 DIAGNOSIS — Z98.61 CAD S/P PERCUTANEOUS CORONARY ANGIOPLASTY: ICD-10-CM

## 2020-11-05 DIAGNOSIS — I50.42 CHRONIC COMBINED SYSTOLIC AND DIASTOLIC CONGESTIVE HEART FAILURE (CMD): ICD-10-CM

## 2020-11-05 DIAGNOSIS — I34.0 NONRHEUMATIC MITRAL (VALVE) INSUFFICIENCY: ICD-10-CM

## 2020-11-05 DIAGNOSIS — I25.5 ISCHEMIC CARDIOMYOPATHY: ICD-10-CM

## 2020-11-05 DIAGNOSIS — Z98.890 STATUS POST IMPLANTATION OF MITRAL VALVE LEAFLET CLIP: ICD-10-CM

## 2020-11-05 DIAGNOSIS — Z95.818 STATUS POST IMPLANTATION OF MITRAL VALVE LEAFLET CLIP: ICD-10-CM

## 2020-11-05 DIAGNOSIS — I10 HYPERTENSION, UNSPECIFIED TYPE: ICD-10-CM

## 2020-11-05 PROBLEM — I50.32 CHRONIC DIASTOLIC CHF (CONGESTIVE HEART FAILURE) (HCC): Status: ACTIVE | Noted: 2020-11-05

## 2020-11-05 LAB
ANION GAP SERPL CALC-SCNC: 8 MMOL/L (ref 10–20)
BUN SERPL-MCNC: 22 MG/DL (ref 6–20)
BUN/CREAT SERPL: 25 (ref 7–25)
CALCIUM SERPL-MCNC: 8.8 MG/DL (ref 8.4–10.2)
CHLORIDE SERPL-SCNC: 110 MMOL/L (ref 98–107)
CO2 SERPL-SCNC: 28 MMOL/L (ref 21–32)
CREAT SERPL-MCNC: 0.88 MG/DL (ref 0.51–0.95)
ERYTHROCYTE [DISTWIDTH] IN BLOOD: 13.7 % (ref 11–15)
GLUCOSE SERPL-MCNC: 93 MG/DL (ref 65–99)
HCT VFR BLD CALC: 34.7 % (ref 36–46.5)
HGB BLD-MCNC: 11.2 G/DL (ref 12–15.5)
MCH RBC QN AUTO: 28.4 PG (ref 26–34)
MCHC RBC AUTO-ENTMCNC: 32.3 G/DL (ref 32–36.5)
MCV RBC AUTO: 88.1 FL (ref 78–100)
NRBC BLD MANUAL-RTO: 0 /100 WBC
NT-PROBNP SERPL-MCNC: 3727 PG/ML
PLATELET # BLD: 95 K/MCL (ref 140–450)
POTASSIUM SERPL-SCNC: 3.6 MMOL/L (ref 3.4–5.1)
RBC # BLD: 3.94 MIL/MCL (ref 4–5.2)
SODIUM SERPL-SCNC: 142 MMOL/L (ref 135–145)
WBC # BLD: 3.7 K/MCL (ref 4.2–11)

## 2020-11-05 PROCEDURE — 36415 COLL VENOUS BLD VENIPUNCTURE: CPT

## 2020-11-05 PROCEDURE — 94618 PULMONARY STRESS TESTING: CPT

## 2020-11-05 PROCEDURE — 85027 COMPLETE CBC AUTOMATED: CPT

## 2020-11-05 PROCEDURE — 80048 BASIC METABOLIC PNL TOTAL CA: CPT

## 2020-11-05 PROCEDURE — 93005 ELECTROCARDIOGRAM TRACING: CPT

## 2020-11-05 PROCEDURE — 93306 TTE W/DOPPLER COMPLETE: CPT

## 2020-11-05 PROCEDURE — 83880 ASSAY OF NATRIURETIC PEPTIDE: CPT

## 2020-11-05 RX ORDER — METOPROLOL TARTRATE 50 MG/1
50 TABLET, FILM COATED ORAL 2 TIMES DAILY
Qty: 180 TABLET | Refills: 3 | Status: SHIPPED | COMMUNITY
Start: 2020-11-05

## 2020-11-05 RX ORDER — IBUPROFEN 200 MG
600 TABLET ORAL EVERY 6 HOURS PRN
COMMUNITY

## 2020-11-06 LAB
ATRIAL RATE (BPM): 77
P AXIS (DEGREES): 47
PR-INTERVAL (MSEC): 104
QRS-INTERVAL (MSEC): 98
QT-INTERVAL (MSEC): 416
QTC: 471
R AXIS (DEGREES): 15
REPORT TEXT: NORMAL
T AXIS (DEGREES): 100
VENTRICULAR RATE EKG/MIN (BPM): 77

## 2020-11-19 RX ORDER — POTASSIUM CHLORIDE 1500 MG/1
1 TABLET, FILM COATED, EXTENDED RELEASE ORAL 2 TIMES DAILY
Qty: 60 TABLET | Refills: 0 | Status: SHIPPED | OUTPATIENT
Start: 2020-11-19 | End: 2020-11-20

## 2020-11-19 NOTE — TELEPHONE ENCOUNTER
Please approve/deny rx for potassium. Pt states she is taking 20meq bid and is to follow up with cardiology next week. States they have been monitoring her potassium. Okay to fill?

## 2020-11-20 RX ORDER — POTASSIUM CHLORIDE 1500 MG/1
TABLET, FILM COATED, EXTENDED RELEASE ORAL
Qty: 180 TABLET | Refills: 0 | Status: SHIPPED | OUTPATIENT
Start: 2020-11-20 | End: 2020-11-23

## 2020-12-01 ENCOUNTER — LAB ENCOUNTER (OUTPATIENT)
Dept: LAB | Facility: HOSPITAL | Age: 70
End: 2020-12-01
Attending: NURSE PRACTITIONER
Payer: MEDICARE

## 2020-12-01 DIAGNOSIS — I50.22 CHRONIC SYSTOLIC CHF (CONGESTIVE HEART FAILURE) (HCC): ICD-10-CM

## 2020-12-01 PROCEDURE — 80048 BASIC METABOLIC PNL TOTAL CA: CPT

## 2020-12-01 PROCEDURE — 36415 COLL VENOUS BLD VENIPUNCTURE: CPT

## 2020-12-09 ENCOUNTER — LAB ENCOUNTER (OUTPATIENT)
Dept: LAB | Facility: HOSPITAL | Age: 70
End: 2020-12-09
Attending: NURSE PRACTITIONER
Payer: MEDICARE

## 2020-12-09 DIAGNOSIS — I50.22 CHRONIC SYSTOLIC CHF (CONGESTIVE HEART FAILURE) (HCC): ICD-10-CM

## 2020-12-09 DIAGNOSIS — I10 ESSENTIAL HYPERTENSION: ICD-10-CM

## 2020-12-09 PROCEDURE — 80048 BASIC METABOLIC PNL TOTAL CA: CPT

## 2020-12-09 PROCEDURE — 36415 COLL VENOUS BLD VENIPUNCTURE: CPT

## 2020-12-09 PROCEDURE — 85049 AUTOMATED PLATELET COUNT: CPT

## 2020-12-14 RX ORDER — HYDROCODONE BITARTRATE AND ACETAMINOPHEN 10; 325 MG/1; MG/1
1 TABLET ORAL EVERY 6 HOURS PRN
Qty: 60 TABLET | Refills: 0 | Status: SHIPPED | OUTPATIENT
Start: 2020-12-14

## 2020-12-14 NOTE — TELEPHONE ENCOUNTER
Dr. Marc Valle,  See below. norco last refilled 7/24/20.   Last office visit 2/25/20 for pre-op visit  Medication pended

## 2020-12-22 ENCOUNTER — LAB ENCOUNTER (OUTPATIENT)
Dept: LAB | Facility: HOSPITAL | Age: 70
End: 2020-12-22
Attending: NURSE PRACTITIONER
Payer: MEDICARE

## 2020-12-22 DIAGNOSIS — I50.22 CHRONIC SYSTOLIC CHF (CONGESTIVE HEART FAILURE) (HCC): ICD-10-CM

## 2020-12-22 PROCEDURE — 36415 COLL VENOUS BLD VENIPUNCTURE: CPT

## 2020-12-22 PROCEDURE — 80048 BASIC METABOLIC PNL TOTAL CA: CPT

## 2021-01-25 DIAGNOSIS — Z23 NEED FOR VACCINATION: ICD-10-CM

## 2021-01-26 ENCOUNTER — OFFICE VISIT (OUTPATIENT)
Dept: FAMILY MEDICINE CLINIC | Facility: CLINIC | Age: 71
End: 2021-01-26
Payer: MEDICARE

## 2021-01-26 VITALS
BODY MASS INDEX: 33.34 KG/M2 | HEIGHT: 68 IN | SYSTOLIC BLOOD PRESSURE: 122 MMHG | TEMPERATURE: 98 F | RESPIRATION RATE: 18 BRPM | WEIGHT: 220 LBS | DIASTOLIC BLOOD PRESSURE: 62 MMHG | HEART RATE: 78 BPM | OXYGEN SATURATION: 98 %

## 2021-01-26 DIAGNOSIS — D69.6 THROMBOCYTOPENIA, UNSPECIFIED (HCC): ICD-10-CM

## 2021-01-26 DIAGNOSIS — J81.0 FLASH PULMONARY EDEMA (HCC): ICD-10-CM

## 2021-01-26 DIAGNOSIS — Z86.14 HISTORY OF MRSA INFECTION: ICD-10-CM

## 2021-01-26 DIAGNOSIS — Z80.3 FAMILY HISTORY OF BREAST CANCER IN FEMALE: ICD-10-CM

## 2021-01-26 DIAGNOSIS — I10 ESSENTIAL HYPERTENSION: ICD-10-CM

## 2021-01-26 DIAGNOSIS — E55.9 VITAMIN D DEFICIENCY: ICD-10-CM

## 2021-01-26 DIAGNOSIS — E53.8 VITAMIN B12 DEFICIENCY: ICD-10-CM

## 2021-01-26 DIAGNOSIS — I89.0 LYMPHEDEMA OF ARM: ICD-10-CM

## 2021-01-26 DIAGNOSIS — C50.811 MALIGNANT NEOPLASM OF OVERLAPPING SITES OF RIGHT FEMALE BREAST, UNSPECIFIED ESTROGEN RECEPTOR STATUS (HCC): ICD-10-CM

## 2021-01-26 DIAGNOSIS — I50.32 CHRONIC DIASTOLIC CHF (CONGESTIVE HEART FAILURE) (HCC): ICD-10-CM

## 2021-01-26 DIAGNOSIS — R73.9 HYPERGLYCEMIA: ICD-10-CM

## 2021-01-26 DIAGNOSIS — Z95.818 S/P MITRAL VALVE CLIP IMPLANTATION: ICD-10-CM

## 2021-01-26 DIAGNOSIS — I25.5 ISCHEMIC CARDIOMYOPATHY: ICD-10-CM

## 2021-01-26 DIAGNOSIS — Z11.59 NEED FOR HEPATITIS C SCREENING TEST: ICD-10-CM

## 2021-01-26 DIAGNOSIS — Z00.00 ENCOUNTER FOR ANNUAL HEALTH EXAMINATION: Primary | ICD-10-CM

## 2021-01-26 DIAGNOSIS — G89.29 OTHER CHRONIC PAIN: ICD-10-CM

## 2021-01-26 DIAGNOSIS — E04.9 GOITER: ICD-10-CM

## 2021-01-26 DIAGNOSIS — Z87.01 HISTORY OF PNEUMONIA: ICD-10-CM

## 2021-01-26 DIAGNOSIS — I34.0 NONRHEUMATIC MITRAL VALVE REGURGITATION: ICD-10-CM

## 2021-01-26 DIAGNOSIS — D05.12 DUCTAL CARCINOMA IN SITU (DCIS) OF LEFT BREAST: ICD-10-CM

## 2021-01-26 DIAGNOSIS — Z98.890 S/P MITRAL VALVE CLIP IMPLANTATION: ICD-10-CM

## 2021-01-26 DIAGNOSIS — I25.10 CORONARY ARTERY DISEASE INVOLVING NATIVE CORONARY ARTERY OF NATIVE HEART WITHOUT ANGINA PECTORIS: ICD-10-CM

## 2021-01-26 DIAGNOSIS — E78.5 DYSLIPIDEMIA: ICD-10-CM

## 2021-01-26 DIAGNOSIS — Z90.12 H/O LEFT MASTECTOMY: ICD-10-CM

## 2021-01-26 DIAGNOSIS — Z78.0 ASYMPTOMATIC MENOPAUSE: ICD-10-CM

## 2021-01-26 DIAGNOSIS — Z13.820 SCREENING FOR OSTEOPOROSIS: ICD-10-CM

## 2021-01-26 DIAGNOSIS — F32.0 MILD SINGLE CURRENT EPISODE OF MAJOR DEPRESSIVE DISORDER (HCC): ICD-10-CM

## 2021-01-26 DIAGNOSIS — J96.01 ACUTE RESPIRATORY FAILURE WITH HYPOXIA (HCC): ICD-10-CM

## 2021-01-26 DIAGNOSIS — I50.9 ACUTE ON CHRONIC CONGESTIVE HEART FAILURE, UNSPECIFIED HEART FAILURE TYPE (HCC): ICD-10-CM

## 2021-01-26 DIAGNOSIS — D69.6 THROMBOCYTOPENIA (HCC): ICD-10-CM

## 2021-01-26 PROBLEM — M17.10 ARTHRITIS OF KNEE, DEGENERATIVE: Status: RESOLVED | Noted: 2017-01-23 | Resolved: 2021-01-26

## 2021-01-26 PROBLEM — I50.22 CHRONIC SYSTOLIC CHF (CONGESTIVE HEART FAILURE) (HCC): Status: RESOLVED | Noted: 2020-10-12 | Resolved: 2021-01-26

## 2021-01-26 PROBLEM — E07.9 THYROID MASS: Status: RESOLVED | Noted: 2020-09-30 | Resolved: 2021-01-26

## 2021-01-26 PROBLEM — T84.032A MECHANICAL LOOSENING OF INTERNAL RIGHT KNEE PROSTHETIC JOINT (HCC): Status: RESOLVED | Noted: 2017-05-10 | Resolved: 2021-01-26

## 2021-01-26 PROBLEM — T84.032A MECHANICAL LOOSENING OF INTERNAL RIGHT KNEE PROSTHETIC JOINT: Status: RESOLVED | Noted: 2017-05-10 | Resolved: 2021-01-26

## 2021-01-26 PROBLEM — T81.43XA POSTOPERATIVE INTRA-ABDOMINAL ABSCESS (HCC): Status: RESOLVED | Noted: 2017-09-21 | Resolved: 2021-01-26

## 2021-01-26 PROBLEM — J32.4 PANSINUSITIS: Status: RESOLVED | Noted: 2018-04-26 | Resolved: 2021-01-26

## 2021-01-26 PROBLEM — J01.90 ACUTE NON-RECURRENT SINUSITIS: Status: RESOLVED | Noted: 2018-04-26 | Resolved: 2021-01-26

## 2021-01-26 PROBLEM — L03.115 CELLULITIS OF RIGHT LOWER EXTREMITY: Status: RESOLVED | Noted: 2017-06-17 | Resolved: 2021-01-26

## 2021-01-26 PROBLEM — J40 BRONCHITIS: Status: RESOLVED | Noted: 2020-09-14 | Resolved: 2021-01-26

## 2021-01-26 PROBLEM — K65.1 POSTOPERATIVE INTRA-ABDOMINAL ABSCESS (HCC): Status: RESOLVED | Noted: 2017-09-21 | Resolved: 2021-01-26

## 2021-01-26 PROBLEM — T81.43XA POSTOPERATIVE INTRA-ABDOMINAL ABSCESS: Status: RESOLVED | Noted: 2017-09-21 | Resolved: 2021-01-26

## 2021-01-26 PROBLEM — R06.03 RESPIRATORY DISTRESS: Status: RESOLVED | Noted: 2020-09-14 | Resolved: 2021-01-26

## 2021-01-26 PROBLEM — M17.9 ARTHRITIS OF KNEE, DEGENERATIVE: Status: RESOLVED | Noted: 2017-01-23 | Resolved: 2021-01-26

## 2021-01-26 PROCEDURE — 99213 OFFICE O/P EST LOW 20 MIN: CPT | Performed by: FAMILY MEDICINE

## 2021-01-26 PROCEDURE — G0439 PPPS, SUBSEQ VISIT: HCPCS | Performed by: FAMILY MEDICINE

## 2021-01-26 NOTE — PATIENT INSTRUCTIONS
Sally Gonzales's SCREENING SCHEDULE   Tests on this list are recommended by your physician but may not be covered, or covered at this frequency, by your insurer. Please check with your insurance carrier before scheduling to verify coverage.    PREVENTAT 65-75) IPPE only No results found for this or any previous visit.  Limited to patients who meet one of the following criteria:   • Men who are 73-68 years old and have smoked more than 100 cigarettes in their lifetime   • Anyone with a family history    Col preventive care reminders to display for this patient.  Please get this Mammogram regularly   Immunizations      Influenza  Covered Annually Orders placed or performed in visit on 11/17/16   • FLU VAC NO PRSV 4 TARYN 3 YRS+   Orders placed or performed in vis of the different types of Advance Directives. It also has the State forms available on it's website for anyone to review and print using their home computer and printer. (the forms are also available in [address])  [email or web address]  This link also has

## 2021-01-26 NOTE — PROGRESS NOTES
HPI:   Rzo Nowak is a 69year old female who presents for a Medicare Subsequent Annual Wellness visit (Pt already had Initial Annual Wellness).     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 Difficulty dressing or bathing?: Y  Bathing or Showering: Need some help  Dressing: Able without help      She has Vision problems based on screening of functional status.    Vision Problems? : Yes oncology      Essential hypertension- stable on meds      Mild single current episode of major depressive disorder ([address]- stable on meds      Thrombocytopenia ([facility]- labs due      Hyperglycemia- labs due      Acute on chronic congestive heart failure, unsp daily.    •  spironolactone 25 MG Oral Tab, Take 1 tablet (25 mg total) by mouth daily. •  B Complex Vitamins (VITAMIN B COMPLEX) Oral Tab, Take 1 tablet by mouth daily.     •  Fluticasone Propionate 50 MCG/ACT Nasal Suspension, 2 sprays by Each Nare rou knee replacement (Right, 5/10/17); and total knee replacement (Right, 09/25/2017).     Her family history includes Breast Cancer (age of onset: 39) in her self; Breast Cancer (age of onset: 39) in her maternal aunt; Breast Cancer (age of onset: 52) in her m Yes      General Appearance:  Alert, cooperative, no distress, appears stated age   Head:  Normocephalic, without obvious abnormality, atraumatic   Eyes:  PERRL, conjunctiva/corneas clear, EOM's intact both eyes   Ears:  Normal TM's and external ear canals OFFICE/OUTPT VISIT,EST,LEVL III    Flash pulmonary edema ([facility]- see cards   -     OFFICE/OUTPT VISIT,EST,LEVL III    Thrombocytopenia, unspecified ([facility]- labs due   -     OFFICE/OUTPT VISIT,EST,LEVL III    Malignant neoplasm of overlapping sites of rig monitor labs   -     OFFICE/OUTPT VISIT,EST,LEVL III    Ischemic cardiomyopathy- see cards   -     OFFICE/OUTPT VISIT,EST,LEVL III    Coronary artery disease involving native coronary artery of native heart without angina pectoris- check labs   -     LIPID Fasting Blood Sugar (FSB)Annually Glucose (mg/dL)   Date Value   12/22/2020 101 (H)     GLUCOSE (mg/dL)   Date Value   07/28/2014 158 (H)   03/21/2014 96          Cardiovascular Disease Screening     LDL Annually LDL Cholesterol (mg/dL)   Date Value   02/2 No vaccine history found Medium/high risk factors:   End-stage renal disease   Hemophiliacs who received Factor VIII or IX concentrates   Clients of institutions for the mentally retarded   Persons who live in the same house as a HepB virus carrier   [facility]

## 2021-01-29 ENCOUNTER — HOSPITAL ENCOUNTER (OUTPATIENT)
Dept: LAB | Facility: HOSPITAL | Age: 71
Discharge: HOME | End: 2021-01-29
Attending: NURSE PRACTITIONER
Payer: MEDICARE

## 2021-01-29 ENCOUNTER — HOSPITAL ENCOUNTER (OUTPATIENT)
Dept: CV DIAGNOSTICS | Facility: HOSPITAL | Age: 71
Discharge: HOME | End: 2021-01-29
Attending: NURSE PRACTITIONER
Payer: MEDICARE

## 2021-01-29 DIAGNOSIS — Z11.59 NEED FOR HEPATITIS C SCREENING TEST: ICD-10-CM

## 2021-01-29 DIAGNOSIS — I10 ESSENTIAL HYPERTENSION: ICD-10-CM

## 2021-01-29 DIAGNOSIS — E04.9 GOITER: ICD-10-CM

## 2021-01-29 DIAGNOSIS — I25.10 CORONARY ARTERY DISEASE INVOLVING NATIVE CORONARY ARTERY OF NATIVE HEART WITHOUT ANGINA PECTORIS: ICD-10-CM

## 2021-01-29 DIAGNOSIS — E55.9 VITAMIN D DEFICIENCY: ICD-10-CM

## 2021-01-29 DIAGNOSIS — I50.22 CHRONIC SYSTOLIC CHF (CONGESTIVE HEART FAILURE) (HCC): ICD-10-CM

## 2021-01-29 DIAGNOSIS — E53.8 VITAMIN B12 DEFICIENCY: ICD-10-CM

## 2021-01-29 DIAGNOSIS — D69.6 THROMBOCYTOPENIA (HCC): ICD-10-CM

## 2021-01-29 DIAGNOSIS — Z78.0 ASYMPTOMATIC MENOPAUSE: ICD-10-CM

## 2021-01-29 LAB
ALBUMIN SERPL-MCNC: 4 G/DL (ref 3.4–5)
ALBUMIN/GLOB SERPL: 1.2 {RATIO} (ref 1–2)
ALP LIVER SERPL-CCNC: 72 U/L
ALT SERPL-CCNC: 15 U/L
ANION GAP SERPL CALC-SCNC: 3 MMOL/L (ref 0–18)
AST SERPL-CCNC: 24 U/L (ref 15–37)
BASOPHILS # BLD AUTO: 0.01 X10(3) UL (ref 0–0.2)
BASOPHILS NFR BLD AUTO: 0.3 %
BILIRUB SERPL-MCNC: 0.4 MG/DL (ref 0.1–2)
BUN BLD-MCNC: 32 MG/DL (ref 7–18)
BUN/CREAT SERPL: 29.1 (ref 10–20)
CALCIUM BLD-MCNC: 9 MG/DL (ref 8.5–10.1)
CHLORIDE SERPL-SCNC: 108 MMOL/L (ref 98–112)
CHOLEST SMN-MCNC: 260 MG/DL (ref ?–200)
CO2 SERPL-SCNC: 30 MMOL/L (ref 21–32)
CREAT BLD-MCNC: 1.1 MG/DL
DEPRECATED RDW RBC AUTO: 47.9 FL (ref 35.1–46.3)
EOSINOPHIL # BLD AUTO: 0.09 X10(3) UL (ref 0–0.7)
EOSINOPHIL NFR BLD AUTO: 3.1 %
ERYTHROCYTE [DISTWIDTH] IN BLOOD BY AUTOMATED COUNT: 14.4 % (ref 11–15)
GLOBULIN PLAS-MCNC: 3.3 G/DL (ref 2.8–4.4)
GLUCOSE BLD-MCNC: 92 MG/DL (ref 70–99)
HCT VFR BLD AUTO: 36.6 %
HCV AB SERPL QL IA: NONREACTIVE
HDLC SERPL-MCNC: 50 MG/DL (ref 40–59)
HGB BLD-MCNC: 12 G/DL
IMM GRANULOCYTES # BLD AUTO: 0.01 X10(3) UL (ref 0–1)
IMM GRANULOCYTES NFR BLD: 0.3 %
LDLC SERPL CALC-MCNC: 182 MG/DL (ref ?–100)
LYMPHOCYTES # BLD AUTO: 0.97 X10(3) UL (ref 1–4)
LYMPHOCYTES NFR BLD AUTO: 33.6 %
M PROTEIN MFR SERPL ELPH: 7.3 G/DL (ref 6.4–8.2)
MCH RBC QN AUTO: 29.8 PG (ref 26–34)
MCHC RBC AUTO-ENTMCNC: 32.8 G/DL (ref 31–37)
MCV RBC AUTO: 90.8 FL
MONOCYTES # BLD AUTO: 0.36 X10(3) UL (ref 0.1–1)
MONOCYTES NFR BLD AUTO: 12.5 %
NEUTROPHILS # BLD AUTO: 1.45 X10 (3) UL (ref 1.5–7.7)
NEUTROPHILS # BLD AUTO: 1.45 X10(3) UL (ref 1.5–7.7)
NEUTROPHILS NFR BLD AUTO: 50.2 %
NONHDLC SERPL-MCNC: 210 MG/DL (ref ?–130)
NT-PROBNP SERPL-MCNC: 820 PG/ML (ref ?–125)
OSMOLALITY SERPL CALC.SUM OF ELEC: 299 MOSM/KG (ref 275–295)
PATIENT FASTING Y/N/NP: YES
PATIENT FASTING Y/N/NP: YES
PLATELET # BLD AUTO: 131 10(3)UL (ref 150–450)
POTASSIUM SERPL-SCNC: 3.4 MMOL/L (ref 3.5–5.1)
RBC # BLD AUTO: 4.03 X10(6)UL
SODIUM SERPL-SCNC: 141 MMOL/L (ref 136–145)
T4 FREE SERPL-MCNC: 0.7 NG/DL (ref 0.8–1.7)
THYROGLOB SERPL-MCNC: <15 U/ML (ref ?–60)
THYROPEROXIDASE AB SERPL-ACNC: 31 U/ML (ref ?–60)
TRIGL SERPL-MCNC: 139 MG/DL (ref 30–149)
TSI SER-ACNC: 0.38 MIU/ML (ref 0.36–3.74)
VIT B12 SERPL-MCNC: 722 PG/ML (ref 193–986)
VIT D+METAB SERPL-MCNC: 43.9 NG/ML (ref 30–100)
VLDLC SERPL CALC-MCNC: 28 MG/DL (ref 0–30)
WBC # BLD AUTO: 2.9 X10(3) UL (ref 4–11)

## 2021-01-29 PROCEDURE — 84443 ASSAY THYROID STIM HORMONE: CPT

## 2021-01-29 PROCEDURE — 80053 COMPREHEN METABOLIC PANEL: CPT

## 2021-01-29 PROCEDURE — 93306 TTE W/DOPPLER COMPLETE: CPT | Performed by: NURSE PRACTITIONER

## 2021-01-29 PROCEDURE — 85025 COMPLETE CBC W/AUTO DIFF WBC: CPT

## 2021-01-29 PROCEDURE — 86803 HEPATITIS C AB TEST: CPT

## 2021-01-29 PROCEDURE — 82306 VITAMIN D 25 HYDROXY: CPT

## 2021-01-29 PROCEDURE — 86800 THYROGLOBULIN ANTIBODY: CPT

## 2021-01-29 PROCEDURE — 82607 VITAMIN B-12: CPT

## 2021-01-29 PROCEDURE — 80061 LIPID PANEL: CPT

## 2021-01-29 PROCEDURE — 84439 ASSAY OF FREE THYROXINE: CPT

## 2021-01-29 PROCEDURE — 36415 COLL VENOUS BLD VENIPUNCTURE: CPT

## 2021-01-29 PROCEDURE — 83880 ASSAY OF NATRIURETIC PEPTIDE: CPT

## 2021-01-29 PROCEDURE — 86376 MICROSOMAL ANTIBODY EACH: CPT

## 2021-02-02 ENCOUNTER — IMMUNIZATION (OUTPATIENT)
Dept: LAB | Age: 71
End: 2021-02-02
Attending: HOSPITALIST
Payer: MEDICARE

## 2021-02-02 DIAGNOSIS — Z23 NEED FOR VACCINATION: Primary | ICD-10-CM

## 2021-02-02 PROCEDURE — 0001A SARSCOV2 VAC 30MCG/0.3ML IM: CPT

## 2021-02-22 RX ORDER — TAMOXIFEN CITRATE 20 MG/1
TABLET ORAL
Qty: 30 TABLET | Refills: 0 | Status: SHIPPED | OUTPATIENT
Start: 2021-02-22 | End: 2021-05-12

## 2021-02-22 RX ORDER — SERTRALINE HYDROCHLORIDE 100 MG/1
150 TABLET, FILM COATED ORAL DAILY
Qty: 135 TABLET | Refills: 0 | Status: SHIPPED | OUTPATIENT
Start: 2021-02-22 | End: 2021-06-01

## 2021-02-23 ENCOUNTER — IMMUNIZATION (OUTPATIENT)
Dept: LAB | Age: 71
End: 2021-02-23
Attending: HOSPITALIST
Payer: MEDICARE

## 2021-02-23 DIAGNOSIS — Z23 NEED FOR VACCINATION: Primary | ICD-10-CM

## 2021-02-23 PROCEDURE — 0002A SARSCOV2 VAC 30MCG/0.3ML IM: CPT

## 2021-02-24 RX ORDER — GABAPENTIN 100 MG/1
100 CAPSULE ORAL 2 TIMES DAILY PRN
Qty: 270 CAPSULE | Refills: 0 | Status: SHIPPED | OUTPATIENT
Start: 2021-02-24 | End: 2021-08-03

## 2021-04-09 ENCOUNTER — HOSPITAL ENCOUNTER (OUTPATIENT)
Dept: CT IMAGING | Facility: HOSPITAL | Age: 71
Discharge: HOME | End: 2021-04-09
Attending: INTERNAL MEDICINE
Payer: MEDICARE

## 2021-04-09 DIAGNOSIS — R93.89 ABNORMAL CT OF THE CHEST: ICD-10-CM

## 2021-04-09 PROCEDURE — 82565 ASSAY OF CREATININE: CPT

## 2021-04-09 PROCEDURE — 71260 CT THORAX DX C+: CPT | Performed by: INTERNAL MEDICINE

## 2021-04-15 NOTE — TELEPHONE ENCOUNTER
Pt was told a copy of her referral will be at  ready for , pt stated her  will pick it up. milk/finger food

## 2021-04-15 NOTE — PROGRESS NOTES
Let her know ct looks much better than prior. Improved fluid overload. Incidental finding of atherosclerosis, not uncommon at her age. Should d/w pcp if this is a new finding.

## 2021-04-21 NOTE — PROGRESS NOTES
Notified pt of results. Advised to discuss findings of atherosclerosis w/ PCP. Pt verbalized understanding.

## 2021-04-22 ENCOUNTER — HOSPITAL ENCOUNTER (OUTPATIENT)
Dept: ULTRASOUND IMAGING | Age: 71
Discharge: HOME | End: 2021-04-22
Attending: FAMILY MEDICINE
Payer: MEDICARE

## 2021-04-22 ENCOUNTER — LAB ENCOUNTER (OUTPATIENT)
Dept: LAB | Age: 71
End: 2021-04-22
Attending: NURSE PRACTITIONER
Payer: MEDICARE

## 2021-04-22 ENCOUNTER — LAB ENCOUNTER (OUTPATIENT)
Dept: LAB | Age: 71
End: 2021-04-22
Attending: FAMILY MEDICINE
Payer: MEDICARE

## 2021-04-22 DIAGNOSIS — D70.8 OTHER NEUTROPENIA (HCC): ICD-10-CM

## 2021-04-22 DIAGNOSIS — R94.4 DECREASED GFR: ICD-10-CM

## 2021-04-22 DIAGNOSIS — E04.9 GOITER: ICD-10-CM

## 2021-04-22 PROCEDURE — 80053 COMPREHEN METABOLIC PANEL: CPT

## 2021-04-22 PROCEDURE — 76536 US EXAM OF HEAD AND NECK: CPT | Performed by: FAMILY MEDICINE

## 2021-04-22 PROCEDURE — 36415 COLL VENOUS BLD VENIPUNCTURE: CPT

## 2021-04-22 PROCEDURE — 85025 COMPLETE CBC W/AUTO DIFF WBC: CPT

## 2021-05-10 ENCOUNTER — OFFICE VISIT (OUTPATIENT)
Dept: HEMATOLOGY/ONCOLOGY | Facility: HOSPITAL | Age: 71
End: 2021-05-10
Attending: INTERNAL MEDICINE
Payer: MEDICARE

## 2021-05-10 VITALS
SYSTOLIC BLOOD PRESSURE: 133 MMHG | HEART RATE: 99 BPM | DIASTOLIC BLOOD PRESSURE: 71 MMHG | RESPIRATION RATE: 18 BRPM | WEIGHT: 218 LBS | TEMPERATURE: 98 F | BODY MASS INDEX: 32.29 KG/M2 | HEIGHT: 69.02 IN | OXYGEN SATURATION: 97 %

## 2021-05-10 DIAGNOSIS — I89.0 LYMPHEDEMA OF RIGHT ARM: ICD-10-CM

## 2021-05-10 DIAGNOSIS — C50.811 MALIGNANT NEOPLASM OF OVERLAPPING SITES OF RIGHT BREAST IN FEMALE, ESTROGEN RECEPTOR POSITIVE (HCC): Primary | ICD-10-CM

## 2021-05-10 DIAGNOSIS — Z17.0 MALIGNANT NEOPLASM OF OVERLAPPING SITES OF RIGHT BREAST IN FEMALE, ESTROGEN RECEPTOR POSITIVE (HCC): Primary | ICD-10-CM

## 2021-05-10 PROCEDURE — 99213 OFFICE O/P EST LOW 20 MIN: CPT | Performed by: INTERNAL MEDICINE

## 2021-05-10 NOTE — PROGRESS NOTES
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 Allergies:       Latex                   ITCHING, OTHER (SEE COMMENTS)    Comment:Blistering and oozing    Medications:  furosemide (LASIX) 40 MG Oral Tab, Take 0.5 tablets (20 mg total) by mouth daily. , Disp: 90 tablet, Rfl: 3  gabap strenuous activity but ambulatory and able to carry out work of a light or sedentary nature. Physical Examination:    General: Patient is alert and not in acute distress.   Vital Signs: /71 (BP Location: Left arm, Patient Position: Sitting, Cuff S adenopathy.     MEDIASTINUM:  No adenopathy.  Stable marked enlargement of right lobe of thyroid with tracheal displacement to the left.  There is decreased tracheal narrowing.  Small hiatal hernia. CARDIAC:  Coronary artery calcium and/or stents.    PLEU

## 2021-05-11 ENCOUNTER — PATIENT MESSAGE (OUTPATIENT)
Dept: HEMATOLOGY/ONCOLOGY | Facility: HOSPITAL | Age: 71
End: 2021-05-11

## 2021-05-12 RX ORDER — TAMOXIFEN CITRATE 20 MG/1
TABLET ORAL
Qty: 90 TABLET | Refills: 0 | OUTPATIENT
Start: 2021-05-12

## 2021-05-12 RX ORDER — TAMOXIFEN CITRATE 20 MG/1
20 TABLET ORAL DAILY
Qty: 90 TABLET | Refills: 3 | Status: SHIPPED | OUTPATIENT
Start: 2021-05-12

## 2021-05-12 RX ORDER — TAMOXIFEN CITRATE 20 MG/1
20 TABLET ORAL DAILY
Qty: 30 TABLET | Refills: 0 | Status: SHIPPED | OUTPATIENT
Start: 2021-05-12 | End: 2021-06-25

## 2021-05-12 NOTE — TELEPHONE ENCOUNTER
From: Sumit Richard  To: Jelani Campoverde MD  Sent: 5/11/2021 5:06 PM CDT  Subject: Prescription Question    I am completely out of tamoxifen, so had requested that a prescription for 30 days be sent to [facility] so I could pick it up quickly.  I'd also re

## 2021-05-26 ENCOUNTER — PATIENT OUTREACH (OUTPATIENT)
Dept: CASE MANAGEMENT | Age: 71
End: 2021-05-26

## 2021-06-01 RX ORDER — SERTRALINE HYDROCHLORIDE 100 MG/1
150 TABLET, FILM COATED ORAL DAILY
Qty: 135 TABLET | Refills: 0 | Status: SHIPPED | OUTPATIENT
Start: 2021-06-01

## 2021-06-25 RX ORDER — TAMOXIFEN CITRATE 20 MG/1
TABLET ORAL
Qty: 30 TABLET | Refills: 0 | Status: SHIPPED | OUTPATIENT
Start: 2021-06-25 | End: 2021-12-30

## 2021-07-01 ENCOUNTER — HOSPITAL ENCOUNTER (OUTPATIENT)
Age: 71
Discharge: HOME | End: 2021-07-01
Payer: MEDICARE

## 2021-07-01 VITALS
DIASTOLIC BLOOD PRESSURE: 91 MMHG | TEMPERATURE: 97 F | RESPIRATION RATE: 16 BRPM | HEIGHT: 68 IN | SYSTOLIC BLOOD PRESSURE: 123 MMHG | HEART RATE: 104 BPM | OXYGEN SATURATION: 96 % | BODY MASS INDEX: 33.04 KG/M2 | WEIGHT: 218 LBS

## 2021-07-01 DIAGNOSIS — L03.114 CELLULITIS OF LEFT UPPER EXTREMITY: Primary | ICD-10-CM

## 2021-07-01 PROCEDURE — 99214 OFFICE O/P EST MOD 30 MIN: CPT | Performed by: NURSE PRACTITIONER

## 2021-07-01 RX ORDER — PREDNISONE 20 MG/1
40 TABLET ORAL DAILY
Qty: 10 TABLET | Refills: 0 | Status: SHIPPED | OUTPATIENT
Start: 2021-07-01 | End: 2021-07-06

## 2021-07-01 RX ORDER — DOXYCYCLINE HYCLATE 100 MG/1
100 CAPSULE ORAL 2 TIMES DAILY
Qty: 20 CAPSULE | Refills: 0 | Status: SHIPPED | OUTPATIENT
Start: 2021-07-01 | End: 2021-07-11

## 2021-07-01 NOTE — ED INITIAL ASSESSMENT (HPI)
C/o rash to left arm since yesterday. She has known lymphedema, but noticed it feeling larger than usual.  Denies itching or burning, but reports it feels tender.

## 2021-07-02 NOTE — ED PROVIDER NOTES
Patient Seen in: Immediate [address] [place] [address]      History   Patient presents with:  Rash Skin Problem    Stated Complaint: possible cellulitis     HPI/Subjective:   Very pleasant 69-year-old female presents to the immediate care with a rash to her le injection of blue dye for sentinel lymph node identification, completion axillary lymph node dissection, and advancement flap mastopexy   • TONSILLECTOMY     • TOTAL KNEE REPLACEMENT Right 1/2017   • TOTAL KNEE REPLACEMENT Right 5/10/17    Revision-Dr. Jonn Arnold Labs Reviewed - No data to display                MDM        65-year-old female presents to immediate care with rash and erythema to her left upper extremity. Concern is for cellulitis, pruritic rash.   We will start patient on doxycycline for cellulitis

## 2021-08-03 RX ORDER — GABAPENTIN 100 MG/1
CAPSULE ORAL
Qty: 180 CAPSULE | Refills: 0 | Status: SHIPPED | OUTPATIENT
Start: 2021-08-03

## 2021-08-03 NOTE — TELEPHONE ENCOUNTER
LV:1/26/2021  LR:2/24/2021  PT REQUESTING 90 DAY SUPPLY PER PREVIOUS NOTE MAIL IN PHARMACY AND 30 DAY SUPPLY VIA LOCAL PHARMACY

## 2021-08-04 RX ORDER — GABAPENTIN 100 MG/1
100 CAPSULE ORAL 2 TIMES DAILY
Qty: 30 CAPSULE | Refills: 0 | Status: SHIPPED | OUTPATIENT
Start: 2021-08-04 | End: 2021-12-30

## 2021-08-04 NOTE — TELEPHONE ENCOUNTER
Pt asking for short supply of the gabapentin to [facility] [address] as she awaits her mail order to arrive.

## 2021-11-09 ENCOUNTER — APPOINTMENT (OUTPATIENT)
Dept: GENERAL RADIOLOGY | Age: 71
End: 2021-11-09
Attending: NURSE PRACTITIONER
Payer: MEDICARE

## 2021-11-09 ENCOUNTER — HOSPITAL ENCOUNTER (OUTPATIENT)
Age: 71
Discharge: HOME | End: 2021-11-09
Payer: MEDICARE

## 2021-11-09 VITALS
TEMPERATURE: 99 F | BODY MASS INDEX: 32.58 KG/M2 | RESPIRATION RATE: 16 BRPM | HEART RATE: 78 BPM | HEIGHT: 69 IN | WEIGHT: 220 LBS | OXYGEN SATURATION: 99 %

## 2021-11-09 DIAGNOSIS — M79.673 FOOT PAIN: Primary | ICD-10-CM

## 2021-11-09 DIAGNOSIS — M10.9 ACUTE GOUT OF RIGHT FOOT, UNSPECIFIED CAUSE: ICD-10-CM

## 2021-11-09 PROCEDURE — 73630 X-RAY EXAM OF FOOT: CPT | Performed by: NURSE PRACTITIONER

## 2021-11-09 PROCEDURE — 99213 OFFICE O/P EST LOW 20 MIN: CPT | Performed by: NURSE PRACTITIONER

## 2021-11-09 RX ORDER — PREDNISONE 20 MG/1
TABLET ORAL
Qty: 21 TABLET | Refills: 0 | Status: SHIPPED | OUTPATIENT
Start: 2021-11-09 | End: 2021-11-21

## 2021-11-09 RX ORDER — HYDROCODONE BITARTRATE AND ACETAMINOPHEN 5; 325 MG/1; MG/1
1-2 TABLET ORAL EVERY 6 HOURS PRN
Qty: 20 TABLET | Refills: 0 | Status: SHIPPED | OUTPATIENT
Start: 2021-11-09 | End: 2021-12-30

## 2021-11-09 NOTE — ED PROVIDER NOTES
Patient Seen in: Immediate [address]      History   Patient presents with:  Leg or Foot Injury    Stated Complaint: R foot pain    Subjective:   79-year-old female presents to immediate care with right foot pain.   Patient states she started 1 TONSILLECTOMY     • TOTAL KNEE REPLACEMENT Right 1/2017   • TOTAL KNEE REPLACEMENT Right 5/10/17    Revision-Dr. John Gaston   • TOTAL KNEE REPLACEMENT Right 09/25/2017    Revision  Dr. Augie Alejandra History    Tobacco Use      Smoking status: Mary Ramos (CPT=[id number])  TECHNIQUE:  AP, oblique, and lateral views were obtained. COMPARISON:  None.   INDICATIONS:  R foot pain  PATIENT STATED HISTORY: (As transcribed by Technologist)  Pt c/o intense pain to top of R foor and great toe starting 10 days ago , pt de daily for 2 days, THEN 1 tablet (20 mg total) daily for 2 days, THEN 0. 5 tablets (10 mg total) daily for 2 days. , Normal, Disp-21 tablet, R-0    HYDROcodone-acetaminophen 5-325 MG Oral Tab  Take 1-2 tablets by mouth every 6 (six) hours as needed for Pain.

## 2021-11-09 NOTE — ED INITIAL ASSESSMENT (HPI)
Pt c/o intense pain to top of R foor and great toe starting 10 days ago , pt denies injury, awaking this am with swelling to top of foot

## 2021-12-20 ENCOUNTER — HOSPITAL ENCOUNTER (OUTPATIENT)
Age: 71
Discharge: HOME | End: 2021-12-20
Payer: MEDICARE

## 2021-12-20 VITALS
RESPIRATION RATE: 17 BRPM | BODY MASS INDEX: 32.58 KG/M2 | HEIGHT: 69 IN | DIASTOLIC BLOOD PRESSURE: 60 MMHG | OXYGEN SATURATION: 98 % | HEART RATE: 87 BPM | SYSTOLIC BLOOD PRESSURE: 106 MMHG | TEMPERATURE: 98 F | WEIGHT: 220 LBS

## 2021-12-20 DIAGNOSIS — M10.9 ACUTE GOUT INVOLVING TOE OF RIGHT FOOT, UNSPECIFIED CAUSE: Primary | ICD-10-CM

## 2021-12-20 PROCEDURE — 99214 OFFICE O/P EST MOD 30 MIN: CPT | Performed by: PHYSICIAN ASSISTANT

## 2021-12-20 RX ORDER — PREDNISONE 10 MG/1
TABLET ORAL
Qty: 30 TABLET | Refills: 0 | Status: SHIPPED | OUTPATIENT
Start: 2021-12-20 | End: 2021-12-20

## 2021-12-20 RX ORDER — PREDNISONE 10 MG/1
TABLET ORAL
Qty: 30 TABLET | Refills: 0 | Status: SHIPPED | OUTPATIENT
Start: 2021-12-20 | End: 2021-12-30

## 2021-12-21 NOTE — ED PROVIDER NOTES
Patient Seen in: Immediate [address] [place], [place] [address]way      History   Patient presents with:  Swelling Edema  Arthritis    Stated Complaint: gout    Subjective:   HPI    CHIEF COMPLAINT: Right great toe     HISTORY OF PRESENT ILLNESS: Patient is a 41-year-old • Disorder of thyroid     enlarged thyroid   • Exposure to radiation 2014   • HIGH BLOOD PRESSURE    • High cholesterol    • Lymph edema     left arm and right arm   • Osteoarthritis    • Personal history of antineoplastic chemotherapy 2014   • PICC (per foot: Tenderness to palpation at the first MTP. There is mild edema and erythema in this location. Some warmth. Limited range of motion of the first digit. Normal sensation of the distal digit and brisk cap refill. 2+ DPs.     ED Course   Labs Reviewed

## 2022-01-14 RX ORDER — CLOPIDOGREL BISULFATE 75 MG/1
75 TABLET ORAL DAILY
Qty: 90 TABLET | Refills: 3 | Status: SHIPPED | OUTPATIENT
Start: 2022-01-14

## 2022-01-24 ENCOUNTER — TELEMEDICINE (OUTPATIENT)
Dept: FAMILY MEDICINE CLINIC | Facility: CLINIC | Age: 72
End: 2022-01-24

## 2022-01-24 DIAGNOSIS — M10.9 ACUTE GOUT OF LEFT FOOT, UNSPECIFIED CAUSE: Primary | ICD-10-CM

## 2022-01-24 PROCEDURE — 99213 OFFICE O/P EST LOW 20 MIN: CPT | Performed by: FAMILY MEDICINE

## 2022-01-24 RX ORDER — COLCHICINE 0.6 MG/1
TABLET ORAL
Qty: 6 TABLET | Refills: 0 | Status: SHIPPED | OUTPATIENT
Start: 2022-01-24

## 2022-01-24 NOTE — PROGRESS NOTES
This visit is conducted using Telemedicine with live, interactive video and audio. Patient has been referred to the [facility] website at [email or web address] to review the yearly Consent to Treat document.     Patient understands and accepts financial res catheter) in place 2017   • Unspecified asthma(493.90)    • Unspecified essential hypertension    • Visual impairment     glasses        PSH  Past Surgical History:   Procedure Laterality Date   • BREAST BIOPSY  2014   •      • KNEE ART • GABAPENTIN 100 MG Oral Cap TAKE 1 CAPSULE (100 MG) BY MOUTH 2 TIMES DAILY AS NEEDED (IN THE MORNING AND AFTERNOON) (Patient taking differently: 100 mg.  Per patient: taking 2 tablet in the AM, if needed take an extra in the AM and taking 3 tablet in the up: as needed      Sylvia Alamo MD

## 2022-01-27 ENCOUNTER — LAB ENCOUNTER (OUTPATIENT)
Dept: LAB | Age: 72
End: 2022-01-27
Attending: NURSE PRACTITIONER
Payer: MEDICARE

## 2022-01-27 ENCOUNTER — TELEPHONE (OUTPATIENT)
Dept: FAMILY MEDICINE CLINIC | Facility: CLINIC | Age: 72
End: 2022-01-27

## 2022-01-27 DIAGNOSIS — E87.6 HYPOKALEMIA: ICD-10-CM

## 2022-01-27 DIAGNOSIS — Z98.890 S/P MITRAL VALVE CLIP IMPLANTATION: ICD-10-CM

## 2022-01-27 DIAGNOSIS — R94.4 DECREASED GFR: ICD-10-CM

## 2022-01-27 DIAGNOSIS — I50.32 CHRONIC DIASTOLIC CHF (CONGESTIVE HEART FAILURE) (HCC): ICD-10-CM

## 2022-01-27 DIAGNOSIS — Z95.818 S/P MITRAL VALVE CLIP IMPLANTATION: ICD-10-CM

## 2022-01-27 DIAGNOSIS — E78.5 DYSLIPIDEMIA: ICD-10-CM

## 2022-01-27 DIAGNOSIS — E87.6 HYPOKALEMIA: Primary | ICD-10-CM

## 2022-01-27 LAB
ALBUMIN SERPL-MCNC: 3.5 G/DL (ref 3.4–5)
ALBUMIN/GLOB SERPL: 0.9 {RATIO} (ref 1–2)
ALP LIVER SERPL-CCNC: 74 U/L
ALT SERPL-CCNC: 19 U/L
ALT SERPL-CCNC: 19 U/L
ANION GAP SERPL CALC-SCNC: 11 MMOL/L (ref 0–18)
ANION GAP SERPL CALC-SCNC: 11 MMOL/L (ref 0–18)
AST SERPL-CCNC: 23 U/L (ref 15–37)
AST SERPL-CCNC: 23 U/L (ref 15–37)
BASOPHILS # BLD AUTO: 0.03 X10(3) UL (ref 0–0.2)
BASOPHILS NFR BLD AUTO: 0.9 %
BILIRUB SERPL-MCNC: 0.5 MG/DL (ref 0.1–2)
BUN BLD-MCNC: 36 MG/DL (ref 7–18)
BUN BLD-MCNC: 36 MG/DL (ref 7–18)
CALCIUM BLD-MCNC: 10 MG/DL (ref 8.5–10.1)
CALCIUM BLD-MCNC: 10 MG/DL (ref 8.5–10.1)
CHLORIDE SERPL-SCNC: 100 MMOL/L (ref 98–112)
CHLORIDE SERPL-SCNC: 100 MMOL/L (ref 98–112)
CHOLEST SERPL-MCNC: 234 MG/DL (ref ?–200)
CO2 SERPL-SCNC: 30 MMOL/L (ref 21–32)
CO2 SERPL-SCNC: 30 MMOL/L (ref 21–32)
CREAT BLD-MCNC: 1.72 MG/DL
CREAT BLD-MCNC: 1.72 MG/DL
EOSINOPHIL # BLD AUTO: 0.14 X10(3) UL (ref 0–0.7)
EOSINOPHIL NFR BLD AUTO: 4.1 %
ERYTHROCYTE [DISTWIDTH] IN BLOOD BY AUTOMATED COUNT: 13.3 %
FASTING STATUS PATIENT QL REPORTED: YES
GLOBULIN PLAS-MCNC: 3.7 G/DL (ref 2.8–4.4)
GLUCOSE BLD-MCNC: 192 MG/DL (ref 70–99)
GLUCOSE BLD-MCNC: 192 MG/DL (ref 70–99)
HCT VFR BLD AUTO: 37.2 %
HDLC SERPL-MCNC: 34 MG/DL (ref 40–59)
HGB BLD-MCNC: 12.2 G/DL
IMM GRANULOCYTES # BLD AUTO: 0.01 X10(3) UL (ref 0–1)
IMM GRANULOCYTES NFR BLD: 0.3 %
LDLC SERPL CALC-MCNC: 154 MG/DL (ref ?–100)
LYMPHOCYTES # BLD AUTO: 1.4 X10(3) UL (ref 1–4)
LYMPHOCYTES NFR BLD AUTO: 41.1 %
MCH RBC QN AUTO: 30 PG (ref 26–34)
MCHC RBC AUTO-ENTMCNC: 32.8 G/DL (ref 31–37)
MCV RBC AUTO: 91.4 FL
MONOCYTES # BLD AUTO: 0.52 X10(3) UL (ref 0.1–1)
MONOCYTES NFR BLD AUTO: 15.2 %
NEUTROPHILS # BLD AUTO: 1.31 X10 (3) UL (ref 1.5–7.7)
NEUTROPHILS # BLD AUTO: 1.31 X10(3) UL (ref 1.5–7.7)
NEUTROPHILS NFR BLD AUTO: 38.4 %
NONHDLC SERPL-MCNC: 200 MG/DL (ref ?–130)
NT-PROBNP SERPL-MCNC: 1005 PG/ML (ref ?–125)
OSMOLALITY SERPL CALC.SUM OF ELEC: 306 MOSM/KG (ref 275–295)
OSMOLALITY SERPL CALC.SUM OF ELEC: 306 MOSM/KG (ref 275–295)
PLATELET # BLD AUTO: 219 10(3)UL (ref 150–450)
POTASSIUM SERPL-SCNC: 2.7 MMOL/L (ref 3.5–5.1)
PROT SERPL-MCNC: 7.2 G/DL (ref 6.4–8.2)
RBC # BLD AUTO: 4.07 X10(6)UL
SODIUM SERPL-SCNC: 141 MMOL/L (ref 136–145)
SODIUM SERPL-SCNC: 141 MMOL/L (ref 136–145)
TRIGL SERPL-MCNC: 251 MG/DL (ref 30–149)
URATE SERPL-MCNC: 11.5 MG/DL
VLDLC SERPL CALC-MCNC: 49 MG/DL (ref 0–30)
WBC # BLD AUTO: 3.4 X10(3) UL (ref 4–11)

## 2022-01-27 PROCEDURE — 36415 COLL VENOUS BLD VENIPUNCTURE: CPT

## 2022-01-27 PROCEDURE — 83880 ASSAY OF NATRIURETIC PEPTIDE: CPT

## 2022-01-27 PROCEDURE — 84550 ASSAY OF BLOOD/URIC ACID: CPT

## 2022-01-27 PROCEDURE — 80061 LIPID PANEL: CPT

## 2022-01-27 PROCEDURE — 80048 BASIC METABOLIC PNL TOTAL CA: CPT

## 2022-01-27 PROCEDURE — 84460 ALANINE AMINO (ALT) (SGPT): CPT

## 2022-01-27 PROCEDURE — 85025 COMPLETE CBC W/AUTO DIFF WBC: CPT

## 2022-01-27 PROCEDURE — 84132 ASSAY OF SERUM POTASSIUM: CPT

## 2022-01-27 PROCEDURE — 80053 COMPREHEN METABOLIC PANEL: CPT

## 2022-01-27 PROCEDURE — 84450 TRANSFERASE (AST) (SGOT): CPT

## 2022-01-27 RX ORDER — POTASSIUM CHLORIDE 1500 MG/1
TABLET, FILM COATED, EXTENDED RELEASE ORAL
Qty: 14 TABLET | Refills: 0 | Status: SHIPPED | OUTPATIENT
Start: 2022-01-27

## 2022-02-03 ENCOUNTER — OFFICE VISIT (OUTPATIENT)
Dept: PODIATRY CLINIC | Facility: CLINIC | Age: 72
End: 2022-02-03
Payer: MEDICARE

## 2022-02-03 VITALS — DIASTOLIC BLOOD PRESSURE: 72 MMHG | SYSTOLIC BLOOD PRESSURE: 136 MMHG

## 2022-02-03 DIAGNOSIS — R52 PAIN: Primary | ICD-10-CM

## 2022-02-03 DIAGNOSIS — M10.072 ACUTE IDIOPATHIC GOUT OF LEFT FOOT: ICD-10-CM

## 2022-02-03 DIAGNOSIS — M19.072 ARTHROSIS OF MIDFOOT, LEFT: ICD-10-CM

## 2022-02-03 PROCEDURE — 99203 OFFICE O/P NEW LOW 30 MIN: CPT | Performed by: PODIATRIST

## 2022-02-03 PROCEDURE — 20600 DRAIN/INJ JOINT/BURSA W/O US: CPT | Performed by: PODIATRIST

## 2022-02-03 RX ORDER — TRIAMCINOLONE ACETONIDE 40 MG/ML
20 INJECTION, SUSPENSION INTRA-ARTICULAR; INTRAMUSCULAR ONCE
Status: COMPLETED | OUTPATIENT
Start: 2022-02-03 | End: 2022-02-03

## 2022-02-03 NOTE — PROGRESS NOTES
Per Dr. Devon Albert draw up 0.5ml of 0.5% Marcaine and 0.5ml of Kenalog 40 for injection to left foot.

## 2022-02-04 ENCOUNTER — LAB ENCOUNTER (OUTPATIENT)
Dept: LAB | Age: 72
End: 2022-02-04
Attending: FAMILY MEDICINE
Payer: MEDICARE

## 2022-02-04 DIAGNOSIS — E87.6 HYPOKALEMIA: ICD-10-CM

## 2022-02-04 LAB — POTASSIUM SERPL-SCNC: 3.6 MMOL/L (ref 3.5–5.1)

## 2022-02-04 PROCEDURE — 36415 COLL VENOUS BLD VENIPUNCTURE: CPT

## 2022-02-04 PROCEDURE — 84132 ASSAY OF SERUM POTASSIUM: CPT

## 2022-02-17 ENCOUNTER — OFFICE VISIT (OUTPATIENT)
Dept: PODIATRY CLINIC | Facility: CLINIC | Age: 72
End: 2022-02-17
Payer: MEDICARE

## 2022-02-17 VITALS — SYSTOLIC BLOOD PRESSURE: 132 MMHG | DIASTOLIC BLOOD PRESSURE: 78 MMHG

## 2022-02-17 DIAGNOSIS — M10.071 ACUTE IDIOPATHIC GOUT INVOLVING TOE OF RIGHT FOOT: ICD-10-CM

## 2022-02-17 DIAGNOSIS — M19.072 ARTHROSIS OF MIDFOOT, LEFT: ICD-10-CM

## 2022-02-17 DIAGNOSIS — M10.072 ACUTE IDIOPATHIC GOUT OF LEFT FOOT: ICD-10-CM

## 2022-02-17 DIAGNOSIS — R52 PAIN: Primary | ICD-10-CM

## 2022-02-17 PROCEDURE — 20600 DRAIN/INJ JOINT/BURSA W/O US: CPT | Performed by: PODIATRIST

## 2022-02-17 RX ORDER — TRIAMCINOLONE ACETONIDE 40 MG/ML
20 INJECTION, SUSPENSION INTRA-ARTICULAR; INTRAMUSCULAR ONCE
Status: COMPLETED | OUTPATIENT
Start: 2022-02-17 | End: 2022-02-17

## 2022-02-17 NOTE — PROGRESS NOTES
Per Dr. Ernie Vo draw up 0.5ml of 0.5% Marcaine and 0.5ml of Kenalog 40 for injection to bilateral feet.

## 2022-02-28 ENCOUNTER — LAB ENCOUNTER (OUTPATIENT)
Dept: LAB | Age: 72
End: 2022-02-28
Attending: FAMILY MEDICINE
Payer: MEDICARE

## 2022-02-28 DIAGNOSIS — E87.6 CHRONICALLY LOW SERUM POTASSIUM: ICD-10-CM

## 2022-02-28 LAB — POTASSIUM SERPL-SCNC: 4.3 MMOL/L (ref 3.5–5.1)

## 2022-02-28 PROCEDURE — 36415 COLL VENOUS BLD VENIPUNCTURE: CPT

## 2022-02-28 PROCEDURE — 84132 ASSAY OF SERUM POTASSIUM: CPT

## 2022-03-01 RX ORDER — SERTRALINE HYDROCHLORIDE 100 MG/1
150 TABLET, FILM COATED ORAL DAILY
Qty: 45 TABLET | Refills: 0 | Status: SHIPPED | OUTPATIENT
Start: 2022-03-01 | End: 2022-04-18

## 2022-03-29 ENCOUNTER — TELEPHONE (OUTPATIENT)
Dept: FAMILY MEDICINE CLINIC | Facility: CLINIC | Age: 72
End: 2022-03-29

## 2022-03-29 NOTE — TELEPHONE ENCOUNTER
Pt was exposed to daughter on Sunday who tested positive for covid yesterday. Pt now has fatigue and a little congestion. Appt changed to [facility].

## 2022-03-29 NOTE — TELEPHONE ENCOUNTER
She has appointment tomorrow with [facility]. She wants to talk with a nurse. She was exposed to Sandra Lynch on Sunday.

## 2022-03-30 ENCOUNTER — TELEMEDICINE (OUTPATIENT)
Dept: FAMILY MEDICINE CLINIC | Facility: CLINIC | Age: 72
End: 2022-03-30
Payer: MEDICARE

## 2022-03-30 DIAGNOSIS — E87.6 LOW POTASSIUM SYNDROME: ICD-10-CM

## 2022-03-30 DIAGNOSIS — Z85.3 HISTORY OF BREAST CANCER: ICD-10-CM

## 2022-03-30 DIAGNOSIS — E04.9 GOITER: ICD-10-CM

## 2022-03-30 DIAGNOSIS — N95.0 POSTMENOPAUSAL BLEEDING: Primary | ICD-10-CM

## 2022-03-30 PROCEDURE — 99214 OFFICE O/P EST MOD 30 MIN: CPT | Performed by: FAMILY MEDICINE

## 2022-03-30 RX ORDER — POTASSIUM CHLORIDE 1500 MG/1
TABLET, FILM COATED, EXTENDED RELEASE ORAL
Qty: 180 TABLET | Refills: 0 | Status: SHIPPED | OUTPATIENT
Start: 2022-03-30

## 2022-04-04 ENCOUNTER — LAB ENCOUNTER (OUTPATIENT)
Dept: LAB | Age: 72
End: 2022-04-04
Attending: FAMILY MEDICINE
Payer: MEDICARE

## 2022-04-04 DIAGNOSIS — E87.6 LOW POTASSIUM SYNDROME: ICD-10-CM

## 2022-04-04 DIAGNOSIS — N95.0 POSTMENOPAUSAL BLEEDING: ICD-10-CM

## 2022-04-04 DIAGNOSIS — N17.9 AKI (ACUTE KIDNEY INJURY) (HCC): ICD-10-CM

## 2022-04-04 DIAGNOSIS — Z95.818 S/P MITRAL VALVE CLIP IMPLANTATION: ICD-10-CM

## 2022-04-04 DIAGNOSIS — Z98.890 S/P MITRAL VALVE CLIP IMPLANTATION: ICD-10-CM

## 2022-04-04 LAB
ALBUMIN SERPL-MCNC: 3.5 G/DL (ref 3.4–5)
ALBUMIN/GLOB SERPL: 1 {RATIO} (ref 1–2)
ALP LIVER SERPL-CCNC: 85 U/L
ALT SERPL-CCNC: 17 U/L
ANION GAP SERPL CALC-SCNC: 8 MMOL/L (ref 0–18)
AST SERPL-CCNC: 13 U/L (ref 15–37)
BASOPHILS # BLD AUTO: 0.03 X10(3) UL (ref 0–0.2)
BASOPHILS NFR BLD AUTO: 0.8 %
BILIRUB SERPL-MCNC: 0.3 MG/DL (ref 0.1–2)
BUN BLD-MCNC: 28 MG/DL (ref 7–18)
CALCIUM BLD-MCNC: 9.2 MG/DL (ref 8.5–10.1)
CHLORIDE SERPL-SCNC: 109 MMOL/L (ref 98–112)
CO2 SERPL-SCNC: 26 MMOL/L (ref 21–32)
CREAT BLD-MCNC: 1.22 MG/DL
DEPRECATED HBV CORE AB SER IA-ACNC: 73 NG/ML
EOSINOPHIL # BLD AUTO: 0.08 X10(3) UL (ref 0–0.7)
EOSINOPHIL NFR BLD AUTO: 2.1 %
ERYTHROCYTE [DISTWIDTH] IN BLOOD BY AUTOMATED COUNT: 14.3 %
FASTING STATUS PATIENT QL REPORTED: YES
GLOBULIN PLAS-MCNC: 3.4 G/DL (ref 2.8–4.4)
GLUCOSE BLD-MCNC: 105 MG/DL (ref 70–99)
HCT VFR BLD AUTO: 35 %
HGB BLD-MCNC: 11.6 G/DL
IMM GRANULOCYTES # BLD AUTO: 0.02 X10(3) UL (ref 0–1)
IMM GRANULOCYTES NFR BLD: 0.5 %
LYMPHOCYTES # BLD AUTO: 1.31 X10(3) UL (ref 1–4)
LYMPHOCYTES NFR BLD AUTO: 34.9 %
MCH RBC QN AUTO: 31.7 PG (ref 26–34)
MCHC RBC AUTO-ENTMCNC: 33.1 G/DL (ref 31–37)
MCV RBC AUTO: 95.6 FL
MONOCYTES # BLD AUTO: 0.53 X10(3) UL (ref 0.1–1)
MONOCYTES NFR BLD AUTO: 14.1 %
NEUTROPHILS # BLD AUTO: 1.78 X10 (3) UL (ref 1.5–7.7)
NEUTROPHILS # BLD AUTO: 1.78 X10(3) UL (ref 1.5–7.7)
NEUTROPHILS NFR BLD AUTO: 47.6 %
OSMOLALITY SERPL CALC.SUM OF ELEC: 302 MOSM/KG (ref 275–295)
PLATELET # BLD AUTO: 156 10(3)UL (ref 150–450)
POTASSIUM SERPL-SCNC: 4.4 MMOL/L (ref 3.5–5.1)
PROT SERPL-MCNC: 6.9 G/DL (ref 6.4–8.2)
RBC # BLD AUTO: 3.66 X10(6)UL
SODIUM SERPL-SCNC: 143 MMOL/L (ref 136–145)
WBC # BLD AUTO: 3.8 X10(3) UL (ref 4–11)

## 2022-04-04 PROCEDURE — 36415 COLL VENOUS BLD VENIPUNCTURE: CPT

## 2022-04-04 PROCEDURE — 80053 COMPREHEN METABOLIC PANEL: CPT

## 2022-04-04 PROCEDURE — 85025 COMPLETE CBC W/AUTO DIFF WBC: CPT

## 2022-04-04 PROCEDURE — 82728 ASSAY OF FERRITIN: CPT

## 2022-04-19 RX ORDER — SERTRALINE HYDROCHLORIDE 100 MG/1
150 TABLET, FILM COATED ORAL DAILY
Qty: 45 TABLET | Refills: 0 | Status: SHIPPED | OUTPATIENT
Start: 2022-04-19

## 2022-04-21 ENCOUNTER — HOSPITAL ENCOUNTER (OUTPATIENT)
Dept: ULTRASOUND IMAGING | Age: 72
Discharge: HOME | End: 2022-04-21
Attending: FAMILY MEDICINE
Payer: MEDICARE

## 2022-04-21 DIAGNOSIS — N95.0 POSTMENOPAUSAL BLEEDING: ICD-10-CM

## 2022-04-21 DIAGNOSIS — E04.9 GOITER: ICD-10-CM

## 2022-04-21 PROCEDURE — 76830 TRANSVAGINAL US NON-OB: CPT | Performed by: FAMILY MEDICINE

## 2022-04-21 PROCEDURE — 76856 US EXAM PELVIC COMPLETE: CPT | Performed by: FAMILY MEDICINE

## 2022-04-21 PROCEDURE — 76536 US EXAM OF HEAD AND NECK: CPT | Performed by: FAMILY MEDICINE

## 2022-05-05 NOTE — TELEPHONE ENCOUNTER
Pt states SOB constant x 4 weeks, worse with activity, using proair and spiriva since Monday, getting loose thick phlegm after using inhaler, but no relief in taking a deep breath No fever, tested for covid- negative.   Advised pt to go to IC for evaluation [Classification of primary headache syndrome based on latest version of International Classification of  Headache Disorders was performed] : Classification of primary headache syndrome based on latest version of International Classification of Headache Disorders was performed: Yes [Overuse of OTC and prescribed analgesics assessed] : Overuse of OTC and prescribed analgesics assessed: Yes [Lifestyle factors including diet, exercise and sleep hygiene discussed] : Lifestyle factors including diet, exercise and sleep hygiene discussed: Yes [Referral to behavioral health for frequent headaches discussed] : Referral to behavioral health for frequent headaches discussed: Yes [Treatment plan for headache including  pharmacological (abortive and preventive) and nonpharmacological (nutraceutical and bio-behavioral) interventions] : Treatment plan for headache including  pharmacological (abortive and preventive) and nonpharmacological (nutraceutical and bio-behavioral) interventions: Yes

## 2022-05-19 DIAGNOSIS — Z17.0 MALIGNANT NEOPLASM OF OVERLAPPING SITES OF RIGHT BREAST IN FEMALE, ESTROGEN RECEPTOR POSITIVE (HCC): Primary | ICD-10-CM

## 2022-05-19 DIAGNOSIS — C50.811 MALIGNANT NEOPLASM OF OVERLAPPING SITES OF RIGHT BREAST IN FEMALE, ESTROGEN RECEPTOR POSITIVE (HCC): Primary | ICD-10-CM

## 2022-05-19 RX ORDER — TAMOXIFEN CITRATE 20 MG/1
20 TABLET ORAL DAILY
Qty: 30 TABLET | Refills: 5 | Status: SHIPPED | OUTPATIENT
Start: 2022-05-19

## 2022-06-15 RX ORDER — SERTRALINE HYDROCHLORIDE 100 MG/1
150 TABLET, FILM COATED ORAL DAILY
Qty: 45 TABLET | Refills: 0 | Status: SHIPPED | OUTPATIENT
Start: 2022-06-15

## 2022-06-17 ENCOUNTER — PATIENT MESSAGE (OUTPATIENT)
Dept: FAMILY MEDICINE CLINIC | Facility: CLINIC | Age: 72
End: 2022-06-17

## 2022-06-17 DIAGNOSIS — M10.9 ACUTE GOUT OF LEFT FOOT, UNSPECIFIED CAUSE: ICD-10-CM

## 2022-06-17 RX ORDER — COLCHICINE 0.6 MG/1
TABLET ORAL
Qty: 6 TABLET | Refills: 0 | Status: SHIPPED | OUTPATIENT
Start: 2022-06-17

## 2022-06-17 NOTE — TELEPHONE ENCOUNTER
From: Anita Wtats  To: Nikki Rincon MD  Sent: 6/17/2022 1:51 PM CDT  Subject: May I please have a refill    I had had a phone appt with you when Perlita Story had gout. You prescribed a medication for me for 6 days that worked wonders. I now have the same situation with both feet. I can barely walk, and I can see that my left foot is a bit swollen. Would you be able to prescribe that medication again for me? It worked wonders the last time.      Silvio Hill  [phone]

## 2022-06-28 ENCOUNTER — OFFICE VISIT (OUTPATIENT)
Dept: OBGYN CLINIC | Facility: CLINIC | Age: 72
End: 2022-06-28
Payer: MEDICARE

## 2022-06-28 VITALS
DIASTOLIC BLOOD PRESSURE: 80 MMHG | HEART RATE: 76 BPM | SYSTOLIC BLOOD PRESSURE: 122 MMHG | HEIGHT: 69 IN | BODY MASS INDEX: 32.94 KG/M2 | WEIGHT: 222.38 LBS

## 2022-06-28 DIAGNOSIS — N95.0 POSTMENOPAUSAL BLEEDING: Primary | ICD-10-CM

## 2022-06-28 DIAGNOSIS — R93.89 THICKENED ENDOMETRIUM: ICD-10-CM

## 2022-06-28 DIAGNOSIS — Z12.4 SCREENING FOR MALIGNANT NEOPLASM OF CERVIX: ICD-10-CM

## 2022-06-28 DIAGNOSIS — C50.811 MALIGNANT NEOPLASM OF OVERLAPPING SITES OF RIGHT FEMALE BREAST, UNSPECIFIED ESTROGEN RECEPTOR STATUS (HCC): ICD-10-CM

## 2022-06-28 PROCEDURE — 88305 TISSUE EXAM BY PATHOLOGIST: CPT | Performed by: OBSTETRICS & GYNECOLOGY

## 2022-06-28 PROCEDURE — 99203 OFFICE O/P NEW LOW 30 MIN: CPT | Performed by: OBSTETRICS & GYNECOLOGY

## 2022-06-28 PROCEDURE — 58100 BIOPSY OF UTERUS LINING: CPT | Performed by: OBSTETRICS & GYNECOLOGY

## 2022-06-28 NOTE — PATIENT INSTRUCTIONS
[facility] Department of OB/GYN  After Care Instructions for Colposcopy/Biopsy      Biopsy Results   You will receive a phone call with your biopsy results in 7 business days. If you have not received your biopsy results in 7 days, please contact our office. Your biopsy results will help the physician decide if and what further treatment is  necessary. Bleeding   After your biopsy, the area is swabbed with a brown solution to help stop any bleeding. For this reason, you may notice a brown or black clotty discharge lasting several days. If you experience bright red bleeding that is heavy, you should call the office. Restrictions    You should avoid douching, intercourse and tampon use for 2 days following your procedure. Pain    If you are uncomfortable after the procedure, you may use Aleve, Tylenol or Ibuprofen for the discomfort.         If you have additional questions or concerns, please call us at [phone]

## 2022-06-30 ENCOUNTER — OFFICE VISIT (OUTPATIENT)
Dept: PODIATRY CLINIC | Facility: CLINIC | Age: 72
End: 2022-06-30
Payer: MEDICARE

## 2022-06-30 VITALS — DIASTOLIC BLOOD PRESSURE: 56 MMHG | SYSTOLIC BLOOD PRESSURE: 122 MMHG

## 2022-06-30 DIAGNOSIS — M10.072 ACUTE IDIOPATHIC GOUT OF LEFT FOOT: ICD-10-CM

## 2022-06-30 DIAGNOSIS — M19.072 ARTHROSIS OF MIDFOOT, LEFT: ICD-10-CM

## 2022-06-30 DIAGNOSIS — R52 PAIN: Primary | ICD-10-CM

## 2022-06-30 RX ORDER — TRIAMCINOLONE ACETONIDE 40 MG/ML
20 INJECTION, SUSPENSION INTRA-ARTICULAR; INTRAMUSCULAR ONCE
Status: COMPLETED | OUTPATIENT
Start: 2022-06-30 | End: 2022-06-30

## 2022-06-30 NOTE — PROGRESS NOTES
Per verbal order from Dr. Cristina Jerry, draw up 0.5ml of 0.5% Marcaine and 0.5ml of Kenalog 40 for cortisone injection to bilateral feet.

## 2022-07-07 RX ORDER — MISOPROSTOL 200 UG/1
TABLET ORAL
Qty: 2 TABLET | Refills: 0 | Status: SHIPPED | OUTPATIENT
Start: 2022-07-07

## 2022-07-10 DIAGNOSIS — E87.6 LOW POTASSIUM SYNDROME: ICD-10-CM

## 2022-07-11 RX ORDER — POTASSIUM CHLORIDE 1500 MG/1
TABLET, FILM COATED, EXTENDED RELEASE ORAL
Qty: 180 TABLET | Refills: 0 | Status: SHIPPED | OUTPATIENT
Start: 2022-07-11

## 2022-07-11 NOTE — TELEPHONE ENCOUNTER
Rx Request  Potassium Chloride ER 20 MEQ Oral Tab CR    Disp:     180               R: 0    Last Refilled: 03/30/2022    Last Visit: 03/30/2022 (Virtual)

## 2022-07-15 ENCOUNTER — TELEMEDICINE (OUTPATIENT)
Dept: FAMILY MEDICINE CLINIC | Facility: CLINIC | Age: 72
End: 2022-07-15
Payer: MEDICARE

## 2022-07-15 DIAGNOSIS — J01.40 ACUTE NON-RECURRENT PANSINUSITIS: Primary | ICD-10-CM

## 2022-07-15 RX ORDER — CEFDINIR 300 MG/1
300 CAPSULE ORAL 2 TIMES DAILY
Qty: 20 CAPSULE | Refills: 0 | Status: SHIPPED | OUTPATIENT
Start: 2022-07-15 | End: 2022-07-25

## 2022-07-18 ENCOUNTER — TELEPHONE (OUTPATIENT)
Dept: FAMILY MEDICINE CLINIC | Facility: CLINIC | Age: 72
End: 2022-07-18

## 2022-07-18 RX ORDER — METHYLPREDNISOLONE 4 MG/1
TABLET ORAL
Qty: 21 TABLET | Refills: 0 | Status: SHIPPED | OUTPATIENT
Start: 2022-07-18

## 2022-07-18 NOTE — TELEPHONE ENCOUNTER
Pt states she is feeling worse today, sinuses are very full, chest congestion, very fatigued. She wants to know if you want to rx a steroid now?

## 2022-08-09 RX ORDER — SERTRALINE HYDROCHLORIDE 100 MG/1
150 TABLET, FILM COATED ORAL DAILY
Qty: 45 TABLET | Refills: 0 | Status: SHIPPED | OUTPATIENT
Start: 2022-08-09

## 2022-08-25 ENCOUNTER — PATIENT MESSAGE (OUTPATIENT)
Dept: FAMILY MEDICINE CLINIC | Facility: CLINIC | Age: 72
End: 2022-08-25

## 2022-08-25 DIAGNOSIS — M10.9 ACUTE GOUT OF LEFT FOOT, UNSPECIFIED CAUSE: ICD-10-CM

## 2022-08-25 RX ORDER — COLCHICINE 0.6 MG/1
TABLET ORAL
Qty: 6 TABLET | Refills: 0 | Status: SHIPPED | OUTPATIENT
Start: 2022-08-25

## 2022-09-14 ENCOUNTER — OFFICE VISIT (OUTPATIENT)
Facility: LOCATION | Age: 72
End: 2022-09-14
Payer: MEDICARE

## 2022-09-14 DIAGNOSIS — E04.2 MULTIPLE THYROID NODULES: Primary | ICD-10-CM

## 2022-09-14 PROCEDURE — 99213 OFFICE O/P EST LOW 20 MIN: CPT | Performed by: OTOLARYNGOLOGY

## 2022-09-19 ENCOUNTER — OFFICE VISIT (OUTPATIENT)
Dept: OBGYN CLINIC | Facility: CLINIC | Age: 72
End: 2022-09-19
Payer: MEDICARE

## 2022-09-19 VITALS
SYSTOLIC BLOOD PRESSURE: 128 MMHG | HEART RATE: 78 BPM | HEIGHT: 68 IN | WEIGHT: 226.63 LBS | BODY MASS INDEX: 34.35 KG/M2 | DIASTOLIC BLOOD PRESSURE: 64 MMHG

## 2022-09-19 DIAGNOSIS — N95.0 POSTMENOPAUSAL BLEEDING: Primary | ICD-10-CM

## 2022-09-19 DIAGNOSIS — D05.12 DUCTAL CARCINOMA IN SITU (DCIS) OF LEFT BREAST: ICD-10-CM

## 2022-09-19 PROCEDURE — 88305 TISSUE EXAM BY PATHOLOGIST: CPT | Performed by: OBSTETRICS & GYNECOLOGY

## 2022-09-19 PROCEDURE — 58100 BIOPSY OF UTERUS LINING: CPT | Performed by: OBSTETRICS & GYNECOLOGY

## 2022-09-19 NOTE — PROGRESS NOTES
Procedure: Endometrial biopsy     Date of Procedure: 22    Pre-procedure diagnosis: postmenopausal bleeding    Post-procedure diagnosis: same    Indications:   66year old female  who presents for endometrial biopsy secondary to PMB  Pt had prior attempt, not satisfactory  Is on tamoxifen due to failed AI x 3  She reports no vaginal bleeding since March. She did use cycotec this AM and has been cramping and bleeding since    Procedure details: The procedure, risks, benefits and alternatives were discussed with the patient. The patient was informed of risks including but not limited to the risk of bleeding, infection, injury and insufficient tissue collection. All questions and concerns were addressed. The patient provided verbal and written consent. The patient was placed in a supine position with feet positioned into stirrups. A sterile speculum was placed into the vagina and the cervix was visualized. There was dark red blood in the vault    The endometrial biopsy Pipelle was then advanced through the cervical canal. The uterus sounded to 9 cm. The Pipelle was then engaged with suction force noted and advance in various angles along the uterine cavity. A moderate amount of endometrial tissue was noted and collected to be sent to pathology. This was repeated for multiple more passes, to obtain an adequate sample Good hemostasis noted with application of pressure to cervix. The patient tolerated the procedure well.  The patient was advised she will be called with the pathology results for further plan    Keila Ballard MD, 22, 10:39 AM

## 2022-09-28 RX ORDER — SERTRALINE HYDROCHLORIDE 100 MG/1
150 TABLET, FILM COATED ORAL DAILY
Qty: 45 TABLET | Refills: 0 | Status: SHIPPED | OUTPATIENT
Start: 2022-09-28 | End: 2022-10-26

## 2022-10-04 ENCOUNTER — OFFICE VISIT (OUTPATIENT)
Facility: LOCATION | Age: 72
End: 2022-10-04
Payer: MEDICARE

## 2022-10-04 VITALS — HEART RATE: 78 BPM | TEMPERATURE: 98 F

## 2022-10-04 DIAGNOSIS — Z98.890 S/P MITRAL VALVE CLIP IMPLANTATION: ICD-10-CM

## 2022-10-04 DIAGNOSIS — I50.32 CHRONIC DIASTOLIC CHF (CONGESTIVE HEART FAILURE) (HCC): ICD-10-CM

## 2022-10-04 DIAGNOSIS — Z95.5 STATUS POST CORONARY ARTERY STENT PLACEMENT: ICD-10-CM

## 2022-10-04 DIAGNOSIS — I25.10 CORONARY ARTERY DISEASE INVOLVING NATIVE CORONARY ARTERY OF NATIVE HEART WITHOUT ANGINA PECTORIS: ICD-10-CM

## 2022-10-04 DIAGNOSIS — I10 ESSENTIAL HYPERTENSION: ICD-10-CM

## 2022-10-04 DIAGNOSIS — Z12.11 ENCOUNTER FOR SCREENING COLONOSCOPY: Primary | ICD-10-CM

## 2022-10-04 DIAGNOSIS — Z95.818 S/P MITRAL VALVE CLIP IMPLANTATION: ICD-10-CM

## 2022-10-04 PROCEDURE — 99202 OFFICE O/P NEW SF 15 MIN: CPT | Performed by: COLON & RECTAL SURGERY

## 2022-10-04 RX ORDER — SOD SULF/POT CHLORIDE/MAG SULF 1.479 G
TABLET ORAL
Qty: 24 TABLET | Refills: 0 | Status: SHIPPED | OUTPATIENT
Start: 2022-10-04

## 2022-10-04 RX ORDER — SODIUM PHOSPHATE, MONOBASIC, MONOHYDRATE, SODIUM PHOSPHATE, DIBASIC ANHYDROUS 1.102; .398 G/1; G/1
TABLET ORAL
Qty: 20 TABLET | Refills: 0 | Status: SHIPPED | OUTPATIENT
Start: 2022-10-04

## 2022-10-04 NOTE — PATIENT INSTRUCTIONS
I am seeing this patient in consultation regarding screening colonoscopy. The patient has never had a previous colonoscopy. The patient has no current history of bright red blood per rectum or melena. She has no mucus in the stool. She does have occasional narrowing of the stool, etiology unclear. She does not have diarrhea or constipation. She has no abdominal pain or distention. She has not suffered weight loss as a symptom. She has never been diagnosed with Crohn's disease, ulcerative colitis, or irritable bowel syndrome. She has a mother who had colon polyps at age 76. There are no first-degree or second-degree relatives with either colon cancer, or cancer of the uterus. Her only prior abdominal operation was a . She had a coronary artery stent placed 2 years ago. She had a mitral valve clip placed. She is on clopidogrel. Clinical exam reveals her abdomen to be soft, nontender, nondistended, good bowel sounds. No guarding or rebound. No masses. No ascites. Liver and spleen are not palpable. Body weight is 226 pounds. There are no inguinal, umbilical, or incisional hernias present. She has active bowel sounds, normal pitch. There is a well-healed midline  incision in the hypogastrium. This patient will undergo colonoscopy. The patient will contact Dr. Larisa Teixeira, cardiology, regarding cessation of clopidogrel. We need her off for at least 7 days. All risks, benefits, complications and alternatives to the proposed procedure(s) were fully discussed with the patient. All questions from the patient were answered in detail. A description of the procedure(s) and possible outcomes was fully discussed. The patient seemed to understand the conversation and its details. Consent for the procedure(s) was confirmed with the patient.

## 2022-10-26 DIAGNOSIS — Z12.11 SCREEN FOR COLON CANCER: Primary | ICD-10-CM

## 2022-11-16 ENCOUNTER — TELEMEDICINE (OUTPATIENT)
Dept: FAMILY MEDICINE CLINIC | Facility: CLINIC | Age: 72
End: 2022-11-16
Payer: MEDICARE

## 2022-11-16 DIAGNOSIS — J01.40 ACUTE NON-RECURRENT PANSINUSITIS: Primary | ICD-10-CM

## 2022-11-16 PROCEDURE — 99213 OFFICE O/P EST LOW 20 MIN: CPT | Performed by: FAMILY MEDICINE

## 2022-11-16 RX ORDER — AZITHROMYCIN 250 MG/1
TABLET, FILM COATED ORAL
Qty: 6 TABLET | Refills: 0 | Status: SHIPPED | OUTPATIENT
Start: 2022-11-16 | End: 2022-11-21

## 2022-11-16 RX ORDER — PREDNISONE 20 MG/1
40 TABLET ORAL DAILY
Qty: 21 TABLET | Refills: 0 | Status: SHIPPED | OUTPATIENT
Start: 2022-11-16 | End: 2022-11-21

## 2022-11-21 ENCOUNTER — PATIENT MESSAGE (OUTPATIENT)
Dept: FAMILY MEDICINE CLINIC | Facility: CLINIC | Age: 72
End: 2022-11-21

## 2022-11-21 RX ORDER — CEFDINIR 300 MG/1
300 CAPSULE ORAL 2 TIMES DAILY
Qty: 20 CAPSULE | Refills: 0 | Status: SHIPPED | OUTPATIENT
Start: 2022-11-21

## 2022-11-21 NOTE — TELEPHONE ENCOUNTER
RH-  Patient would like to know if she should wean off of prednisone due to past side effects?  If so what is the wean regimen

## 2022-11-21 NOTE — TELEPHONE ENCOUNTER
I would try another antibiotic instead, cefdinir, I would stop the prednisone at 5 days, to avoid increasing risk of negative side effects.     Osiris Major MD

## 2022-11-22 NOTE — TELEPHONE ENCOUNTER
Patient called still waiting for instruction on how to wean off prednisone. Read RH message of \"In that case, 20 mg daily for 2 days, then 10 mg for 4 days. \"    Patient would like info sent through [facility] ? IO.

## 2022-12-15 ENCOUNTER — TELEPHONE (OUTPATIENT)
Facility: LOCATION | Age: 72
End: 2022-12-15

## 2022-12-15 NOTE — TELEPHONE ENCOUNTER
Per Medicare Guidelines -no authorization is required for Colonoscopy CPT 11885     Status: Authorization is not required however may be subject to review once claim is submitted-Covered Benefit

## 2022-12-16 RX ORDER — SERTRALINE HYDROCHLORIDE 100 MG/1
150 TABLET, FILM COATED ORAL DAILY
Qty: 45 TABLET | Refills: 0 | Status: SHIPPED | OUTPATIENT
Start: 2022-12-16

## 2023-01-04 DIAGNOSIS — Z12.11 SPECIAL SCREENING FOR MALIGNANT NEOPLASMS, COLON: Primary | ICD-10-CM

## 2023-01-25 RX ORDER — SODIUM CHLORIDE, SODIUM LACTATE, POTASSIUM CHLORIDE, CALCIUM CHLORIDE 600; 310; 30; 20 MG/100ML; MG/100ML; MG/100ML; MG/100ML
INJECTION, SOLUTION INTRAVENOUS CONTINUOUS
Status: CANCELLED | OUTPATIENT
Start: 2023-01-25

## 2023-01-27 ENCOUNTER — LABORATORY ENCOUNTER (OUTPATIENT)
Dept: LAB | Facility: HOSPITAL | Age: 73
End: 2023-01-27
Attending: COLON & RECTAL SURGERY
Payer: MEDICARE

## 2023-01-27 DIAGNOSIS — Z01.818 PRE-OP TESTING: ICD-10-CM

## 2023-01-27 LAB
ANION GAP SERPL CALC-SCNC: 4 MMOL/L (ref 0–18)
BUN BLD-MCNC: 38 MG/DL (ref 7–18)
CALCIUM BLD-MCNC: 9.1 MG/DL (ref 8.5–10.1)
CHLORIDE SERPL-SCNC: 108 MMOL/L (ref 98–112)
CO2 SERPL-SCNC: 29 MMOL/L (ref 21–32)
CREAT BLD-MCNC: 1.45 MG/DL
FASTING STATUS PATIENT QL REPORTED: YES
GFR SERPLBLD BASED ON 1.73 SQ M-ARVRAT: 38 ML/MIN/1.73M2 (ref 60–?)
GLUCOSE BLD-MCNC: 114 MG/DL (ref 70–99)
OSMOLALITY SERPL CALC.SUM OF ELEC: 302 MOSM/KG (ref 275–295)
POTASSIUM SERPL-SCNC: 4.2 MMOL/L (ref 3.5–5.1)
SODIUM SERPL-SCNC: 141 MMOL/L (ref 136–145)

## 2023-01-27 PROCEDURE — 80048 BASIC METABOLIC PNL TOTAL CA: CPT

## 2023-01-27 PROCEDURE — 36415 COLL VENOUS BLD VENIPUNCTURE: CPT

## 2023-01-29 DIAGNOSIS — Z17.0 MALIGNANT NEOPLASM OF OVERLAPPING SITES OF RIGHT BREAST IN FEMALE, ESTROGEN RECEPTOR POSITIVE (HCC): ICD-10-CM

## 2023-01-29 DIAGNOSIS — C50.811 MALIGNANT NEOPLASM OF OVERLAPPING SITES OF RIGHT BREAST IN FEMALE, ESTROGEN RECEPTOR POSITIVE (HCC): ICD-10-CM

## 2023-01-30 RX ORDER — SERTRALINE HYDROCHLORIDE 100 MG/1
150 TABLET, FILM COATED ORAL DAILY
Qty: 45 TABLET | Refills: 0 | Status: SHIPPED | OUTPATIENT
Start: 2023-01-30

## 2023-01-30 RX ORDER — TAMOXIFEN CITRATE 20 MG/1
20 TABLET ORAL DAILY
Qty: 30 TABLET | Refills: 1 | Status: SHIPPED | OUTPATIENT
Start: 2023-01-30

## 2023-01-31 ENCOUNTER — ANESTHESIA EVENT (OUTPATIENT)
Dept: ENDOSCOPY | Facility: HOSPITAL | Age: 73
End: 2023-01-31
Payer: MEDICARE

## 2023-01-31 ENCOUNTER — HOSPITAL ENCOUNTER (OUTPATIENT)
Facility: HOSPITAL | Age: 73
Setting detail: HOSPITAL OUTPATIENT SURGERY
Discharge: HOME | End: 2023-01-31
Attending: COLON & RECTAL SURGERY | Admitting: COLON & RECTAL SURGERY
Payer: MEDICARE

## 2023-01-31 ENCOUNTER — ANESTHESIA (OUTPATIENT)
Dept: ENDOSCOPY | Facility: HOSPITAL | Age: 73
End: 2023-01-31
Payer: MEDICARE

## 2023-01-31 VITALS
DIASTOLIC BLOOD PRESSURE: 94 MMHG | BODY MASS INDEX: 33.95 KG/M2 | HEIGHT: 68 IN | HEART RATE: 98 BPM | SYSTOLIC BLOOD PRESSURE: 129 MMHG | WEIGHT: 224 LBS | OXYGEN SATURATION: 94 % | TEMPERATURE: 97 F

## 2023-01-31 DIAGNOSIS — Z01.818 PRE-OP TESTING: Primary | ICD-10-CM

## 2023-01-31 DIAGNOSIS — Z12.11 SCREEN FOR COLON CANCER: ICD-10-CM

## 2023-01-31 PROCEDURE — 0DJD8ZZ INSPECTION OF LOWER INTESTINAL TRACT, VIA NATURAL OR ARTIFICIAL OPENING ENDOSCOPIC: ICD-10-PCS | Performed by: COLON & RECTAL SURGERY

## 2023-01-31 RX ORDER — LIDOCAINE HYDROCHLORIDE 10 MG/ML
INJECTION, SOLUTION EPIDURAL; INFILTRATION; INTRACAUDAL; PERINEURAL AS NEEDED
Status: DISCONTINUED | OUTPATIENT
Start: 2023-01-31 | End: 2023-01-31 | Stop reason: SURG

## 2023-01-31 RX ORDER — SODIUM CHLORIDE, SODIUM LACTATE, POTASSIUM CHLORIDE, CALCIUM CHLORIDE 600; 310; 30; 20 MG/100ML; MG/100ML; MG/100ML; MG/100ML
INJECTION, SOLUTION INTRAVENOUS CONTINUOUS
Status: DISCONTINUED | OUTPATIENT
Start: 2023-01-31 | End: 2023-01-31

## 2023-01-31 RX ADMIN — LIDOCAINE HYDROCHLORIDE 25 MG: 10 INJECTION, SOLUTION EPIDURAL; INFILTRATION; INTRACAUDAL; PERINEURAL at 10:37:00

## 2023-01-31 RX ADMIN — SODIUM CHLORIDE, SODIUM LACTATE, POTASSIUM CHLORIDE, CALCIUM CHLORIDE: 600; 310; 30; 20 INJECTION, SOLUTION INTRAVENOUS at 10:34:00

## 2023-01-31 NOTE — OPERATIVE REPORT
[facility]  Op Note    Pasquale Tierney Location: OR   [facility] [id number] MRN [id number]   Admission Date 1/31/2023 Operation Date 1/31/2023   Attending Physician Dipika Boyle MD Operating Physician Evan Armenta MD     Pre-Operative Diagnosis: Screen for colon cancer [Z12.11]    Post-Operative Diagnosis: Diverticulosis, internal and external prolapsing hemorrhoids    Procedure Performed: Colonoscopy    Surgeon: Evan Armenta MD    Anesthesia: MAC      History of present illness:  Screening colonoscopy    Operative findings:  Diverticulosis of the distal sigmoid colon. These are small pocket diverticuli numbering greater than 20. There is some very slight luminal narrowing muscular per trophy. There is no active diverticulitis. The patient has large mixed complex prolapsing hemorrhoids. She has a moderate anterior rectocele. There are no polyps, colitis, neoplasms, or inflammation throughout the entire examination. Operative summary:  Following adequate IV sedation, the patient was placed in the left lateral decubitus position on the operating room table. Digital rectal examination was performed. The colonoscope was inserted, and passed to the end of the colon. Upon withdrawal of the scope, the prep was rated as follows and the [facility] bowel prep scale:  3    0. Unprepared colon segment with stool but cannot be cleared  1. Portion of mucosa in segments seen after cleaning, but other areas not seen because of retained material  2. Minor residual material after cleaning, but mucosa of segment generally well seen  3. Entire mucosa of segment well seen after cleaning        Digital rectal examination was performed, the rectal exam had the following findings:   Large mixed complex prolapsing hemorrhoids nearly circumferential.  These are both internal grade 3 and external prolapsed hemorrhoids. They are irritated after the bowel prep. There is a moderate anterior rectocele.     Landmarks were identified confirming the location of the colonoscope in the cecum, including the transverse cecal fold, and the ileocecal valve. Upon withdrawal of the scope, the patient had the following findings:  The patient does not have polyps. The patient does have diverticulosis. The patient does not have inflammation. This patient has not had a previous colon resection. The scope was retroverted in the anal canal, the anal canal had the following findings:    The patient does have internal hemorrhoids. The patient does not have anal canal polyps. The patient does not have hypertrophied anal papillae. The procedures performed during the procedure other than colonoscopy are listed as follows:  None    The scope was withdrawn, the patient was taken to the recovery room in stable postoperative condition. Pathologic specimens:  None    Complications: None      Addendum: This patient can be returned to the general screening population. Current recommendations for screening include colonoscopy every 10 years starting at age 39. If this patient displays any signs and symptoms consistent with colon cancer, the patient should return to me immediately, even if it is prior to 10 years, or age 39.          Niru Knight MD  1/31/2023  11:00 AM

## 2023-01-31 NOTE — ANESTHESIA POSTPROCEDURE EVALUATION
[address] Patient Status:  Hospital Outpatient Surgery   Age/Gender 67year old female MRN [id number]   Location [address] [facility] 28 Attending Joy Hickman MD   [address] Day [address] 0 PCP Sammi Win DO       Anesthesia Post-op Note    COLONOSCOPY    Procedure Summary     Date: 01/31/23 Room / Location: [facility] ENDOSCOPY 04 / [facility] ENDOSCOPY    Anesthesia Start: 1034 Anesthesia Stop: 4358    Procedure: COLONOSCOPY Diagnosis:       Screen for colon cancer      (prolapsed exteral hemorrhoids, diverticulosis)    Surgeons: Joy Hickman MD Anesthesiologist: Rob Sims MD    Anesthesia Type: MAC ASA Status: 3          Anesthesia Type: MAC    Vitals Value Taken Time   /94 01/31/23 1115   Temp 98.0 01/31/23 1118   Pulse 98 01/31/23 1117   Resp 16 01/31/23 1118   SpO2 98 % 01/31/23 1117   Vitals shown include unvalidated device data. Patient Location: Endoscopy    Anesthesia Type: MAC    Airway Patency: patent    Postop Pain Control: adequate    Mental Status: mildly sedated but able to meaningfully participate in the post-anesthesia evaluation    Nausea/Vomiting: none    Cardiopulmonary/Hydration status: stable euvolemic    Complications: no apparent anesthesia related complications    Postop vital signs: stable    Dental Exam: Unchanged from Preop    Patient to be discharged home when criteria met.

## 2023-02-05 DIAGNOSIS — E87.6 LOW POTASSIUM SYNDROME: ICD-10-CM

## 2023-02-06 RX ORDER — POTASSIUM CHLORIDE 1500 MG/1
TABLET, FILM COATED, EXTENDED RELEASE ORAL
Qty: 180 TABLET | Refills: 0 | Status: SHIPPED | OUTPATIENT
Start: 2023-02-06

## 2023-02-16 ENCOUNTER — OFFICE VISIT (OUTPATIENT)
Dept: FAMILY MEDICINE CLINIC | Facility: CLINIC | Age: 73
End: 2023-02-16
Payer: MEDICARE

## 2023-02-16 VITALS
HEART RATE: 78 BPM | DIASTOLIC BLOOD PRESSURE: 68 MMHG | TEMPERATURE: 98 F | SYSTOLIC BLOOD PRESSURE: 124 MMHG | RESPIRATION RATE: 18 BRPM | WEIGHT: 233.31 LBS | BODY MASS INDEX: 35.36 KG/M2 | HEIGHT: 68 IN | OXYGEN SATURATION: 99 %

## 2023-02-16 DIAGNOSIS — Z98.890 S/P MITRAL VALVE CLIP IMPLANTATION: ICD-10-CM

## 2023-02-16 DIAGNOSIS — I25.5 ISCHEMIC CARDIOMYOPATHY: ICD-10-CM

## 2023-02-16 DIAGNOSIS — R93.89 THICKENED ENDOMETRIUM: ICD-10-CM

## 2023-02-16 DIAGNOSIS — D05.12 DUCTAL CARCINOMA IN SITU (DCIS) OF LEFT BREAST: ICD-10-CM

## 2023-02-16 DIAGNOSIS — I89.0 LYMPHEDEMA OF ARM: ICD-10-CM

## 2023-02-16 DIAGNOSIS — Z95.818 S/P MITRAL VALVE CLIP IMPLANTATION: ICD-10-CM

## 2023-02-16 DIAGNOSIS — E53.8 VITAMIN B12 DEFICIENCY: ICD-10-CM

## 2023-02-16 DIAGNOSIS — F32.0 MILD SINGLE CURRENT EPISODE OF MAJOR DEPRESSIVE DISORDER (HCC): ICD-10-CM

## 2023-02-16 DIAGNOSIS — E55.9 VITAMIN D DEFICIENCY: ICD-10-CM

## 2023-02-16 DIAGNOSIS — I50.32 CHRONIC DIASTOLIC CHF (CONGESTIVE HEART FAILURE) (HCC): ICD-10-CM

## 2023-02-16 DIAGNOSIS — M25.552 LEFT HIP PAIN: ICD-10-CM

## 2023-02-16 DIAGNOSIS — Z90.12 H/O LEFT MASTECTOMY: ICD-10-CM

## 2023-02-16 DIAGNOSIS — M54.50 LUMBAR PAIN: ICD-10-CM

## 2023-02-16 DIAGNOSIS — I25.10 CORONARY ARTERY DISEASE INVOLVING NATIVE CORONARY ARTERY OF NATIVE HEART WITHOUT ANGINA PECTORIS: ICD-10-CM

## 2023-02-16 DIAGNOSIS — D69.6 THROMBOCYTOPENIA (HCC): ICD-10-CM

## 2023-02-16 DIAGNOSIS — Z80.3 FAMILY HISTORY OF BREAST CANCER IN FEMALE: ICD-10-CM

## 2023-02-16 DIAGNOSIS — Z78.0 ASYMPTOMATIC MENOPAUSE: ICD-10-CM

## 2023-02-16 DIAGNOSIS — E04.9 GOITER: ICD-10-CM

## 2023-02-16 DIAGNOSIS — E78.5 DYSLIPIDEMIA: ICD-10-CM

## 2023-02-16 DIAGNOSIS — G89.29 OTHER CHRONIC PAIN: ICD-10-CM

## 2023-02-16 DIAGNOSIS — Z95.5 STATUS POST CORONARY ARTERY STENT PLACEMENT: ICD-10-CM

## 2023-02-16 DIAGNOSIS — I34.0 NONRHEUMATIC MITRAL VALVE REGURGITATION: ICD-10-CM

## 2023-02-16 DIAGNOSIS — I10 ESSENTIAL HYPERTENSION: ICD-10-CM

## 2023-02-16 DIAGNOSIS — C50.811 MALIGNANT NEOPLASM OF OVERLAPPING SITES OF RIGHT FEMALE BREAST, UNSPECIFIED ESTROGEN RECEPTOR STATUS (HCC): ICD-10-CM

## 2023-02-16 DIAGNOSIS — Z13.820 SCREENING FOR OSTEOPOROSIS: ICD-10-CM

## 2023-02-16 DIAGNOSIS — Z00.00 ENCOUNTER FOR ANNUAL HEALTH EXAMINATION: Primary | ICD-10-CM

## 2023-02-16 DIAGNOSIS — R73.9 HYPERGLYCEMIA: ICD-10-CM

## 2023-02-16 PROBLEM — J81.0 FLASH PULMONARY EDEMA (HCC): Status: RESOLVED | Noted: 2020-09-30 | Resolved: 2023-02-16

## 2023-02-16 PROBLEM — Z12.11 ENCOUNTER FOR SCREENING COLONOSCOPY: Status: RESOLVED | Noted: 2022-10-04 | Resolved: 2023-02-16

## 2023-02-16 PROBLEM — I50.9 ACUTE ON CHRONIC CONGESTIVE HEART FAILURE, UNSPECIFIED HEART FAILURE TYPE (HCC): Status: RESOLVED | Noted: 2020-09-14 | Resolved: 2023-02-16

## 2023-02-16 PROCEDURE — 90662 IIV NO PRSV INCREASED AG IM: CPT | Performed by: FAMILY MEDICINE

## 2023-02-16 PROCEDURE — G0439 PPPS, SUBSEQ VISIT: HCPCS | Performed by: FAMILY MEDICINE

## 2023-02-16 PROCEDURE — 99213 OFFICE O/P EST LOW 20 MIN: CPT | Performed by: FAMILY MEDICINE

## 2023-02-16 PROCEDURE — G0008 ADMIN INFLUENZA VIRUS VAC: HCPCS | Performed by: FAMILY MEDICINE

## 2023-02-16 PROCEDURE — 90677 PCV20 VACCINE IM: CPT | Performed by: FAMILY MEDICINE

## 2023-02-16 PROCEDURE — G0009 ADMIN PNEUMOCOCCAL VACCINE: HCPCS | Performed by: FAMILY MEDICINE

## 2023-02-16 PROCEDURE — 1125F AMNT PAIN NOTED PAIN PRSNT: CPT | Performed by: FAMILY MEDICINE

## 2023-02-16 RX ORDER — MELOXICAM 15 MG/1
15 TABLET ORAL DAILY
COMMUNITY
Start: 2023-01-24 | End: 2023-02-16 | Stop reason: ALTCHOICE

## 2023-02-16 RX ORDER — ASCORBIC ACID 500 MG
TABLET ORAL
COMMUNITY

## 2023-03-10 RX ORDER — SERTRALINE HYDROCHLORIDE 100 MG/1
TABLET, FILM COATED ORAL
Qty: 45 TABLET | Refills: 0 | Status: SHIPPED | OUTPATIENT
Start: 2023-03-10

## 2023-04-25 RX ORDER — SERTRALINE HYDROCHLORIDE 100 MG/1
150 TABLET, FILM COATED ORAL DAILY
Qty: 45 TABLET | Refills: 0 | Status: SHIPPED | OUTPATIENT
Start: 2023-04-25

## 2023-05-09 ENCOUNTER — PATIENT OUTREACH (OUTPATIENT)
Facility: LOCATION | Age: 73
End: 2023-05-09

## 2023-05-12 DIAGNOSIS — E87.6 LOW POTASSIUM SYNDROME: ICD-10-CM

## 2023-05-12 RX ORDER — POTASSIUM CHLORIDE 1500 MG/1
TABLET, FILM COATED, EXTENDED RELEASE ORAL
Qty: 180 TABLET | Refills: 0 | Status: SHIPPED | OUTPATIENT
Start: 2023-05-12

## 2023-05-22 NOTE — CM/SW NOTE
The pt has coverage for IV abx at home. Per [employer] the pt's copay for the drug will be $223.08/week. The pt. Has been set up with [facility] for RN services and [address] [place] [address] for IV abx.   [address] [place] [address] will deliver the meds today and [facility] will start care tomor Was treated with IV lasix initially on admission to hospital  Continue monitoring fluid status and weight  Currently not on diuretics

## 2023-06-05 DIAGNOSIS — E87.6 HYPOKALEMIA: Primary | ICD-10-CM

## 2023-06-06 RX ORDER — SERTRALINE HYDROCHLORIDE 100 MG/1
150 TABLET, FILM COATED ORAL DAILY
Qty: 45 TABLET | Refills: 0 | OUTPATIENT
Start: 2023-06-06

## 2023-06-21 ENCOUNTER — PATIENT MESSAGE (OUTPATIENT)
Dept: FAMILY MEDICINE CLINIC | Facility: CLINIC | Age: 73
End: 2023-06-21

## 2023-06-26 ENCOUNTER — LAB ENCOUNTER (OUTPATIENT)
Dept: LAB | Age: 73
End: 2023-06-26
Attending: FAMILY MEDICINE
Payer: MEDICARE

## 2023-06-26 DIAGNOSIS — I10 ESSENTIAL HYPERTENSION: ICD-10-CM

## 2023-06-26 DIAGNOSIS — E04.9 GOITER: ICD-10-CM

## 2023-06-26 DIAGNOSIS — E53.8 VITAMIN B12 DEFICIENCY: ICD-10-CM

## 2023-06-26 DIAGNOSIS — E78.5 DYSLIPIDEMIA: ICD-10-CM

## 2023-06-26 DIAGNOSIS — D69.6 THROMBOCYTOPENIA (HCC): ICD-10-CM

## 2023-06-26 DIAGNOSIS — E55.9 VITAMIN D DEFICIENCY: ICD-10-CM

## 2023-06-26 LAB
ALBUMIN SERPL-MCNC: 4 G/DL (ref 3.4–5)
ALBUMIN/GLOB SERPL: 1.2 {RATIO} (ref 1–2)
ALP LIVER SERPL-CCNC: 74 U/L
ALT SERPL-CCNC: 17 U/L
ANION GAP SERPL CALC-SCNC: 7 MMOL/L (ref 0–18)
AST SERPL-CCNC: 20 U/L (ref 15–37)
BASOPHILS # BLD AUTO: 0.02 X10(3) UL (ref 0–0.2)
BASOPHILS NFR BLD AUTO: 0.5 %
BILIRUB SERPL-MCNC: 0.3 MG/DL (ref 0.1–2)
BUN BLD-MCNC: 40 MG/DL (ref 7–18)
CALCIUM BLD-MCNC: 9.3 MG/DL (ref 8.5–10.1)
CHLORIDE SERPL-SCNC: 106 MMOL/L (ref 98–112)
CHOLEST SERPL-MCNC: 204 MG/DL (ref ?–200)
CO2 SERPL-SCNC: 26 MMOL/L (ref 21–32)
CREAT BLD-MCNC: 1.54 MG/DL
EOSINOPHIL # BLD AUTO: 0.04 X10(3) UL (ref 0–0.7)
EOSINOPHIL NFR BLD AUTO: 1 %
ERYTHROCYTE [DISTWIDTH] IN BLOOD BY AUTOMATED COUNT: 13.2 %
FASTING PATIENT LIPID ANSWER: YES
FASTING STATUS PATIENT QL REPORTED: YES
GFR SERPLBLD BASED ON 1.73 SQ M-ARVRAT: 35 ML/MIN/1.73M2 (ref 60–?)
GLOBULIN PLAS-MCNC: 3.4 G/DL (ref 2.8–4.4)
GLUCOSE BLD-MCNC: 130 MG/DL (ref 70–99)
HCT VFR BLD AUTO: 38.5 %
HDLC SERPL-MCNC: 45 MG/DL (ref 40–59)
HGB BLD-MCNC: 12.6 G/DL
IMM GRANULOCYTES # BLD AUTO: 0.03 X10(3) UL (ref 0–1)
IMM GRANULOCYTES NFR BLD: 0.8 %
LDLC SERPL CALC-MCNC: 127 MG/DL (ref ?–100)
LYMPHOCYTES # BLD AUTO: 1.02 X10(3) UL (ref 1–4)
LYMPHOCYTES NFR BLD AUTO: 25.7 %
MCH RBC QN AUTO: 30.7 PG (ref 26–34)
MCHC RBC AUTO-ENTMCNC: 32.7 G/DL (ref 31–37)
MCV RBC AUTO: 93.7 FL
MONOCYTES # BLD AUTO: 0.59 X10(3) UL (ref 0.1–1)
MONOCYTES NFR BLD AUTO: 14.9 %
NEUTROPHILS # BLD AUTO: 2.27 X10 (3) UL (ref 1.5–7.7)
NEUTROPHILS # BLD AUTO: 2.27 X10(3) UL (ref 1.5–7.7)
NEUTROPHILS NFR BLD AUTO: 57.1 %
NONHDLC SERPL-MCNC: 159 MG/DL (ref ?–130)
OSMOLALITY SERPL CALC.SUM OF ELEC: 300 MOSM/KG (ref 275–295)
PLATELET # BLD AUTO: 146 10(3)UL (ref 150–450)
POTASSIUM SERPL-SCNC: 3.4 MMOL/L (ref 3.5–5.1)
PROT SERPL-MCNC: 7.4 G/DL (ref 6.4–8.2)
RBC # BLD AUTO: 4.11 X10(6)UL
SODIUM SERPL-SCNC: 139 MMOL/L (ref 136–145)
T4 FREE SERPL-MCNC: 0.8 NG/DL (ref 0.8–1.7)
TRIGL SERPL-MCNC: 180 MG/DL (ref 30–149)
TSI SER-ACNC: 0.09 MIU/ML (ref 0.36–3.74)
VIT B12 SERPL-MCNC: 627 PG/ML (ref 193–986)
VIT D+METAB SERPL-MCNC: 41.7 NG/ML (ref 30–100)
VLDLC SERPL CALC-MCNC: 32 MG/DL (ref 0–30)
WBC # BLD AUTO: 4 X10(3) UL (ref 4–11)

## 2023-06-26 PROCEDURE — 36415 COLL VENOUS BLD VENIPUNCTURE: CPT

## 2023-06-26 PROCEDURE — 80061 LIPID PANEL: CPT

## 2023-06-26 PROCEDURE — 80053 COMPREHEN METABOLIC PANEL: CPT

## 2023-06-26 PROCEDURE — 84439 ASSAY OF FREE THYROXINE: CPT

## 2023-06-26 PROCEDURE — 84443 ASSAY THYROID STIM HORMONE: CPT

## 2023-06-26 PROCEDURE — 85025 COMPLETE CBC W/AUTO DIFF WBC: CPT

## 2023-06-26 PROCEDURE — 82607 VITAMIN B-12: CPT

## 2023-06-26 PROCEDURE — 82306 VITAMIN D 25 HYDROXY: CPT

## 2023-07-05 ENCOUNTER — OFFICE VISIT (OUTPATIENT)
Dept: HEMATOLOGY/ONCOLOGY | Facility: HOSPITAL | Age: 73
End: 2023-07-05
Attending: INTERNAL MEDICINE
Payer: MEDICARE

## 2023-07-05 VITALS
BODY MASS INDEX: 33.18 KG/M2 | HEIGHT: 69.02 IN | HEART RATE: 83 BPM | DIASTOLIC BLOOD PRESSURE: 62 MMHG | SYSTOLIC BLOOD PRESSURE: 97 MMHG | TEMPERATURE: 98 F | RESPIRATION RATE: 18 BRPM | WEIGHT: 224 LBS | OXYGEN SATURATION: 95 %

## 2023-07-05 DIAGNOSIS — Z17.0 MALIGNANT NEOPLASM OF OVERLAPPING SITES OF RIGHT BREAST IN FEMALE, ESTROGEN RECEPTOR POSITIVE: Primary | ICD-10-CM

## 2023-07-05 DIAGNOSIS — C50.811 MALIGNANT NEOPLASM OF OVERLAPPING SITES OF RIGHT BREAST IN FEMALE, ESTROGEN RECEPTOR POSITIVE: Primary | ICD-10-CM

## 2023-07-05 PROCEDURE — 99213 OFFICE O/P EST LOW 20 MIN: CPT | Performed by: INTERNAL MEDICINE

## 2023-07-05 RX ORDER — TAMOXIFEN CITRATE 20 MG/1
20 TABLET ORAL DAILY
Qty: 90 TABLET | Refills: 3 | Status: SHIPPED | OUTPATIENT
Start: 2023-07-05

## 2023-07-05 RX ORDER — SERTRALINE HYDROCHLORIDE 100 MG/1
150 TABLET, FILM COATED ORAL DAILY
Qty: 45 TABLET | Refills: 0 | Status: SHIPPED | OUTPATIENT
Start: 2023-07-05

## 2023-07-05 NOTE — TELEPHONE ENCOUNTER
A refill request was received for:  Requested Prescriptions     Pending Prescriptions Disp Refills    SERTRALINE 100 MG Oral Tab [Pharmacy Med Name: SERTRALINE 100MG TABLETS] 45 tablet 0     Sig: TAKE 1 AND 1/2 TABLETS BY MOUTH DAILY       Last refill date:       Last office visit:     Follow up due:  Future Appointments   Date Time Provider [facility] [place]   7/5/2023  2:00 PM Prince Leija MD [facility]   8/31/2023 11:00 AM Reese Farrell DO [facility] [address]

## 2023-07-05 NOTE — PROGRESS NOTES
Patient is here for follow up for breast cancer on tamoxifen. She ran out of medication about 2 weeks ago when she ran out. Last summer she had some vaginal bleeding and had a uterine biopsy that was negative. She recently had episodes of dizziness that she related to an increase in her Entresto. She has seen her doctor and this has been adjusted. She is feeling less dizzy now. She denies any fever, cough or shortness of breath. She has some chronic back pain and is seeing a new pain doctor who is helping.     Education Record    Learner:  Patient    Disease / Diagnosis: Breast cancer    Barriers / Limitations:  None   Comments:    Method:  Discussion   Comments:    General Topics:  Side effects and symptom management   Comments:    Outcome:  Shows understanding   Comments:

## 2023-07-06 DIAGNOSIS — E87.6 LOW POTASSIUM SYNDROME: ICD-10-CM

## 2023-07-06 NOTE — TELEPHONE ENCOUNTER
A refill request was received for:  Requested Prescriptions     Pending Prescriptions Disp Refills    Potassium Chloride ER 20 MEQ Oral Tab  tablet 0     Sig: TAKE 2 TABLETS(40 MEQ) BY MOUTH DAILY     Component  Ref Range & Units 2/28/22 12:03 PM   Potassium  3.5 - 5.1 mmol/L 4.3     Last refill date:5/12/2023      Last office visit:2/2023     Follow up due:  Future Appointments   Date Time Provider [place]   8/31/2023 11:00 AM Art [place],  [facility] [address]

## 2023-07-07 RX ORDER — POTASSIUM CHLORIDE 1500 MG/1
TABLET, EXTENDED RELEASE ORAL
Qty: 180 TABLET | Refills: 0 | Status: SHIPPED | OUTPATIENT
Start: 2023-07-07

## 2023-08-17 ENCOUNTER — LAB ENCOUNTER (OUTPATIENT)
Dept: LAB | Age: 73
End: 2023-08-17
Attending: CLINICAL NURSE SPECIALIST
Payer: MEDICARE

## 2023-08-17 ENCOUNTER — LAB ENCOUNTER (OUTPATIENT)
Dept: LAB | Age: 73
End: 2023-08-17
Attending: FAMILY MEDICINE
Payer: MEDICARE

## 2023-08-17 DIAGNOSIS — I50.32 CHRONIC DIASTOLIC HEART FAILURE (HCC): Primary | ICD-10-CM

## 2023-08-17 DIAGNOSIS — I25.5 ISCHEMIC CARDIOMYOPATHY: ICD-10-CM

## 2023-08-17 DIAGNOSIS — E78.5 DYSLIPIDEMIA: ICD-10-CM

## 2023-08-17 DIAGNOSIS — E87.6 HYPOKALEMIA: ICD-10-CM

## 2023-08-17 DIAGNOSIS — I25.10 CORONARY ATHEROSCLEROSIS OF NATIVE CORONARY ARTERY: ICD-10-CM

## 2023-08-17 DIAGNOSIS — I10 HYPERTENSION, ESSENTIAL: ICD-10-CM

## 2023-08-17 PROCEDURE — 83880 ASSAY OF NATRIURETIC PEPTIDE: CPT

## 2023-08-17 PROCEDURE — 84132 ASSAY OF SERUM POTASSIUM: CPT

## 2023-08-17 PROCEDURE — 80048 BASIC METABOLIC PNL TOTAL CA: CPT

## 2023-08-17 PROCEDURE — 36415 COLL VENOUS BLD VENIPUNCTURE: CPT

## 2023-08-18 DIAGNOSIS — E87.6 HYPOKALEMIA: Primary | ICD-10-CM

## 2023-08-18 LAB
ANION GAP SERPL CALC-SCNC: 6 MMOL/L (ref 0–18)
BUN BLD-MCNC: 33 MG/DL (ref 7–18)
CALCIUM BLD-MCNC: 9.3 MG/DL (ref 8.5–10.1)
CHLORIDE SERPL-SCNC: 106 MMOL/L (ref 98–112)
CO2 SERPL-SCNC: 27 MMOL/L (ref 21–32)
CREAT BLD-MCNC: 1.34 MG/DL
EGFRCR SERPLBLD CKD-EPI 2021: 42 ML/MIN/1.73M2 (ref 60–?)
FASTING STATUS PATIENT QL REPORTED: NO
GLUCOSE BLD-MCNC: 75 MG/DL (ref 70–99)
NT-PROBNP SERPL-MCNC: 637 PG/ML (ref ?–125)
OSMOLALITY SERPL CALC.SUM OF ELEC: 294 MOSM/KG (ref 275–295)
POTASSIUM SERPL-SCNC: 4.3 MMOL/L (ref 3.5–5.1)
POTASSIUM SERPL-SCNC: 4.3 MMOL/L (ref 3.5–5.1)
SODIUM SERPL-SCNC: 139 MMOL/L (ref 136–145)

## 2023-08-31 ENCOUNTER — OFFICE VISIT (OUTPATIENT)
Dept: FAMILY MEDICINE CLINIC | Facility: CLINIC | Age: 73
End: 2023-08-31
Payer: MEDICARE

## 2023-08-31 VITALS
DIASTOLIC BLOOD PRESSURE: 70 MMHG | WEIGHT: 215 LBS | SYSTOLIC BLOOD PRESSURE: 110 MMHG | RESPIRATION RATE: 16 BRPM | HEIGHT: 68 IN | OXYGEN SATURATION: 97 % | BODY MASS INDEX: 32.58 KG/M2 | HEART RATE: 88 BPM

## 2023-08-31 DIAGNOSIS — M47.816 LUMBAR ARTHROPATHY: Primary | ICD-10-CM

## 2023-08-31 DIAGNOSIS — F32.0 MILD SINGLE CURRENT EPISODE OF MAJOR DEPRESSIVE DISORDER (HCC): ICD-10-CM

## 2023-08-31 DIAGNOSIS — I25.10 CORONARY ARTERY DISEASE INVOLVING NATIVE CORONARY ARTERY OF NATIVE HEART WITHOUT ANGINA PECTORIS: ICD-10-CM

## 2023-08-31 DIAGNOSIS — M51.36 BULGING LUMBAR DISC: ICD-10-CM

## 2023-08-31 DIAGNOSIS — R73.9 HYPERGLYCEMIA: ICD-10-CM

## 2023-08-31 DIAGNOSIS — E04.9 GOITER: ICD-10-CM

## 2023-08-31 DIAGNOSIS — Z95.5 STATUS POST CORONARY ARTERY STENT PLACEMENT: ICD-10-CM

## 2023-08-31 DIAGNOSIS — R93.89 THICKENED ENDOMETRIUM: ICD-10-CM

## 2023-08-31 DIAGNOSIS — N95.0 POSTMENOPAUSAL BLEEDING: ICD-10-CM

## 2023-08-31 DIAGNOSIS — I50.32 CHRONIC DIASTOLIC CHF (CONGESTIVE HEART FAILURE) (HCC): ICD-10-CM

## 2023-08-31 DIAGNOSIS — I10 ESSENTIAL HYPERTENSION: ICD-10-CM

## 2023-08-31 DIAGNOSIS — Z98.890 H/O RADIOFREQUENCY ABLATION (RFA) OF NERVE OF LUMBAR SPINE: ICD-10-CM

## 2023-08-31 DIAGNOSIS — I25.5 ISCHEMIC CARDIOMYOPATHY: ICD-10-CM

## 2023-08-31 PROBLEM — D70.8 OTHER NEUTROPENIA (HCC): Status: ACTIVE | Noted: 2023-08-31

## 2023-08-31 PROBLEM — D69.6 THROMBOCYTOPENIA (HCC): Status: RESOLVED | Noted: 2020-09-14 | Resolved: 2023-08-31

## 2023-08-31 PROBLEM — D69.6 THROMBOCYTOPENIA: Status: RESOLVED | Noted: 2020-09-14 | Resolved: 2023-08-31

## 2023-08-31 PROBLEM — Z87.01 HISTORY OF PNEUMONIA: Status: RESOLVED | Noted: 2021-01-26 | Resolved: 2023-08-31

## 2023-08-31 PROBLEM — D70.8 OTHER NEUTROPENIA: Status: ACTIVE | Noted: 2023-08-31

## 2023-08-31 PROCEDURE — 99214 OFFICE O/P EST MOD 30 MIN: CPT | Performed by: FAMILY MEDICINE

## 2023-08-31 RX ORDER — SERTRALINE HYDROCHLORIDE 100 MG/1
150 TABLET, FILM COATED ORAL DAILY
Qty: 135 TABLET | Refills: 1 | Status: SHIPPED | OUTPATIENT
Start: 2023-08-31

## 2023-09-13 ENCOUNTER — HOSPITAL ENCOUNTER (OUTPATIENT)
Age: 73
Discharge: HOME | End: 2023-09-13
Payer: MEDICARE

## 2023-09-13 VITALS
RESPIRATION RATE: 20 BRPM | HEIGHT: 68 IN | BODY MASS INDEX: 31.22 KG/M2 | SYSTOLIC BLOOD PRESSURE: 116 MMHG | WEIGHT: 206 LBS | TEMPERATURE: 97 F | DIASTOLIC BLOOD PRESSURE: 66 MMHG | OXYGEN SATURATION: 96 %

## 2023-09-13 DIAGNOSIS — L25.9 CONTACT DERMATITIS, UNSPECIFIED CONTACT DERMATITIS TYPE, UNSPECIFIED TRIGGER: Primary | ICD-10-CM

## 2023-09-13 PROCEDURE — 99213 OFFICE O/P EST LOW 20 MIN: CPT | Performed by: PHYSICIAN ASSISTANT

## 2023-09-13 RX ORDER — TRIAMCINOLONE ACETONIDE 5 MG/G
1 CREAM TOPICAL 3 TIMES DAILY
Qty: 15 G | Refills: 0 | Status: SHIPPED | OUTPATIENT
Start: 2023-09-13

## 2023-09-13 RX ORDER — PREDNISONE 20 MG/1
TABLET ORAL
Qty: 23 TABLET | Refills: 0 | Status: SHIPPED | OUTPATIENT
Start: 2023-09-13 | End: 2023-09-28

## 2023-09-29 ENCOUNTER — PATIENT MESSAGE (OUTPATIENT)
Dept: FAMILY MEDICINE CLINIC | Facility: CLINIC | Age: 73
End: 2023-09-29

## 2023-09-29 ENCOUNTER — TELEMEDICINE (OUTPATIENT)
Dept: FAMILY MEDICINE CLINIC | Facility: CLINIC | Age: 73
End: 2023-09-29
Payer: MEDICARE

## 2023-09-29 ENCOUNTER — LAB ENCOUNTER (OUTPATIENT)
Dept: LAB | Facility: HOSPITAL | Age: 73
End: 2023-09-29
Attending: FAMILY MEDICINE
Payer: MEDICARE

## 2023-09-29 DIAGNOSIS — R68.89 FLU-LIKE SYMPTOMS: ICD-10-CM

## 2023-09-29 DIAGNOSIS — R68.89 FLU-LIKE SYMPTOMS: Primary | ICD-10-CM

## 2023-09-29 PROCEDURE — 99213 OFFICE O/P EST LOW 20 MIN: CPT | Performed by: FAMILY MEDICINE

## 2023-09-29 PROCEDURE — 87635 SARS-COV-2 COVID-19 AMP PRB: CPT

## 2023-09-29 RX ORDER — BENZONATATE 200 MG/1
200 CAPSULE ORAL EVERY 8 HOURS PRN
Qty: 30 CAPSULE | Refills: 0 | Status: SHIPPED | OUTPATIENT
Start: 2023-09-29

## 2023-09-29 RX ORDER — AMOXICILLIN AND CLAVULANATE POTASSIUM 875; 125 MG/1; MG/1
1 TABLET, FILM COATED ORAL 2 TIMES DAILY
Qty: 20 TABLET | Refills: 0 | Status: SHIPPED | OUTPATIENT
Start: 2023-09-29 | End: 2023-10-09

## 2023-09-29 RX ORDER — FLUTICASONE PROPIONATE 50 MCG
2 SPRAY, SUSPENSION (ML) NASAL DAILY
Qty: 1 EACH | Refills: 1 | Status: SHIPPED | OUTPATIENT
Start: 2023-09-29

## 2023-09-29 NOTE — PROGRESS NOTES
Subjective:   Patient ID: Breezy Dubois is a 67year old female. Started feeling sick on Tuesday. Coughing, sinus pressure, ear pressure, raspy throat. Clear sputum. No CP/SOB. Subjective fever, and chills. + bodyaches.  + fatigue. History/Other:   Review of Systems   All other systems reviewed and are negative. Current Outpatient Medications   Medication Sig Dispense Refill    amoxicillin clavulanate 875-125 MG Oral Tab Take 1 tablet by mouth 2 (two) times daily for 10 days. 20 tablet 0    nirmatrelvir-ritonavir 150-100 MG Oral Tablet Therapy Pack Take one nirmatrelvir tablet (150mg) with one ritonavir tablet (100mg) together twice daily for 5 days. (Nirmatrelvir dose is renally adjusted) 20 tablet 0    fluticasone propionate 50 MCG/ACT Nasal Suspension 2 sprays by Each Nare route daily. 1 each 1    benzonatate 200 MG Oral Cap Take 1 capsule (200 mg total) by mouth every 8 (eight) hours as needed for cough. 30 capsule 0    triamcinolone 0.5 % External Cream Apply 1 Application topically 3 (three) times daily. 15 g 0    sertraline 100 MG Oral Tab Take 1.5 tablets (150 mg total) by mouth daily. 135 tablet 1    Potassium Chloride ER 20 MEQ Oral Tab CR TAKE 2 TABLETS(40 MEQ) BY MOUTH DAILY (Patient taking differently: 60 mEq. TAKE 2 TABLETS(40 MEQ) BY MOUTH DAILY) 180 tablet 0    tamoxifen 20 MG Oral Tab Take 1 tablet (20 mg total) by mouth daily. 90 tablet 3    Multiple Vitamins-Iron (MULTI-DAY PLUS IRON) Oral Tab Take by mouth. NON FORMULARY Take 4 tablets by mouth daily. Focus Factor      furosemide 40 MG Oral Tab Take 1 tablet (40 mg total) by mouth daily. 90 tablet 1    metoprolol succinate 100 MG Oral Tablet 24 Hr Take 1 tablet (100 mg total) by mouth daily. 90 tablet 3    sacubitril-valsartan 49-51 MG Oral Tab Take 1 tablet by mouth 2 (two) times daily. 180 tablet 3    cyclobenzaprine 5 MG Oral Tab Take 1-2 tablets (5-10 mg total) by mouth nightly as needed.       allopurinol 100 MG Oral Tab Take 1 tablet (100 mg total) by mouth daily. 30 tablet 11    GABAPENTIN 100 MG Oral Cap TAKE 1 CAPSULE (100 MG) BY MOUTH 2 TIMES DAILY AS NEEDED (IN THE MORNING AND AFTERNOON) (Patient taking differently: 1 capsule (100 mg total). Per patient: taking 2 tablet in the AM, if needed take an extra in the AM and taking 3 tablet in the PM.) 180 capsule 0    simvastatin 40 MG Oral Tab Take 1 tablet (40 mg total) by mouth nightly. 90 tablet 3    HYDROcodone-acetaminophen (NORCO)  MG Oral Tab Take 1 tablet by mouth every 6 (six) hours as needed for Pain. 60 tablet 0    B Complex Vitamins (VITAMIN B COMPLEX) Oral Tab Take 1 tablet by mouth daily. aspirin 81 MG Oral Tab EC Take 1 tablet (81 mg total) by mouth daily. 0    Cholecalciferol (VITAMIN D3) 75 MCG (3000 UT) Oral Tab Take 3,000 Units by mouth daily. Multiple Vitamins-Minerals (CENTRUM SILVER) Oral Tab Take 1 tablet by mouth daily. Allergies:  Latex                   ITCHING, OTHER (SEE COMMENTS)    Comment:Blistering and oozing    Objective:   Physical Exam  Video visit    Assessment & Plan:   Flu-like symptoms  (primary encounter diagnosis)  Video visit    Hold statin    If covid positive take paxlovid  If negative take augmentin    Follow up in 4-5 days if not improving    Orders Placed This Encounter      SARS-CoV-2 by PCR      Meds This Visit:  Requested Prescriptions     Signed Prescriptions Disp Refills    amoxicillin clavulanate 875-125 MG Oral Tab 20 tablet 0     Sig: Take 1 tablet by mouth 2 (two) times daily for 10 days. nirmatrelvir-ritonavir 150-100 MG Oral Tablet Therapy Pack 20 tablet 0     Sig: Take one nirmatrelvir tablet (150mg) with one ritonavir tablet (100mg) together twice daily for 5 days. (Nirmatrelvir dose is renally adjusted)    fluticasone propionate 50 MCG/ACT Nasal Suspension 1 each 1     Si sprays by Each Nare route daily.     benzonatate 200 MG Oral Cap 30 capsule 0     Sig: Take 1 capsule (200 mg total) by mouth every 8 (eight) hours as needed for cough.        Imaging & Referrals:  None

## 2023-09-30 LAB — SARS-COV-2 RNA RESP QL NAA+PROBE: DETECTED

## 2023-10-05 ENCOUNTER — HOSPITAL ENCOUNTER (OUTPATIENT)
Dept: GENERAL RADIOLOGY | Facility: HOSPITAL | Age: 73
Discharge: HOME | End: 2023-10-05
Attending: FAMILY MEDICINE
Payer: MEDICARE

## 2023-10-05 ENCOUNTER — TELEPHONE (OUTPATIENT)
Dept: FAMILY MEDICINE CLINIC | Facility: CLINIC | Age: 73
End: 2023-10-05

## 2023-10-05 DIAGNOSIS — R05.9 COUGH, UNSPECIFIED TYPE: ICD-10-CM

## 2023-10-05 DIAGNOSIS — R05.9 COUGH, UNSPECIFIED TYPE: Primary | ICD-10-CM

## 2023-10-05 PROCEDURE — 71046 X-RAY EXAM CHEST 2 VIEWS: CPT | Performed by: FAMILY MEDICINE

## 2023-10-05 RX ORDER — PREDNISONE 20 MG/1
TABLET ORAL
Qty: 13 TABLET | Refills: 0 | Status: SHIPPED | OUTPATIENT
Start: 2023-10-05

## 2023-10-05 NOTE — TELEPHONE ENCOUNTER
Dr. Tala Iglesias, can patient take augmentin? Xray report is back. 10/5/23 11:20 AM    Cough with phlegm, sinus congestion, fatigue. Patient took Paxlovid, did not take Augmentin yet. Dr. Tala Iglesias, patient has augmentin, can she start this for the lingering symptoms?

## 2023-10-05 NOTE — TELEPHONE ENCOUNTER
Pt COVID+ - no improvement with sx after 5 days. She was told to advise Dr. Soledad Wallace with condition update.

## 2023-10-05 NOTE — TELEPHONE ENCOUNTER
Patient notified, verbalized understanding. She states prednisone makes her feel dizzy, requesting something else.

## 2023-10-05 NOTE — TELEPHONE ENCOUNTER
Per Dr. Macey Jeffrey:   Order chest xray    Spoke with patient, she states she prefers to have xray at [facility] and book. I spoke with CS for patient, they have no availability for today, appt booked for 815am 10/6. I notified patient and she states she will call them back and change time.

## 2023-10-06 ENCOUNTER — PATIENT MESSAGE (OUTPATIENT)
Dept: FAMILY MEDICINE CLINIC | Facility: CLINIC | Age: 73
End: 2023-10-06

## 2023-10-06 RX ORDER — BUDESONIDE AND FORMOTEROL FUMARATE DIHYDRATE 160; 4.5 UG/1; UG/1
2 AEROSOL RESPIRATORY (INHALATION) 2 TIMES DAILY
Qty: 1 EACH | Refills: 0 | Status: SHIPPED | OUTPATIENT
Start: 2023-10-06

## 2023-10-10 ENCOUNTER — TELEPHONE (OUTPATIENT)
Dept: FAMILY MEDICINE CLINIC | Facility: CLINIC | Age: 73
End: 2023-10-10

## 2023-10-10 NOTE — TELEPHONE ENCOUNTER
Patient prescription (Budesonide/Form 160/4.5mcg(120 INH)) is not covered by patient plan. The preferred alternatives are Symbicortaer, Advairdiskuaer, Breoelliptainh. Patient's last visit was 9/29/23. Please advise.

## 2023-10-11 RX ORDER — BUDESONIDE AND FORMOTEROL FUMARATE DIHYDRATE 160; 4.5 UG/1; UG/1
2 AEROSOL RESPIRATORY (INHALATION) 2 TIMES DAILY
Qty: 1 EACH | Refills: 2 | Status: SHIPPED | OUTPATIENT
Start: 2023-10-11

## 2023-10-11 RX ORDER — BUDESONIDE AND FORMOTEROL FUMARATE DIHYDRATE 160; 4.5 UG/1; UG/1
2 AEROSOL RESPIRATORY (INHALATION) 2 TIMES DAILY
Qty: 1 EACH | Refills: 2 | Status: SHIPPED | OUTPATIENT
Start: 2023-10-11 | End: 2023-10-11

## 2023-10-11 NOTE — TELEPHONE ENCOUNTER
Ok to give symbicort branded if better covered or breo ellipta 200/25 1 puff daily     Send the branded symbicort I think, if that doesn't work then the [employer]

## 2023-10-16 ENCOUNTER — HOSPITAL ENCOUNTER (EMERGENCY)
Facility: HOSPITAL | Age: 73
Discharge: HOME | End: 2023-10-16
Attending: EMERGENCY MEDICINE
Payer: MEDICARE

## 2023-10-16 ENCOUNTER — APPOINTMENT (OUTPATIENT)
Dept: CT IMAGING | Facility: HOSPITAL | Age: 73
End: 2023-10-16
Attending: EMERGENCY MEDICINE
Payer: MEDICARE

## 2023-10-16 ENCOUNTER — HOSPITAL ENCOUNTER (OUTPATIENT)
Age: 73
Discharge: ANOTHER HOSPITAL | End: 2023-10-16
Payer: MEDICARE

## 2023-10-16 ENCOUNTER — TELEPHONE (OUTPATIENT)
Dept: FAMILY MEDICINE CLINIC | Facility: CLINIC | Age: 73
End: 2023-10-16

## 2023-10-16 ENCOUNTER — APPOINTMENT (OUTPATIENT)
Dept: GENERAL RADIOLOGY | Age: 73
End: 2023-10-16
Attending: NURSE PRACTITIONER
Payer: MEDICARE

## 2023-10-16 VITALS
BODY MASS INDEX: 30.36 KG/M2 | TEMPERATURE: 98 F | SYSTOLIC BLOOD PRESSURE: 129 MMHG | HEART RATE: 105 BPM | OXYGEN SATURATION: 96 % | RESPIRATION RATE: 22 BRPM | WEIGHT: 205 LBS | HEIGHT: 69 IN | DIASTOLIC BLOOD PRESSURE: 73 MMHG

## 2023-10-16 VITALS
HEIGHT: 68 IN | RESPIRATION RATE: 16 BRPM | HEART RATE: 113 BPM | DIASTOLIC BLOOD PRESSURE: 73 MMHG | OXYGEN SATURATION: 95 % | BODY MASS INDEX: 31.07 KG/M2 | WEIGHT: 205 LBS | SYSTOLIC BLOOD PRESSURE: 118 MMHG | TEMPERATURE: 97 F

## 2023-10-16 DIAGNOSIS — R06.02 SOB (SHORTNESS OF BREATH): Primary | ICD-10-CM

## 2023-10-16 DIAGNOSIS — R79.89 ELEVATED D-DIMER: ICD-10-CM

## 2023-10-16 DIAGNOSIS — R06.09 DOE (DYSPNEA ON EXERTION): ICD-10-CM

## 2023-10-16 DIAGNOSIS — J98.01 ACUTE BRONCHOSPASM: Primary | ICD-10-CM

## 2023-10-16 DIAGNOSIS — N18.9 CHRONIC KIDNEY DISEASE, UNSPECIFIED CKD STAGE: ICD-10-CM

## 2023-10-16 LAB
#MXD IC: 0.7 X10ˆ3/UL (ref 0.1–1)
ATRIAL RATE: 111 BPM
BUN BLD-MCNC: 20 MG/DL (ref 7–18)
CHLORIDE BLD-SCNC: 103 MMOL/L (ref 98–112)
CO2 BLD-SCNC: 28 MMOL/L (ref 21–32)
CREAT BLD-MCNC: 1.4 MG/DL
DDIMER WHOLE BLOOD: 1460 NG/ML DDU (ref ?–400)
EGFRCR SERPLBLD CKD-EPI 2021: 40 ML/MIN/1.73M2 (ref 60–?)
GLUCOSE BLD-MCNC: 119 MG/DL (ref 70–99)
HCT VFR BLD AUTO: 39.5 %
HCT VFR BLD CALC: 39 %
HGB BLD-MCNC: 13.2 G/DL
ISTAT IONIZED CALCIUM FOR CHEM 8: 1.15 MMOL/L (ref 1.12–1.32)
LYMPHOCYTES # BLD AUTO: 1.6 X10ˆ3/UL (ref 1–4)
LYMPHOCYTES NFR BLD AUTO: 29.6 %
MCH RBC QN AUTO: 30.5 PG (ref 26–34)
MCHC RBC AUTO-ENTMCNC: 33.4 G/DL (ref 31–37)
MCV RBC AUTO: 91.2 FL (ref 80–100)
MIXED CELL %: 13.1 %
NEUTROPHILS # BLD AUTO: 3.1 X10ˆ3/UL (ref 1.5–7.7)
NEUTROPHILS NFR BLD AUTO: 57.3 %
NT-PROBNP SERPL-MCNC: 573 PG/ML (ref ?–125)
P AXIS: 50 DEGREES
P-R INTERVAL: 142 MS
PLATELET # BLD AUTO: 183 X10ˆ3/UL (ref 150–450)
POTASSIUM BLD-SCNC: 3.7 MMOL/L (ref 3.6–5.1)
Q-T INTERVAL: 336 MS
QRS DURATION: 98 MS
QTC CALCULATION (BEZET): 456 MS
R AXIS: -14 DEGREES
RBC # BLD AUTO: 4.33 X10ˆ6/UL
SODIUM BLD-SCNC: 141 MMOL/L (ref 136–145)
T AXIS: 82 DEGREES
TROPONIN I HIGH SENSITIVITY: 18 NG/L
VENTRICULAR RATE: 111 BPM
WBC # BLD AUTO: 5.4 X10ˆ3/UL (ref 4–11)

## 2023-10-16 PROCEDURE — 80047 BASIC METABLC PNL IONIZED CA: CPT | Performed by: NURSE PRACTITIONER

## 2023-10-16 PROCEDURE — 71046 X-RAY EXAM CHEST 2 VIEWS: CPT | Performed by: NURSE PRACTITIONER

## 2023-10-16 PROCEDURE — 93010 ELECTROCARDIOGRAM REPORT: CPT

## 2023-10-16 PROCEDURE — 84484 ASSAY OF TROPONIN QUANT: CPT | Performed by: EMERGENCY MEDICINE

## 2023-10-16 PROCEDURE — 85378 FIBRIN DEGRADE SEMIQUANT: CPT | Performed by: NURSE PRACTITIONER

## 2023-10-16 PROCEDURE — 94640 AIRWAY INHALATION TREATMENT: CPT

## 2023-10-16 PROCEDURE — 36415 COLL VENOUS BLD VENIPUNCTURE: CPT

## 2023-10-16 PROCEDURE — 99285 EMERGENCY DEPT VISIT HI MDM: CPT

## 2023-10-16 PROCEDURE — 93005 ELECTROCARDIOGRAM TRACING: CPT

## 2023-10-16 PROCEDURE — 99214 OFFICE O/P EST MOD 30 MIN: CPT | Performed by: NURSE PRACTITIONER

## 2023-10-16 PROCEDURE — 85025 COMPLETE CBC W/AUTO DIFF WBC: CPT | Performed by: NURSE PRACTITIONER

## 2023-10-16 PROCEDURE — 99284 EMERGENCY DEPT VISIT MOD MDM: CPT

## 2023-10-16 PROCEDURE — 71275 CT ANGIOGRAPHY CHEST: CPT | Performed by: EMERGENCY MEDICINE

## 2023-10-16 PROCEDURE — 83880 ASSAY OF NATRIURETIC PEPTIDE: CPT | Performed by: EMERGENCY MEDICINE

## 2023-10-16 RX ORDER — IPRATROPIUM BROMIDE AND ALBUTEROL SULFATE 2.5; .5 MG/3ML; MG/3ML
3 SOLUTION RESPIRATORY (INHALATION) ONCE
Status: COMPLETED | OUTPATIENT
Start: 2023-10-16 | End: 2023-10-16

## 2023-10-16 RX ORDER — ALBUTEROL SULFATE 90 UG/1
2 AEROSOL, METERED RESPIRATORY (INHALATION) EVERY 4 HOURS PRN
Qty: 1 EACH | Refills: 0 | Status: SHIPPED | OUTPATIENT
Start: 2023-10-16 | End: 2023-11-15

## 2023-10-16 NOTE — ED INITIAL ASSESSMENT (HPI)
Pt c/o continued cough, shortness of breath and chest tightness. Pt  states she's had symptoms for several days. Pt states she had a chest xray on Friday. Pt is noticeably short of breath.

## 2023-10-16 NOTE — ED QUICK NOTES
Pt had cbc.  Cmp, ddimer and chest xray done at [facility], operated by [facility]

## 2023-10-16 NOTE — ED INITIAL ASSESSMENT (HPI)
Pt reports covid 2 weeks ago, was given nasal spray and albuterol when cough and ELISABETH was not improving, still has same symptoms and is SOB when walking to triage room, seen at [address] [place] [address] and had elevated ddimer

## 2023-10-16 NOTE — ED QUICK NOTES
Pt going to [facility] for [facility]. Pt instructed to go directly to the hospital. Pt  is driving her.

## 2023-10-16 NOTE — TELEPHONE ENCOUNTER
Pt states she has had some light headedness, fatigue, chest feels heavy. Per Dr. Jaimes Form pt to IC to be evaluated. Pt agreed and will go to IC.

## 2023-10-17 NOTE — ED QUICK NOTES
Patient is for discharge  . RT    demonstrated how to use the spacer and inhailor.   Sent home with family

## 2023-10-19 ENCOUNTER — PATIENT OUTREACH (OUTPATIENT)
Dept: CASE MANAGEMENT | Age: 73
End: 2023-10-19

## 2023-11-02 DIAGNOSIS — E87.6 LOW POTASSIUM SYNDROME: ICD-10-CM

## 2023-11-02 RX ORDER — POTASSIUM CHLORIDE 1500 MG/1
TABLET, EXTENDED RELEASE ORAL
Qty: 180 TABLET | Refills: 0 | Status: SHIPPED | OUTPATIENT
Start: 2023-11-02

## 2023-11-07 DIAGNOSIS — R68.89 FLU-LIKE SYMPTOMS: ICD-10-CM

## 2023-11-07 RX ORDER — FLUTICASONE PROPIONATE 50 MCG
2 SPRAY, SUSPENSION (ML) NASAL DAILY
Qty: 16 G | Refills: 0 | Status: SHIPPED | OUTPATIENT
Start: 2023-11-07

## 2024-01-08 NOTE — PROGRESS NOTES
This visit is conducted using Telemedicine with live, interactive video and audio.    Telehealth outside of [facility]  Telehealth Verbal Consent   I conducted a telehealth visit with Sally Gonzales today, 01/09/24, which was completed using two-way, real-time interactive audio and video communication. This has been done in good nnamdi to provide continuity of care in the best interest of the provider-patient relationship, due to the COVID - public health crisis/national emergency where restrictions of face-to-face office visits are ongoing. Every conscious effort was taken to allow for sufficient and adequate time to complete the visit.  The patient was made aware of the limitations of the telehealth visit, including treatment limitations as no physical exam could be performed.  The patient was advised to call 911 or to go to the ER in case there was an emergency.  The patient was also advised of the potential privacy & security concerns related to the telehealth platform.   The patient was made aware of where to find [facility]'s notice of privacy practices, telehealth consent form and other related consent forms and documents.  which are located on the [facility] website. The patient verbally agreed to telehealth consent form, related consents and the risks discussed.    Lastly, the patient confirmed that they were in [place].   Included in this visit, time may have been spent reviewing labs, medications, radiology tests and decision making. Appropriate medical decision-making and tests are ordered as detailed in the plan of care above.  Coding/billing information is submitted for this visit based on complexity of care and/or time spent for the visit.        HPI:   Sally Gonzales is a 73 year old female here to f/u on MMP     Pt was s/p several consults for her lumbar pain   Pt finally found a pain specialist that performed ablation and now feeling better    Lumbar pain flared since Saturday   Was lifting boxes - sitting /  pushing and pulling   Left sided lumbar pain radiating into left buttock and left thigh   Still on gabapentin - seeing pain specialist   Gets dizzy is she takes prednisone   Does well with cyclobenzaprine   Denies urinary symptoms     Sunday morning pt noted pain to top and bottom of feet   C/w gout flare per pt   Stopped allopurinol a while ago   Has not been following the diet        Current Outpatient Medications   Medication Sig Dispense Refill    FLUTICASONE PROPIONATE 50 MCG/ACT Nasal Suspension SHAKE LIQUID AND USE 2 SPRAYS IN EACH NOSTRIL DAILY 16 g 0    Potassium Chloride ER 20 MEQ Oral Tab CR TAKE 2 TABLETS(40 MEQ) BY MOUTH DAILY 180 tablet 0    SYMBICORT 160-4.5 MCG/ACT Inhalation Aerosol Inhale 2 puffs into the lungs 2 (two) times daily. 1 each 2    predniSONE 20 MG Oral Tab 2.5 tab day 1-3, 2 tab day 4, 1.5 tab day 5, 1 tab day 6, 0.5 tab day 7 13 tablet 0    benzonatate 200 MG Oral Cap Take 1 capsule (200 mg total) by mouth every 8 (eight) hours as needed for cough. 30 capsule 0    triamcinolone 0.5 % External Cream Apply 1 Application topically 3 (three) times daily. 15 g 0    sertraline 100 MG Oral Tab Take 1.5 tablets (150 mg total) by mouth daily. 135 tablet 1    tamoxifen 20 MG Oral Tab Take 1 tablet (20 mg total) by mouth daily. 90 tablet 3    Multiple Vitamins-Iron (MULTI-DAY PLUS IRON) Oral Tab Take by mouth.      NON FORMULARY Take 4 tablets by mouth daily. Focus Factor      furosemide 40 MG Oral Tab Take 1 tablet (40 mg total) by mouth daily. 90 tablet 1    metoprolol succinate 100 MG Oral Tablet 24 Hr Take 1 tablet (100 mg total) by mouth daily. 90 tablet 3    sacubitril-valsartan 49-51 MG Oral Tab Take 1 tablet by mouth 2 (two) times daily. 180 tablet 3    cyclobenzaprine 5 MG Oral Tab Take 1-2 tablets (5-10 mg total) by mouth nightly as needed.      allopurinol 100 MG Oral Tab Take 1 tablet (100 mg total) by mouth daily. 30 tablet 11    GABAPENTIN 100 MG Oral Cap TAKE 1 CAPSULE (100 MG) BY  MOUTH 2 TIMES DAILY AS NEEDED (IN THE MORNING AND AFTERNOON) (Patient taking differently: 1 capsule (100 mg total). Per patient: taking 2 tablet in the AM, if needed take an extra in the AM and taking 3 tablet in the PM.) 180 capsule 0    simvastatin 40 MG Oral Tab Take 1 tablet (40 mg total) by mouth nightly. 90 tablet 3    HYDROcodone-acetaminophen (NORCO)  MG Oral Tab Take 1 tablet by mouth every 6 (six) hours as needed for Pain. 60 tablet 0    B Complex Vitamins (VITAMIN B COMPLEX) Oral Tab Take 1 tablet by mouth daily.      aspirin 81 MG Oral Tab EC Take 1 tablet (81 mg total) by mouth daily.  0    Cholecalciferol (VITAMIN D3) 75 MCG (3000 UT) Oral Tab Take 3,000 Units by mouth daily.        Multiple Vitamins-Minerals (CENTRUM SILVER) Oral Tab Take 1 tablet by mouth daily.        Past Medical History:   Diagnosis Date    Acute sinusitis, unspecified     Anxiety state     Arrhythmia     Irregular heart beat    Asthma     Asthmatic/bronchitis 8 years ago, not on Asthma meds or steroids    Asymptomatic postmenopausal status (age-related) (natural)     Asymptomatic varicose veins     Breast CA (HCC) ,     Left mastctomy for DCIS    Breast CA (HCC)     Right mastectomy with chemo and radiation    Cardiomyopathy (HCC)     Cataract     Congenital pes planus     Depression     Disorder of thyroid     enlarged thyroid    Exposure to radiation     HIGH BLOOD PRESSURE     High cholesterol     Lymph edema     left arm and right arm    Osteoarthritis     Personal history of antineoplastic chemotherapy     PICC (peripherally inserted central catheter) in place 2017    Unspecified asthma(493.90)     Unspecified essential hypertension     Visual impairment     glasses      Past Surgical History:   Procedure Laterality Date    BREAST BIOPSY  2014      1983    COLONOSCOPY N/A 2023    Procedure: COLONOSCOPY;  Surgeon: Cesar Blum MD;  Location:  ENDOSCOPY    KNEE ARTHROSCP  HARV Left     MAGGIE LOCALIZATION WIRE 1 SITE RIGHT (CPT=19281)      MASTECTOMY LEFT  1990    MASTECTOMY RIGHT  04/08/2014    Right mastectomy with sentinel lymph node biopsy, injection of blue dye for sentinel lymph node identification, completion axillary lymph node dissection, and advancement flap mastopexy    SHOULDER ARTHROPLASTY Left     2020- Dr. Fairchild    TONSILLECTOMY      TOTAL KNEE REPLACEMENT Right 01/2017    TOTAL KNEE REPLACEMENT Right 05/10/2017    Revision-Dr. Fairchild    TOTAL KNEE REPLACEMENT Right 09/25/2017    Revision  Dr. Fairchild      Family History   Problem Relation Age of Onset    Depression Mother     Diabetes Mother     Breast Cancer Mother 49    Cancer Mother     Psychiatric Mother     Heart Attack Father     Heart Disorder Father     Depression Maternal Aunt     Breast Cancer Maternal Aunt 45    Cancer Maternal Aunt     Breast Cancer Self 41    Ovarian Cancer Cousin     Cancer Cousin     Diabetes Maternal Grandmother       Social History:   Social History     Socioeconomic History    Marital status:     Number of children: 1   Occupational History    Occupation: stay at home mom   Tobacco Use    Smoking status: Never    Smokeless tobacco: Never   Vaping Use    Vaping Use: Never used   Substance and Sexual Activity    Alcohol use: No    Drug use: No    Sexual activity: Not Currently     Partners: Male   Other Topics Concern    Blood Transfusions No    Caffeine Concern Yes    Exercise No    Seat Belt Yes          REVIEW OF SYSTEMS:   GENERAL: feels well otherwise  SKIN: denies any unusual skin lesions  EYES:denies blurred vision or double vision  HEENT: denies nasal congestion, sinus pain or ST  LUNGS: denies shortness of breath with exertion  CARDIOVASCULAR: denies chest pain on exertion  MUSCULOSKELETAL: denies back pain  NEURO: denies headaches  PSYCHE: denies depression or anxiety  HEMATOLOGIC: denies hx of anemia  ENDOCRINE: denies thyroid history  ALL/ASTHMA: denies asthma    EXAM:    alert, appears stated age and cooperative, Normocephalic, without obvious abnormality, atraumatic, lips, mucosa, and tongue normal; teeth and gums normal, Speaking in full sentences comfortably, Normal work of breathing, Skin color, texture, turgor normal. No rashes or lesions and age appropriate, normal, logical connections, person, place and time/date and no suicidal ideation      ASSESSMENT AND PLAN:   Sally Gonzales is a 73 year old female who presents with   1. Acute gout of left foot, unspecified cause  No allopurinol until flare over   - colchicine 0.6 MG Oral Tab; 2 tablets day 1, 1 tablet daily for 4 days.  Dispense: 6 tablet; Refill: 0    2. Lumbar radicular pain    - cyclobenzaprine 5 MG Oral Tab; Take 1 tablet (5 mg total) by mouth 3 (three) times daily as needed for Muscle spasms.  Dispense: 30 tablet; Refill: 0    Questions answered and patient indicates understanding of these issues and agrees to the plan.  Follow up in  1 month or sooner if needed / awv due soon

## 2024-01-09 ENCOUNTER — TELEMEDICINE (OUTPATIENT)
Dept: FAMILY MEDICINE CLINIC | Facility: CLINIC | Age: 74
End: 2024-01-09
Payer: MEDICARE

## 2024-01-09 DIAGNOSIS — M10.9 ACUTE GOUT OF LEFT FOOT, UNSPECIFIED CAUSE: ICD-10-CM

## 2024-01-09 DIAGNOSIS — M54.16 LUMBAR RADICULAR PAIN: Primary | ICD-10-CM

## 2024-01-09 PROCEDURE — 99214 OFFICE O/P EST MOD 30 MIN: CPT | Performed by: FAMILY MEDICINE

## 2024-01-09 RX ORDER — COLCHICINE 0.6 MG/1
TABLET ORAL
Qty: 6 TABLET | Refills: 0 | Status: SHIPPED | OUTPATIENT
Start: 2024-01-09

## 2024-01-09 RX ORDER — CYCLOBENZAPRINE HCL 5 MG
5 TABLET ORAL 3 TIMES DAILY PRN
Qty: 30 TABLET | Refills: 0 | Status: SHIPPED | OUTPATIENT
Start: 2024-01-09

## 2024-01-16 ENCOUNTER — TELEPHONE (OUTPATIENT)
Dept: FAMILY MEDICINE CLINIC | Facility: CLINIC | Age: 74
End: 2024-01-16

## 2024-01-16 DIAGNOSIS — M10.9 ACUTE GOUT OF LEFT FOOT, UNSPECIFIED CAUSE: ICD-10-CM

## 2024-01-16 RX ORDER — COLCHICINE 0.6 MG/1
TABLET ORAL
Qty: 6 TABLET | Refills: 0 | Status: SHIPPED | OUTPATIENT
Start: 2024-01-16

## 2024-01-16 NOTE — TELEPHONE ENCOUNTER
Patient called stating that she was given medication for gout and was starting to feel better but ran of medication, since medication has ran out feeling bad again patient would like a refill on     colchicine 0.6 MG Oral Tab     Or please prescribed something else.    Sent to [id number] - [place], [place] - [address] AT [facility] [address], [phone], [phone]     Please advise

## 2024-01-24 ENCOUNTER — APPOINTMENT (OUTPATIENT)
Dept: CT IMAGING | Facility: HOSPITAL | Age: 74
End: 2024-01-24
Attending: EMERGENCY MEDICINE
Payer: MEDICARE

## 2024-01-24 ENCOUNTER — HOSPITAL ENCOUNTER (INPATIENT)
Facility: HOSPITAL | Age: 74
LOS: 3 days | Discharge: HOME | End: 2024-01-27
Attending: EMERGENCY MEDICINE | Admitting: HOSPITALIST
Payer: MEDICARE

## 2024-01-24 ENCOUNTER — APPOINTMENT (OUTPATIENT)
Dept: GENERAL RADIOLOGY | Facility: HOSPITAL | Age: 74
End: 2024-01-24
Attending: EMERGENCY MEDICINE
Payer: MEDICARE

## 2024-01-24 DIAGNOSIS — S09.8XXA BLUNT HEAD TRAUMA, INITIAL ENCOUNTER: ICD-10-CM

## 2024-01-24 DIAGNOSIS — E87.6 HYPOKALEMIA: ICD-10-CM

## 2024-01-24 DIAGNOSIS — I48.91 ATRIAL FIBRILLATION WITH RAPID VENTRICULAR RESPONSE (HCC): Primary | ICD-10-CM

## 2024-01-24 DIAGNOSIS — K59.1 FUNCTIONAL DIARRHEA: ICD-10-CM
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
## 2024-01-24 PROBLEM — R42 DIZZINESS: Status: ACTIVE | Noted: 2024-01-24

## 2024-01-24 LAB
ALBUMIN SERPL-MCNC: 3.3 G/DL (ref 3.4–5)
ALBUMIN/GLOB SERPL: 0.7 {RATIO} (ref 1–2)
ALP LIVER SERPL-CCNC: 95 U/L
ANION GAP SERPL CALC-SCNC: 13 MMOL/L (ref 0–18)
APTT PPP: 29.1 SECONDS (ref 23.3–35.6)
AST SERPL-CCNC: 54 U/L (ref 15–37)
BASOPHILS # BLD AUTO: 0.03 X10(3) UL (ref 0–0.2)
BASOPHILS NFR BLD AUTO: 0.6 %
BILIRUB SERPL-MCNC: 0.5 MG/DL (ref 0.1–2)
BUN BLD-MCNC: 22 MG/DL (ref 9–23)
CALCIUM BLD-MCNC: 8.9 MG/DL (ref 8.5–10.1)
CHLORIDE SERPL-SCNC: 99 MMOL/L (ref 98–112)
CO2 SERPL-SCNC: 27 MMOL/L (ref 21–32)
CREAT BLD-MCNC: 1.57 MG/DL
EGFRCR SERPLBLD CKD-EPI 2021: 34 ML/MIN/1.73M2 (ref 60–?)
EOSINOPHIL # BLD AUTO: 0.07 X10(3) UL (ref 0–0.7)
EOSINOPHIL NFR BLD AUTO: 1.3 %
ERYTHROCYTE [DISTWIDTH] IN BLOOD BY AUTOMATED COUNT: 14.7 %
GLOBULIN PLAS-MCNC: 4.5 G/DL (ref 2.8–4.4)
GLUCOSE BLD-MCNC: 150 MG/DL (ref 70–99)
HCT VFR BLD AUTO: 32.9 %
HGB BLD-MCNC: 11.4 G/DL
IMM GRANULOCYTES # BLD AUTO: 0.04 X10(3) UL (ref 0–1)
IMM GRANULOCYTES NFR BLD: 0.7 %
INR BLD: 1.23 (ref 0.8–1.2)
LYMPHOCYTES # BLD AUTO: 0.95 X10(3) UL (ref 1–4)
LYMPHOCYTES NFR BLD AUTO: 17.8 %
MCH RBC QN AUTO: 28.6 PG (ref 26–34)
MCHC RBC AUTO-ENTMCNC: 34.7 G/DL (ref 31–37)
MCV RBC AUTO: 82.7 FL
MONOCYTES # BLD AUTO: 0.82 X10(3) UL (ref 0.1–1)
MONOCYTES NFR BLD AUTO: 15.4 %
NEUTROPHILS # BLD AUTO: 3.43 X10 (3) UL (ref 1.5–7.7)
NEUTROPHILS # BLD AUTO: 3.43 X10(3) UL (ref 1.5–7.7)
NEUTROPHILS NFR BLD AUTO: 64.2 %
NT-PROBNP SERPL-MCNC: 2389 PG/ML (ref ?–125)
OSMOLALITY SERPL CALC.SUM OF ELEC: 294 MOSM/KG (ref 275–295)
PLATELET # BLD AUTO: 190 10(3)UL (ref 150–450)
POTASSIUM SERPL-SCNC: 2.2 MMOL/L (ref 3.5–5.1)
PROT SERPL-MCNC: 7.8 G/DL (ref 6.4–8.2)
PROTHROMBIN TIME: 15.6 SECONDS (ref 11.6–14.8)
RBC # BLD AUTO: 3.98 X10(6)UL
SARS-COV-2 RNA RESP QL NAA+PROBE: NOT DETECTED
SODIUM SERPL-SCNC: 139 MMOL/L (ref 136–145)
TROPONIN I SERPL HS-MCNC: 26 NG/L
WBC # BLD AUTO: 5.3 X10(3) UL (ref 4–11)

## 2024-01-24 PROCEDURE — 70450 CT HEAD/BRAIN W/O DYE: CPT | Performed by: EMERGENCY MEDICINE

## 2024-01-24 PROCEDURE — 99223 1ST HOSP IP/OBS HIGH 75: CPT | Performed by: INTERNAL MEDICINE

## 2024-01-24 PROCEDURE — 71045 X-RAY EXAM CHEST 1 VIEW: CPT | Performed by: EMERGENCY MEDICINE

## 2024-01-24 RX ORDER — TAMOXIFEN CITRATE 10 MG/1
20 TABLET ORAL DAILY
Status: DISCONTINUED | OUTPATIENT
Start: 2024-01-25 | End: 2024-01-27

## 2024-01-24 RX ORDER — HEPARIN SODIUM AND DEXTROSE 10000; 5 [USP'U]/100ML; G/100ML
1000 INJECTION INTRAVENOUS ONCE
Status: COMPLETED | OUTPATIENT
Start: 2024-01-24 | End: 2024-01-25

## 2024-01-24 RX ORDER — METOPROLOL SUCCINATE 100 MG/1
100 TABLET, EXTENDED RELEASE ORAL
Status: DISCONTINUED | OUTPATIENT
Start: 2024-01-25 | End: 2024-01-27

## 2024-01-24 RX ORDER — SODIUM CHLORIDE 9 MG/ML
INJECTION, SOLUTION INTRAVENOUS CONTINUOUS
Status: DISCONTINUED | OUTPATIENT
Start: 2024-01-24 | End: 2024-01-25

## 2024-01-24 RX ORDER — MECLIZINE HCL 12.5 MG/1
12.5 TABLET ORAL 3 TIMES DAILY PRN
Status: DISCONTINUED | OUTPATIENT
Start: 2024-01-24 | End: 2024-01-27

## 2024-01-24 RX ORDER — ONDANSETRON 2 MG/ML
4 INJECTION INTRAMUSCULAR; INTRAVENOUS EVERY 6 HOURS PRN
Status: DISCONTINUED | OUTPATIENT
Start: 2024-01-24 | End: 2024-01-27

## 2024-01-24 RX ORDER — HEPARIN SODIUM AND DEXTROSE 10000; 5 [USP'U]/100ML; G/100ML
INJECTION INTRAVENOUS CONTINUOUS
Status: DISCONTINUED | OUTPATIENT
Start: 2024-01-25 | End: 2024-01-25

## 2024-01-24 RX ORDER — ATORVASTATIN CALCIUM 20 MG/1
20 TABLET, FILM COATED ORAL NIGHTLY
Status: DISCONTINUED | OUTPATIENT
Start: 2024-01-24 | End: 2024-01-27

## 2024-01-24 RX ORDER — ACETAMINOPHEN 500 MG
500 TABLET ORAL EVERY 4 HOURS PRN
Status: DISCONTINUED | OUTPATIENT
Start: 2024-01-24 | End: 2024-01-27

## 2024-01-24 RX ORDER — HEPARIN SODIUM 1000 [USP'U]/ML
5000 INJECTION, SOLUTION INTRAVENOUS; SUBCUTANEOUS ONCE
Status: COMPLETED | OUTPATIENT
Start: 2024-01-24 | End: 2024-01-24

## 2024-01-24 RX ORDER — FUROSEMIDE 40 MG/1
40 TABLET ORAL DAILY
Status: DISCONTINUED | OUTPATIENT
Start: 2024-01-24 | End: 2024-01-25

## 2024-01-24 RX ORDER — METOCLOPRAMIDE HYDROCHLORIDE 5 MG/ML
5 INJECTION INTRAMUSCULAR; INTRAVENOUS EVERY 8 HOURS PRN
Status: DISCONTINUED | OUTPATIENT
Start: 2024-01-24 | End: 2024-01-27

## 2024-01-24 RX ORDER — DILTIAZEM HYDROCHLORIDE 5 MG/ML
10 INJECTION INTRAVENOUS ONCE
Status: DISCONTINUED | OUTPATIENT
Start: 2024-01-24 | End: 2024-01-25

## 2024-01-24 RX ORDER — ASPIRIN 81 MG/1
81 TABLET ORAL DAILY
Status: DISCONTINUED | OUTPATIENT
Start: 2024-01-24 | End: 2024-01-27

## 2024-01-24 RX ORDER — FLUTICASONE PROPIONATE 50 MCG
2 SPRAY, SUSPENSION (ML) NASAL DAILY
Status: DISCONTINUED | OUTPATIENT
Start: 2024-01-24 | End: 2024-01-27

## 2024-01-24 RX ORDER — ALLOPURINOL 100 MG/1
100 TABLET ORAL DAILY
Status: DISCONTINUED | OUTPATIENT
Start: 2024-01-24 | End: 2024-01-27

## 2024-01-25 ENCOUNTER — APPOINTMENT (OUTPATIENT)
Dept: MRI IMAGING | Facility: HOSPITAL | Age: 74
End: 2024-01-25
Attending: INTERNAL MEDICINE
Payer: MEDICARE

## 2024-01-25 ENCOUNTER — APPOINTMENT (OUTPATIENT)
Dept: CV DIAGNOSTICS | Facility: HOSPITAL | Age: 74
End: 2024-01-25
Attending: INTERNAL MEDICINE
Payer: MEDICARE

## 2024-01-25 LAB
ANION GAP SERPL CALC-SCNC: 10 MMOL/L (ref 0–18)
APTT PPP: 68.2 SECONDS (ref 23.3–35.6)
ATRIAL RATE: 166 BPM
ATRIAL RATE: 83 BPM
BUN BLD-MCNC: 23 MG/DL (ref 9–23)
CALCIUM BLD-MCNC: 7.9 MG/DL (ref 8.5–10.1)
CHLORIDE SERPL-SCNC: 101 MMOL/L (ref 98–112)
CO2 SERPL-SCNC: 26 MMOL/L (ref 21–32)
CREAT BLD-MCNC: 1.61 MG/DL
CREAT BLD-MCNC: 1.72 MG/DL
DEPRECATED HBV CORE AB SER IA-ACNC: 285.8 NG/ML
EGFRCR SERPLBLD CKD-EPI 2021: 31 ML/MIN/1.73M2 (ref 60–?)
EGFRCR SERPLBLD CKD-EPI 2021: 33 ML/MIN/1.73M2 (ref 60–?)
ERYTHROCYTE [DISTWIDTH] IN BLOOD BY AUTOMATED COUNT: 14.8 %
GLUCOSE BLD-MCNC: 104 MG/DL (ref 70–99)
HCT VFR BLD AUTO: 27.6 %
HGB BLD-MCNC: 9.5 G/DL
IRON SATN MFR SERPL: 14 %
IRON SERPL-MCNC: 33 UG/DL
MAGNESIUM SERPL-MCNC: 1 MG/DL (ref 1.6–2.6)
MCH RBC QN AUTO: 28 PG (ref 26–34)
MCHC RBC AUTO-ENTMCNC: 34.4 G/DL (ref 31–37)
MCV RBC AUTO: 81.4 FL
OSMOLALITY SERPL CALC.SUM OF ELEC: 288 MOSM/KG (ref 275–295)
P AXIS: -5 DEGREES
P AXIS: 53 DEGREES
P-R INTERVAL: 116 MS
P-R INTERVAL: 176 MS
PLATELET # BLD AUTO: 155 10(3)UL (ref 150–450)
PLATELET # BLD AUTO: 155 10(3)UL (ref 150–450)
POTASSIUM SERPL-SCNC: 2.1 MMOL/L (ref 3.5–5.1)
POTASSIUM SERPL-SCNC: 3 MMOL/L (ref 3.5–5.1)
Q-T INTERVAL: 226 MS
Q-T INTERVAL: 432 MS
QRS DURATION: 102 MS
QRS DURATION: 104 MS
QTC CALCULATION (BEZET): 353 MS
QTC CALCULATION (BEZET): 507 MS
R AXIS: -14 DEGREES
R AXIS: -15 DEGREES
RBC # BLD AUTO: 3.39 X10(6)UL
SODIUM SERPL-SCNC: 137 MMOL/L (ref 136–145)
T AXIS: -38 DEGREES
T AXIS: 194 DEGREES
T3FREE SERPL-MCNC: 3.16 PG/ML (ref 2.4–4.2)
T4 FREE SERPL-MCNC: 1.3 NG/DL (ref 0.8–1.7)
TIBC SERPL-MCNC: 238 UG/DL (ref 240–450)
TRANSFERRIN SERPL-MCNC: 160 MG/DL (ref 200–360)
TSI SER-ACNC: 0.01 MIU/ML (ref 0.36–3.74)
VENTRICULAR RATE: 147 BPM
VENTRICULAR RATE: 83 BPM
WBC # BLD AUTO: 5.9 X10(3) UL (ref 4–11)

## 2024-01-25 PROCEDURE — 70551 MRI BRAIN STEM W/O DYE: CPT | Performed by: INTERNAL MEDICINE

## 2024-01-25 PROCEDURE — 93306 TTE W/DOPPLER COMPLETE: CPT | Performed by: INTERNAL MEDICINE

## 2024-01-25 PROCEDURE — 99233 SBSQ HOSP IP/OBS HIGH 50: CPT | Performed by: INTERNAL MEDICINE

## 2024-01-25 RX ORDER — POTASSIUM CHLORIDE 20 MEQ/1
40 TABLET, EXTENDED RELEASE ORAL EVERY 4 HOURS
Status: COMPLETED | OUTPATIENT
Start: 2024-01-25 | End: 2024-01-26

## 2024-01-25 RX ORDER — POTASSIUM CHLORIDE 20 MEQ/1
40 TABLET, EXTENDED RELEASE ORAL EVERY 4 HOURS
Status: COMPLETED | OUTPATIENT
Start: 2024-01-25 | End: 2024-01-25

## 2024-01-25 RX ORDER — SODIUM CHLORIDE 9 MG/ML
INJECTION, SOLUTION INTRAVENOUS CONTINUOUS
Status: ACTIVE | OUTPATIENT
Start: 2024-01-25 | End: 2024-01-25

## 2024-01-25 RX ORDER — MAGNESIUM OXIDE 400 MG/1
800 TABLET ORAL ONCE
Status: COMPLETED | OUTPATIENT
Start: 2024-01-25 | End: 2024-01-25

## 2024-01-25 RX ORDER — GABAPENTIN 100 MG/1
200 CAPSULE ORAL DAILY
Status: DISCONTINUED | OUTPATIENT
Start: 2024-01-25 | End: 2024-01-27

## 2024-01-25 NOTE — OCCUPATIONAL THERAPY NOTE
OCCUPATIONAL THERAPY EVALUATION - INPATIENT     Room Number: OFD3/OFD3-A  Evaluation Date: 1/25/2024  Type of Evaluation: Initial  Presenting Problem: Dizziness    Physician Order: IP Consult to Occupational Therapy  Reason for Therapy: ADL/IADL Dysfunction and Discharge Planning    History: Patient is a 73 year old female admitted on 1/24/2024 with Presenting Problem: Dizziness. Co-Morbidities : breast Ca, anxiety, asthma, CAD (s/p PCI mLAD 2020, FFR negative ostial RCA disease), severe MR s/p mitraclip, HTN, DL , AFib      ASSESSMENT   Patient presents with the following performance deficits: Decreased strength, balance, ADL completion, dizziness. These deficits impact the patient’s ability to participate in ADL, transfers, instrumental activities of daily living, rest and sleep, leisure and social participation.     The patient is functioning below her previous functional level and would benefit from skilled inpatient OT to address the above deficits, maximizing patient’s ability to return safely to her prior level of function.    The AM-PAC ' '6-Clicks' Inpatient Daily Activity Short Form was completed and this patient is demonstrating an Approx Degree of Impairment: 25.8% in activities of daily living. Research supports that patients with this level of impairment often benefit from Home.       OT Discharge Recommendations: Home  OT Device Recommendations: Grab bars;Shower chair    WEIGHT BEARING RESTRICTION  Weight Bearing Restriction: None                Recommendations for nursing staff:   Transfers: CGA once dizziness is managed  Toileting location: Toilet    EVALUATION SESSION:  Patient Start of Session: Supine  FUNCTIONAL TRANSFER ASSESSMENT  Sit to Stand: Edge of Bed  Edge of Bed: Contact Guard Assist    BED MOBILITY  Rolling: Contact Guard Assist  Supine to Sit : Contact Guard Assist  Sit to Supine (OT): Contact Guard Assist    BALANCE ASSESSMENT  Static Sitting: Stand-by Assist  Sitting Bilateral:  Stand-by Assist  Static Standing: Contact Guard Assist  Standing Bilateral: Contact Guard Assist    FUNCTIONAL ADL ASSESSMENT  Grooming Seated: Contact Guard Assist  LB Dressing Seated: Contact Guard Assist      ACTIVITY TOLERANCE: Ed regarding pacing and EC  Pulse:  (Asymptomatic vitals throughout session)                      O2 SATURATIONS       COGNITION  Overall Cognitive Status:  WFL - within functional limits    Upper Extremity   ROM: within functional limits  WFL  Strength: within functional limits  WFL  Coordination  Gross motor: WFL  Fine motor: WFl  Sensation: Light touch:  intact    EDUCATION PROVIDED  Patient : Role of Occupational Therapy; Plan of Care; DME Recommendations; Adaptive Equipment Recommendations; Discharge Recommendations; Functional Transfer Techniques; Energy Conservation; Compensatory ADL Techniques; Proper Body Mechanics  Patient's Response to Education: Verbalized Understanding; Returned Demonstration    Equipment used: RW  Demonstrates functional use, Would benefit from additional trial      Therapist comments: Pt presents supine in bed. Pt ed regarding role of OT and POC throughout stay. OT facilitated compensatory ADL completion strategies. Pt ed regarding safe functional transfers with proper body mechanics. Pt ed regarding slow functional transfers. Pt limited by dizziness, able to tolerate sitting EOB and standing EOB.       Patient End of Session: In bed;Needs met;Call light within reach;RN aware of session/findings;All patient questions and concerns addressed    OCCUPATIONAL PROFILE    HOME SITUATION  Type of Home: House  Home Layout: Two level  Lives With: Spouse    Toilet and Equipment: Comfort height toilet  Shower/Tub and Equipment: Walk-in shower;Grab bar;Shower chair  Other Equipment:  (RW)          Drives: Yes  Patient Regularly Uses: Reading glasses    Prior Level of Function: Ind with ADLs    SUBJECTIVE   \"I just get so nervous walking right now\"    PAIN  ASSESSMENT  Ratin          OBJECTIVE     Fall Risk: High fall risk      ASSESSMENTS    AM-PAC ‘6-Clicks’ Inpatient Daily Activity Short Form  -   Putting on and taking off regular lower body clothing?: A Little  -   Bathing (including washing, rinsing, drying)?: A Little  -   Toileting, which includes using toilet, bedpan or urinal? : None  -   Putting on and taking off regular upper body clothing?: None  -   Taking care of personal grooming such as brushing teeth?: None  -   Eating meals?: None    AM-PAC Score:  Score: 22  Approx Degree of Impairment: 25.8%  Standardized Score (AM-PAC Scale): 47.1    ADDITIONAL TESTS     NEUROLOGICAL FINDINGS      COGNITION ASSESSMENTS       PLAN  OT Treatment Plan: Balance activities;Energy conservation/work simplification techniques;IADL training;ADL training;Functional transfer training;UE strengthening/ROM;Patient/Family education;Patient/Family training;Equipment eval/education  Rehab Potential : Good  Frequency: 3x/week  Number of Visits to Meet Established Goals: 2    ADL Goals   Patient will perform all ADLs: with supervision  Patient will perform grooming: with supervision  Patient will perform upper body dressing:  with supervision  Patient will perform lower body dressing:  with supervision  Patient will perform toileting: with supervision    Functional Transfer Goals  Patient will perform all functional transfers:  with supervision    UE Exercise Program Goal  Patient will be independent with bilateral AROM HEP (home exercise program).    Additional Goals      Therapist Goals    Patient Evaluation Complexity Level:   Occupational Profile/Medical History LOW - Brief history including review of medical or therapy records    Specific performance deficits impacting engagement in ADL/IADL LOW  1 - 3 performance deficits    Client Assessment/Performance Deficits LOW - No comorbidities nor modifications of tasks    Clinical Decision Making LOW - Analysis of occupational  profile, problem-focused assessments, limited treatment options    Overall Complexity LOW     OT Session Time: 33 minutes  Self-Care Home Management: 6 minutes  Therapeutic Activity: 16 minutes  Neuromuscular Re-education: 0 minutes  Therapeutic Exercise: 0 minutes  Cognitive Skills: 0 minutes  Sensory Integrative: 0 minutes  Orthotic Management and Trainin minutes  Can add/delete any of these

## 2024-01-25 NOTE — CONSULTS
Cardiology Consultation  [facility]    Sally Gonzalse Patient Status:  Inpatient    1950 MRN [id number]   Location [facility] 0SW-A Attending Jorge Galeano DO   Hosp Day # 1 PCP Mackenzie Harp DO     Reason for Consultation:  afib    History of Present Illness:    Patient of Dr. Cesar Martino    Sally Gonzales is a a(n) 73 year old female with breast Ca, anxiety, asthma, CAD (s/p PCI mLAD , FFR negative ostial RCA disease), severe MR s/p mitraclip, HTN, DL , AFib presents with dizziness. In EMS, she was in afib with RVR and she had not taken her BB for 3 weeks. Currently on cardizem gtt, and today she converted to NSR. Also orthostatic positive and she received 1L IVF.      She has somewhat of a confused historian.  She states that she was diagnosed with atrial fibrillation many years ago when she was 6 years old.  She is not on anticoagulation.  She states that she has been off her metoprolol for the last few weeks.  This was as she felt dizzy when she was on her prednisone during a episode of COVID.  She has stopped all her medications since then.  She also has a history of coronary artery disease and underwent PCI of her mid LAD in .  She has not been taking her aspirin and states that she has never taken aspirin.  She also does not recall if she was taking P2Y12 inhibitor as prescribed (she was prescribed plavix). She has also not been taking her lasix as prescribed.  On heparin gtt.    TTE : LVEF 45-50% (stable), s/p mitraclip with mild-moderate MR. Pro BNP 2389 (baseline 573)    Assessment/Plan:    Atrial fibrillation with RVR, resolved with dilt gtt  CAD, not on ASA  Severe MR s/p mitraclip  Mildly reduced LVEF (45-50%)  Acute decompensated CHF    PLAN  Continue dilt 30mg q6h  Continue metoprolol 100mg daily. Can likely move to monotherapy as it has worked previously  Restart home lasix 40mg   Eliquis 5mg BID  TTE pending    History:  Past Medical History:   Diagnosis Date     Acute sinusitis, unspecified     Anxiety state     Arrhythmia     Irregular heart beat    Asthma     Asthmatic/bronchitis 8 years ago, not on Asthma meds or steroids    Asymptomatic postmenopausal status (age-related) (natural)     Asymptomatic varicose veins     Breast CA (HCC) ,     Left mastctomy for DCIS    Breast CA (HCC)     Right mastectomy with chemo and radiation    Cardiomyopathy (HCC)     Cataract     Congenital pes planus     Depression     Disorder of thyroid     enlarged thyroid    Exposure to radiation     HIGH BLOOD PRESSURE     High cholesterol     Lymph edema     left arm and right arm    Osteoarthritis     Personal history of antineoplastic chemotherapy     PICC (peripherally inserted central catheter) in place 2017    Unspecified asthma(493.90)     Unspecified essential hypertension     Visual impairment     glasses     Past Surgical History:   Procedure Laterality Date    BREAST BIOPSY  2014          COLONOSCOPY N/A 2023    Procedure: COLONOSCOPY;  Surgeon: Cesar Blum MD;  Location:  ENDOSCOPY    KNEE ARTHROSCP HARV Left     MAGGIE LOCALIZATION WIRE 1 SITE RIGHT (CPT=19281)      MASTECTOMY LEFT      MASTECTOMY RIGHT  2014    Right mastectomy with sentinel lymph node biopsy, injection of blue dye for sentinel lymph node identification, completion axillary lymph node dissection, and advancement flap mastopexy    SHOULDER ARTHROPLASTY Left     - Dr. Fairchild    TONSILLECTOMY      TOTAL KNEE REPLACEMENT Right 2017    TOTAL KNEE REPLACEMENT Right 05/10/2017    Revision-Dr. Fairchild    TOTAL KNEE REPLACEMENT Right 2017    Revision  Dr. Fairchild     Family History   Problem Relation Age of Onset    Depression Mother     Diabetes Mother     Breast Cancer Mother 49    Cancer Mother     Psychiatric Mother     Heart Attack Father     Heart Disorder Father     Depression Maternal Aunt     Breast Cancer Maternal Aunt 45    Cancer Maternal  Aunt     Breast Cancer Self 41    Ovarian Cancer Cousin     Cancer Cousin     Diabetes Maternal Grandmother       reports that she has never smoked. She has never used smokeless tobacco. She reports that she does not drink alcohol and does not use drugs.    Allergies:  Allergies   Allergen Reactions    Latex ITCHING and OTHER (SEE COMMENTS)     Blistering and oozing       Medications:  No current facility-administered medications on file prior to encounter.     Current Outpatient Medications on File Prior to Encounter   Medication Sig Dispense Refill    cyclobenzaprine 5 MG Oral Tab Take 1 tablet (5 mg total) by mouth 3 (three) times daily as needed for Muscle spasms. 30 tablet 0    Multiple Vitamins-Iron (MULTI-DAY PLUS IRON) Oral Tab Take by mouth.      furosemide 40 MG Oral Tab Take 1 tablet (40 mg total) by mouth daily. 90 tablet 1    GABAPENTIN 100 MG Oral Cap TAKE 1 CAPSULE (100 MG) BY MOUTH 2 TIMES DAILY AS NEEDED (IN THE MORNING AND AFTERNOON) (Patient taking differently: Take 1 capsule (100 mg total) by mouth 2 (two) times daily. Per patient: taking 2 tablet in the AM and taking 4 tablet in the HS) 180 capsule 0    B Complex Vitamins (VITAMIN B COMPLEX) Oral Tab Take 1 tablet by mouth daily.      Cholecalciferol (VITAMIN D3) 75 MCG (3000 UT) Oral Tab Take 3,000 Units by mouth daily.        Multiple Vitamins-Minerals (CENTRUM SILVER) Oral Tab Take 1 tablet by mouth daily.      Potassium Chloride ER 20 MEQ Oral Tab CR TAKE 2 TABLETS(40 MEQ) BY MOUTH DAILY 180 tablet 0    sertraline 100 MG Oral Tab Take 1.5 tablets (150 mg total) by mouth daily. (Patient taking differently: Take 1.5 tablets (150 mg total) by mouth daily. 1 - 1.5 tablets) 135 tablet 1    tamoxifen 20 MG Oral Tab Take 1 tablet (20 mg total) by mouth daily. 90 tablet 3    metoprolol succinate 100 MG Oral Tablet 24 Hr Take 1 tablet (100 mg total) by mouth daily. 90 tablet 3    sacubitril-valsartan 49-51 MG Oral Tab Take 1 tablet by mouth 2 (two)  times daily. 180 tablet 3    allopurinol 100 MG Oral Tab Take 1 tablet (100 mg total) by mouth daily. 30 tablet 11    simvastatin 40 MG Oral Tab Take 1 tablet (40 mg total) by mouth nightly. 90 tablet 3    HYDROcodone-acetaminophen (NORCO)  MG Oral Tab Take 1 tablet by mouth every 6 (six) hours as needed for Pain. 60 tablet 0    aspirin 81 MG Oral Tab EC Take 1 tablet (81 mg total) by mouth daily. Never takes, but prescribed per MD  0       Review of Systems:  Constitutional: denies fevers, chills, night sweats  HEENT: denies headache, vision changes, trouble or pain with swallowing  Cardiac: denies chest pain, palpitations, edema  Pulm: denies dyspnea, cough, wheeze  GI: denies n/v, abd pain, diarrhea or constipation  : denies hematuria, dysuria, incontinence  MSK: denies muscle or joint pains  Neuro: denies numbness, weakness, paresthesias  Psych: denies anxiety, depression  Integument: denies skin rashes or lesions  Heme: denies easy bruising or bleeding  Endo: denies heat/cold intolerance, skin or nail changes      Physical Exam:  Blood pressure 104/68, pulse 86, temperature 98.3 °F (36.8 °C), temperature source Oral, resp. rate 18, weight 180 lb (81.6 kg), SpO2 99%, not currently breastfeeding.  Wt Readings from Last 3 Encounters:   01/24/24 180 lb (81.6 kg)   01/25/24 180 lb (81.6 kg)   10/16/23 205 lb (93 kg)       General: awake, alert, oriented x 3, no acute distress  HEENT: at/nc, perrl, eomi  Neck: No JVD, carotids 2+ no bruits.  Cardiac: Regular rate and rhythm, S1, S2 normal, no murmur, rub or gallop.  Lungs: Clear without wheezes, rales, rhonchi or dullness.  Normal excursions and effort.  Abdomen: Soft, non-tender, non-distended, normal bowel sounds   Extremities: Without clubbing, cyanosis or edema.  Peripheral pulses are 2+.  Neurologic: Alert and oriented, normal affect.  Psych: normal mood and affect  Skin: Warm and dry.       Laboratories and Data:  Diagnostics:      Labs:   CBC:    Lab  Results   Component Value Date    WBC 5.9 01/25/2024    WBC 5.3 01/24/2024    WBC 4.0 06/26/2023     Lab Results   Component Value Date    HEMOGLOBIN 13.2 10/16/2023    HGB 9.5 (L) 01/25/2024    HGB 11.4 (L) 01/24/2024    HGB 12.6 06/26/2023      Lab Results   Component Value Date    .0 01/25/2024    .0 01/25/2024    .0 01/24/2024     BMP:   No results found for: \"GLUCOSE\"  Lab Results   Component Value Date    K 2.1 (LL) 01/25/2024    K 2.2 (LL) 01/24/2024    K 4.3 08/17/2023    K 4.3 08/17/2023     Lab Results   Component Value Date    BUN 23 01/25/2024    BUN 22 01/24/2024    BUN 33 (H) 08/17/2023     Lab Results   Component Value Date    CREATSERUM 1.61 (H) 01/25/2024    CREATSERUM 1.57 (H) 01/24/2024    CREATSERUM 1.34 (H) 08/17/2023     Cholesterol:     Lab Results   Component Value Date    CHOLEST 204 (H) 06/26/2023    CHOLEST 234 (H) 01/27/2022    CHOLEST 260 (H) 01/29/2021     Lab Results   Component Value Date    HDL 45 06/26/2023    HDL 34 (L) 01/27/2022    HDL 50 01/29/2021     Lab Results   Component Value Date    TRIG 180 (H) 06/26/2023    TRIG 251 (H) 01/27/2022    TRIG 139 01/29/2021     Lab Results   Component Value Date     (H) 06/26/2023     (H) 01/27/2022     (H) 01/29/2021     Lab Results   Component Value Date    AST 54 (H) 01/24/2024    AST 20 06/26/2023    AST 13 (L) 04/04/2022     Lab Results   Component Value Date    ALT  01/24/2024      Comment:      Due to  backorder we are temporarily unable to offer hospital-based ALT testing at [facility].   If urgently needed, please order ALT test code [id number].   The new order will need a new venipuncture and will be sent to [facility] for testing.   The expected turnaround time will be within 24 hours.     ALT 17 06/26/2023    ALT 17 04/04/2022           Ariel Tena MD  Interventional Cardiology  [place]  1/25/2024  10:58 AM

## 2024-01-25 NOTE — PROGRESS NOTES
01/24/24 2329 01/24/24 2333   Vital Signs   Pulse 111 (!) 122   Resp 14 20   /49 98/72   MAP (mmHg) 76 78   BP Method Automatic Automatic   Patient Position Lying Sitting         Patient became dizzy when sitting, unable to do standing. Hospitalist notified of positive orthostatic BP.

## 2024-01-25 NOTE — ED INITIAL ASSESSMENT (HPI)
Patient to the ER from home, patient was walking and was dizzy. Dizzy since last night. Fell and hit head, no LOC. No blood thinners. Hx of a fib, a fib with RVR in ambulance. Has not been on medications for 3 weeks: metoprolol.

## 2024-01-25 NOTE — PLAN OF CARE
NURSING ADMISSION NOTE      Patient admitted via Cart approx 2225.  Oriented to room.  Safety precautions initiated.  Bed in low position.  Call light in reach.    Patient A&Ox4.  Tele afib. HR fluctuating 100-110's. On room air.  PRN tylenol for arthritic pain.  Cardizem gtt @ 15 mg/hr, hep gtt @ 10 mL/hr.  K rider infusing.  Positive orthostatic BP, unable to complete standing.   Plan for MRI and ECHO TBD.

## 2024-01-25 NOTE — H&P
[facility]     Sally Gonzales Patient Status:  Emergency    1950 MRN [id number]   Location [facility] EMERGENCY DEPARTMENT Attending Petros Hooper MD   Hosp Day # 0 PCP Mackenzie Harp DO     Chief Complaint: dizziness    Subjective:    History of Present Illness:     Sally Gonzales is a 73 year old female with PMhx of breast Ca, anxiety, asthma, HTN, DL , AFib presents with dizziness. Pt states symptoms started last night. She states she fell and hit her head but denies any LOC. She is not on any blood thinners. She denies any focal weakness, numbness or tingling. No CP or SOB. In EMS she was noted to have Afib RVR. Pt has not taken her BB for 3 weeks now. She denies any N/V/D/C or abd pain.     History/Other:    Past Medical History:  Past Medical History:   Diagnosis Date    Acute sinusitis, unspecified     Anxiety state     Arrhythmia     Irregular heart beat    Asthma     Asthmatic/bronchitis 8 years ago, not on Asthma meds or steroids    Asymptomatic postmenopausal status (age-related) (natural)     Asymptomatic varicose veins     Breast CA (HCC) ,     Left mastctomy for DCIS    Breast CA (HCC)     Right mastectomy with chemo and radiation    Cardiomyopathy (HCC)     Cataract     Congenital pes planus     Depression     Disorder of thyroid     enlarged thyroid    Exposure to radiation     HIGH BLOOD PRESSURE     High cholesterol     Lymph edema     left arm and right arm    Osteoarthritis     Personal history of antineoplastic chemotherapy     PICC (peripherally inserted central catheter) in place 2017    Unspecified asthma(493.90)     Unspecified essential hypertension     Visual impairment     glasses     Past Surgical History:   Past Surgical History:   Procedure Laterality Date    BREAST BIOPSY  2014      1983    COLONOSCOPY N/A 2023    Procedure: COLONOSCOPY;  Surgeon: Cesar Blum MD;  Location:  ENDOSCOPY     KNEE ARTHROSCP HARV Left     MAGGIE LOCALIZATION WIRE 1 SITE RIGHT (CPT=19281)      MASTECTOMY LEFT      MASTECTOMY RIGHT  2014    Right mastectomy with sentinel lymph node biopsy, injection of blue dye for sentinel lymph node identification, completion axillary lymph node dissection, and advancement flap mastopexy    SHOULDER ARTHROPLASTY Left     - Dr. Fairchild    TONSILLECTOMY      TOTAL KNEE REPLACEMENT Right 2017    TOTAL KNEE REPLACEMENT Right 05/10/2017    Revision-Dr. Fairchild    TOTAL KNEE REPLACEMENT Right 2017    Revision  Dr. Fairchild      Family History:   Family History   Problem Relation Age of Onset    Depression Mother     Diabetes Mother     Breast Cancer Mother 49    Cancer Mother     Psychiatric Mother     Heart Attack Father     Heart Disorder Father     Depression Maternal Aunt     Breast Cancer Maternal Aunt 45    Cancer Maternal Aunt     Breast Cancer Self 41    Ovarian Cancer Cousin     Cancer Cousin     Diabetes Maternal Grandmother      Social History:    reports that she has never smoked. She has never used smokeless tobacco. She reports that she does not drink alcohol and does not use drugs.     Allergies:   Allergies   Allergen Reactions    Latex ITCHING and OTHER (SEE COMMENTS)     Blistering and oozing       Medications:    No current facility-administered medications on file prior to encounter.     Current Outpatient Medications on File Prior to Encounter   Medication Sig Dispense Refill    colchicine 0.6 MG Oral Tab 2 tablets day 1, 1 tablet daily for 4 days. 6 tablet 0    cyclobenzaprine 5 MG Oral Tab Take 1 tablet (5 mg total) by mouth 3 (three) times daily as needed for Muscle spasms. 30 tablet 0    FLUTICASONE PROPIONATE 50 MCG/ACT Nasal Suspension SHAKE LIQUID AND USE 2 SPRAYS IN EACH NOSTRIL DAILY 16 g 0    Potassium Chloride ER 20 MEQ Oral Tab CR TAKE 2 TABLETS(40 MEQ) BY MOUTH DAILY 180 tablet 0    [] albuterol 108 (90 Base) MCG/ACT Inhalation Aero Soln  Inhale 2 puffs into the lungs every 4 (four) hours as needed for Wheezing. 1 each 0    triamcinolone 0.5 % External Cream Apply 1 Application topically 3 (three) times daily. 15 g 0    sertraline 100 MG Oral Tab Take 1.5 tablets (150 mg total) by mouth daily. 135 tablet 1    tamoxifen 20 MG Oral Tab Take 1 tablet (20 mg total) by mouth daily. 90 tablet 3    Multiple Vitamins-Iron (MULTI-DAY PLUS IRON) Oral Tab Take by mouth.      NON FORMULARY Take 4 tablets by mouth daily. Focus Factor      furosemide 40 MG Oral Tab Take 1 tablet (40 mg total) by mouth daily. 90 tablet 1    metoprolol succinate 100 MG Oral Tablet 24 Hr Take 1 tablet (100 mg total) by mouth daily. 90 tablet 3    sacubitril-valsartan 49-51 MG Oral Tab Take 1 tablet by mouth 2 (two) times daily. 180 tablet 3    allopurinol 100 MG Oral Tab Take 1 tablet (100 mg total) by mouth daily. 30 tablet 11    GABAPENTIN 100 MG Oral Cap TAKE 1 CAPSULE (100 MG) BY MOUTH 2 TIMES DAILY AS NEEDED (IN THE MORNING AND AFTERNOON) (Patient taking differently: 1 capsule (100 mg total). Per patient: taking 2 tablet in the AM, if needed take an extra in the AM and taking 3 tablet in the PM.) 180 capsule 0    simvastatin 40 MG Oral Tab Take 1 tablet (40 mg total) by mouth nightly. 90 tablet 3    HYDROcodone-acetaminophen (NORCO)  MG Oral Tab Take 1 tablet by mouth every 6 (six) hours as needed for Pain. 60 tablet 0    B Complex Vitamins (VITAMIN B COMPLEX) Oral Tab Take 1 tablet by mouth daily.      aspirin 81 MG Oral Tab EC Take 1 tablet (81 mg total) by mouth daily.  0    Cholecalciferol (VITAMIN D3) 75 MCG (3000 UT) Oral Tab Take 3,000 Units by mouth daily.        Multiple Vitamins-Minerals (CENTRUM SILVER) Oral Tab Take 1 tablet by mouth daily.         Review of Systems:   A comprehensive review of systems was completed.    Pertinent positives and negatives noted in the HPI.    Objective:   Physical Exam:    /53   Pulse 111   Temp 97.8 °F (36.6 °C)   Resp  15   LMP  (LMP Unknown)   SpO2 100%   General: No acute distress, Alert  Respiratory: No rhonchi, no wheezes  Cardiovascular: irreg irreg  Abdomen: Soft, Non-tender, non-distended, positive bowel sounds  Neuro: No new focal deficits  Extremities: No edema      Results:    Labs:      Labs Last 24 Hours:    Recent Labs   Lab 01/24/24 1925   RBC 3.98   HGB 11.4*   HCT 32.9*   MCV 82.7   MCH 28.6   MCHC 34.7   RDW 14.7   NEPRELIM 3.43   WBC 5.3   .0       Recent Labs   Lab 01/24/24 1925   *   BUN 22   CREATSERUM 1.57*   EGFRCR 34*   CA 8.9   ALB 3.3*      K 2.2*   CL 99   CO2 27.0   ALKPHO 95   AST 54*   BILT 0.5   TP 7.8       Lab Results   Component Value Date    INR 1.23 (H) 01/24/2024    INR 1.06 09/16/2020    INR 0.96 02/25/2020       Recent Labs   Lab 01/24/24 1925   TROPHS 26       Recent Labs   Lab 01/24/24 1925   PBNP 2,389*       No results for input(s): \"PCT\" in the last 168 hours.    Imaging: Imaging data reviewed in Epic.    Assessment & Plan:      #Fall  #Dizziness  CT head negative  Likely related to heart rates  Check MRI  PRN meclizine  Check orthostatics    #AFib RVR  Cardizem gtt  Heparin gtt  Check ECHO  Cardiology to eval  Encourage BB use    #Ess hTN  Resume home meds    #Dyslipidemia  statin    #Hypokalemia  Replace per protocol    #Breast Cancer  Continue tamoxifen    Plan of care discussed with patient, ED Physician     Levi Lopez MD    Supplementary Documentation:     The 21st Century Cures Act makes medical notes like these available to patients in the interest of transparency. Please be advised this is a medical document. Medical documents are intended to carry relevant information, facts as evident, and the clinical opinion of the practitioner. The medical note is intended as peer to peer communication and may appear blunt or direct. It is written in medical language and may contain abbreviations or verbiage that are unfamiliar.

## 2024-01-25 NOTE — CM/SW NOTE
PT recommendations received for Home Health/PT. Per PT note:    HOME SITUATION  Type of Home: House   Home Layout: Two level  Stairs to Bedroom:  (1 flight)     Lives With: Spouse  Drives: Yes  Patient Regularly Uses: Reading glasses     Prior Level of [place]: Pt reports ind prior to admit.  Pt states had pulled out RW from prior knee replacement to use due to ongoing dizziness at home.  Pt reports all dizziness occurred in stance and would pass while still standing.  Pt states 1 fall where she was holding onto her  and fell with him.    Home health referrals sent via [employer]. SW/CM will follow up w/ pt to provide list of accepting providers.     CM/SW will remain available for DC planning and/or support.     SIMA AltamiranoN, CMSRN    [id number]

## 2024-01-25 NOTE — ED PROVIDER NOTES
Patient Seen in: [facility] Emergency Department      History     Chief Complaint   Patient presents with    Fall    Arrythmia/Palpitations     Stated Complaint: hx of a fib, a fib with RVR today, fell at home    Subjective:   HPI    Patient is a 73-year-old female presents emergency room with a history of multiple complaints.  Patient states that she has been getting intermittent dizziness since she was taking Imodium for some diarrhea several weeks ago.  The patient states that she has had dizziness that has been ongoing intermittently since then was more pronounced last night and states this afternoon she was walking with her  helping her and she became dizzy and fell hitting the back of her head.  The patient did not have a loss of consciousness.  The patient complains of some mild headache at this time.  The patient did not hurt her neck or back.  The patient denies chest pain or abdominal pain.  Patient states she did not hurt herself anywhere else when she fell.  Patient does have a history of atrial fibrillation per patient and she reportedly has been off all of her medication for her heart over the last couple of months per patient by her own accord.  I did review the patient's medical records and she has had a history of sinus tachycardia and also accelerated junctional rhythm with PVCs and PACs in the past.  Patient denies history of any vomiting or diarrhea.  The patient denies weakness or numbness to the legs.  The patient denies history of any other somatic complaints or discomfort at this time.  The patient does not take any blood thinners.    Objective:   Past Medical History:   Diagnosis Date    Acute sinusitis, unspecified     Anxiety state     Arrhythmia     Irregular heart beat    Asthma     Asthmatic/bronchitis 8 years ago, not on Asthma meds or steroids    Asymptomatic postmenopausal status (age-related) (natural)     Asymptomatic varicose veins     Breast CA ([facility]) 1990, 2014     Left mastctomy for DCIS    Breast CA (HCC)     Right mastectomy with chemo and radiation    Cardiomyopathy (HCC)     Cataract     Congenital pes planus     Depression     Disorder of thyroid     enlarged thyroid    Exposure to radiation     HIGH BLOOD PRESSURE     High cholesterol     Lymph edema     left arm and right arm    Osteoarthritis     Personal history of antineoplastic chemotherapy     PICC (peripherally inserted central catheter) in place 2017    Unspecified asthma(493.90)     Unspecified essential hypertension     Visual impairment     glasses              Past Surgical History:   Procedure Laterality Date    BREAST BIOPSY  2014      1983    COLONOSCOPY N/A 2023    Procedure: COLONOSCOPY;  Surgeon: Cesar Blum MD;  Location:  ENDOSCOPY    KNEE ARTHROSCP HARV Left     MAGGIE LOCALIZATION WIRE 1 SITE RIGHT (CPT=19281)      MASTECTOMY LEFT      MASTECTOMY RIGHT  2014    Right mastectomy with sentinel lymph node biopsy, injection of blue dye for sentinel lymph node identification, completion axillary lymph node dissection, and advancement flap mastopexy    SHOULDER ARTHROPLASTY Left     - Dr. Fairchild    TONSILLECTOMY      TOTAL KNEE REPLACEMENT Right 2017    TOTAL KNEE REPLACEMENT Right 05/10/2017    Revision-Dr. Fairchild    TOTAL KNEE REPLACEMENT Right 2017    Revision  Dr. Fairchild                Social History     Socioeconomic History    Marital status:     Number of children: 1   Occupational History    Occupation: stay at home mom   Tobacco Use    Smoking status: Never    Smokeless tobacco: Never   Vaping Use    Vaping Use: Never used   Substance and Sexual Activity    Alcohol use: No    Drug use: No    Sexual activity: Not Currently     Partners: Male   Other Topics Concern    Blood Transfusions No    Caffeine Concern Yes    Exercise No    Seat Belt Yes              Review of Systems    Positive for stated complaint: hx of a fib, a fib with  RVR today, fell at home  Other systems are as noted in HPI.  Constitutional and vital signs reviewed.      All other systems reviewed and negative except as noted above.    Physical Exam     ED Triage Vitals [01/24/24 1911]   /75   Pulse (!) 124   Resp 18   Temp 97.8 °F (36.6 °C)   Temp src    SpO2 99 %   O2 Device None (Room air)       Current:/55   Pulse 99   Temp 97.8 °F (36.6 °C)   Resp 13   LMP  (LMP Unknown)   SpO2 100%         Physical Exam  GENERAL: Well-developed, well-nourished female sitting up breathing easily in no apparent distress.  Patient is nontoxic in appearance.  HEENT: Head is normocephalic, atraumatic. Pupils are 4 mm equally round and reactive to light. Oropharynx is clear. Mucous membranes are moist.  NECK: There is no focal tenderness to palpation appreciated.   LUNGS: Clear to auscultation bilaterally with no wheeze. There is good equal air entry bilaterally.  HEART: Irregularly irregular rhythm with a tachycardic rate. Normal S1, S2 no S3, or S4. No murmur.  ABDOMEN: There is no focal tenderness to palpation appreciated anywhere throughout the abdomen. There is no guarding, no rebound, no mass, and no organomegaly appreciated. There is normoactive bowel sounds. There is no hernia.  EXTREMITIES: There is no cyanosis, clubbing, or edema appreciated. Pulses are 2+ and equal in all 4 extremities.  NEURO: Patient is awake, alert and oriented to time place and person. Motor strength is 5 over 5 in all 4 extremities. There are no gross motor or sensory deficits appreciated. Cranial nerves II through XII are intact.  Patient answering all questions appropriately.             ED Course     Labs Reviewed   COMP METABOLIC PANEL (14) - Abnormal; Notable for the following components:       Result Value    Glucose 150 (*)     Potassium 2.2 (*)     Creatinine 1.57 (*)     eGFR-Cr 34 (*)     AST 54 (*)     Albumin 3.3 (*)     Globulin  4.5 (*)     A/G Ratio 0.7 (*)     All other  components within normal limits   PROTHROMBIN TIME (PT) - Abnormal; Notable for the following components:    PT 15.6 (*)     INR 1.23 (*)     All other components within normal limits   PRO BETA NATRIURETIC PEPTIDE - Abnormal; Notable for the following components:    Pro-Beta Natriuretic Peptide 2,389 (*)     All other components within normal limits   CBC W/ DIFFERENTIAL - Abnormal; Notable for the following components:    HGB 11.4 (*)     HCT 32.9 (*)     Lymphocyte Absolute 0.95 (*)     All other components within normal limits   TROPONIN I HIGH SENSITIVITY - Normal   PTT, ACTIVATED - Normal   RAPID SARS-COV-2 BY PCR - Normal   CBC WITH DIFFERENTIAL WITH PLATELET    Narrative:     The following orders were created for panel order CBC With Differential With Platelet.  Procedure                               Abnormality         Status                     ---------                               -----------         ------                     CBC W/ DIFFERENTIAL[[id number]]          Abnormal            Final result                 Please view results for these tests on the individual orders.   RAINBOW DRAW LAVENDER   RAINBOW DRAW LIGHT GREEN   RAINBOW DRAW BLUE     EKG    Rate, intervals and axes as noted on EKG Report.  Rate: 147  Rhythm: Atrial fibrillation with multiple PVCs.  Reading: Atrial fibrillation with multiple PVCs no acute ST elevation appreciated         I personally viewed the patient's chest x-ray my individual interpretation shows no evidence of any acute infiltrate or pneumothorax.  I also reviewed the official radiology report which showed results as noted below.        CT BRAIN OR HEAD ([id number])    Result Date: 1/24/2024  CONCLUSION:  No acute intracranial abnormality identified.    LOCATION:  [id number]   Dictated by (CST): José Miguel Maria MD on 1/24/2024 at 9:09 PM     Finalized by (CST): José Miguel Maria MD on 1/24/2024 at 9:10 PM       XR CHEST AP PORTABLE  (CPT=71045)    Result Date:  1/24/2024  CONCLUSION:  No active cardiopulmonary process identified.   LOCATION:  [facility]      Dictated by (CST): José Miguel Maria MD on 1/24/2024 at 7:56 PM     Finalized by (CST): José Miguel Maria MD on 1/24/2024 at 7:57 PM               [facility]          21:15 patient sitting back and breathing easily in no apparent distress this time.  Patient with no other new complaints at this time.  Will continue to observe at this time.  Patient heart rate is improved at this time.  21:18 patient sitting back and breathing easily in no apparent distress.  Patient's case discussed with Dr. Perez who is aware of the need for admission and he does want the patient to be anticoagulated at this time.  Patient be started on heparin at this time.  He is aware of the patient's history of recent fall and negative CT scan.  Patient will be admitted for further care.  Admission disposition: 1/24/2024  9:19 PM         Patient an IV line established blood work drawn including CBC, chemistries, BUN and creatinine, and blood sugar showed evidence of a potassium of 2.2 which is low for which the patient was given IV potassium in the ER.  Liver function test troponin found to be negative.  BNP is elevated to 389.  Coags are unremarkable.  Patient underwent CT and x-ray findings as noted above.  The patient is sitting back and breathing easily in no apparent distress.  Patient started on Cardizem as a bolus and drip here in the emergency room with improvement in heart rate after this was given.  Patient appeared to have ongoing PVCs with improvement in rate while here in the ER patient did appear to be in atrial fibrillation initially upon arrival to the ER she has been feeling dizzy and lightheaded.  The patient case discussed with Dr. Perez and the patient was started on IV heparin here in the emergency room after discussion with Dr. Perez and the patient was seen and evaluated by Dr. Lopez at the bedside.  The patient will be admitted to  the hospital for further care at this time.  Patient was admitted for further care at this time.  Patient did receive IV potassium here in the ER as noted above.  Patient is comfortable on repeat examination.                               Medical Decision Making      Disposition and Plan     Clinical Impression:  1. Atrial fibrillation with rapid ventricular response (HCC)    2. Blunt head trauma, initial encounter    3. Hypokalemia         Disposition:  Admit  1/24/2024  9:19 pm    Follow-up:  No follow-up provider specified.        Medications Prescribed:  Current Discharge Medication List                            Hospital Problems       Present on Admission  Date Reviewed: 1/9/2024            ICD-10-CM Noted POA    * (Principal) Atrial fibrillation with rapid ventricular response (HCC) I48.91 1/24/2024 Unknown    Benign essential HTN I10 Unknown Yes    Dizziness R42 1/24/2024 Unknown    Fall W19.XXXA 1/24/2024 Unknown

## 2024-01-25 NOTE — PROGRESS NOTES
[facility]     Hospitalist Progress Note     Sally Gonzales Patient Status:  Inpatient    1950 MRN [id number]   Location [facility] 0SW-A Attending Jorge Galeano DO   Hosp Day # 1 PCP Mackenzie Harp DO     Chief Complaint: Dizziness    Subjective:     Patient dizzy with standing, orthostatics positive. States the room is spinning when she is up. No chest pain or SOB. Back in sinus.    Objective:    Review of Systems:   A comprehensive review of systems was completed; pertinent positive and negatives stated in subjective.    Vital signs:  Temp:  [97.2 °F (36.2 °C)-98 °F (36.7 °C)] 98 °F (36.7 °C)  Pulse:  [] 98  Resp:  [13-23] 18  BP: ()/(49-79) 127/69  SpO2:  [99 %-100 %] 99 %    Physical Exam:    General: No acute distress, awake and alert  Respiratory: No wheezes, no rhonchi  Cardiovascular: S1, S2, RRR  Abdomen: Soft, Non-tender, non-distended, positive bowel sounds  Extremities: No edema    Diagnostic Data:    Labs:  Recent Labs   Lab 24  0543   WBC 5.3 5.9   HGB 11.4* 9.5*   MCV 82.7 81.4   .0 155.0  155.0   INR 1.23*  --      Recent Labs   Lab 24   *   BUN 22   CREATSERUM 1.57*   CA 8.9   ALB 3.3*      K 2.2*   CL 99   CO2 27.0   ALKPHO 95   AST 54*   BILT 0.5   TP 7.8     Estimated Creatinine Clearance: 31.7 mL/min (A) (based on SCr of 1.57 mg/dL (H)).    Recent Labs   Lab 24   TROPHS 26     Recent Labs   Lab 24   PTP 15.6*   INR 1.23*     Microbiology  No results found for this visit on 24.    Imaging: Reviewed in Epic.    Medications:    allopurinol  100 mg Oral Daily    aspirin  81 mg Oral Daily    fluticasone propionate  2 spray Nasal Daily    furosemide  40 mg Oral Daily    metoprolol succinate ER  100 mg Oral Daily Beta Blocker    sacubitril-valsartan  1 tablet Oral BID    atorvastatin  20 mg Oral Nightly    sertraline  150 mg Oral Daily    tamoxifen  20 mg Oral Daily    dilTIAZem  10 mg  Intravenous Once    dilTIAZem  30 mg Oral 4 times per day       Assessment & Plan:      #Fall  #Dizziness/veritgo  -CT head negative, check MRI brain  -Possibly also related to heart rates  -Orthostatics positive, hold lasix. Give gentle IVF x 1 liter  -PRN meclizine  -PT/OT     #Atrial fibrillation with RVR now in NSR  -Cardizem gtt  -Was not taking her BB at home, resumed here  -PO CCB  -Heparin gtt  -Echo pending  -TSH low but FT4 WNL, repeat in 4-6 weeks  -Telemetry  -Cardiology to eval     #Hypertension  -Resume home meds     #Dyslipidemia  -Statin     #Hypokalemia  #Hypomagnesemia  -Replace per protocol    #CKD stage III  -Cr only slightly up from baseline  -Gentle IVF today     #Breast Cancer  -Continue tamoxifen    #Hx of MR s/p mitraclip  -Repeat echo pending    #CAD s/p stent  #Mild LV dysfunction with EF 45-50%  -ASA, statin, BB  -Repeat echo pending    #Anxiety and depression  -Resume home meds    #Anemia with iron deficiency, trending lower  -Denies any evidence of bleeding  -IV iron      Jorge Galeano, DO    Supplementary Documentation:     Quality:  DVT Mechanical Prophylaxis:        DVT Pharmacologic Prophylaxis   Medication    heparin (Porcine) 25272 units/250mL infusion CONTINUOUS         DVT Pharmacologic prophylaxis: Aspirin 81 mg    Code Status: Full Code  Pradhan: No urinary catheter in place  BERTA: TBD    Discharge is dependent on: Clinical state, cardiology recs  At this point Ms. Gonzales is expected to be discharge to: Home    The 21st Century Cures Act makes medical notes like these available to patients in the interest of transparency. Please be advised this is a medical document. Medical documents are intended to carry relevant information, facts as evident, and the clinical opinion of the practitioner. The medical note is intended as peer to peer communication and may appear blunt or direct. It is written in medical language and may contain abbreviations or verbiage that are unfamiliar.              **Certification    PHYSICIAN Certification of Need for Inpatient Hospitalization - Initial Certification    Patient will require inpatient services that will reasonably be expected to span two midnight's based on the clinical documentation in H+P.   Based on patients current state of illness, I anticipate that, after discharge, patient will require TBD.

## 2024-01-25 NOTE — PHYSICAL THERAPY NOTE
PHYSICAL THERAPY EVALUATION - INPATIENT     Room Number: OFD3/OFD3-A  Evaluation Date: 1/25/2024  Type of Evaluation: Initial  Physician Order: PT Eval and Treat    Presenting Problem: Afib, dizziness  Co-Morbidities : breast Ca, anxiety, asthma, CAD (s/p PCI mLAD 2020, FFR negative ostial RCA disease), severe MR s/p mitraclip, HTN, DL , AFib  Reason for Therapy: Mobility Dysfunction and Discharge Planning    History related to current admission: Patient is a 73 year old female admitted on 1/24/2024 from home for dizziness.  Pt diagnosed with Afib, +orthostatic BP upon admit.        ASSESSMENT   In this PT evaluation, the patient presents with the following impairments decreased activity tolerance.  These impairments and comorbidities manifest themselves as functional limitations in independent bed mobility, transfers, and gait.  The patient is below baseline and would benefit from skilled inpatient PT to address the above deficits to assist patient in returning to prior to level of function.   Functional outcome measures completed include AMPAC.  The AM-PAC '6-Clicks' Inpatient Basic Mobility Short Form was completed and this patient is demonstrating a Approx Degree of Impairment: 35.83%  degree of impairment in mobility. Research supports that patients with this level of impairment may benefit from C.    DISCHARGE RECOMMENDATIONS  PT Discharge Recommendations: Home with home health PT    PLAN  PT Treatment Plan: Bed mobility;Body mechanics;Endurance;Energy conservation;Patient education;Gait training;Transfer training;Balance training;Strengthening  Rehab Potential : Good  Frequency (Obs):  (2-3x/week)  Number of Visits to Meet Established Goals: 3      CURRENT GOALS    Goal #1 Patient is able to demonstrate supine - sit EOB @ level: supervision  GOAL MET   Goal #2 Patient is able to demonstrate transfers Sit to/from Stand at assistance level: supervision     Goal #3 Patient is able to ambulate 50 feet  with assist device: walker - rolling at assistance level: supervision     Goal #4 Patient will negotiate 1 flight of stairs with supervision to ensure safety at GA.     Goal #5    Goal #6    Goal Comments: Goals established on 2024    HOME SITUATION  Type of Home: House   Home Layout: Two level        Stairs to Bedroom:  (1 flight)       Lives With: Spouse  Drives: Yes     Patient Regularly Uses: Reading glasses    Prior Level of Millard: Pt reports ind prior to admit.  Pt states had pulled out RW from prior knee replacement to use due to ongoing dizziness at home.  Pt reports all dizziness occurred in stance and would pass while still standing.  Pt states 1 fall where she was holding onto her  and fell with him.    SUBJECTIVE  \"Thank you for being sensitive to...\"      OBJECTIVE  Precautions: Bed/chair alarm  Fall Risk: High fall risk    WEIGHT BEARING RESTRICTION  Weight Bearing Restriction: None                PAIN ASSESSMENT  Ratin          COGNITION  Following Commands:  follows all commands and directions without difficulty    RANGE OF MOTION AND STRENGTH ASSESSMENT  Upper extremity ROM and strength are within functional limits     Lower extremity ROM is within functional limits     Lower extremity strength is within functional limits       BALANCE  Static Sitting: Good  Dynamic Sitting: Good  Static Standing: Fair -  Dynamic Standing: Fair -    ADDITIONAL TESTS                                    ACTIVITY TOLERANCE                         O2 WALK       NEUROLOGICAL FINDINGS                        AM-PAC '6-Clicks' INPATIENT SHORT FORM - BASIC MOBILITY  How much difficulty does the patient currently have...  Patient Difficulty: Turning over in bed (including adjusting bedclothes, sheets and blankets)?: None   Patient Difficulty: Sitting down on and standing up from a chair with arms (e.g., wheelchair, bedside commode, etc.): A Little   Patient Difficulty: Moving from lying on back to  sitting on the side of the bed?: None   How much help from another person does the patient currently need...   Help from Another: Moving to and from a bed to a chair (including a wheelchair)?: A Little   Help from Another: Need to walk in hospital room?: A Little   Help from Another: Climbing 3-5 steps with a railing?: A Little       AM-PAC Score:  Raw Score: 20   Approx Degree of Impairment: 35.83%   Standardized Score (AM-PAC Scale): 47.67   CMS Modifier (G-Code): CJ    FUNCTIONAL ABILITY STATUS  Gait Assessment   Functional Mobility/Gait Assessment  Gait Assistance: Contact guard assist    Skilled Therapy Provided     Bed Mobility:  Rolling: ind  Supine to sit: ind   Sit to supine: ind     Transfer Mobility:  Sit to stand: supervision   Stand to sit: sueprvision  Gait = CGA towards HOB steps only.    Therapist's Comments: Pt requires max encouragement to participate.  Pt reports high fear of falling again, wanting to know what is wrong prior to initiating therapy.  Encouraged mobility to allow to check orthostatic BP (negative- RN aware).  Full symptoms not reproduced within this session. Smooth pursuits negative as well as no dizziness with head turns.     Exercise/Education Provided:  Bed mobility  Body mechanics  Functional activity tolerated  Gait training  Transfer training    Patient End of Session: Up in chair;Needs met;RN aware of session/findings;Call light within reach;All patient questions and concerns addressed;Discussed recommendations with /      Patient Evaluation Complexity Level:  History Moderate - 1 or 2 personal factors and/or co-morbidities   Examination of body systems Moderate - addressing a total of 3 or more elements   Clinical Presentation Moderate - Evolving   Clinical Decision Making Moderate - Evolving       PT Session Time: 20 minutes    Therapeutic Activity: 10 minutes

## 2024-01-25 NOTE — PLAN OF CARE
Pt alert able to make known.   SR on tele.   Cardizem gtts turned off.   Cont on heparin gtt next PTT in am.   Echo done results pending.   Scheduled for MRI of the brain later on.   Plan of care discussed with pt, verbalized understanding.   Call light within reach.     L arm iv infiltrated area maked to monitor for increased swelling.       Problem: CARDIOVASCULAR - ADULT  Goal: Maintains optimal cardiac output and hemodynamic stability  Description: INTERVENTIONS:  - Monitor vital signs, rhythm, and trends  - Monitor for bleeding, hypotension and signs of decreased cardiac output  - Evaluate effectiveness of vasoactive medications to optimize hemodynamic stability  - Monitor arterial and/or venous puncture sites for bleeding and/or hematoma  - Assess quality of pulses, skin color and temperature  - Assess for signs of decreased coronary artery perfusion - ex. Angina  - Evaluate fluid balance, assess for edema, trend weights  Outcome: Progressing  Goal: Absence of cardiac arrhythmias or at baseline  Description: INTERVENTIONS:  - Continuous cardiac monitoring, monitor vital signs, obtain 12 lead EKG if indicated  - Evaluate effectiveness of antiarrhythmic and heart rate control medications as ordered  - Initiate emergency measures for life threatening arrhythmias  - Monitor electrolytes and administer replacement therapy as ordered  Outcome: Progressing     Problem: METABOLIC/FLUID AND ELECTROLYTES - ADULT  Goal: Electrolytes maintained within normal limits  Description: INTERVENTIONS:  - Monitor labs and rhythm and assess patient for signs and symptoms of electrolyte imbalances  - Administer electrolyte replacement as ordered  - Monitor response to electrolyte replacements, including rhythm and repeat lab results as appropriate  - Fluid restriction as ordered  - Instruct patient on fluid and nutrition restrictions as appropriate  Outcome: Progressing  Goal: Hemodynamic stability and optimal renal function  maintained  Description: INTERVENTIONS:  - Monitor labs and assess for signs and symptoms of volume excess or deficit  - Monitor intake, output and patient weight  - Monitor urine specific gravity, serum osmolarity and serum sodium as indicated or ordered  - Monitor response to interventions for patient's volume status, including labs, urine output, blood pressure (other measures as available)  - Encourage oral intake as appropriate  - Instruct patient on fluid and nutrition restrictions as appropriate  Outcome: Progressing

## 2024-01-26 LAB
ADENOVIRUS F 40/41 PCR: NEGATIVE
ANION GAP SERPL CALC-SCNC: 7 MMOL/L (ref 0–18)
APTT PPP: 38.3 SECONDS (ref 23.3–35.6)
ASTROVIRUS PCR: NEGATIVE
BASOPHILS # BLD AUTO: 0.01 X10(3) UL (ref 0–0.2)
BASOPHILS NFR BLD AUTO: 0.2 %
BUN BLD-MCNC: 26 MG/DL (ref 9–23)
C CAYETANENSIS DNA SPEC QL NAA+PROBE: NEGATIVE
C DIFF TOX B STL QL: NEGATIVE
CALCIUM BLD-MCNC: 8.2 MG/DL (ref 8.5–10.1)
CAMPY SP DNA.DIARRHEA STL QL NAA+PROBE: NEGATIVE
CHLORIDE SERPL-SCNC: 107 MMOL/L (ref 98–112)
CO2 SERPL-SCNC: 25 MMOL/L (ref 21–32)
CREAT BLD-MCNC: 1.66 MG/DL
CRYPTOSP DNA SPEC QL NAA+PROBE: NEGATIVE
EAEC PAA PLAS AGGR+AATA ST NAA+NON-PRB: NEGATIVE
EC STX1+STX2 + H7 FLIC SPEC NAA+PROBE: NEGATIVE
EGFRCR SERPLBLD CKD-EPI 2021: 32 ML/MIN/1.73M2 (ref 60–?)
ENTAMOEBA HISTOLYTICA PCR: NEGATIVE
EOSINOPHIL # BLD AUTO: 0.1 X10(3) UL (ref 0–0.7)
EOSINOPHIL NFR BLD AUTO: 2.3 %
EPEC EAE GENE STL QL NAA+NON-PROBE: NEGATIVE
ERYTHROCYTE [DISTWIDTH] IN BLOOD BY AUTOMATED COUNT: 14.9 %
ETEC LTA+ST1A+ST1B TOX ST NAA+NON-PROBE: NEGATIVE
GIARDIA LAMBLIA PCR: NEGATIVE
GLUCOSE BLD-MCNC: 105 MG/DL (ref 70–99)
HCT VFR BLD AUTO: 28 %
HGB BLD-MCNC: 9.3 G/DL
IMM GRANULOCYTES # BLD AUTO: 0.03 X10(3) UL (ref 0–1)
IMM GRANULOCYTES NFR BLD: 0.7 %
LYMPHOCYTES # BLD AUTO: 0.93 X10(3) UL (ref 1–4)
LYMPHOCYTES NFR BLD AUTO: 21.6 %
MAGNESIUM SERPL-MCNC: 1.2 MG/DL (ref 1.6–2.6)
MCH RBC QN AUTO: 28.3 PG (ref 26–34)
MCHC RBC AUTO-ENTMCNC: 33.2 G/DL (ref 31–37)
MCV RBC AUTO: 85.1 FL
MONOCYTES # BLD AUTO: 0.91 X10(3) UL (ref 0.1–1)
MONOCYTES NFR BLD AUTO: 21.2 %
NEUTROPHILS # BLD AUTO: 2.32 X10 (3) UL (ref 1.5–7.7)
NEUTROPHILS # BLD AUTO: 2.32 X10(3) UL (ref 1.5–7.7)
NEUTROPHILS NFR BLD AUTO: 54 %
NOROVIRUS GI/GII PCR: NEGATIVE
OSMOLALITY SERPL CALC.SUM OF ELEC: 293 MOSM/KG (ref 275–295)
P SHIGELLOIDES DNA STL QL NAA+PROBE: NEGATIVE
PLATELET # BLD AUTO: 151 10(3)UL (ref 150–450)
POTASSIUM SERPL-SCNC: 3.5 MMOL/L (ref 3.5–5.1)
POTASSIUM SERPL-SCNC: 3.5 MMOL/L (ref 3.5–5.1)
POTASSIUM SERPL-SCNC: 4.5 MMOL/L (ref 3.5–5.1)
RBC # BLD AUTO: 3.29 X10(6)UL
ROTAVIRUS A PCR: NEGATIVE
SALMONELLA DNA SPEC QL NAA+PROBE: NEGATIVE
SAPOVIRUS PCR: NEGATIVE
SHIGELLA SP+EIEC IPAH ST NAA+NON-PROBE: NEGATIVE
SODIUM SERPL-SCNC: 139 MMOL/L (ref 136–145)
V CHOLERAE DNA SPEC QL NAA+PROBE: NEGATIVE
VIBRIO DNA SPEC NAA+PROBE: NEGATIVE
WBC # BLD AUTO: 4.3 X10(3) UL (ref 4–11)
YERSINIA DNA SPEC NAA+PROBE: NEGATIVE

## 2024-01-26 PROCEDURE — 99232 SBSQ HOSP IP/OBS MODERATE 35: CPT | Performed by: INTERNAL MEDICINE

## 2024-01-26 RX ORDER — MAGNESIUM OXIDE 400 MG/1
800 TABLET ORAL ONCE
Status: COMPLETED | OUTPATIENT
Start: 2024-01-26 | End: 2024-01-26

## 2024-01-26 RX ORDER — LOPERAMIDE HYDROCHLORIDE 2 MG/1
2 CAPSULE ORAL 4 TIMES DAILY PRN
Status: DISCONTINUED | OUTPATIENT
Start: 2024-01-26 | End: 2024-01-27

## 2024-01-26 RX ORDER — MECLIZINE HCL 12.5 MG/1
12.5 TABLET ORAL 3 TIMES DAILY PRN
Qty: 20 TABLET | Refills: 0 | Status: SHIPPED | OUTPATIENT
Start: 2024-01-26

## 2024-01-26 RX ORDER — POTASSIUM CHLORIDE 20 MEQ/1
40 TABLET, EXTENDED RELEASE ORAL EVERY 4 HOURS
Status: COMPLETED | OUTPATIENT
Start: 2024-01-26 | End: 2024-01-26

## 2024-01-26 NOTE — PROGRESS NOTES
[facility]     Hospitalist Progress Note     Sallyronak Gonzales Patient Status:  Inpatient    1950 MRN [id number]   Location [facility] 0SW-A Attending Jorge Galeano DO   Hosp Day # 2 PCP Mackenzie Harp DO     Chief Complaint: Dizziness    Subjective:     Patient denies chest pain or SOB. Dizziness resolved. Reports explosive diarrhea this morning but RN states it was soft.    Objective:    Review of Systems:   A comprehensive review of systems was completed; pertinent positive and negatives stated in subjective.    Vital signs:  Temp:  [96.9 °F (36.1 °C)-99.2 °F (37.3 °C)] 98.7 °F (37.1 °C)  Pulse:  [] 105  Resp:  [18-22] 18  BP: ()/(55-75) 112/67  SpO2:  [96 %-100 %] 97 %    Physical Exam:    General: No acute distress, awake and alert  Respiratory: No wheezes, no rhonchi  Cardiovascular: S1, S2, RRR  Abdomen: Soft, Non-tender, non-distended, positive bowel sounds  Extremities: No edema    Diagnostic Data:    Labs:  Recent Labs   Lab 24  0543 24  0433   WBC 5.3 5.9 4.3   HGB 11.4* 9.5* 9.3*   MCV 82.7 81.4 85.1   .0 155.0  155.0 151.0   INR 1.23*  --   --      Recent Labs   Lab 24  0543 249 24  0433   * 104*  --  105*   BUN 22 23  --  26*   CREATSERUM 1.57* 1.61* 1.72* 1.66*   CA 8.9 7.9*  --  8.2*   ALB 3.3*  --   --   --     137  --  139   K 2.2* 2.1* 3.0* 3.5  3.5   CL 99 101  --  107   CO2 27.0 26.0  --  25.0   ALKPHO 95  --   --   --    AST 54*  --   --   --    BILT 0.5  --   --   --    TP 7.8  --   --   --      Estimated Creatinine Clearance: 30 mL/min (A) (based on SCr of 1.66 mg/dL (H)).    Recent Labs   Lab 24   TROPHS 26     Recent Labs   Lab 24   PTP 15.6*   INR 1.23*     Microbiology  No results found for this visit on 24.    Imaging: Reviewed in Epic.    Medications:    potassium chloride  40 mEq Oral Q4H    gabapentin  200 mg Oral Daily    gabapentin  400 mg  Oral Nightly    iron sucrose  200 mg Intravenous Daily    apixaban  5 mg Oral BID    allopurinol  100 mg Oral Daily    aspirin  81 mg Oral Daily    fluticasone propionate  2 spray Nasal Daily    metoprolol succinate ER  100 mg Oral Daily Beta Blocker    sacubitril-valsartan  1 tablet Oral BID    atorvastatin  20 mg Oral Nightly    sertraline  150 mg Oral Daily    tamoxifen  20 mg Oral Daily    dilTIAZem  30 mg Oral 4 times per day       Assessment & Plan:      #Fall  #Dizziness/veritgo, resolved  -CT head negative, MRI brain with no acute abnormalities but shows old lacunar infarct  -Possibly also related to heart rates and positive orthostatics  -PRN meclizine  -PT/OT recommending [facility]     #Atrial fibrillation with RVR now in NSR  -Cardizem gtt off  -Was not taking her BB at home, resumed here  -PO CCB  -Eliquis started  -Echo reviewed  -TSH low but FT4 WNL, repeat in 4-6 weeks  -Telemetry  -Cardiology following     #Hypertension  -Resume home meds     #Dyslipidemia  -Statin     #Hypokalemia  #Hypomagnesemia  -Replace per protocol    #CKD stage III  -Cr only slightly up from baseline  -S/p IVF, hold further     #Breast Cancer  -Continue tamoxifen    #Hx of MR s/p mitraclip  -Repeat echo shows moderate to severe MR, worse than 3 years ago    #CAD s/p stent  #LV dysfunction  -ASA, statin, BB  -Repeat echo EF is lower EF 35-40% from 45-50% 3 years ago    #Anxiety and depression  -Resume home meds    #Anemia with iron deficiency, trending lower  -Denies any evidence of bleeding  -IV iron    #Diarrhea?  -Conflicting reports  -C diff if recurs, discussed with RN    Plan: PT/OT, await cardiology recs on echo findings and medication adjustments    Jorge Galeano DO    Supplementary Documentation:     Quality:  DVT Mechanical Prophylaxis:        DVT Pharmacologic Prophylaxis   Medication    apixaban (Eliquis) tab 5 mg         DVT Pharmacologic prophylaxis: Aspirin 81 mg    Code Status: Full Code  Pradhan: No urinary catheter in  place  BERTA: 1 day    Discharge is dependent on: Clinical state, cardiology recs  At this point Ms. Gonzales is expected to be discharge to: Home    The 21st Century Cures Act makes medical notes like these available to patients in the interest of transparency. Please be advised this is a medical document. Medical documents are intended to carry relevant information, facts as evident, and the clinical opinion of the practitioner. The medical note is intended as peer to peer communication and may appear blunt or direct. It is written in medical language and may contain abbreviations or verbiage that are unfamiliar.

## 2024-01-26 NOTE — CM/SW NOTE
Met w/ pt to discuss DC planning. PT recommendation received for HH. Pt doesn't feel she needs HH. List left w/ pt to review, in case she changes her mind.     Pt encouraged to reach out with any questions or concerns.    CM/SW will remain available for DC planning and/or support.     JOSÉ Altamirano, CMSRN    [phone]  [

## 2024-01-26 NOTE — PROGRESS NOTES
Assumed care @ 1930.    Pt a/o x4, VSS.  Tele SR. HR 80-90's.  No acute respiratory distress noted.    Denies any pain.    Pt ambulated to the bathroom with standby to ad missy.    (+) BM.    Eliquis started overnight.  No other complaints made.    Will continue to monitor.

## 2024-01-26 NOTE — PHYSICAL THERAPY NOTE
PHYSICAL THERAPY TREATMENT NOTE - INPATIENT    Room Number: OFD3/OFD3-A     Session: 1     Number of Visits to Meet Established Goals: 3    Presenting Problem: Afib, dizziness  Co-Morbidities : breast Ca, anxiety, asthma, CAD (s/p PCI mLAD , FFR negative ostial RCA disease), severe MR s/p mitraclip, HTN, DL , AFib  ASSESSMENT     Pt progressing with all mobility this date, agreeable to ambulating into bathroom.  Pt reports quick fatigue with mobility, however able to complete without physical assist.  Encouraged OOB to chair and continued ambulation while in house.  Pt highly focused on discussing with own cardiologist re: meds recommended and current meds.  RN present in room at end of session.  Will f/u if pt remains in house.    DISCHARGE RECOMMENDATIONS  PT Discharge Recommendations: Home with home health PT     PLAN  PT Treatment Plan: Bed mobility;Body mechanics;Endurance;Energy conservation;Patient education;Gait training;Transfer training;Balance training;Strengthening  Rehab Potential : Good  Frequency (Obs):  (2-3x/week)    CURRENT GOALS     Goal #1 Patient is able to demonstrate supine - sit EOB @ level: supervision  GOAL MET   Goal #2 Patient is able to demonstrate transfers Sit to/from Stand at assistance level: supervision   GOAL MET   Goal #3 Patient is able to ambulate 50 feet with assist device: walker - rolling at assistance level: supervision   PROGRESSING   Goal #4 Patient will negotiate 1 flight of stairs with supervision to ensure safety at MT.    PROGRESSING   Goal #5     Goal #6     Goal Comments: Goals established on 2024      SUBJECTIVE  \"I need to talk to him... someone needs to take control and I want it to be him.\"  Re: meds.    OBJECTIVE  Precautions: Bed/chair alarm    WEIGHT BEARING RESTRICTION  Weight Bearing Restriction: None                PAIN ASSESSMENT   Ratin          BALANCE                                                                                                                        Static Sitting: Good  Dynamic Sitting: Good           Static Standing: Fair +  Dynamic Standing: Fair -    ACTIVITY TOLERANCE                         O2 WALK         AM-PAC '6-Clicks' INPATIENT SHORT FORM - BASIC MOBILITY  How much difficulty does the patient currently have...  Patient Difficulty: Turning over in bed (including adjusting bedclothes, sheets and blankets)?: None   Patient Difficulty: Sitting down on and standing up from a chair with arms (e.g., wheelchair, bedside commode, etc.): None   Patient Difficulty: Moving from lying on back to sitting on the side of the bed?: None   How much help from another person does the patient currently need...   Help from Another: Moving to and from a bed to a chair (including a wheelchair)?: A Little   Help from Another: Need to walk in hospital room?: A Little   Help from Another: Climbing 3-5 steps with a railing?: A Little       AM-PAC Score:  Raw Score: 21   Approx Degree of Impairment: 28.97%   Standardized Score (AM-PAC Scale): 50.25   CMS Modifier (G-Code): CJ    FUNCTIONAL ABILITY STATUS  Gait Assessment   Functional Mobility/Gait Assessment  Gait Assistance: Supervision  Distance (ft): 20ftx1;  30ftx1  Assistive Device: Rolling walker  Pattern: Shuffle    Skilled Therapy Provided    Bed Mobility:  Rolling: ind   Supine<>Sit: ind   Sit<>Supine: NT     Transfer Mobility:  Sit<>Stand: mod ind from bed, CGA from lowered toilet   Stand<>Sit: supervision to toilet and chair without arms.   Gait: Ambulation as above.      Therapist's Comments: Pt comleted pericare and standing at sink for hand hygiene on own.  Educated to call for assist as pt reporting got self to bathroom earlier this AM.  RN aware.          Patient End of Session: With  staff;Up in chair;Needs met;Call light within reach;RN aware of session/findings;All patient questions and concerns addressed    PT Session Time: 23 minutes    Therapeutic Activity: 23  minutes

## 2024-01-26 NOTE — PROGRESS NOTES
[facility]  Progress Note    Sally Gonzales Patient Status:  Inpatient    1950 MRN [id number]   Location [facility] 0-A Attending Jorge Galeano DO   Hosp Day # 2 PCP Mackenzie Harp DO     Impression:  1. AF RVR  2. acute on chronic systolic heart failure, LVEF 30-35%  3. MR s/p mitraclip (10/20, GSH)  4. postural dizziness, diarreha, +orthostasis  - CT/MRI brain without acute CNS issues.  5. CAD s/p PCI to LAD (10/20)    Plan:  1. remains SR with PVCs. continue toprol 100mg.  will hold diltiazem  2. CM/CHF, LVEF 30-35% (prior 40-45%). pt has been holding GDMT over the past 1-2 months.  Will need to re-initiate therapy, restart entresto once diarrhea resolves (david considering othostasis on admission).  3. AC: apixaban 5mg bid.  4. tele- will follow.      Subjective:  dizziness/postural weakness improved (able to get to bathroom). 3-4 episodes of diarrhea today. denies CP/SOB. tele- SR PVCs.    Objective:  /70 (BP Location: Right arm)   Pulse 96   Temp 98.3 °F (36.8 °C) (Oral)   Resp 17   Wt 180 lb (81.6 kg)   LMP  (LMP Unknown)   SpO2 92%   BMI 27.37 kg/m²   Temp (24hrs), Av.3 °F (36.8 °C), Min:96.9 °F (36.1 °C), Max:99.2 °F (37.3 °C)      Intake/Output Summary (Last 24 hours) at 2024 1500  Last data filed at 2024 1600  Gross per 24 hour   Intake --   Output 400 ml   Net -400 ml     Wt Readings from Last 3 Encounters:   24 180 lb (81.6 kg)   24 180 lb (81.6 kg)   10/16/23 205 lb (93 kg)       General: Awake and alert; in no acute distress  Cardiac: Regular rate and regular rhythm; no murmurs/rubs/gallops are appreciated  Lungs: Clear to auscultation bilaterally; no accessory muscle use  Abdomen: Soft, non-tender; bowel sounds are normoactive  Extremities: No clubbing/cyanosis; moves all 4 extremities normally    Current Facility-Administered Medications   Medication Dose Route Frequency    influenza vaccine high dose quad (Fluzone QIV HD)  0.7 mL IM injection (ages >/= 65 years) 0.7 mL  0.7 mL Intramuscular Prior to discharge    gabapentin (Neurontin) cap 200 mg  200 mg Oral Daily    gabapentin (Neurontin) cap 400 mg  400 mg Oral Nightly    iron sucrose (Venofer) 20 mg/mL injection 200 mg  200 mg Intravenous Daily    apixaban (Eliquis) tab 5 mg  5 mg Oral BID    allopurinol (Zyloprim) tab 100 mg  100 mg Oral Daily    aspirin DR tab 81 mg  81 mg Oral Daily    fluticasone propionate (Flonase) 50 MCG/ACT nasal suspension 2 spray  2 spray Nasal Daily    metoprolol succinate ER (Toprol XL) 24 hr tab 100 mg  100 mg Oral Daily Beta Blocker    sacubitril-valsartan (Entresto) 49-51 MG per tab 1 tablet  1 tablet Oral BID    atorvastatin (Lipitor) tab 20 mg  20 mg Oral Nightly    sertraline (Zoloft) tab 150 mg  150 mg Oral Daily    tamoxifen (Nolvadex) tab 20 mg  20 mg Oral Daily    dilTIAZem 10 mg BOLUS FROM BAG infusion  10 mg Intravenous Q1H PRN    dilTIAZem (cardIZEM) 100 mg in sodium chloride 0.9% 100 mL IVPB-ADDV  2.5-20 mg/hr Intravenous Continuous    dilTIAZem (cardIZEM) tab 30 mg  30 mg Oral 4 times per day    acetaminophen (Tylenol Extra Strength) tab 500 mg  500 mg Oral Q4H PRN    ondansetron (Zofran) 4 MG/2ML injection 4 mg  4 mg Intravenous Q6H PRN    metoclopramide (Reglan) 5 mg/mL injection 5 mg  5 mg Intravenous Q8H PRN    meclizine (Antivert) tab 12.5 mg  12.5 mg Oral TID PRN       Laboratory Data:  Lab Results   Component Value Date    WBC 4.3 01/26/2024    HGB 9.3 01/26/2024    HCT 28.0 01/26/2024    .0 01/26/2024     Lab Results   Component Value Date    INR 1.23 (H) 01/24/2024    INR 1.06 09/16/2020    INR 0.96 02/25/2020     Lab Results   Component Value Date     01/26/2024    K 3.5 01/26/2024    K 3.5 01/26/2024     01/26/2024    CO2 25.0 01/26/2024    BUN 26 01/26/2024    CREATSERUM 1.66 01/26/2024     01/26/2024    CA 8.2 01/26/2024    MG 1.2 01/26/2024       Telemetry: No malignant tachyarrhythmias or  bradyarrhythmias      Thank you for allowing our practice to participate in the care of your patient. Please do not hesitate to contact me if you have any questions.    Cesar Martino MD  1/26/2024  3:00 PM

## 2024-01-26 NOTE — PLAN OF CARE
Assumed care of patient @ 1300 from Zack TOLBERT. Patient A&O x4, on room air, Sinus rhythm no complaints of pain, patient up stand by assist with a walker. Some loose stools. MD aware. C-diff sent. Negative. Stool PCR panel pending. Seen by CARDS, too adjust meds. On eliquis.     Report called to TOMASZ Oates for transfer to CTU 7 room . 1765    Patient aware for transfer to inpatient room. Patient alert and oriented, Patient stated she will call  to make aware.

## 2024-01-26 NOTE — CDS QUERY
Dear Dr. GONZALEZ Galeano,  Clinical information in the patient's medical record (provided below) includes documentation of atrial fibrillation.   PLEASE FURTHER SPECIFY THE ATRIAL FIBRILLATION:     [  ] Persistent Atrial Fibrillation  [ x ] Paroxysmal Atrial Fibrillation  [  ] Permanent Atrial fibrillation  [  ] Other(please specify)____________       Documentation from the Medical Record:   Risk; HX afib, had not taken BB x3 weeks   Clinical indicators; Chief complaint -dizziness  H&P- #Fall#Dizziness CT head negative Likely related to heart rates. AFib RVR Cardizem gtt Heparin gtt Check ECHO  Cardiology to eval Encourage BB use    Cardiology consult; Atrial fibrillation with RVR, resolved with dilt gtt Continue dilt 30mg q6h,Continue metoprolol 100mg daily. Can likely move to monotherapy as it has worked previously     Treatment; Cardiology consult.telemetry, Cardizem , BB. Eliquis , ECHO       For questions regarding this query, please contact Clinical Documentation : Carlos Enrique GoldenRN, BSN, CCDS Ext. [phone]                                                                                 THIS FORM IS A PERMANENT PART OF THE MEDICAL RECORD

## 2024-01-27 VITALS
HEART RATE: 93 BPM | DIASTOLIC BLOOD PRESSURE: 79 MMHG | WEIGHT: 180 LBS | OXYGEN SATURATION: 95 % | RESPIRATION RATE: 18 BRPM | TEMPERATURE: 97 F | BODY MASS INDEX: 27 KG/M2 | SYSTOLIC BLOOD PRESSURE: 123 MMHG

## 2024-01-27 PROBLEM — K59.1 FUNCTIONAL DIARRHEA: Status: ACTIVE | Noted: 2024-01-27

## 2024-01-27 LAB — MAGNESIUM SERPL-MCNC: 1.3 MG/DL (ref 1.6–2.6)

## 2024-01-27 PROCEDURE — 99239 HOSP IP/OBS DSCHRG MGMT >30: CPT | Performed by: INTERNAL MEDICINE

## 2024-01-27 RX ORDER — DIPHENOXYLATE HYDROCHLORIDE AND ATROPINE SULFATE 2.5; .025 MG/1; MG/1
1 TABLET ORAL 4 TIMES DAILY PRN
Qty: 20 TABLET | Refills: 0 | Status: SHIPPED | OUTPATIENT
Start: 2024-01-27 | End: 2024-02-05

## 2024-01-27 RX ORDER — MAGNESIUM OXIDE 400 MG/1
800 TABLET ORAL ONCE
Status: COMPLETED | OUTPATIENT
Start: 2024-01-27 | End: 2024-01-27

## 2024-01-27 RX ORDER — DIPHENOXYLATE HYDROCHLORIDE AND ATROPINE SULFATE 2.5; .025 MG/1; MG/1
1 TABLET ORAL 4 TIMES DAILY PRN
Status: DISCONTINUED | OUTPATIENT
Start: 2024-01-27 | End: 2024-01-27

## 2024-01-27 NOTE — PLAN OF CARE
Assumed care around 1800  Patient alert, oriented x4  VSS, RA, NSR on tele  Denies pain and dizziness  Up with standby assist and walker  Voiding in bathroom   Arms elevated on pillows, swelling and redness noted   Tolerating diet well  Call light within reach    Plan for med adjustments  Patient updated on plan of care    NURSING ADMISSION NOTE    Patient admitted via Cart  Oriented to room.  Safety precautions initiated.  Bed in low position.  Call light in reach.

## 2024-01-27 NOTE — PROGRESS NOTES
[facility]     Hospitalist Progress Note     Sally Gonzales Patient Status:  Inpatient    1950 MRN [id number]   Location [facility] 0SW-A Attending Jorge Galeano DO   Hosp Day # 3 PCP Mackenzie Harp DO     Chief Complaint: Dizziness    Subjective:     Patient denies chest pain or SOB. Dizziness resolved. Only complaint is loose stool, infectious studies negative.    Objective:    Review of Systems:   A comprehensive review of systems was completed; pertinent positive and negatives stated in subjective.    Vital signs:  Temp:  [97.3 °F (36.3 °C)-98.8 °F (37.1 °C)] 98.5 °F (36.9 °C)  Pulse:  [] 127  Resp:  [17-22] 18  BP: ()/(51-70) 99/51  SpO2:  [92 %-99 %] 99 %    Physical Exam:    General: No acute distress, awake and alert  Respiratory: No wheezes, no rhonchi  Cardiovascular: S1, S2, RRR  Abdomen: Soft, Non-tender, non-distended, positive bowel sounds  Extremities: No edema    Diagnostic Data:    Labs:  Recent Labs   Lab 24  0543 24  0433   WBC 5.3 5.9 4.3   HGB 11.4* 9.5* 9.3*   MCV 82.7 81.4 85.1   .0 155.0  155.0 151.0   INR 1.23*  --   --      Recent Labs   Lab 24  0543 24  1829 24  0433 24  1847   * 104*  --  105*  --    BUN 22 23  --  26*  --    CREATSERUM 1.57* 1.61* 1.72* 1.66*  --    CA 8.9 7.9*  --  8.2*  --    ALB 3.3*  --   --   --   --     137  --  139  --    K 2.2* 2.1* 3.0* 3.5  3.5 4.5   CL 99 101  --  107  --    CO2 27.0 26.0  --  25.0  --    ALKPHO 95  --   --   --   --    AST 54*  --   --   --   --    BILT 0.5  --   --   --   --    TP 7.8  --   --   --   --      Estimated Creatinine Clearance: 30 mL/min (A) (based on SCr of 1.66 mg/dL (H)).    Recent Labs   Lab 24   TROPHS 26     Recent Labs   Lab 24   PTP 15.6*   INR 1.23*     Microbiology  No results found for this visit on 24.    Imaging: Reviewed in Epic.    Medications:    gabapentin  200 mg  Oral Daily    gabapentin  400 mg Oral Nightly    iron sucrose  200 mg Intravenous Daily    apixaban  5 mg Oral BID    allopurinol  100 mg Oral Daily    aspirin  81 mg Oral Daily    fluticasone propionate  2 spray Nasal Daily    metoprolol succinate ER  100 mg Oral Daily Beta Blocker    sacubitril-valsartan  1 tablet Oral BID    atorvastatin  20 mg Oral Nightly    sertraline  150 mg Oral Daily    tamoxifen  20 mg Oral Daily       Assessment & Plan:      #Fall  #Dizziness/veritgo, resolved  -CT head negative, MRI brain with no acute abnormalities but shows old lacunar infarct  -Possibly also related to heart rates and positive orthostatics on arrival. S/p IVF  -PRN meclizine  -PT/OT recommending [facility]     #Atrial fibrillation with RVR now in NSR  -Cardizem gtt off  -Was not taking her BB at home, resumed here  -PO CCB discontinued  -Eliquis started  -Echo reviewed  -TSH low but FT4 WNL, repeat in 4-6 weeks  -Telemetry  -Cardiology following     #Hypertension  -Resume home meds     #Dyslipidemia  -Statin     #Hypokalemia  #Hypomagnesemia  -Replace per protocol    #CKD stage III  -Cr only slightly up from baseline  -S/p IVF, hold further     #Breast Cancer  -Continue tamoxifen    #Hx of MR s/p mitraclip  -Repeat echo shows moderate to severe MR, worse than 3 years ago. Per cardiology    #CAD s/p stent  #LV dysfunction  -ASA, statin, BB  -Repeat echo EF is lower EF 35-40% from 45-50% 3 years ago, however was off GDMT for 1-2 months. Re-initiate therapy and repeat echo outpatient    #Anxiety and depression  -Resume home meds    #Anemia with iron deficiency, trending lower  -Denies any evidence of bleeding  -IV iron completed    #Diarrhea  -Stool studies neg, PRN lomotil    Discussed with patient, RN, Dr. Martino.    Jorge Galeano, DO    Supplementary Documentation:     Quality:  DVT Mechanical Prophylaxis:        DVT Pharmacologic Prophylaxis   Medication    apixaban (Eliquis) tab 5 mg         DVT Pharmacologic prophylaxis:  Aspirin 81 mg    Code Status: Full Code  Pradhan: No urinary catheter in place  BERTA: Today?    Discharge is dependent on: Clinical state, cardiology recs  At this point Ms. Gonzales is expected to be discharge to: Home    The 21st Century Cures Act makes medical notes like these available to patients in the interest of transparency. Please be advised this is a medical document. Medical documents are intended to carry relevant information, facts as evident, and the clinical opinion of the practitioner. The medical note is intended as peer to peer communication and may appear blunt or direct. It is written in medical language and may contain abbreviations or verbiage that are unfamiliar.

## 2024-01-27 NOTE — PLAN OF CARE
Assumed care of pt 0730  A+Ox4, RA, AF, c/o pain   -foot pain, occasional   -internist aware, restart allopurinol   -[place] office number listed in AVS  Consults clear for discharge   -f/u with Cards in 2-3 weeks w/ Dr Salena MANS reviewed, all questions answered   -RX faxed to pharmacy        NURSING DISCHARGE NOTE    Discharged Home via Wheelchair.  Accompanied by Family member and Support staff  Belongings Taken by patient/family.

## 2024-01-27 NOTE — PLAN OF CARE
Assumed pt care at 1930  A&Ox4, able to make needs known  SBA w/ BRP  PRN tylenol given for pain, see MAR  Orthostatic BP Q shift  Poss dc savana?  Call light in reach  Bed in low position

## 2024-01-27 NOTE — DISCHARGE SUMMARY
[place] HOSPITALIST  DISCHARGE SUMMARY     Sally Gonzales Patient Status:  Inpatient    1950 MRN [id number]   Location [facility] 7NE-A Attending No att. providers found   Hosp Day # 3 PCP Mackenzie Harp DO     Date of Admission: 2024  Date of Discharge: 2024  Discharge Disposition: Home or Self Care    Discharge Diagnosis:   Vertigo, resolved  Paroxysmal atrial fibrillation  Hypertension  Dyslipidemia  Hypomagnesemia  CKD stage III  Breast cancer  History of MR s/p MitraClip  CAD with prior stent  Mild ischemic cardiomyopathy  Anxiety  Depression  Anemia with iron deficiency  Diarrhea    History of Present Illness: Sally Gonzales is a 73 year old female with PMhx of breast Ca, anxiety, asthma, HTN, DL , AFib presents with dizziness. Pt states symptoms started last night. She states she fell and hit her head but denies any LOC. She is not on any blood thinners. She denies any focal weakness, numbness or tingling. No CP or SOB. In EMS she was noted to have Afib RVR. Pt has not taken her BB for 3 weeks now. She denies any N/V/D/C or abd pain.     Brief Synopsis: Patient presented with a fall along with dizziness/vertigo.  CT head and MRI brain were negative for stroke.  She was orthostatic on arrival and was given IV fluids.  Dizziness improved.  She also had atrial fibrillation with RVR and was started on Cardizem drip.  She was not taking her beta-blocker at home.  Beta-blocker resumed and Cardizem drip weaned off.  She was converted back to normal sinus rhythm.  She was started on Eliquis.  She did have diarrhea in the hospital, stool studies were negative and she was started on Lomotil.  She was discharged home in stable condition.    Lace+ Score: 68  59-90 High Risk  29-58 Medium Risk  0-28   Low Risk  Patient was referred to the [facility].    TCM Follow-Up Recommendation:  LACE > 58: High Risk of readmission after discharge from the hospital.    Procedures during  hospitalization:   None    Incidental or significant findings and recommendations (brief descriptions):  None     Lab/Test results pending at Discharge:   None    Consultants:  Cardiology    Discharge Medication List:     Discharge Medications        START taking these medications        Instructions Prescription details   apixaban 5 MG Tabs  Commonly known as: Eliquis      Take 1 tablet (5 mg total) by mouth 2 (two) times daily.   Stop taking on: April 26, 2024  Quantity: 180 tablet  Refills: 0     diphenoxylate-atropine 2.5-0.025 MG Tabs  Commonly known as: Lomotil  Notes to patient: As directed      Take 1 tablet by mouth 4 (four) times daily as needed for Diarrhea.   Quantity: 20 tablet  Refills: 0     meclizine 12.5 MG Tabs  Commonly known as: Antivert  Notes to patient: As directed      Take 1 tablet (12.5 mg total) by mouth 3 (three) times daily as needed for Dizziness.   Quantity: 20 tablet  Refills: 0            CHANGE how you take these medications        Instructions Prescription details   gabapentin 100 MG Caps  Commonly known as: Neurontin  What changed:   when to take this  additional instructions      TAKE 1 CAPSULE (100 MG) BY MOUTH 2 TIMES DAILY AS NEEDED (IN THE MORNING AND AFTERNOON)   Quantity: 180 capsule  Refills: 0            CONTINUE taking these medications        Instructions Prescription details   allopurinol 100 MG Tabs  Commonly known as: Zyloprim      Take 1 tablet (100 mg total) by mouth daily.   Quantity: 30 tablet  Refills: 11     aspirin 81 MG Tbec      Take 1 tablet (81 mg total) by mouth daily. Never takes, but prescribed per MD   Quantity:    Refills: 0     HYDROcodone-acetaminophen  MG Tabs  Commonly known as: Norco  Notes to patient: As directed      Take 1 tablet by mouth every 6 (six) hours as needed for Pain.   Quantity: 60 tablet  Refills: 0     metoprolol succinate  MG Tb24  Commonly known as: Toprol XL      Take 1 tablet (100 mg total) by mouth daily.    Quantity: 90 tablet  Refills: 3     Multi-Day Plus Iron Tabs      Take by mouth.   Refills: 0     Potassium Chloride ER 20 MEQ Tbcr      TAKE 2 TABLETS(40 MEQ) BY MOUTH DAILY   Quantity: 180 tablet  Refills: 0     sertraline 100 MG Tabs  Commonly known as: Zoloft      Take 1.5 tablets (150 mg total) by mouth daily.   Quantity: 135 tablet  Refills: 1     simvastatin 40 MG Tabs  Commonly known as: Zocor      Take 1 tablet (40 mg total) by mouth nightly.   Quantity: 90 tablet  Refills: 3     tamoxifen 20 MG Tabs  Commonly known as: Nolvadex      Take 1 tablet (20 mg total) by mouth daily.   Quantity: 90 tablet  Refills: 3     Vitamin B Complex Tabs      Take 1 tablet by mouth daily.   Refills: 0     Vitamin D3 75 MCG (3000 UT) Tabs      Take 3,000 Units by mouth daily.   Refills: 0            STOP taking these medications      Centrum Silver Tabs        cyclobenzaprine 5 MG Tabs  Commonly known as: Flexeril        furosemide 40 MG Tabs  Commonly known as: Lasix        sacubitril-valsartan 49-51 MG Tabs  Commonly known as: Entresto                  Where to Get Your Medications        These medications were sent to [id number] - [place], [place] - [address] AT [facility] [address], [phone], [phone]   [address], [facility] [address]      Phone: [phone]   apixaban 5 MG Tabs  diphenoxylate-atropine 2.5-0.025 MG Tabs  meclizine 12.5 MG Tabs         [place] reviewed[place]    Follow-up appointment:   Mackenzie Harp DO  [address] [place] [address] [address]  [facility] [address] [phone]    Follow up in 1 week(s)      Cesar Martino MD  [address] [facility] [address]  [facility] [address] [phone]    Follow up in 3 week(s)      Prieto Castañeda DPM  [address] [facility] [address]  [facility] [address] [phone]    Call  As needed    Appointments for Next 30 Days 1/27/2024 - 2/26/2024        Date Arrival Time Visit Type Length Department Provider     2/20/2024 11:00 AM  MEDICARE ANNUAL  WELL VISIT [7261] 30 min [facility], [address] [place] [address] Mackenzie Harp DO    Patient Instructions:         Location Instructions:     Masks are optional for all patients and visitors, unless otherwise indicated.                      Vital signs:  Temp:  [97.3 °F (36.3 °C)-98.8 °F (37.1 °C)] 97.3 °F (36.3 °C)  Pulse:  [] 93  Resp:  [18] 18  BP: ()/(51-79) 123/79  SpO2:  [94 %-99 %] 95 %    Physical Exam:    See progress note dated same day.  -----------------------------------------------------------------------------------------------  PATIENT DISCHARGE INSTRUCTIONS: See electronic chart    Jorge Galeano DO    Time spent:  32 minutes

## 2024-01-27 NOTE — PROGRESS NOTES
01/27/24 0533 01/27/24 0534 01/27/24 0535   Vital Signs   Pulse 109 118 (!) 127   /62 115/62 99/51   MAP (mmHg) 74 74 (!) 62   BP Location Right arm Right arm Right arm   BP Method Automatic Automatic Automatic   Patient Position Lying Sitting Standing

## 2024-01-27 NOTE — PROGRESS NOTES
[facility] Cardiology  Progress Note    Sally Gonzales Patient Status:  Inpatient    1950 MRN [id number]   Location [facility] 0-A Attending Jorge Galeano DO   Hosp Day # 3 PCP Mackenzie Harp DO     Impression:  1. AF RVR  2. acute on chronic systolic heart failure, LVEF 30-35%  3. MR s/p mitraclip (10/20, [facility])  4. postural dizziness, diarreha, +orthostasis  - CT/MRI brain without acute CNS issues.  5. CAD s/p PCI to LAD (10/20)    Plan:  1. remains SR with PVCs. continue toprol 100mg, holding diltiazem  2. CM/CHF, LVEF 30-35% (prior 40-45%). pt has been holding GDMT over the past 1-2 months.  Will need to re-initiate therapy, restart entresto likely outpatient (awaiting resolution of diarrhea- on lomotil). holding oral lasix.  3. AC: apixaban 5mg bid.  4. ambulate for symptoms. if stable, ok for dc from cv standpoint with close f/u 1-2 weeks (dc fu order placed).      Subjective:  dizziness/postural weakness improved (able to get to bathroom). 3-4 episodes of diarrhea today. denies CP/SOB. tele- SR PVCs.    Objective:  BP 99/51 (BP Location: Right arm)   Pulse (!) 127   Temp 98.5 °F (36.9 °C) (Oral)   Resp 18   Wt 180 lb (81.6 kg)   LMP  (LMP Unknown)   SpO2 99%   BMI 27.37 kg/m²   Temp (24hrs), Av.1 °F (36.7 °C), Min:97.3 °F (36.3 °C), Max:98.8 °F (37.1 °C)      Intake/Output Summary (Last 24 hours) at 2024 0809  Last data filed at 2024 1900  Gross per 24 hour   Intake 480 ml   Output --   Net 480 ml     Wt Readings from Last 3 Encounters:   24 180 lb (81.6 kg)   24 180 lb (81.6 kg)   10/16/23 205 lb (93 kg)       General: Awake and alert; in no acute distress  Cardiac: Regular rate and regular rhythm; no murmurs/rubs/gallops are appreciated  Lungs: Clear to auscultation bilaterally; no accessory muscle use  Abdomen: Soft, non-tender; bowel sounds are normoactive  Extremities: No clubbing/cyanosis; moves all 4 extremities normally          Laboratory  NO TELE READING, PATIENT OFF TELE FOR SHOWER. Data:       Lab Results   Component Value Date    INR 1.23 (H) 01/24/2024    INR 1.06 09/16/2020    INR 0.96 02/25/2020     Lab Results   Component Value Date    K 4.5 01/26/2024    MG 1.3 01/27/2024       Telemetry: No malignant tachyarrhythmias or bradyarrhythmias      Thank you for allowing our practice to participate in the care of your patient. Please do not hesitate to contact me if you have any questions.

## 2024-01-29 ENCOUNTER — TELEPHONE (OUTPATIENT)
Dept: FAMILY MEDICINE CLINIC | Facility: CLINIC | Age: 74
End: 2024-01-29

## 2024-01-29 ENCOUNTER — PATIENT OUTREACH (OUTPATIENT)
Dept: CASE MANAGEMENT | Age: 74
End: 2024-01-29

## 2024-01-29 DIAGNOSIS — Z02.9 ENCOUNTERS FOR UNSPECIFIED ADMINISTRATIVE PURPOSE: Primary | ICD-10-CM

## 2024-01-29 NOTE — CDS QUERY
CLINICAL DOCUMENTATION CLARIFICATION FORM  Dear Dr. GONZALEZ Galeano,  Clinical information (provided below) includes a diagnosis of systolic heart failure. Please clarify.  PLEASE “X” THE DIAGNOSIS THAT APPLIES:    (  x  ) Acute on Chronic Systolic Heart Failure    (    ) Chronic Systolic Heart Failure    (    ) Other - please specify:     Documentation from the Medical Record:   Risk; Ischemic cardiomyopathy, afib. Not taking home metoprolol or Lasix .HTN  HX MR ,CAD with stent   Clinical indicators; Probnp 2389. CXR with cardiomegaly . normal pulmonary vascularity  No pleural effusion . ECHO EF 35-40%  H&P exam irregular HR. No edema  No rhonchi or wheezing . No CHF DX.  Cardiology consult; A/P acute decompensated CHF . Exam No JVD, no edema     1/26 Hospitalist PN notes lower EF from 3 years ago     1/26 Cardiology note ; Impression;acute on chronic systolic heart failure, LVEF 30-35% . Plan CM/CHF . Deanna has been holding GCMT  over past 1-2 months  Will re initiate therapy. Restart Entresto once diarrhea resolves     Treatment; Cardiology consult ECHO restart GDMT        For questions regarding this query, please contact Clinical : Carlos Enrique Golden RN, BSN, CCDS. ext. [phone]                                                                                 THIS FORM IS A PERMANENT PART OF THE MEDICAL RECORD

## 2024-01-29 NOTE — TELEPHONE ENCOUNTER
AURELIO Pt is a TCM and is high risk for readmission. It is recommended she be seen by 2/3/24.    TCM book by date-2/10/24

## 2024-01-29 NOTE — PROGRESS NOTES
NCM s/w patient who states that she is on the other line and it was not a good time. HEATHER will try again later.

## 2024-01-30 ENCOUNTER — PATIENT OUTREACH (OUTPATIENT)
Dept: INTERNAL MEDICINE CLINIC | Facility: CLINIC | Age: 74
End: 2024-01-30

## 2024-01-30 NOTE — PROGRESS NOTES
Called pt to schedule a hospital follow-up appt :    [facility] Request:    Mackenzie Harp DO  [address] [place] [address] [address]  [facility] [address]  [phone]  Follow up in 1 week(s)  Monday 02/05 @3:15pm w/ Dr. Mackenzie Harp (existing appt)      Cesar Martino MD  [address] [facility] [address]  [facility] [address] [phone]  Follow up in 3 week(s)  Monday 02/19 @12:20pm w/ Dr. Cesar Martino (existing appt)    Spoke with pt and she confirmed that all hospital follow-up appts scheduled already.  She verbalized that no further assistance is needed.     Closing encounter

## 2024-02-05 ENCOUNTER — OFFICE VISIT (OUTPATIENT)
Dept: FAMILY MEDICINE CLINIC | Facility: CLINIC | Age: 74
End: 2024-02-05
Payer: MEDICARE

## 2024-02-05 ENCOUNTER — LAB ENCOUNTER (OUTPATIENT)
Dept: LAB | Age: 74
End: 2024-02-05
Attending: FAMILY MEDICINE
Payer: MEDICARE

## 2024-02-05 VITALS
DIASTOLIC BLOOD PRESSURE: 60 MMHG | RESPIRATION RATE: 16 BRPM | HEIGHT: 68 IN | HEART RATE: 101 BPM | SYSTOLIC BLOOD PRESSURE: 124 MMHG | BODY MASS INDEX: 29.7 KG/M2 | OXYGEN SATURATION: 98 % | WEIGHT: 196 LBS

## 2024-02-05 DIAGNOSIS — R19.7 DIARRHEA, UNSPECIFIED TYPE: Primary | ICD-10-CM

## 2024-02-05 DIAGNOSIS — M25.511 CHRONIC RIGHT SHOULDER PAIN: ICD-10-CM

## 2024-02-05 DIAGNOSIS — M10.9 GOUT, UNSPECIFIED CAUSE, UNSPECIFIED CHRONICITY, UNSPECIFIED SITE: ICD-10-CM

## 2024-02-05 DIAGNOSIS — I50.32 CHRONIC DIASTOLIC CHF (CONGESTIVE HEART FAILURE) (HCC): ICD-10-CM

## 2024-02-05 DIAGNOSIS — G89.29 CHRONIC RIGHT SHOULDER PAIN: ICD-10-CM

## 2024-02-05 DIAGNOSIS — E87.6 LOW POTASSIUM SYNDROME: ICD-10-CM

## 2024-02-05 DIAGNOSIS — M51.36 BULGING LUMBAR DISC: ICD-10-CM

## 2024-02-05 DIAGNOSIS — K59.1 FUNCTIONAL DIARRHEA: ICD-10-CM

## 2024-02-05 DIAGNOSIS — I48.91 ATRIAL FIBRILLATION, UNSPECIFIED TYPE (HCC): ICD-10-CM

## 2024-02-05 LAB
POTASSIUM SERPL-SCNC: 4.6 MMOL/L (ref 3.5–5.1)
URATE SERPL-MCNC: 6.2 MG/DL

## 2024-02-05 PROCEDURE — 36415 COLL VENOUS BLD VENIPUNCTURE: CPT

## 2024-02-05 PROCEDURE — 84132 ASSAY OF SERUM POTASSIUM: CPT

## 2024-02-05 PROCEDURE — 84550 ASSAY OF BLOOD/URIC ACID: CPT

## 2024-02-05 RX ORDER — SPIRONOLACTONE 25 MG/1
25 TABLET ORAL DAILY
Qty: 90 TABLET | Refills: 0 | Status: SHIPPED | OUTPATIENT
Start: 2024-02-05 | End: 2025-01-30

## 2024-02-05 RX ORDER — CYCLOBENZAPRINE HCL 10 MG
10 TABLET ORAL 2 TIMES DAILY PRN
COMMUNITY
Start: 2023-09-20

## 2024-02-05 RX ORDER — DIPHENOXYLATE HYDROCHLORIDE AND ATROPINE SULFATE 2.5; .025 MG/1; MG/1
1 TABLET ORAL 4 TIMES DAILY PRN
Qty: 40 TABLET | Refills: 0 | Status: SHIPPED | OUTPATIENT
Start: 2024-02-05

## 2024-02-05 NOTE — PROGRESS NOTES
Subjective:   Sally Gonzales is a 73 year old female who presents for hospital follow up.   She was discharged from [facility] to Home or Self Care  Admission Date: 1/24/24   Discharge Date: 1/27/24  Hospital Discharge Diagnosis:       Vertigo, resolved  Paroxysmal atrial fibrillation  Hypertension  Dyslipidemia  Hypomagnesemia  CKD stage III  Breast cancer  History of MR s/p MitraClip  CAD with prior stent  Mild ischemic cardiomyopathy  Anxiety  Depression  Anemia with iron deficiency  Diarrhea    Interactive contact within 2 business days post discharge first initiated on Date: 1/29/2024    During the visit, the following was completed:  Obtained and reviewed discharge summary, continuity of care documents, and Hospitalist notes  Reviewed Labs (CBC, CMP)    HPI: pt c/o bilateral foot pain     Pt has been \"bed ridden since christmas\"     Pt c/o right index finger pain     Chronic lumbar pain - seeing pain specialist Dr. Arellano for injections     Will see cardiology in 2 weeks     History/Other:   Current Medications:  Medication Reconciliation:  I am aware of an inpatient discharge within the last 30 days.  The discharge medication list has been reconciled with the patient's current medication list and reviewed by me.  See medication list for additions of new medication, and changes to current doses of medications and discontinued medications.  Outpatient Medications Marked as Taking for the 2/5/24 encounter (Office Visit) with Mackenzie Harp, DO   Medication Sig    cyclobenzaprine 10 MG Oral Tab Take 1 tablet (10 mg total) by mouth 2 (two) times daily as needed for Muscle spasms.    spironolactone 25 MG Oral Tab Take 1 tablet (25 mg total) by mouth daily.    diphenoxylate-atropine 2.5-0.025 MG Oral Tab Take 1 tablet by mouth 4 (four) times daily as needed for Diarrhea.    apixaban 5 MG Oral Tab Take 1 tablet (5 mg total) by mouth 2 (two) times daily.    meclizine 12.5 MG Oral Tab Take 1 tablet (12.5 mg total) by  mouth 3 (three) times daily as needed for Dizziness.    sertraline 100 MG Oral Tab Take 1.5 tablets (150 mg total) by mouth daily. (Patient taking differently: Take 1.5 tablets (150 mg total) by mouth daily. 1 - 1.5 tablets)    tamoxifen 20 MG Oral Tab Take 1 tablet (20 mg total) by mouth daily.    Multiple Vitamins-Iron (MULTI-DAY PLUS IRON) Oral Tab Take by mouth.    metoprolol succinate 100 MG Oral Tablet 24 Hr Take 1 tablet (100 mg total) by mouth daily.    allopurinol 100 MG Oral Tab Take 1 tablet (100 mg total) by mouth daily.    GABAPENTIN 100 MG Oral Cap TAKE 1 CAPSULE (100 MG) BY MOUTH 2 TIMES DAILY AS NEEDED (IN THE MORNING AND AFTERNOON) (Patient taking differently: Take 1 capsule (100 mg total) by mouth 2 (two) times daily. Per patient: taking 2 tablet in the AM and taking 4 tablet in the HS)    simvastatin 40 MG Oral Tab Take 1 tablet (40 mg total) by mouth nightly.    HYDROcodone-acetaminophen (NORCO)  MG Oral Tab Take 1 tablet by mouth every 6 (six) hours as needed for Pain.    B Complex Vitamins (VITAMIN B COMPLEX) Oral Tab Take 1 tablet by mouth daily.    aspirin 81 MG Oral Tab EC Take 1 tablet (81 mg total) by mouth daily. Never takes, but prescribed per MD    Cholecalciferol (VITAMIN D3) 75 MCG (3000 UT) Oral Tab Take 3,000 Units by mouth daily.         Review of Systems:  GENERAL: weight stable, energy stable, no sweating  SKIN: denies any unusual skin lesions  EYES: denies blurred vision or double vision  HEENT: denies nasal congestion, sinus pain or ST  LUNGS: denies shortness of breath with exertion  CARDIOVASCULAR: denies chest pain on exertion or palpitations  GI: denies abdominal pain, denies heartburn, denies diarrhea  MUSCULOSKELETAL: denies pain, normal range of motion of extremities  NEURO: denies headaches, denies dizziness, denies weakness  PSYCHE: denies depression or anxiety  HEMATOLOGIC: denies hx of anemia, or bruising, denies bleeding  ENDOCRINE: denies thyroid  history  ALL/ASTHMA: denies hx of allergy or asthma    Objective:   No LMP recorded (lmp unknown). Patient is postmenopausal.  Estimated body mass index is 29.8 kg/m² as calculated from the following:    Height as of this encounter: 5' 8\" (1.727 m).    Weight as of this encounter: 196 lb (88.9 kg).   /60   Pulse 101   Resp 16   Ht 5' 8\" (1.727 m)   Wt 196 lb (88.9 kg)   LMP  (LMP Unknown)   SpO2 98%   BMI 29.80 kg/m²    GENERAL: well developed, well nourished, in no apparent distress  SKIN: no rashes, no suspicious lesions  HEENT: atraumatic, normocephalic, ears and throat are clear  EYES: PERRLA, EOMI, conjunctiva are clear  NECK: supple, no adenopathy, no bruits  CHEST: no chest tenderness  LUNGS: clear to auscultation  CARDIO: RRR without murmur  GI: good BS's, no masses, HSM or tenderness  MUSCULOSKELETAL: back is not tender, FROM of the extremities  EXTREMITIES: no cyanosis, clubbing or edema  NEURO: Oriented times three, cranial nerves are intact, motor and sensory are grossly intact    Assessment & Plan:   1. Diarrhea, unspecified type (Primary)- meds refilled   2. Atrial fibrillation, unspecified type (HCC)- see cardiology   3. Low potassium syndrome- monitor   -     Potassium; Future; Expected date: 02/05/2024  4. Chronic diastolic CHF (congestive heart failure) (HCC)- see cardiology   -     Spironolactone; Take 1 tablet (25 mg total) by mouth daily.  Dispense: 90 tablet; Refill: 0  5. Bulging lumbar disc- PT  -     Physical Therapy Referral - [facility]  6. Chronic right shoulder pain- PT   -     Physical Therapy Referral - [facility]  7. Gout, unspecified cause, unspecified chronicity, unspecified site- check level   -     Uric Acid; Future; Expected date: 02/05/2024  8. Functional diarrhea- cpm for now / negative stool studies  -     Diphenoxylate-Atropine; Take 1 tablet by mouth 4 (four) times daily as needed for Diarrhea.  Dispense: 40 tablet; Refill: 0      Return in 4 weeks (on  3/4/2024).

## 2024-02-06 DIAGNOSIS — E87.6 LOW POTASSIUM SYNDROME: Primary | ICD-10-CM

## 2024-02-06 NOTE — PROGRESS NOTES
Multiple attempts to reach pt and messages left with no return call.  Patient went in for HFU appt with PCP on 2/5/24.  Encounter closing.

## 2024-02-16 ENCOUNTER — TELEPHONE (OUTPATIENT)
Dept: FAMILY MEDICINE CLINIC | Facility: CLINIC | Age: 74
End: 2024-02-16

## 2024-02-17 PROBLEM — E87.6 HYPOKALEMIA: Status: RESOLVED | Noted: 2024-01-24 | Resolved: 2024-02-17

## 2024-02-17 PROBLEM — K59.1 FUNCTIONAL DIARRHEA: Status: RESOLVED | Noted: 2024-01-27 | Resolved: 2024-02-17

## 2024-02-17 PROBLEM — R42 DIZZINESS: Status: RESOLVED | Noted: 2024-01-24 | Resolved: 2024-02-17

## 2024-02-17 PROBLEM — S09.8XXA BLUNT HEAD TRAUMA, INITIAL ENCOUNTER: Status: RESOLVED | Noted: 2024-01-24 | Resolved: 2024-02-17

## 2024-02-20 ENCOUNTER — OFFICE VISIT (OUTPATIENT)
Dept: FAMILY MEDICINE CLINIC | Facility: CLINIC | Age: 74
End: 2024-02-20
Payer: MEDICARE

## 2024-02-20 VITALS
WEIGHT: 196 LBS | SYSTOLIC BLOOD PRESSURE: 120 MMHG | OXYGEN SATURATION: 98 % | RESPIRATION RATE: 16 BRPM | BODY MASS INDEX: 29.7 KG/M2 | HEART RATE: 88 BPM | DIASTOLIC BLOOD PRESSURE: 84 MMHG | HEIGHT: 68 IN

## 2024-02-20 DIAGNOSIS — Z00.00 ENCOUNTER FOR ANNUAL HEALTH EXAMINATION: Primary | ICD-10-CM

## 2024-02-20 DIAGNOSIS — E55.9 VITAMIN D DEFICIENCY: ICD-10-CM

## 2024-02-20 DIAGNOSIS — I25.5 ISCHEMIC CARDIOMYOPATHY: ICD-10-CM

## 2024-02-20 DIAGNOSIS — Z80.3 FAMILY HISTORY OF BREAST CANCER IN FEMALE: ICD-10-CM

## 2024-02-20 DIAGNOSIS — Z95.5 STATUS POST CORONARY ARTERY STENT PLACEMENT: ICD-10-CM

## 2024-02-20 DIAGNOSIS — E78.5 DYSLIPIDEMIA: ICD-10-CM

## 2024-02-20 DIAGNOSIS — I25.10 CORONARY ARTERY DISEASE INVOLVING NATIVE CORONARY ARTERY OF NATIVE HEART WITHOUT ANGINA PECTORIS: ICD-10-CM

## 2024-02-20 DIAGNOSIS — I50.32 CHRONIC DIASTOLIC CHF (CONGESTIVE HEART FAILURE) (HCC): ICD-10-CM

## 2024-02-20 DIAGNOSIS — I48.91 ATRIAL FIBRILLATION WITH RAPID VENTRICULAR RESPONSE (HCC): ICD-10-CM

## 2024-02-20 DIAGNOSIS — E04.9 GOITER: ICD-10-CM

## 2024-02-20 DIAGNOSIS — Z98.890 S/P MITRAL VALVE CLIP IMPLANTATION: ICD-10-CM

## 2024-02-20 DIAGNOSIS — F32.0 MILD SINGLE CURRENT EPISODE OF MAJOR DEPRESSIVE DISORDER (HCC): ICD-10-CM

## 2024-02-20 DIAGNOSIS — D05.12 DUCTAL CARCINOMA IN SITU (DCIS) OF LEFT BREAST: ICD-10-CM

## 2024-02-20 DIAGNOSIS — I89.0 LYMPHEDEMA OF ARM: ICD-10-CM

## 2024-02-20 DIAGNOSIS — N95.0 POSTMENOPAUSAL BLEEDING: ICD-10-CM

## 2024-02-20 DIAGNOSIS — Z95.818 S/P MITRAL VALVE CLIP IMPLANTATION: ICD-10-CM

## 2024-02-20 DIAGNOSIS — R93.89 THICKENED ENDOMETRIUM: ICD-10-CM

## 2024-02-20 DIAGNOSIS — R73.9 HYPERGLYCEMIA: ICD-10-CM

## 2024-02-20 DIAGNOSIS — D70.8 OTHER NEUTROPENIA (HCC): ICD-10-CM

## 2024-02-20 DIAGNOSIS — I34.0 NONRHEUMATIC MITRAL VALVE REGURGITATION: ICD-10-CM

## 2024-02-20 DIAGNOSIS — C50.811 MALIGNANT NEOPLASM OF OVERLAPPING SITES OF RIGHT FEMALE BREAST, UNSPECIFIED ESTROGEN RECEPTOR STATUS (HCC): ICD-10-CM

## 2024-02-20 DIAGNOSIS — E87.6 LOW SERUM POTASSIUM: ICD-10-CM

## 2024-02-20 DIAGNOSIS — M79.671 BILATERAL FOOT PAIN: ICD-10-CM

## 2024-02-20 DIAGNOSIS — M79.672 BILATERAL FOOT PAIN: ICD-10-CM

## 2024-02-20 DIAGNOSIS — I10 BENIGN ESSENTIAL HTN: ICD-10-CM
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
## 2024-02-20 PROCEDURE — 99214 OFFICE O/P EST MOD 30 MIN: CPT | Performed by: FAMILY MEDICINE

## 2024-02-20 PROCEDURE — G0439 PPPS, SUBSEQ VISIT: HCPCS | Performed by: FAMILY MEDICINE

## 2024-02-20 RX ORDER — FUROSEMIDE 40 MG/1
TABLET ORAL
COMMUNITY
Start: 2024-02-19

## 2024-02-20 RX ORDER — ROSUVASTATIN CALCIUM 20 MG/1
40 TABLET, COATED ORAL NIGHTLY
COMMUNITY
Start: 2024-02-19 | End: 2025-02-13

## 2024-02-20 RX ORDER — CETIRIZINE HYDROCHLORIDE 10 MG/1
10 TABLET ORAL DAILY
COMMUNITY

## 2024-02-20 NOTE — PROGRESS NOTES
Subjective:   Sally Gonzales is a 73 year old female who presents for a Medicare Subsequent Annual Wellness visit (Pt already had Initial Annual Wellness) and scheduled follow up of multiple significant but stable problems.   Pt also here with    Goiter    Cancer of overlapping sites of right female breast (HCC)    Lymphedema of left arm    Family history of breast cancer in female    Benign essential HTN    Mild single current episode of major depressive disorder (HCC)    Hyperglycemia    Nonrheumatic mitral valve regurgitation    Coronary artery disease involving native coronary artery of native heart without angina pectoris    Ischemic cardiomyopathy    Dyslipidemia    S/P mitral valve clip implantation    Chronic diastolic CHF (congestive heart failure) (HCC)    Postmenopausal bleeding    Thickened endometrium    Status post coronary artery stent placement    Other neutropenia (HCC)    Atrial fibrillation with rapid ventricular response (HCC)     History/Other:   Fall Risk Assessment:   She has been screened for Falls and is High Risk. Fall Prevention information provided to patient in After Visit Summary.    Do you feel unsteady when standing or walking?: Yes  Do you worry about falling?: Yes  Have you fallen in the past year?: Yes  How many times have you fallen?: (P) 1  Were you injured?: (P) No     Cognitive Assessment:   She had a completely normal cognitive assessment - see flowsheet entries     Functional Ability/Status:   Sally Gonzales has some abnormal functions as listed below:  She has Dressing and/or Bathing issues based on screening of functional status.  Difficulty dressing or bathing?: Yes  Bathing or Showering: Need some help  Dressing: Able without help  She has difficulties Shopping for Groceries based on screening of functional status. She has Vision problems based on screening of functional status. She has Walking problems based on screening of functional status. She has problems with  Daily Activities based on screening of functional status. She has problems with Memory based on screening of functional status.       Depression Screening (PHQ-2/PHQ-9): PHQ-9 TOTAL SCORE: 3  , done 2/16/2024   Feeling down, depressed, or hopeless: 1    Trouble falling or staying asleep, or sleeping too much: 2     If you checked off any problems, how difficult have these problems made it for you to do your work, take care of things at home, or get along with other people?: Not difficult at all    Last [place] Suicide Screening on 2/20/2024 was No Risk.       Advanced Directives:\   She does have a Living Will but we do NOT have it on file in Epic.    She does have a POA but we do NOT have it on file in Epic.    Discussed Advance Care Planning with patient (and family/surrogate if present). Standard forms made available to patient in After Visit Summary.      Patient Active Problem List   Diagnosis    Goiter    Cancer of overlapping sites of right female breast (HCC)- stable     Lymphedema of left arm- stable    Family history of breast cancer in female- stable     Benign essential HTN- stable     Mild single current episode of major depressive disorder (HCC)- stable     Hyperglycemia- stable     Nonrheumatic mitral valve regurgitation- stable     Coronary artery disease involving native coronary artery of native heart without angina pectoris- stable     Ischemic cardiomyopathy- stable     Dyslipidemia- stable     S/P mitral valve clip implantation- stable     Chronic diastolic CHF (congestive heart failure) (HCC)- stable     Postmenopausal bleeding- overdue for gyne eval     Thickened endometrium- overdue for gyne eval     Status post coronary artery stent placement- stable     Other neutropenia (HCC)- stable     Atrial fibrillation with rapid ventricular response (HCC)- stable      Allergies:  She is allergic to prednisone and latex.    Current Medications:  Outpatient Medications Marked as Taking for the 2/20/24  encounter (Office Visit) with Mackenzie Harp DO   Medication Sig    rosuvastatin 20 MG Oral Tab Take 2 tablets (40 mg total) by mouth nightly.    sacubitril-valsartan 49-51 MG Oral Tab Take 1 tablet by mouth 2 (two) times daily.    furosemide 40 MG Oral Tab     Multiple Vitamins-Minerals (CENTRUM SILVER 50+WOMEN OR) Take by mouth.    cetirizine 10 MG Oral Tab Take 1 tablet (10 mg total) by mouth daily.    cyclobenzaprine 10 MG Oral Tab Take 1 tablet (10 mg total) by mouth 2 (two) times daily as needed for Muscle spasms.    spironolactone 25 MG Oral Tab Take 1 tablet (25 mg total) by mouth daily.    apixaban 5 MG Oral Tab Take 1 tablet (5 mg total) by mouth 2 (two) times daily.    meclizine 12.5 MG Oral Tab Take 1 tablet (12.5 mg total) by mouth 3 (three) times daily as needed for Dizziness.    sertraline 100 MG Oral Tab Take 1.5 tablets (150 mg total) by mouth daily. (Patient taking differently: Take 1.5 tablets (150 mg total) by mouth daily. 1 - 1.5 tablets)    tamoxifen 20 MG Oral Tab Take 1 tablet (20 mg total) by mouth daily.    Multiple Vitamins-Iron (MULTI-DAY PLUS IRON) Oral Tab Take by mouth.    metoprolol succinate 100 MG Oral Tablet 24 Hr Take 1 tablet (100 mg total) by mouth daily.    allopurinol 100 MG Oral Tab Take 1 tablet (100 mg total) by mouth daily.    GABAPENTIN 100 MG Oral Cap TAKE 1 CAPSULE (100 MG) BY MOUTH 2 TIMES DAILY AS NEEDED (IN THE MORNING AND AFTERNOON) (Patient taking differently: Take 1 capsule (100 mg total) by mouth 2 (two) times daily. Per patient: taking 2 tablet in the AM and taking 4 tablet in the HS)    HYDROcodone-acetaminophen (NORCO)  MG Oral Tab Take 1 tablet by mouth every 6 (six) hours as needed for Pain.    B Complex Vitamins (VITAMIN B COMPLEX) Oral Tab Take 1 tablet by mouth daily.    aspirin 81 MG Oral Tab EC Take 1 tablet (81 mg total) by mouth daily. Never takes, but prescribed per MD    Cholecalciferol (VITAMIN D3) 75 MCG (3000 UT) Oral Tab Take 3,000 Units  by mouth daily.         Medical History:  She  has a past medical history of Acute sinusitis, unspecified, Anxiety state, Arrhythmia, Asthma (HCC), Asymptomatic postmenopausal status (age-related) (natural), Asymptomatic varicose veins, Breast CA (HCC) (, ), Breast CA (HCC) (), Cardiomyopathy (HCC), Cataract, Congenital pes planus, Depression, Disorder of thyroid, Exposure to radiation (), HIGH BLOOD PRESSURE, High cholesterol, Lymph edema, Osteoarthritis, Personal history of antineoplastic chemotherapy (), PICC (peripherally inserted central catheter) in place (2017), Unspecified asthma(493.90), Unspecified essential hypertension, and Visual impairment.  Surgical History:  She  has a past surgical history that includes  (); knee arthroscp harv (Left); tonsillectomy; kya localization wire 1 site right (cpt=19281); mastectomy left (); Breast biopsy (2014); mastectomy right (2014); total knee replacement (Right, 2017); total knee replacement (Right, 05/10/2017); total knee replacement (Right, 2017); shoulder arthroplasty (Left); and colonoscopy (N/A, 2023).   Family History:  Her family history includes Breast Cancer (age of onset: 41) in her self; Breast Cancer (age of onset: 45) in her maternal aunt; Breast Cancer (age of onset: 49) in her mother; Cancer in her cousin, maternal aunt, and mother; Depression in her maternal aunt and mother; Diabetes in her maternal grandmother and mother; Heart Attack in her father; Heart Disorder in her father; Ovarian Cancer in her cousin; Psychiatric in her mother.  Social History:  She  reports that she has never smoked. She has never used smokeless tobacco. She reports that she does not drink alcohol and does not use drugs.    Tobacco:  She has never smoked tobacco.    CAGE Alcohol Screen:   CAGE screening score of 0 on 2024, showing low risk of alcohol abuse.    Patient Care Team:  Mackenzie Harp DO as PCP -  General (Family Medicine)  Bruna Morales MD (Radiation Oncology)  Lalito Young, RN as Registered Nurse (Registered Nurse)  Becky Stover OT as Occupational Therapist (Occupational Therapist)  Cesar Martino MD (Cardiovascular Diseases)  Anila Santillan, RN as [facility] TCM Care Manager (TCM Management)    Review of Systems  GENERAL: feels well otherwise  SKIN: denies any unusual skin lesions  EYES: denies blurred vision or double vision  HEENT: denies nasal congestion, sinus pain or ST  LUNGS: denies shortness of breath with exertion  CARDIOVASCULAR: denies chest pain on exertion  GI: denies abdominal pain, denies heartburn  : denies dysuria, vaginal discharge or itching, no complaint of urinary incontinence   MUSCULOSKELETAL: denies back pain  NEURO: denies headaches  PSYCHE: denies depression or anxiety  HEMATOLOGIC: denies hx of anemia  ENDOCRINE:  thyroid history  ALL/ASTHMA: denies asthma    Objective:   Physical Exam  General Appearance:  Alert, cooperative, no distress, appears stated age   Head:  Normocephalic, without obvious abnormality, atraumatic   Eyes:  PERRL, conjunctiva/corneas clear, EOM's intact both eyes   Ears:  Normal TM's and external ear canals, both ears   Nose: Nares normal, septum midline,mucosa normal, no drainage or sinus tenderness   Throat: Lips, mucosa, and tongue normal; teeth and gums normal   Neck: Supple, symmetrical, trachea midline, no adenopathy;  thyroid: not enlarged, symmetric, no tenderness/mass/nodules; no carotid bruit or JVD   Back:   Symmetric, no curvature, ROM normal, no CVA tenderness   Lungs:   Clear to auscultation bilaterally, respirations unlabored   Heart:  Regular rate and rhythm, S1 and S2 normal, no murmur, rub, or gallop   Abdomen:   Soft, non-tender, bowel sounds active all four quadrants,  no masses, no organomegaly   Pelvic: Deferred   Extremities: Extremities normal, atraumatic, no cyanosis or edema   Pulses: 2+ and symmetric   Skin: Skin  color, texture, turgor normal, no rashes or lesions   Lymph nodes: Cervical, supraclavicular, and axillary nodes normal   Neurologic: Normal       /84   Pulse 88   Resp 16   Ht 5' 8\" (1.727 m)   Wt 196 lb (88.9 kg)   LMP  (LMP Unknown)   SpO2 98%   BMI 29.80 kg/m²  Estimated body mass index is 29.8 kg/m² as calculated from the following:    Height as of this encounter: 5' 8\" (1.727 m).    Weight as of this encounter: 196 lb (88.9 kg).    Medicare Hearing Assessment: whispered voice     Visual Acuity:   Right Eye Visual Acuity: Uncorrected Right Eye Chart Acuity: 20/50   Left Eye Visual Acuity: Uncorrected Left Eye Chart Acuity: 20/50   Both Eyes Visual Acuity: Uncorrected Both Eyes Chart Acuity: 20/40   Able To Tolerate Visual Acuity: Yes        Assessment & Plan:   Sally Gonzales is a 73 year old female who presents for a Medicare Assessment.     1. Encounter for annual health examination (Primary)  2. Atrial fibrillation with rapid ventricular response (HCC)- see cardiology   -     Detailed, Mod Complex (73573)  3. Malignant neoplasm of overlapping sites of right female breast, unspecified estrogen receptor status (HCC)- STILL ON TAMOXIFEN   -     Detailed, Mod Complex (17603)  4. Chronic diastolic CHF (congestive heart failure) (HCC)- stable see cards   -     Detailed, Mod Complex (35439)  5. Mild single current episode of major depressive disorder (HCC)- stable cpm   -     Detailed, Mod Complex (33820)  6. Other neutropenia (HCC)- stable monitor   -     Detailed, Mod Complex (47848)  7. Benign essential HTN- stable monitor   -     Magnesium; Future; Expected date: 02/20/2024  -     Free T4, (Free Thyroxine); Future; Expected date: 02/20/2024  -     Assay, Thyroid Stim Hormone; Future; Expected date: 02/20/2024  -     Comp Metabolic Panel (14); Future; Expected date: 02/20/2024  -     Detailed, Mod Complex (26480)  8. Coronary artery disease involving native coronary artery of native heart without  angina pectoris- see cardiology   -     Detailed, Mod Complex (99214)  9. Dyslipidemia- stable monitor   -     Lipid Panel; Future; Expected date: 02/20/2024  -     Detailed, Mod Complex (99214)  10. Ischemic cardiomyopathy- see cardiology   -     Detailed, Mod Complex (99214)  11. Nonrheumatic mitral valve regurgitation- see cardiology   -     Detailed, Mod Complex (99214)  12. S/P mitral valve clip implantation- see cardiology   -     Detailed, Mod Complex (99214)  13. Status post coronary artery stent placement- see cardiology   -     Detailed, Mod Complex (99214)  14. Goiter- stable monitor   -     Detailed, Mod Complex (99214)  15. Hyperglycemia- stable monitor   -     Hemoglobin A1C; Future; Expected date: 02/20/2024  -     Detailed, Mod Complex (99214)  16. Ductal carcinoma in situ (DCIS) of left breast- STILL ON TAMOXIFEN  Overview:  Diagnosed in 1990  S/P Mastectomy with a lymph node procedure  Orders:  -     Detailed, Mod Complex (99214)  17. Lymphedema of left arm- STABLE MONITOR   -     Detailed, Mod Complex (99214)  18. Thickened endometrium  -     z [employer] US PELVIS (TRANSABDOMINAL AND TRANSVAGINAL) (CPT=76856/19626) [[id number]]; Future; Expected date: 02/20/2024  -     OBG Referral - [facility])  -     z [employer] US PELVIS (TRANSABDOMINAL AND TRANSVAGINAL) (CPT=76856/89817) [[id number]]  -     Detailed, Mod Complex (99214)  19. Postmenopausal bleeding  -     z [employer] US PELVIS (TRANSABDOMINAL AND TRANSVAGINAL) (CPT=76856/15407) [[id number]]; Future; Expected date: 02/20/2024  -     OBG Referral - [facility])  -     z [employer] US PELVIS (TRANSABDOMINAL AND TRANSVAGINAL) (CPT=76856/06786) [[id number]]  -     CBC With Differential With Platelet; Future; Expected date: 02/20/2024  -     Detailed, Mod Complex (99214)  20. Family history of breast cancer in female  -     Detailed, Mod Complex (99214)  21. Low serum potassium- REPEAT K NEXT WEEK   -     Potassium; Future; Expected date: 02/20/2024  -      Detailed, Mod Complex (25269)  22. Vitamin D deficiency- stable monitor   -     Vitamin D; Future; Expected date: 02/20/2024  -     Detailed, Mod Complex (44034)  23. Bilateral foot pain- see podiatry   -     Podiatry Referral - [facility]  -     Detailed, Mod Complex (75835)    The patient indicates understanding of these issues and agrees to the plan.  Reinforced healthy diet, lifestyle, and exercise.    Return in 6 months (on 8/20/2024).     Mackenzie Harp DO, 2/20/2024     Supplementary Documentation:   General Health:  In the past six months, have you lost more than 10 pounds without trying?: 2 - No  Has your appetite been poor?: No  Type of Diet: Balanced;Low Salt  How does the patient maintain a good energy level?: Other  How would you describe your daily physical activity?: Light  How would you describe your current health state?: Fair  How do you maintain positive mental well-being?: Social Interaction;Visiting Family  On a scale of 0 to 10, with 0 being no pain and 10 being severe pain, what is your pain level?: 6 - (Moderate)  In the past six months, have you experienced urine leakage?: 0-No  At any time do you feel concerned for the safety/well-being of yourself and/or your children, in your home or elsewhere?: No  Have you had any immunizations at another office such as Influenza, Hepatitis B, Tetanus, or Pneumococcal?: No         Sally Gonzales's SCREENING SCHEDULE   Tests on this list are recommended by your physician but may not be covered, or covered at this frequency, by your insurer.   Please check with your insurance carrier before scheduling to verify coverage.   PREVENTATIVE SERVICES FREQUENCY &  COVERAGE DETAILS LAST COMPLETION DATE   Diabetes Screening    Fasting Blood Sugar /  Glucose    One screening every 12 months if never tested or if previously tested but not diagnosed with pre-diabetes   One screening every 6 months if diagnosed with pre-diabetes Lab Results   Component Value Date      (H) 01/26/2024        Cardiovascular Disease Screening    Lipid Panel  Cholesterol  Lipoprotein (HDL)  Triglycerides Covered every 5 years for all Medicare beneficiaries without apparent signs or symptoms of cardiovascular disease Lab Results   Component Value Date    CHOLEST 204 (H) 06/26/2023    HDL 45 06/26/2023     (H) 06/26/2023    TRIG 180 (H) 06/26/2023         Electrocardiogram (EKG)   Covered if needed at Welcome to Medicare, and non-screening if indicated for medical reasons 01/29/2024      Ultrasound Screening for Abdominal Aortic Aneurysm (AAA) Covered once in a lifetime for one of the following risk factors    Men who are 65-75 years old and have ever smoked    Anyone with a family history -     Colorectal Cancer Screening  Covered for ages 50-85; only need ONE of the following:    Colonoscopy   Covered every 10 years    Covered every 2 years if patient is at high risk or previous colonoscopy was abnormal 01/31/2023    Health Maintenance   Topic Date Due    Colorectal Cancer Screening  01/31/2033       Flexible Sigmoidoscopy   Covered every 4 years -    Fecal Occult Blood Test Covered annually -   Bone Density Screening    Bone density screening    Covered every 2 years after age 65 if diagnosed with risk of osteoporosis or estrogen deficiency.    Covered yearly for long-term glucocorticoid medication use (Steroids) Last Dexa Scan:    XR DEXA BONE DENSITOMETRY (CPT=77080) 02/18/2014      No recommendations at this time   Pap and Pelvic    Pap   Covered every 2 years for women at normal risk; Annually if at high risk 06/28/2022  No recommendations at this time    Chlamydia Annually if high risk -  No recommendations at this time   Screening Mammogram    Mammogram     Recommend annually for all female patients aged 40 and older    One baseline mammogram covered for patients aged 35-39 -    No recommendations at this time    Immunizations    Influenza Covered once per flu season  Please get  every year 01/27/2024  No recommendations at this time    Pneumococcal Each vaccine ([id number] & [id number]) covered once after 65 Prevnar 13: 04/15/2016    [id number]: 12/03/2018     No recommendations at this time    Hepatitis B One screening covered for patients with certain risk factors   -  No recommendations at this time    Tetanus Toxoid Not covered by Medicare Part B unless medically necessary (cut with metal); may be covered with your pharmacy prescription benefits -    Tetanus, Diptheria and Pertusis TD and TDaP Not covered by Medicare Part B -  No recommendations at this time    Zoster Not covered by Medicare Part B; may be covered with your pharmacy  prescription benefits 09/12/2016  Zoster Vaccines(1 of 2) due on 11/07/2016     Annual Monitoring of Persistent Medications (ACE/ARB, digoxin diuretics, anticonvulsants)    Potassium Annually Lab Results   Component Value Date    K 4.6 02/05/2024         Creatinine   Annually Lab Results   Component Value Date    CREATSERUM 1.66 (H) 01/26/2024         BUN Annually Lab Results   Component Value Date    BUN 26 (H) 01/26/2024       Drug Serum Conc Annually No results found for: \"DIGOXIN\", \"DIG\", \"VALP\"

## 2024-02-20 NOTE — PATIENT INSTRUCTIONS
Sally Gonzales's SCREENING SCHEDULE   Tests on this list are recommended by your physician but may not be covered, or covered at this frequency, by your insurer.   Please check with your insurance carrier before scheduling to verify coverage.   PREVENTATIVE SERVICES FREQUENCY &  COVERAGE DETAILS LAST COMPLETION DATE   Diabetes Screening    Fasting Blood Sugar /  Glucose    One screening every 12 months if never tested or if previously tested but not diagnosed with pre-diabetes   One screening every 6 months if diagnosed with pre-diabetes Lab Results   Component Value Date     (H) 01/26/2024        Cardiovascular Disease Screening    Lipid Panel  Cholesterol  Lipoprotein (HDL)  Triglycerides Covered every 5 years for all Medicare beneficiaries without apparent signs or symptoms of cardiovascular disease Lab Results   Component Value Date    CHOLEST 204 (H) 06/26/2023    HDL 45 06/26/2023     (H) 06/26/2023    TRIG 180 (H) 06/26/2023         Electrocardiogram (EKG)   Covered if needed at Welcome to Medicare, and non-screening if indicated for medical reasons 01/29/2024      Ultrasound Screening for Abdominal Aortic Aneurysm (AAA) Covered once in a lifetime for one of the following risk factors   • Men who are 65-75 years old and have ever smoked   • Anyone with a family history -     Colorectal Cancer Screening  Covered for ages 50-85; only need ONE of the following:    Colonoscopy   Covered every 10 years    Covered every 2 years if patient is at high risk or previous colonoscopy was abnormal 01/31/2023    Health Maintenance   Topic Date Due   • Colorectal Cancer Screening  01/31/2033       Flexible Sigmoidoscopy   Covered every 4 years -    Fecal Occult Blood Test Covered annually -   Bone Density Screening    Bone density screening    Covered every 2 years after age 65 if diagnosed with risk of osteoporosis or estrogen deficiency.    Covered yearly for long-term glucocorticoid medication use  (Steroids) Last Dexa Scan:    XR DEXA BONE DENSITOMETRY (CPT=77080) 02/18/2014      No recommendations at this time   Pap and Pelvic    Pap   Covered every 2 years for women at normal risk; Annually if at high risk 06/28/2022  No recommendations at this time    Chlamydia Annually if high risk -  No recommendations at this time   Screening Mammogram    Mammogram     Recommend annually for all female patients aged 40 and older    One baseline mammogram covered for patients aged 35-39 -    No recommendations at this time    Immunizations    Influenza Covered once per flu season  Please get every year 01/27/2024  No recommendations at this time    Pneumococcal Each vaccine ([id number] & [id number]) covered once after 65 Prevnar 13: 04/15/2016    [id number]: 12/03/2018     No recommendations at this time    Hepatitis B One screening covered for patients with certain risk factors   -  No recommendations at this time    Tetanus Toxoid Not covered by Medicare Part B unless medically necessary (cut with metal); may be covered with your pharmacy prescription benefits -    Tetanus, Diptheria and Pertusis TD and TDaP Not covered by Medicare Part B -  No recommendations at this time    Zoster Not covered by Medicare Part B; may be covered with your pharmacy  prescription benefits 09/12/2016  Zoster Vaccines(1 of 2) due on 11/07/2016     Annual Monitoring of Persistent Medications (ACE/ARB, digoxin diuretics, anticonvulsants)    Potassium Annually Lab Results   Component Value Date    K 4.6 02/05/2024         Creatinine   Annually Lab Results   Component Value Date    CREATSERUM 1.66 (H) 01/26/2024         BUN Annually Lab Results   Component Value Date    BUN 26 (H) 01/26/2024       Drug Serum Conc Annually No results found for: \"DIGOXIN\", \"DIG\", \"VALP\"

## 2024-02-27 DIAGNOSIS — F32.0 MILD SINGLE CURRENT EPISODE OF MAJOR DEPRESSIVE DISORDER (HCC): ICD-10-CM

## 2024-02-27 RX ORDER — SERTRALINE HYDROCHLORIDE 100 MG/1
150 TABLET, FILM COATED ORAL DAILY
Qty: 135 TABLET | Refills: 1 | Status: SHIPPED | OUTPATIENT
Start: 2024-02-27

## 2024-04-02 ENCOUNTER — OFFICE VISIT (OUTPATIENT)
Dept: FAMILY MEDICINE CLINIC | Facility: CLINIC | Age: 74
End: 2024-04-02
Payer: MEDICARE

## 2024-04-02 ENCOUNTER — LAB ENCOUNTER (OUTPATIENT)
Dept: LAB | Age: 74
End: 2024-04-02
Attending: FAMILY MEDICINE
Payer: MEDICARE

## 2024-04-02 VITALS
SYSTOLIC BLOOD PRESSURE: 120 MMHG | HEIGHT: 68 IN | OXYGEN SATURATION: 97 % | HEART RATE: 76 BPM | BODY MASS INDEX: 29.1 KG/M2 | DIASTOLIC BLOOD PRESSURE: 62 MMHG | WEIGHT: 192 LBS

## 2024-04-02 DIAGNOSIS — N95.0 POSTMENOPAUSAL BLEEDING: ICD-10-CM

## 2024-04-02 DIAGNOSIS — E87.6 LOW SERUM POTASSIUM: ICD-10-CM

## 2024-04-02 DIAGNOSIS — I10 BENIGN ESSENTIAL HTN: ICD-10-CM

## 2024-04-02 DIAGNOSIS — E55.9 VITAMIN D DEFICIENCY: ICD-10-CM

## 2024-04-02 DIAGNOSIS — E78.5 DYSLIPIDEMIA: ICD-10-CM

## 2024-04-02 DIAGNOSIS — M79.671 BILATERAL FOOT PAIN: ICD-10-CM

## 2024-04-02 DIAGNOSIS — M79.672 BILATERAL FOOT PAIN: ICD-10-CM

## 2024-04-02 DIAGNOSIS — J32.1 CHRONIC FRONTAL SINUSITIS: Primary | ICD-10-CM

## 2024-04-02 DIAGNOSIS — R73.9 HYPERGLYCEMIA: ICD-10-CM

## 2024-04-02 LAB
ALBUMIN SERPL-MCNC: 3.7 G/DL (ref 3.4–5)
ALBUMIN/GLOB SERPL: 0.9 {RATIO} (ref 1–2)
ALP LIVER SERPL-CCNC: 82 U/L
ALT SERPL-CCNC: 13 U/L
ANION GAP SERPL CALC-SCNC: 9 MMOL/L (ref 0–18)
AST SERPL-CCNC: 12 U/L (ref 15–37)
BASOPHILS # BLD AUTO: 0.02 X10(3) UL (ref 0–0.2)
BASOPHILS NFR BLD AUTO: 0.5 %
BILIRUB SERPL-MCNC: 0.2 MG/DL (ref 0.1–2)
BUN BLD-MCNC: 39 MG/DL (ref 9–23)
CALCIUM BLD-MCNC: 9.5 MG/DL (ref 8.5–10.1)
CHLORIDE SERPL-SCNC: 109 MMOL/L (ref 98–112)
CHOLEST SERPL-MCNC: 195 MG/DL (ref ?–200)
CO2 SERPL-SCNC: 22 MMOL/L (ref 21–32)
CREAT BLD-MCNC: 1.49 MG/DL
EGFRCR SERPLBLD CKD-EPI 2021: 37 ML/MIN/1.73M2 (ref 60–?)
EOSINOPHIL # BLD AUTO: 0.06 X10(3) UL (ref 0–0.7)
EOSINOPHIL NFR BLD AUTO: 1.5 %
ERYTHROCYTE [DISTWIDTH] IN BLOOD BY AUTOMATED COUNT: 15.1 %
EST. AVERAGE GLUCOSE BLD GHB EST-MCNC: 111 MG/DL (ref 68–126)
FASTING PATIENT LIPID ANSWER: NO
FASTING STATUS PATIENT QL REPORTED: NO
GLOBULIN PLAS-MCNC: 4.1 G/DL (ref 2.8–4.4)
GLUCOSE BLD-MCNC: 106 MG/DL (ref 70–99)
HBA1C MFR BLD: 5.5 % (ref ?–5.7)
HCT VFR BLD AUTO: 32.4 %
HDLC SERPL-MCNC: 43 MG/DL (ref 40–59)
HGB BLD-MCNC: 10.7 G/DL
IMM GRANULOCYTES # BLD AUTO: 0.01 X10(3) UL (ref 0–1)
IMM GRANULOCYTES NFR BLD: 0.2 %
LDLC SERPL CALC-MCNC: 133 MG/DL (ref ?–100)
LYMPHOCYTES # BLD AUTO: 1.1 X10(3) UL (ref 1–4)
LYMPHOCYTES NFR BLD AUTO: 27 %
MAGNESIUM SERPL-MCNC: 1.9 MG/DL (ref 1.6–2.6)
MCH RBC QN AUTO: 30 PG (ref 26–34)
MCHC RBC AUTO-ENTMCNC: 33 G/DL (ref 31–37)
MCV RBC AUTO: 90.8 FL
MONOCYTES # BLD AUTO: 0.6 X10(3) UL (ref 0.1–1)
MONOCYTES NFR BLD AUTO: 14.7 %
NEUTROPHILS # BLD AUTO: 2.29 X10 (3) UL (ref 1.5–7.7)
NEUTROPHILS # BLD AUTO: 2.29 X10(3) UL (ref 1.5–7.7)
NEUTROPHILS NFR BLD AUTO: 56.1 %
NONHDLC SERPL-MCNC: 152 MG/DL (ref ?–130)
OSMOLALITY SERPL CALC.SUM OF ELEC: 300 MOSM/KG (ref 275–295)
PLATELET # BLD AUTO: 183 10(3)UL (ref 150–450)
POTASSIUM SERPL-SCNC: 3.8 MMOL/L (ref 3.5–5.1)
PROT SERPL-MCNC: 7.8 G/DL (ref 6.4–8.2)
RBC # BLD AUTO: 3.57 X10(6)UL
SODIUM SERPL-SCNC: 140 MMOL/L (ref 136–145)
T4 FREE SERPL-MCNC: 0.8 NG/DL (ref 0.8–1.7)
TRIGL SERPL-MCNC: 105 MG/DL (ref 30–149)
TSI SER-ACNC: 0.03 MIU/ML (ref 0.36–3.74)
VIT D+METAB SERPL-MCNC: 39.8 NG/ML (ref 30–100)
VLDLC SERPL CALC-MCNC: 19 MG/DL (ref 0–30)
WBC # BLD AUTO: 4.1 X10(3) UL (ref 4–11)

## 2024-04-02 PROCEDURE — 84443 ASSAY THYROID STIM HORMONE: CPT

## 2024-04-02 PROCEDURE — 85025 COMPLETE CBC W/AUTO DIFF WBC: CPT

## 2024-04-02 PROCEDURE — 83735 ASSAY OF MAGNESIUM: CPT

## 2024-04-02 PROCEDURE — 84439 ASSAY OF FREE THYROXINE: CPT

## 2024-04-02 PROCEDURE — 36415 COLL VENOUS BLD VENIPUNCTURE: CPT

## 2024-04-02 PROCEDURE — 80053 COMPREHEN METABOLIC PANEL: CPT

## 2024-04-02 PROCEDURE — 80061 LIPID PANEL: CPT

## 2024-04-02 PROCEDURE — 99213 OFFICE O/P EST LOW 20 MIN: CPT | Performed by: FAMILY MEDICINE

## 2024-04-02 PROCEDURE — 83036 HEMOGLOBIN GLYCOSYLATED A1C: CPT

## 2024-04-02 PROCEDURE — 82306 VITAMIN D 25 HYDROXY: CPT

## 2024-04-02 RX ORDER — AMOXICILLIN AND CLAVULANATE POTASSIUM 875; 125 MG/1; MG/1
1 TABLET, FILM COATED ORAL 2 TIMES DAILY
Qty: 20 TABLET | Refills: 0 | Status: SHIPPED | OUTPATIENT
Start: 2024-04-02 | End: 2024-04-12

## 2024-04-02 NOTE — PROGRESS NOTES
[facility] Progress Note    SUBJECTIVE: Sally Gonzales 73 year old female is here today for   Chief Complaint   Patient presents with    Foot Pain     Room 5 foot pain both feet/ possible sinus infection       Feet, of note similar thing happening to her     Yesterday walking would hurt in anterior ankle, not hurting today. They have felt puffy and any weight on them would hurt almost like gout, but not visibily an issue, and not lasting    Could be all over on foot sometimes on top of foot and felt like on bone, and other times felt muscular. Roughly half to 2/3 of days. No obvious correlations in cause of symptoms. If late taking water pill seems to make it worse. But none other than that.    No obvious improving factors.    Maybe a 2-3 months at this time, a month to 6 weeks at time of physical. (February)    One time did not some swelling, but not regularly.. No numbness or tinglign      When she saw DR. Harp had sensation of pressure andmucous, and is eventually able to cough up the mucous. No fevers or chills, no pressure.    PMH  Past Medical History:   Diagnosis Date    Acute sinusitis, unspecified     Anxiety state     Arrhythmia     Irregular heart beat    Asthma (HCC)     Asthmatic/bronchitis 8 years ago, not on Asthma meds or steroids    Asymptomatic postmenopausal status (age-related) (natural)     Asymptomatic varicose veins     Breast CA (HCC) 1990, 2014    Left mastctomy for DCIS    Breast CA (HCC) 2014    Right mastectomy with chemo and radiation    Cardiomyopathy (HCC)     Cataract     Congenital pes planus     Depression     Disorder of thyroid     enlarged thyroid    Exposure to radiation 2014    HIGH BLOOD PRESSURE     High cholesterol     Lymph edema     left arm and right arm    Osteoarthritis     Personal history of antineoplastic chemotherapy 2014    PICC (peripherally inserted central catheter) in place 06/2017    Unspecified asthma(493.90)     Unspecified essential  Pediatric [place] Admit Note    Subjective:     Female Nikhil Davis is a female infant born on 2020 at 10:01 PM. She weighed 3.1 kg and measured 19.25\" in length. Apgars were 9 and 9. Presentation was Vertex. Maternal Data:     Rupture Date: 2019  Rupture Time: 3:51 PM  Delivery Type: Vaginal, Spontaneous   Delivery Resuscitation: Tactile Stimulation;Suctioning-bulb    Number of Vessels: 3 Vessels  Cord Events: None  Meconium Stained: Other (Comment)  Amniotic Fluid Description: Clear      Information for the patient's mother:  Yady Sen [[id number]]   Gestational Age: 35w6d   Prenatal Labs:  Lab Results   Component Value Date/Time    ABO/Rh(D) O POS 2013 04:10 PM    HBsAg, External Negative 2018    HIV, External Negative 2018    Rubella, External Immune 2018    RPR, External Non-reactive 2018    Gonorrhea, External Negative 2018    Chlamydia, External Negative 2018    GrBStrep, External Negative 2019    ABO,Rh O pos 2015            Prenatal ultrasound:     Feeding Method Used: Breast feeding    Supplemental information:     Objective:     No intake/output data recorded. No intake/output data recorded.   Patient Vitals for the past 24 hrs:   Urine Occurrence(s)   19 0639 1   19 0631 1   19 2301 1     Patient Vitals for the past 24 hrs:   Stool Occurrence(s)   19 0714 1   19 0639 1         Recent Results (from the past 24 hour(s))   BILIRUBIN, TOTAL    Collection Time: 19 11:12 PM   Result Value Ref Range    Bilirubin, total 0.7 <5.1 MG/DL   CORD BLOOD EVALUATION    Collection Time: 19 11:12 PM   Result Value Ref Range    ABO/Rh(D) O POSITIVE     GERTRUDE IgG NEG     Bilirubin if GERTRUDE pos: IF DIRECT BUCK POSITIVE, BILIRUBIN TO FOLLOW    GLUCOSE, POC    Collection Time: 19 11:25 PM   Result Value Ref Range    Glucose (POC) 47 (LL) 50 - 110 mg/dL    Performed by Javon Bryant, POC Collection Time: 19 12:26 AM   Result Value Ref Range    Glucose (POC) 50 50 - 110 mg/dL    Performed by [employer]    GLUCOSE, POC    Collection Time: 19  1:29 AM   Result Value Ref Range    Glucose (POC) 49 (LL) 50 - 110 mg/dL    Performed by [employer]        Breast Milk: Nursing             Physical Exam:    General: healthy-appearing, vigorous infant. Strong cry. Head: sutures lines are open,fontanelles soft, flat and open, MOLDING, VERTEX CAPUT. Eyes: pupils equal and reactive, red reflex normal bilaterally  Ears: well-positioned, well-formed pinnae  Nose: clear, normal mucosa  Mouth: Normal tongue, palate intact,  Neck: normal structure  Chest: lungs clear to auscultation, unlabored breathing, no clavicular crepitus  Heart: RRR, S1 S2, no murmurs  Abd: Soft, non-tender, no masses, no HSM, nondistended, umbilical stump clean and dry  Pulses: strong equal femoral pulses, brisk capillary refill  Hips: Negative Shahid, Ortolani, gluteal creases equal  : Normal genitalia  Extremities: well-perfused, warm and dry  Neuro: easily aroused  Good symmetric tone and strength  Positive root and suck. Symmetric normal reflexes  Skin: warm and pink      Assessment:     Active Problems:    Single liveborn, born in hospital, delivered (2020)     Late  (35w 6d). Has voided at least once and stooled twice. Nursing every 2-3 hrs. Plan:     Continue routine  care. hypertension     Visual impairment     glasses        PSH  Past Surgical History:   Procedure Laterality Date    BREAST BIOPSY  2014          COLONOSCOPY N/A 2023    Procedure: COLONOSCOPY;  Surgeon: Cesar Blum MD;  Location:  ENDOSCOPY    KNEE ARTHROSCP HARV Left     MAGGIE LOCALIZATION WIRE 1 SITE RIGHT (CPT=19281)      MASTECTOMY LEFT      MASTECTOMY RIGHT  2014    Right mastectomy with sentinel lymph node biopsy, injection of blue dye for sentinel lymph node identification, completion axillary lymph node dissection, and advancement flap mastopexy    SHOULDER ARTHROPLASTY Left     - Dr. Fairchild    TONSILLECTOMY      TOTAL KNEE REPLACEMENT Right 2017    TOTAL KNEE REPLACEMENT Right 05/10/2017    Revision-Dr. Fairchild    TOTAL KNEE REPLACEMENT Right 2017    Revision  Dr. Fairchild        Social Hx:  No changes    ROS  See HPI    OBJECTIVE:  /62   Pulse 76   Ht 5' 8\" (1.727 m)   Wt 192 lb (87.1 kg)   LMP  (LMP Unknown)   SpO2 97%   BMI 29.19 kg/m²     Exam  ENT: congestion, drainage or nasopharynx  Ext: FROM at feet, some hammer toes, pulses intact, no pain on exam, sensory exam normal      Labs:          Meds:   Current Outpatient Medications   Medication Sig Dispense Refill    amoxicillin clavulanate 875-125 MG Oral Tab Take 1 tablet by mouth 2 (two) times daily for 10 days. 20 tablet 0    sertraline 100 MG Oral Tab Take 1.5 tablets (150 mg total) by mouth daily. 135 tablet 1    rosuvastatin 20 MG Oral Tab Take 2 tablets (40 mg total) by mouth nightly.      sacubitril-valsartan 49-51 MG Oral Tab Take 1 tablet by mouth 2 (two) times daily.      furosemide 40 MG Oral Tab       Multiple Vitamins-Minerals (CENTRUM SILVER 50+WOMEN OR) Take by mouth.      cetirizine 10 MG Oral Tab Take 1 tablet (10 mg total) by mouth daily.      cyclobenzaprine 10 MG Oral Tab Take 1 tablet (10 mg total) by mouth 2 (two) times daily as needed for Muscle spasms.      spironolactone 25  MG Oral Tab Take 1 tablet (25 mg total) by mouth daily. 90 tablet 0    apixaban 5 MG Oral Tab Take 1 tablet (5 mg total) by mouth 2 (two) times daily. 180 tablet 0    meclizine 12.5 MG Oral Tab Take 1 tablet (12.5 mg total) by mouth 3 (three) times daily as needed for Dizziness. 20 tablet 0    tamoxifen 20 MG Oral Tab Take 1 tablet (20 mg total) by mouth daily. 90 tablet 3    Multiple Vitamins-Iron (MULTI-DAY PLUS IRON) Oral Tab Take by mouth.      metoprolol succinate 100 MG Oral Tablet 24 Hr Take 1 tablet (100 mg total) by mouth daily. 90 tablet 3    allopurinol 100 MG Oral Tab Take 1 tablet (100 mg total) by mouth daily. 30 tablet 11    GABAPENTIN 100 MG Oral Cap TAKE 1 CAPSULE (100 MG) BY MOUTH 2 TIMES DAILY AS NEEDED (IN THE MORNING AND AFTERNOON) (Patient taking differently: Take 1 capsule (100 mg total) by mouth 2 (two) times daily. Per patient: taking 2 tablet in the AM and taking 4 tablet in the HS) 180 capsule 0    HYDROcodone-acetaminophen (NORCO)  MG Oral Tab Take 1 tablet by mouth every 6 (six) hours as needed for Pain. 60 tablet 0    B Complex Vitamins (VITAMIN B COMPLEX) Oral Tab Take 1 tablet by mouth daily.      aspirin 81 MG Oral Tab EC Take 1 tablet (81 mg total) by mouth daily. Never takes, but prescribed per MD  0    Cholecalciferol (VITAMIN D3) 75 MCG (3000 UT) Oral Tab Take 3,000 Units by mouth daily.             Assessment/Plan  Sally was seen today for foot pain.    Diagnoses and all orders for this visit:    Chronic frontal sinusitis  -     amoxicillin clavulanate 875-125 MG Oral Tab; Take 1 tablet by mouth 2 (two) times daily for 10 days.    Bilateral foot pain         For the foot pain suggest a week of topical diclofenac to see if pain is less frequent, however, no obvious cause for pain, if ongoing consider podiatry eval     Will treat for sinusitis due to length of time with symptoms    Total Time spent with patient and coordinating care:  15 minutes.    Follow up: as  needed      Ben Humphreys MD

## 2024-04-15 ENCOUNTER — TELEPHONE (OUTPATIENT)
Dept: FAMILY MEDICINE CLINIC | Facility: CLINIC | Age: 74
End: 2024-04-15

## 2024-04-15 DIAGNOSIS — J01.90 SUBACUTE SINUSITIS, UNSPECIFIED LOCATION: Primary | ICD-10-CM

## 2024-04-15 RX ORDER — AZITHROMYCIN 250 MG/1
TABLET, FILM COATED ORAL
Qty: 6 TABLET | Refills: 0 | Status: SHIPPED | OUTPATIENT
Start: 2024-04-15 | End: 2024-04-19

## 2024-04-15 NOTE — TELEPHONE ENCOUNTER
Pt states she is allergic to prednisone.  She is wanting to know if she can have another antibiotic instead?  Recommended sinus rinse pt states they do not work for her.     Please advise, ENT?

## 2024-04-15 NOTE — TELEPHONE ENCOUNTER
That's right. We can try azithromycin, but then plan on ENT if not resolving    Ben Humphreys MD

## 2024-04-15 NOTE — TELEPHONE ENCOUNTER
If she tolerates steroids, could consider prednisone course to see if clears up symptoms, if that doesn't do it would recommend ENT christopher Humphreys MD

## 2024-04-15 NOTE — TELEPHONE ENCOUNTER
Dr. Humphreys,  Patient finished Augmentin. Still having congestion behind eyes, nose. Slight cough with a little phlegm? Doesn't know the color. No SOB, no fever. Sounds good on the phone.

## 2024-04-15 NOTE — TELEPHONE ENCOUNTER
Patient calling wanting to discuss the incident that happened after her surgery with Dr. Infante in 1/2021. Patient initially stated, \"I don't remember the doctor's name but he did surgery on my arm in July of 2021.\" After reviewing chart, writer confirmed with patient that it was surgery for her shoulder. Patient confirmed and writer informed that Dr. Infante is the one who performed her surgery and it was in January of 2021. Patient states, \"I'm sorry, yes that is the correct one. I mixed up July, June, and January.\" Patient states, \"I took the percocet and morphine and ended up in the [facility] for them to watch me until the medication was out of my system.\" Patient states \"I need proof of what happened when I had taken the morphine and proof that I was prescribed the medication.\" Patient states she \"is frustrated because no one is able to see that this happened. I know I'm not crazy, my daughter was there.\" Per telephone encounter from 2/2/2021 patient was experiencing \"suicidal thoughts and is hallucinating.\" After discussing with PA, RN advised patient to go to the ED. Please contact patient to discuss. Patient states she is an [employer]  and cannot answer her phone sometimes so it is okay to leave a detailed message   Pt saw Dr. Humphreys for a sinus infection and was given an antibiotic.   She was supposed to let him know how she is doing.   Pt is a little better but its not gone.   Please advise

## 2024-05-14 ENCOUNTER — LAB ENCOUNTER (OUTPATIENT)
Dept: LAB | Age: 74
End: 2024-05-14
Attending: FAMILY MEDICINE

## 2024-05-14 DIAGNOSIS — I25.10 CORONARY ARTERY DISEASE INVOLVING NATIVE CORONARY ARTERY OF NATIVE HEART WITHOUT ANGINA PECTORIS: ICD-10-CM

## 2024-05-14 DIAGNOSIS — R73.9 HYPERGLYCEMIA: ICD-10-CM

## 2024-05-14 DIAGNOSIS — E87.6 LOW SERUM POTASSIUM: ICD-10-CM

## 2024-05-14 LAB
ALBUMIN SERPL-MCNC: 4 G/DL (ref 3.4–5)
ALBUMIN/GLOB SERPL: 1.1 {RATIO} (ref 1–2)
ALP LIVER SERPL-CCNC: 77 U/L
ALT SERPL-CCNC: 14 U/L
ANION GAP SERPL CALC-SCNC: 12 MMOL/L (ref 0–18)
AST SERPL-CCNC: 18 U/L (ref 15–37)
BILIRUB SERPL-MCNC: 0.3 MG/DL (ref 0.1–2)
BUN BLD-MCNC: 32 MG/DL (ref 9–23)
CALCIUM BLD-MCNC: 9.7 MG/DL (ref 8.5–10.1)
CHLORIDE SERPL-SCNC: 106 MMOL/L (ref 98–112)
CHOLEST SERPL-MCNC: 170 MG/DL (ref ?–200)
CO2 SERPL-SCNC: 23 MMOL/L (ref 21–32)
CREAT BLD-MCNC: 1.26 MG/DL
EGFRCR SERPLBLD CKD-EPI 2021: 45 ML/MIN/1.73M2 (ref 60–?)
EST. AVERAGE GLUCOSE BLD GHB EST-MCNC: 117 MG/DL (ref 68–126)
FASTING PATIENT LIPID ANSWER: NO
FASTING STATUS PATIENT QL REPORTED: NO
GLOBULIN PLAS-MCNC: 3.7 G/DL (ref 2.8–4.4)
GLUCOSE BLD-MCNC: 162 MG/DL (ref 70–99)
HBA1C MFR BLD: 5.7 % (ref ?–5.7)
HDLC SERPL-MCNC: 42 MG/DL (ref 40–59)
LDLC SERPL CALC-MCNC: 101 MG/DL (ref ?–100)
NONHDLC SERPL-MCNC: 128 MG/DL (ref ?–130)
OSMOLALITY SERPL CALC.SUM OF ELEC: 302 MOSM/KG (ref 275–295)
POTASSIUM SERPL-SCNC: 3.3 MMOL/L (ref 3.5–5.1)
PROT SERPL-MCNC: 7.7 G/DL (ref 6.4–8.2)
SODIUM SERPL-SCNC: 141 MMOL/L (ref 136–145)
TRIGL SERPL-MCNC: 152 MG/DL (ref 30–149)
VLDLC SERPL CALC-MCNC: 25 MG/DL (ref 0–30)

## 2024-05-14 PROCEDURE — 83036 HEMOGLOBIN GLYCOSYLATED A1C: CPT

## 2024-05-14 PROCEDURE — 36415 COLL VENOUS BLD VENIPUNCTURE: CPT

## 2024-05-14 PROCEDURE — 80061 LIPID PANEL: CPT

## 2024-05-14 PROCEDURE — 80053 COMPREHEN METABOLIC PANEL: CPT

## 2024-05-16 DIAGNOSIS — E87.6 LOW BLOOD POTASSIUM: Primary | ICD-10-CM

## 2024-05-16 RX ORDER — POTASSIUM CHLORIDE 1500 MG/1
20 TABLET, EXTENDED RELEASE ORAL DAILY
Qty: 14 TABLET | Refills: 0 | Status: SHIPPED | OUTPATIENT
Start: 2024-05-16 | End: 2024-05-30

## 2024-05-30 NOTE — INTERVAL H&P NOTE
Pre-op Diagnosis: Mechanical loosening of internal right knee prosthetic joint, subsequent encounter [T84.531D]    The above referenced H&P was reviewed by Prisca Harris MD on 6/18/2017, the patient was examined and no significant changes have occurred in th
Statement Selected

## 2024-06-05 ENCOUNTER — LAB ENCOUNTER (OUTPATIENT)
Dept: LAB | Age: 74
End: 2024-06-05
Attending: FAMILY MEDICINE
Payer: MEDICARE

## 2024-06-05 DIAGNOSIS — E87.6 LOW BLOOD POTASSIUM: ICD-10-CM

## 2024-06-05 LAB — POTASSIUM SERPL-SCNC: 3.9 MMOL/L (ref 3.5–5.1)

## 2024-06-05 PROCEDURE — 84132 ASSAY OF SERUM POTASSIUM: CPT

## 2024-06-05 PROCEDURE — 36415 COLL VENOUS BLD VENIPUNCTURE: CPT

## 2024-06-26 ENCOUNTER — PATIENT MESSAGE (OUTPATIENT)
Dept: FAMILY MEDICINE CLINIC | Facility: CLINIC | Age: 74
End: 2024-06-26

## 2024-06-26 DIAGNOSIS — Z17.0 MALIGNANT NEOPLASM OF OVERLAPPING SITES OF RIGHT BREAST IN FEMALE, ESTROGEN RECEPTOR POSITIVE (HCC): ICD-10-CM

## 2024-06-26 DIAGNOSIS — C50.811 MALIGNANT NEOPLASM OF OVERLAPPING SITES OF RIGHT BREAST IN FEMALE, ESTROGEN RECEPTOR POSITIVE (HCC): ICD-10-CM

## 2024-06-26 RX ORDER — POTASSIUM CHLORIDE 1500 MG/1
20 TABLET, EXTENDED RELEASE ORAL DAILY
Qty: 90 TABLET | Refills: 0 | Status: SHIPPED | OUTPATIENT
Start: 2024-06-26 | End: 2024-07-26

## 2024-06-26 RX ORDER — TAMOXIFEN CITRATE 20 MG/1
20 TABLET ORAL DAILY
Qty: 90 TABLET | Refills: 3 | Status: SHIPPED | OUTPATIENT
Start: 2024-06-26

## 2024-06-26 NOTE — TELEPHONE ENCOUNTER
,  Last K+ checked 6/5/24 3.9. per LE Looks good  Stay on current K. Repeat K + ? Refill medication?

## 2024-06-26 NOTE — TELEPHONE ENCOUNTER
From: Sally Gonzales  To: Mackenzie Harp  Sent: 6/26/2024 11:20 AM CDT  Subject: Potassium Chloride 20MEQER tablets    The above is the prescription strength you had prescribed for me prior to my last blood test checking my blood potassium level. It was for one a day.   I am still needing a prescription for this drug.     Thanks,  Sally

## 2024-06-26 NOTE — TELEPHONE ENCOUNTER
Refilled potassium. Follow up with Dr. Harp in August per last visit note, can determine need for repeat lab then.

## 2024-07-25 ENCOUNTER — TELEPHONE (OUTPATIENT)
Facility: LOCATION | Age: 74
End: 2024-07-25

## 2024-07-25 ENCOUNTER — HOSPITAL ENCOUNTER (OUTPATIENT)
Dept: ULTRASOUND IMAGING | Age: 74
Discharge: HOME | End: 2024-07-25
Attending: OTOLARYNGOLOGY
Payer: MEDICARE

## 2024-07-25 DIAGNOSIS — E04.2 MULTIPLE THYROID NODULES: ICD-10-CM

## 2024-07-25 DIAGNOSIS — E04.2 MULTIPLE THYROID NODULES: Primary | ICD-10-CM

## 2024-07-25 PROCEDURE — 76536 US EXAM OF HEAD AND NECK: CPT | Performed by: OTOLARYNGOLOGY

## 2024-08-19 ENCOUNTER — OFFICE VISIT (OUTPATIENT)
Facility: CLINIC | Age: 74
End: 2024-08-19
Payer: MEDICARE

## 2024-08-19 VITALS
WEIGHT: 187.63 LBS | DIASTOLIC BLOOD PRESSURE: 70 MMHG | SYSTOLIC BLOOD PRESSURE: 112 MMHG | HEART RATE: 75 BPM | BODY MASS INDEX: 28.44 KG/M2 | HEIGHT: 68 IN

## 2024-08-19 DIAGNOSIS — Z87.42 HISTORY OF POSTMENOPAUSAL BLEEDING: ICD-10-CM

## 2024-08-19 DIAGNOSIS — Z01.419 ENCOUNTER FOR ANNUAL ROUTINE GYNECOLOGICAL EXAMINATION: Primary | ICD-10-CM

## 2024-08-19 NOTE — PROGRESS NOTES
[facility]  Obstetrics and Gynecology   History & Physical    Chief complaint:   Chief Complaint   Patient presents with    Gyn Problem     Referred by Pcp due  to bleeding that occur twice in  the last 10 years, last one per pt, 4 yrs ago.        HPI: Sally Gonzales is a 73 year old  with No LMP recorded (lmp unknown). Patient is postmenopausal.      Pt reports was referred by PCP due to her history of postmenopausal bleeding  Pt had a few episodes of spotting years ago, thinks the last time was 4 years ago. She did see Dr. Kerr and had endometrial biopsy 2 years ago which was benign. No bleeding since then  Pt has hx breast cancer twice - first side diagnosed age 39 & had mastectomy (unilateral), then new diagnosis age 62 and had mastectomy on other side  She is still on tamoxifen, pt reports oncologist will actually stop this soon    No hx abnormal paps or procedures on cervix    Hx Prior Abnormal Pap: No  Pap Date: 22  Pap Result Notes: negative    PMHx/Surghx reviewed, below    PCP: Mackenzie Harp DO    Review of Systems:  Constitutional:  Denies fatigue, night sweats, hot flashes  Eyes:  denies blurred or double vision  Cardiovascular:  denies chest pain or palpitations  Respiratory:  denies shortness of breath  Gastrointestinal:  denies heartburn, abdominal pain, diarrhea or constipation  Genitourinary:  denies dysuria, incontinence, abnormal vaginal discharge, vaginal itching  Musculoskeletal:  denies back pain.  Skin/Breast:  Denies any breast area concerns.   Neurological:  denies headaches, extremity weakness or numbness.  Psychiatric: denies depression or anxiety.  Endocrine:   denies excessive thirst or urination.  Heme/Lymph:  denies history of easy bruising or bleeding.    OB History:  OB History    Para Term  AB Living   1 1 1 0 0 1   SAB IAB Ectopic Multiple Live Births   0 0 0 0 1      # Outcome Date GA Lbr Chriss/2nd Weight Sex Type Anes PTL Lv   1 Term  [birth year]08[birth year]83 40w0d  10 lb 5 oz (4.678 kg) F Caesarean   PRIYANKA      Obstetric Comments   Menarche: 12 y/o   Menopause: 58 y/o    OCP x 10 months       Meds:  Current Outpatient Medications on File Prior to Visit   Medication Sig Dispense Refill    tamoxifen 20 MG Oral Tab Take 1 tablet (20 mg total) by mouth daily. 90 tablet 3    sertraline 100 MG Oral Tab Take 1.5 tablets (150 mg total) by mouth daily. 135 tablet 1    rosuvastatin 20 MG Oral Tab Take 2 tablets (40 mg total) by mouth nightly.      sacubitril-valsartan 49-51 MG Oral Tab Take 1 tablet by mouth 2 (two) times daily.      furosemide 40 MG Oral Tab       Multiple Vitamins-Minerals (CENTRUM SILVER 50+WOMEN OR) Take by mouth.      cyclobenzaprine 10 MG Oral Tab Take 1 tablet (10 mg total) by mouth 2 (two) times daily as needed for Muscle spasms.      spironolactone 25 MG Oral Tab Take 1 tablet (25 mg total) by mouth daily. 90 tablet 0    Multiple Vitamins-Iron (MULTI-DAY PLUS IRON) Oral Tab Take by mouth.      metoprolol succinate 100 MG Oral Tablet 24 Hr Take 1 tablet (100 mg total) by mouth daily. 90 tablet 3    allopurinol 100 MG Oral Tab Take 1 tablet (100 mg total) by mouth daily. 30 tablet 11    GABAPENTIN 100 MG Oral Cap TAKE 1 CAPSULE (100 MG) BY MOUTH 2 TIMES DAILY AS NEEDED (IN THE MORNING AND AFTERNOON) (Patient taking differently: Take 1 capsule (100 mg total) by mouth 2 (two) times daily. Per patient: taking 2 tablet in the AM and taking 4 tablet in the HS) 180 capsule 0    HYDROcodone-acetaminophen (NORCO)  MG Oral Tab Take 1 tablet by mouth every 6 (six) hours as needed for Pain. 60 tablet 0    B Complex Vitamins (VITAMIN B COMPLEX) Oral Tab Take 1 tablet by mouth daily.      aspirin 81 MG Oral Tab EC Take 1 tablet (81 mg total) by mouth daily. Never takes, but prescribed per MD  0    Cholecalciferol (VITAMIN D3) 75 MCG (3000 UT) Oral Tab Take 3,000 Units by mouth daily.        cetirizine 10 MG Oral Tab Take 1 tablet (10 mg total) by mouth  daily. (Patient not taking: Reported on 2024)      meclizine 12.5 MG Oral Tab Take 1 tablet (12.5 mg total) by mouth 3 (three) times daily as needed for Dizziness. (Patient not taking: Reported on 2024) 20 tablet 0     No current facility-administered medications on file prior to visit.       All:  Allergies   Allergen Reactions    Prednisone DIZZINESS    Latex ITCHING and OTHER (SEE COMMENTS)     Blistering and oozing       PMH:  Past Medical History:    Acute sinusitis, unspecified    Anxiety state    Arrhythmia    Irregular heart beat    Asthma (HCC)    Asthmatic/bronchitis 8 years ago, not on Asthma meds or steroids    Asymptomatic postmenopausal status (age-related) (natural)    Asymptomatic varicose veins    Breast CA (HCC)    Left mastctomy for DCIS    Breast CA (HCC)    Right mastectomy with chemo and radiation    Cardiomyopathy (HCC)    Cataract    Congenital pes planus    Depression    Disorder of thyroid    enlarged thyroid    Exposure to radiation    HIGH BLOOD PRESSURE    High cholesterol    Lymph edema    left arm and right arm    Osteoarthritis    Personal history of antineoplastic chemotherapy    PICC (peripherally inserted central catheter) in place    Unspecified asthma(493.90)    Unspecified essential hypertension    Visual impairment    glasses       PSH:  Past Surgical History:   Procedure Laterality Date    Breast biopsy  2014      1983    Colonoscopy N/A 2023    Procedure: COLONOSCOPY;  Surgeon: Cesar Blum MD;  Location:  ENDOSCOPY    Knee arthroscp harv Left     Danny localization wire 1 site right (cpt=19281)      Mastectomy left      Mastectomy right  2014    Right mastectomy with sentinel lymph node biopsy, injection of blue dye for sentinel lymph node identification, completion axillary lymph node dissection, and advancement flap mastopexy    Shoulder arthroplasty Left     - Dr. Fairchild    Tonsillectomy      Total knee replacement Right  01/2017    Total knee replacement Right 05/10/2017    Revision-Dr. Fairchild    Total knee replacement Right 09/25/2017    Revision  Dr. Fairchild       Social History:  Social History     Socioeconomic History    Marital status:      Spouse name: Not on file    Number of children: 1    Years of education: Not on file    Highest education level: Not on file   Occupational History    Occupation: stay at home mom   Tobacco Use    Smoking status: Never    Smokeless tobacco: Never   Vaping Use    Vaping status: Never Used   Substance and Sexual Activity    Alcohol use: No    Drug use: No    Sexual activity: Not Currently     Partners: Male   Other Topics Concern     Service Not Asked    Blood Transfusions No    Caffeine Concern Yes    Occupational Exposure Not Asked    Hobby Hazards Not Asked    Sleep Concern Not Asked    Stress Concern Not Asked    Weight Concern Not Asked    Special Diet Not Asked    Back Care Not Asked    Exercise No    Bike Helmet Not Asked    Seat Belt Yes    Self-Exams Not Asked   Social History Narrative    Not on file     Social Determinants of Health     Financial Resource Strain: Not on file   Food Insecurity: No Food Insecurity (1/24/2024)    Food Insecurity     Food Insecurity: Never true   Transportation Needs: No Transportation Needs (1/24/2024)    Transportation Needs     Lack of Transportation: No   Physical Activity: Not on file   Stress: Not on file   Social Connections: Not on file   Housing Stability: Low Risk  (1/24/2024)    Housing Stability     Housing Instability: No     Housing Instability Emergency: Not on file     Crib or Bassinette: Not on file        Family History:  Family History   Problem Relation Age of Onset    Depression Mother     Diabetes Mother     Breast Cancer Mother 49    Cancer Mother     Psychiatric Mother     Heart Attack Father     Heart Disorder Father     Depression Maternal Aunt     Breast Cancer Maternal Aunt 45    Cancer Maternal Aunt     Breast  Cancer Self 41    Ovarian Cancer Cousin     Cancer Cousin     Diabetes Maternal Grandmother        PHYSICAL EXAM:     Vitals:    24 1108   BP: 112/70   Pulse: 75   Weight: 187 lb 9.6 oz (85.1 kg)   Height: 68\"       Body mass index is 28.52 kg/m².      Constitutional: well developed, well nourished  Head/Face: normocephalic  Breast: deferred - had with oncologist  Abdomen:  soft, nontender, nondistended, no masses  Skin/Hair: no unusual rashes or bruises  Extremities: no edema, no cyanosis  Psychiatric:  Oriented to time, place, person and situation. Appropriate mood and affect    Pelvic Exam:  External Genitalia: normal appearance, hair distribution, and no lesions  Urethral Meatus:  normal in size, location, without lesions and prolapse  Bladder:  No fullness, masses or tenderness  Vagina:  Normal appearance without lesions, no abnormal discharge  Cervix:  Normal without tenderness on motion  Uterus: normal in size, contour, position, mobility, without tenderness  Adnexa: normal without masses or tenderness  Perineum: normal  Anus: no hemorrhoids     Assessment & Plan:     Sally Gonzales is a 73 year old      Diagnoses and all orders for this visit:    Encounter for annual routine gynecological examination    History of postmenopausal bleeding       Normal pelvic exam  Pt reassured that as she had a benign endometrial biopsy in  and no recurrent postmenopausal bleeding, does not need any further workup at this time.  Advised pt to call if she does develop bleeding again at which point we would repeat EMB or hysteroscopy      Healthcare maintenance:  Pap: no longer indicated as >66yo   Mammogram: pt is s/p bilateral mastectomy - following with oncologist  Colonoscopy: , negative  DEXA - had in , normal. Would be due for repeat soon - not addressed today          Ana Carroll MD  [facility] - OBGYN

## 2024-09-09 ENCOUNTER — OFFICE VISIT (OUTPATIENT)
Facility: LOCATION | Age: 74
End: 2024-09-09
Payer: MEDICARE

## 2024-09-09 DIAGNOSIS — E04.2 MULTIPLE THYROID NODULES: Primary | ICD-10-CM

## 2024-09-09 PROCEDURE — 99214 OFFICE O/P EST MOD 30 MIN: CPT | Performed by: OTOLARYNGOLOGY

## 2024-09-09 NOTE — PROGRESS NOTES
Sally Gonzales is a 73 year old female.   Chief Complaint   Patient presents with    Thyroid Problem     HPI:   73-year-old white female following up with thyroid ultrasound.  No new complaints.  Current Outpatient Medications   Medication Sig Dispense Refill    tamoxifen 20 MG Oral Tab Take 1 tablet (20 mg total) by mouth daily. 90 tablet 3    sertraline 100 MG Oral Tab Take 1.5 tablets (150 mg total) by mouth daily. 135 tablet 1    rosuvastatin 20 MG Oral Tab Take 2 tablets (40 mg total) by mouth nightly.      sacubitril-valsartan 49-51 MG Oral Tab Take 1 tablet by mouth 2 (two) times daily.      furosemide 40 MG Oral Tab       Multiple Vitamins-Minerals (CENTRUM SILVER 50+WOMEN OR) Take by mouth.      cetirizine 10 MG Oral Tab Take 1 tablet (10 mg total) by mouth daily. (Patient not taking: Reported on 8/19/2024)      cyclobenzaprine 10 MG Oral Tab Take 1 tablet (10 mg total) by mouth 2 (two) times daily as needed for Muscle spasms.      spironolactone 25 MG Oral Tab Take 1 tablet (25 mg total) by mouth daily. 90 tablet 0    meclizine 12.5 MG Oral Tab Take 1 tablet (12.5 mg total) by mouth 3 (three) times daily as needed for Dizziness. (Patient not taking: Reported on 8/19/2024) 20 tablet 0    Multiple Vitamins-Iron (MULTI-DAY PLUS IRON) Oral Tab Take by mouth.      metoprolol succinate 100 MG Oral Tablet 24 Hr Take 1 tablet (100 mg total) by mouth daily. 90 tablet 3    allopurinol 100 MG Oral Tab Take 1 tablet (100 mg total) by mouth daily. 30 tablet 11    GABAPENTIN 100 MG Oral Cap TAKE 1 CAPSULE (100 MG) BY MOUTH 2 TIMES DAILY AS NEEDED (IN THE MORNING AND AFTERNOON) (Patient taking differently: Take 1 capsule (100 mg total) by mouth 2 (two) times daily. Per patient: taking 2 tablet in the AM and taking 4 tablet in the HS) 180 capsule 0    HYDROcodone-acetaminophen (NORCO)  MG Oral Tab Take 1 tablet by mouth every 6 (six) hours as needed for Pain. 60 tablet 0    B Complex Vitamins (VITAMIN B COMPLEX)  Oral Tab Take 1 tablet by mouth daily.      aspirin 81 MG Oral Tab EC Take 1 tablet (81 mg total) by mouth daily. Never takes, but prescribed per MD  0    Cholecalciferol (VITAMIN D3) 75 MCG (3000 UT) Oral Tab Take 3,000 Units by mouth daily.          Past Medical History:    Acute sinusitis, unspecified    Anxiety state    Arrhythmia    Irregular heart beat    Asthma (HCC)    Asthmatic/bronchitis 8 years ago, not on Asthma meds or steroids    Asymptomatic postmenopausal status (age-related) (natural)    Asymptomatic varicose veins    Breast CA (HCC)    Left mastctomy for DCIS    Breast CA (HCC)    Right mastectomy with chemo and radiation    Cardiomyopathy (HCC)    Cataract    Congenital pes planus    Depression    Disorder of thyroid    enlarged thyroid    Exposure to radiation    HIGH BLOOD PRESSURE    High cholesterol    Lymph edema    left arm and right arm    Osteoarthritis    Personal history of antineoplastic chemotherapy    PICC (peripherally inserted central catheter) in place    Unspecified asthma(493.90)    Unspecified essential hypertension    Visual impairment    glasses      Social History:  Social History     Socioeconomic History    Marital status:     Number of children: 1   Occupational History    Occupation: stay at home mom   Tobacco Use    Smoking status: Never    Smokeless tobacco: Never   Vaping Use    Vaping status: Never Used   Substance and Sexual Activity    Alcohol use: No    Drug use: No    Sexual activity: Not Currently     Partners: Male   Other Topics Concern    Blood Transfusions No    Caffeine Concern Yes    Exercise No    Seat Belt Yes     Social Determinants of Health     Food Insecurity: No Food Insecurity (1/24/2024)    Food Insecurity     Food Insecurity: Never true   Transportation Needs: No Transportation Needs (1/24/2024)    Transportation Needs     Lack of Transportation: No   Housing Stability: Low Risk  (1/24/2024)    Housing Stability     Housing Instability: No       Past Surgical History:   Procedure Laterality Date    Breast biopsy  2014          Colonoscopy N/A 2023    Procedure: COLONOSCOPY;  Surgeon: Cesar Blum MD;  Location:  ENDOSCOPY    Knee arthroscp harv Left     Danny localization wire 1 site right (cpt=19281)      Mastectomy left      Mastectomy right  2014    Right mastectomy with sentinel lymph node biopsy, injection of blue dye for sentinel lymph node identification, completion axillary lymph node dissection, and advancement flap mastopexy    Shoulder arthroplasty Left     - Dr. Fairchild    Tonsillectomy      Total knee replacement Right 2017    Total knee replacement Right 05/10/2017    Revision-Dr. Fairchild    Total knee replacement Right 2017    Revision  Dr. Fairchild         REVIEW OF SYSTEMS:   GENERAL HEALTH: feels well otherwise  GENERAL : denies fever, chills, sweats, weight loss, weight gain  SKIN: denies any unusual skin lesions or rashes  RESPIRATORY: denies shortness of breath with exertion  NEURO: denies headaches    EXAM:   LMP  (LMP Unknown)     System Pertinent findings Details   Constitutional  Overall appearance - Normal.   Head/Face  Facial features -- Normal. Skull - Normal.   Eyes  Pupils equal ,round ,react to light and accomidate   Ears  External Ear Right: Normal, Left: Normal. Canal - Right: Normal, Left: Normal. TM - Right: Normal left: Normal   Nose  External Nose, Normal, Septum -midline,Nasal Vault, clear. Turbinates - Right: Normal left: Normal   Mouth/Throat  Lips/teeth/gums - Normal. Tonsils -0+ oropharynx - Normal.   Neck Exam  Inspection - Normal. Palpation - Normal. Parotid gland - Normal. Thyroid gland -large with nodularity   Lymph Detail  Submental. Submandibular. Anterior cervical. Posterior cervical. Supraclavicular.   Thyroid ultrasound from 2024 stable as compared to ultrasound from 2022    ASSESSMENT AND PLAN:   1. Multiple thyroid nodules  18-month follow-up  thyroid ultrasound      The patient indicates understanding of these issues and agrees to the plan.      Sandeep Blum MD  9/9/2024  5:53 PM

## 2024-10-10 RX ORDER — POTASSIUM CHLORIDE 1500 MG/1
1 TABLET, EXTENDED RELEASE ORAL DAILY
Qty: 90 TABLET | Refills: 0 | Status: SHIPPED | OUTPATIENT
Start: 2024-10-10

## 2024-10-10 NOTE — TELEPHONE ENCOUNTER
A refill request was received for:  Requested Prescriptions     Pending Prescriptions Disp Refills    POTASSIUM CHLORIDE ER 20 MEQ Oral Tab CR [Pharmacy Med Name: POTASSIUM CHLORIDE 20MEQ ER TABLETS] 90 tablet 0     Sig: TAKE 1 TABLET BY MOUTH DAILY       Last refill date:       Last office visit:2/20/2024     Follow up due:  No future appointments.

## 2024-12-22 ENCOUNTER — PATIENT MESSAGE (OUTPATIENT)
Dept: FAMILY MEDICINE CLINIC | Facility: CLINIC | Age: 74
End: 2024-12-22

## 2024-12-23 ENCOUNTER — TELEPHONE (OUTPATIENT)
Dept: FAMILY MEDICINE CLINIC | Facility: CLINIC | Age: 74
End: 2024-12-23

## 2024-12-23 RX ORDER — ALLOPURINOL 100 MG/1
100 TABLET ORAL DAILY
Qty: 30 TABLET | Refills: 1 | Status: SHIPPED | OUTPATIENT
Start: 2024-12-23

## 2024-12-23 NOTE — TELEPHONE ENCOUNTER
Patient called stating she fell on Friday 12/20/24 and has pain in both of her feet and in both  knee's. Patient stated that she can't hardly walk.    Patient stated that she need a medication for gout and for pain.    Patient want to request a refill for: allopurinol 100 MG Oral Tab     Please send script to:[facility] #[id number] [place] [address] AT [facility] [address], [phone], [phone]     Please call patient [phone]    Please advise

## 2024-12-23 NOTE — TELEPHONE ENCOUNTER
Spoke to pt, she had her reading glasses on and walked to kitchen, tripped on rubber rug.  Landed on both her knees.  No loss of consciousness, states she got right back up.  Today having some soreness in knees and wrist.  Advised pt to go to IC for eval/xrays if needed. Pt expressed understanding, however states she is able to move and does not feel she needs to go.  Will take Ibuprofen for pain.  .      See mychart for gout refill

## 2024-12-27 RX ORDER — ALLOPURINOL 100 MG/1
100 TABLET ORAL DAILY
Qty: 30 TABLET | Refills: 1 | Status: SHIPPED | OUTPATIENT
Start: 2024-12-27

## 2025-01-17 ENCOUNTER — LAB ENCOUNTER (OUTPATIENT)
Dept: LAB | Age: 75
End: 2025-01-17
Attending: FAMILY MEDICINE
Payer: MEDICARE

## 2025-01-17 DIAGNOSIS — E87.6 LOW POTASSIUM SYNDROME: ICD-10-CM

## 2025-01-17 LAB — POTASSIUM SERPL-SCNC: 4.1 MMOL/L (ref 3.5–5.1)

## 2025-01-17 PROCEDURE — 36415 COLL VENOUS BLD VENIPUNCTURE: CPT

## 2025-01-17 PROCEDURE — 84132 ASSAY OF SERUM POTASSIUM: CPT

## 2025-01-31 RX ORDER — POTASSIUM CHLORIDE 1500 MG/1
1 TABLET, EXTENDED RELEASE ORAL DAILY
Qty: 90 TABLET | Refills: 0 | Status: SHIPPED | OUTPATIENT
Start: 2025-01-31

## 2025-01-31 NOTE — TELEPHONE ENCOUNTER
.A refill request was received for:  Requested Prescriptions     Pending Prescriptions Disp Refills    Potassium Chloride ER 20 MEQ Oral Tab CR 90 tablet 0     Sig: Take 1 tablet by mouth daily.       Last refill date:   10/10/24    Last office visit: 4/2/2024    Follow up due:  No future appointments.     please call Pt to schedule an AWV

## 2025-02-04 ENCOUNTER — LAB ENCOUNTER (OUTPATIENT)
Dept: LAB | Age: 75
End: 2025-02-04
Attending: CLINICAL NURSE SPECIALIST
Payer: MEDICARE

## 2025-02-04 DIAGNOSIS — I50.22 CHRONIC SYSTOLIC HEART FAILURE (HCC): Primary | ICD-10-CM

## 2025-02-04 LAB
ANION GAP SERPL CALC-SCNC: 11 MMOL/L (ref 0–18)
BASOPHILS # BLD AUTO: 0.02 X10(3) UL (ref 0–0.2)
BASOPHILS NFR BLD AUTO: 0.5 %
BUN BLD-MCNC: 43 MG/DL (ref 9–23)
CALCIUM BLD-MCNC: 9.4 MG/DL (ref 8.7–10.6)
CHLORIDE SERPL-SCNC: 103 MMOL/L (ref 98–112)
CO2 SERPL-SCNC: 27 MMOL/L (ref 21–32)
CREAT BLD-MCNC: 2.04 MG/DL
EGFRCR SERPLBLD CKD-EPI 2021: 25 ML/MIN/1.73M2 (ref 60–?)
EOSINOPHIL # BLD AUTO: 0.01 X10(3) UL (ref 0–0.7)
EOSINOPHIL NFR BLD AUTO: 0.2 %
ERYTHROCYTE [DISTWIDTH] IN BLOOD BY AUTOMATED COUNT: 15.3 %
FASTING STATUS PATIENT QL REPORTED: NO
GLUCOSE BLD-MCNC: 105 MG/DL (ref 70–99)
HCT VFR BLD AUTO: 31.6 %
HGB BLD-MCNC: 10.3 G/DL
IMM GRANULOCYTES # BLD AUTO: 0.02 X10(3) UL (ref 0–1)
IMM GRANULOCYTES NFR BLD: 0.5 %
LYMPHOCYTES # BLD AUTO: 1.53 X10(3) UL (ref 1–4)
LYMPHOCYTES NFR BLD AUTO: 37.5 %
MAGNESIUM SERPL-MCNC: 1.8 MG/DL (ref 1.6–2.6)
MCH RBC QN AUTO: 30.4 PG (ref 26–34)
MCHC RBC AUTO-ENTMCNC: 32.6 G/DL (ref 31–37)
MCV RBC AUTO: 93.2 FL
MONOCYTES # BLD AUTO: 0.82 X10(3) UL (ref 0.1–1)
MONOCYTES NFR BLD AUTO: 20.1 %
NEUTROPHILS # BLD AUTO: 1.68 X10 (3) UL (ref 1.5–7.7)
NEUTROPHILS # BLD AUTO: 1.68 X10(3) UL (ref 1.5–7.7)
NEUTROPHILS NFR BLD AUTO: 41.2 %
NT-PROBNP SERPL-MCNC: 681 PG/ML (ref ?–450)
OSMOLALITY SERPL CALC.SUM OF ELEC: 303 MOSM/KG (ref 275–295)
PLATELET # BLD AUTO: 122 10(3)UL (ref 150–450)
POTASSIUM SERPL-SCNC: 4.1 MMOL/L (ref 3.5–5.1)
RBC # BLD AUTO: 3.39 X10(6)UL
SODIUM SERPL-SCNC: 141 MMOL/L (ref 136–145)
URATE SERPL-MCNC: 9.3 MG/DL
WBC # BLD AUTO: 4.1 X10(3) UL (ref 4–11)

## 2025-02-04 PROCEDURE — 80048 BASIC METABOLIC PNL TOTAL CA: CPT

## 2025-02-04 PROCEDURE — 85025 COMPLETE CBC W/AUTO DIFF WBC: CPT

## 2025-02-04 PROCEDURE — 84550 ASSAY OF BLOOD/URIC ACID: CPT

## 2025-02-04 PROCEDURE — 83880 ASSAY OF NATRIURETIC PEPTIDE: CPT

## 2025-02-04 PROCEDURE — 83735 ASSAY OF MAGNESIUM: CPT

## 2025-02-04 PROCEDURE — 36415 COLL VENOUS BLD VENIPUNCTURE: CPT

## 2025-02-24 ENCOUNTER — LAB ENCOUNTER (OUTPATIENT)
Dept: LAB | Age: 75
End: 2025-02-24
Attending: FAMILY MEDICINE
Payer: MEDICARE

## 2025-02-24 ENCOUNTER — OFFICE VISIT (OUTPATIENT)
Dept: FAMILY MEDICINE CLINIC | Facility: CLINIC | Age: 75
End: 2025-02-24
Payer: MEDICARE

## 2025-02-24 ENCOUNTER — LAB ENCOUNTER (OUTPATIENT)
Dept: LAB | Age: 75
End: 2025-02-24
Attending: CLINICAL NURSE SPECIALIST
Payer: MEDICARE

## 2025-02-24 ENCOUNTER — TELEPHONE (OUTPATIENT)
Dept: FAMILY MEDICINE CLINIC | Facility: CLINIC | Age: 75
End: 2025-02-24

## 2025-02-24 VITALS
HEIGHT: 68 IN | DIASTOLIC BLOOD PRESSURE: 68 MMHG | SYSTOLIC BLOOD PRESSURE: 118 MMHG | BODY MASS INDEX: 27.13 KG/M2 | HEART RATE: 120 BPM | WEIGHT: 179 LBS | RESPIRATION RATE: 16 BRPM | OXYGEN SATURATION: 100 %

## 2025-02-24 DIAGNOSIS — R53.83 OTHER FATIGUE: ICD-10-CM

## 2025-02-24 DIAGNOSIS — E55.9 VITAMIN D DEFICIENCY: ICD-10-CM

## 2025-02-24 DIAGNOSIS — N95.0 POSTMENOPAUSAL BLEEDING: Primary | ICD-10-CM

## 2025-02-24 DIAGNOSIS — Z13.220 LIPID SCREENING: ICD-10-CM

## 2025-02-24 DIAGNOSIS — R73.9 HYPERGLYCEMIA: ICD-10-CM

## 2025-02-24 DIAGNOSIS — I50.22 CHRONIC SYSTOLIC HEART FAILURE (HCC): Primary | ICD-10-CM

## 2025-02-24 DIAGNOSIS — G47.09 OTHER INSOMNIA: ICD-10-CM

## 2025-02-24 DIAGNOSIS — N18.30 STAGE 3 CHRONIC KIDNEY DISEASE, UNSPECIFIED WHETHER STAGE 3A OR 3B CKD (HCC): ICD-10-CM

## 2025-02-24 DIAGNOSIS — Z79.810 LONG-TERM CURRENT USE OF TAMOXIFEN: ICD-10-CM

## 2025-02-24 LAB
ALBUMIN SERPL-MCNC: 4.9 G/DL (ref 3.2–4.8)
ALBUMIN/GLOB SERPL: 1.8 {RATIO} (ref 1–2)
ALP LIVER SERPL-CCNC: 78 U/L
ALT SERPL-CCNC: 9 U/L
ANION GAP SERPL CALC-SCNC: 9 MMOL/L (ref 0–18)
AST SERPL-CCNC: 20 U/L (ref ?–34)
BASOPHILS # BLD AUTO: 0.02 X10(3) UL (ref 0–0.2)
BASOPHILS NFR BLD AUTO: 0.5 %
BILIRUB SERPL-MCNC: 0.3 MG/DL (ref 0.2–1.1)
BUN BLD-MCNC: 29 MG/DL (ref 9–23)
CALCIUM BLD-MCNC: 10.4 MG/DL (ref 8.7–10.6)
CHLORIDE SERPL-SCNC: 105 MMOL/L (ref 98–112)
CHOLEST SERPL-MCNC: 151 MG/DL (ref ?–200)
CO2 SERPL-SCNC: 27 MMOL/L (ref 21–32)
CREAT BLD-MCNC: 1.53 MG/DL
EGFRCR SERPLBLD CKD-EPI 2021: 35 ML/MIN/1.73M2 (ref 60–?)
EOSINOPHIL # BLD AUTO: 0.02 X10(3) UL (ref 0–0.7)
EOSINOPHIL NFR BLD AUTO: 0.5 %
ERYTHROCYTE [DISTWIDTH] IN BLOOD BY AUTOMATED COUNT: 14.9 %
EST. AVERAGE GLUCOSE BLD GHB EST-MCNC: 114 MG/DL (ref 68–126)
FASTING PATIENT LIPID ANSWER: YES
FASTING STATUS PATIENT QL REPORTED: YES
GLOBULIN PLAS-MCNC: 2.7 G/DL (ref 2–3.5)
GLUCOSE BLD-MCNC: 101 MG/DL (ref 70–99)
HBA1C MFR BLD: 5.6 % (ref ?–5.7)
HCT VFR BLD AUTO: 33 %
HDLC SERPL-MCNC: 44 MG/DL (ref 40–59)
HGB BLD-MCNC: 10.8 G/DL
IMM GRANULOCYTES # BLD AUTO: 0.02 X10(3) UL (ref 0–1)
IMM GRANULOCYTES NFR BLD: 0.5 %
IRON SATN MFR SERPL: 30 %
IRON SERPL-MCNC: 95 UG/DL
LDLC SERPL CALC-MCNC: 77 MG/DL (ref ?–100)
LYMPHOCYTES # BLD AUTO: 0.88 X10(3) UL (ref 1–4)
LYMPHOCYTES NFR BLD AUTO: 20 %
MCH RBC QN AUTO: 30.1 PG (ref 26–34)
MCHC RBC AUTO-ENTMCNC: 32.7 G/DL (ref 31–37)
MCV RBC AUTO: 91.9 FL
MONOCYTES # BLD AUTO: 0.64 X10(3) UL (ref 0.1–1)
MONOCYTES NFR BLD AUTO: 14.5 %
NEUTROPHILS # BLD AUTO: 2.82 X10 (3) UL (ref 1.5–7.7)
NEUTROPHILS # BLD AUTO: 2.82 X10(3) UL (ref 1.5–7.7)
NEUTROPHILS NFR BLD AUTO: 64 %
NONHDLC SERPL-MCNC: 107 MG/DL (ref ?–130)
OSMOLALITY SERPL CALC.SUM OF ELEC: 298 MOSM/KG (ref 275–295)
PLATELET # BLD AUTO: 119 10(3)UL (ref 150–450)
POTASSIUM SERPL-SCNC: 4.5 MMOL/L (ref 3.5–5.1)
PROT SERPL-MCNC: 7.6 G/DL (ref 5.7–8.2)
RBC # BLD AUTO: 3.59 X10(6)UL
SODIUM SERPL-SCNC: 141 MMOL/L (ref 136–145)
T4 FREE SERPL-MCNC: 0.8 NG/DL (ref 0.8–1.7)
TOTAL IRON BINDING CAPACITY: 320 UG/DL (ref 250–425)
TRANSFERRIN SERPL-MCNC: 255 MG/DL (ref 250–380)
TRIGL SERPL-MCNC: 178 MG/DL (ref 30–149)
TSI SER-ACNC: 0.58 UIU/ML (ref 0.55–4.78)
VIT B12 SERPL-MCNC: 1017 PG/ML (ref 211–911)
VIT D+METAB SERPL-MCNC: 46 NG/ML (ref 30–100)
VLDLC SERPL CALC-MCNC: 28 MG/DL (ref 0–30)
WBC # BLD AUTO: 4.4 X10(3) UL (ref 4–11)

## 2025-02-24 PROCEDURE — 83550 IRON BINDING TEST: CPT

## 2025-02-24 PROCEDURE — 85025 COMPLETE CBC W/AUTO DIFF WBC: CPT

## 2025-02-24 PROCEDURE — 80053 COMPREHEN METABOLIC PANEL: CPT

## 2025-02-24 PROCEDURE — 80061 LIPID PANEL: CPT

## 2025-02-24 PROCEDURE — 83036 HEMOGLOBIN GLYCOSYLATED A1C: CPT

## 2025-02-24 PROCEDURE — 84443 ASSAY THYROID STIM HORMONE: CPT

## 2025-02-24 PROCEDURE — 83540 ASSAY OF IRON: CPT

## 2025-02-24 PROCEDURE — 82607 VITAMIN B-12: CPT

## 2025-02-24 PROCEDURE — 82306 VITAMIN D 25 HYDROXY: CPT

## 2025-02-24 PROCEDURE — 84439 ASSAY OF FREE THYROXINE: CPT

## 2025-02-24 PROCEDURE — 36415 COLL VENOUS BLD VENIPUNCTURE: CPT

## 2025-02-24 PROCEDURE — 99214 OFFICE O/P EST MOD 30 MIN: CPT | Performed by: FAMILY MEDICINE

## 2025-02-24 RX ORDER — TRAZODONE HYDROCHLORIDE 50 MG/1
50 TABLET ORAL NIGHTLY
Qty: 60 TABLET | Refills: 0 | Status: SHIPPED | OUTPATIENT
Start: 2025-02-24

## 2025-02-24 RX ORDER — ROSUVASTATIN CALCIUM 40 MG/1
TABLET, COATED ORAL
COMMUNITY
Start: 2024-11-17

## 2025-02-24 RX ORDER — SPIRONOLACTONE 25 MG/1
TABLET ORAL
COMMUNITY
Start: 2025-01-31

## 2025-02-24 RX ORDER — LOSARTAN POTASSIUM 25 MG/1
25 TABLET ORAL DAILY
COMMUNITY
Start: 2025-02-07

## 2025-02-24 NOTE — PROGRESS NOTES
HPI:   Sally Gonzales is a 74 year old female here to f/u on MMP     Pt was s/p several consults for her lumbar pain   Pt finally found a pain specialist that performed ablation and now feeling better    Eating better and exercising as tolerated and has lost weight     Feeling much better overall     Pt has seen gyne- no biopsy   No further vaginal bleeding     Mood well controlled   Pt denies suicidal  or homicidal ideation. Pt denies hopelessness or anhedonia.   Pt is not sleeping well   Normal appetite     CHF- meds recently adjusted by cardiology   Patient denies chest pain, shortness of breath, dizziness, and lightheadedness. No exertional symptoms.         Current Outpatient Medications   Medication Sig Dispense Refill    apixaban 5 MG Oral Tab Take 1 tablet (5 mg total) by mouth 2 (two) times daily.      losartan 25 MG Oral Tab Take 1 tablet (25 mg total) by mouth daily.      rosuvastatin 40 MG Oral Tab       spironolactone 25 MG Oral Tab       Potassium Chloride ER 20 MEQ Oral Tab CR Take 1 tablet by mouth daily. (Patient taking differently: Take 1 tablet by mouth in the morning, at noon, and at bedtime.) 90 tablet 0    allopurinol 100 MG Oral Tab Take 1 tablet (100 mg total) by mouth daily. 30 tablet 1    tamoxifen 20 MG Oral Tab Take 1 tablet (20 mg total) by mouth daily. 90 tablet 3    sertraline 100 MG Oral Tab Take 1.5 tablets (150 mg total) by mouth daily. 135 tablet 1    cyclobenzaprine 10 MG Oral Tab Take 1 tablet (10 mg total) by mouth 2 (two) times daily as needed for Muscle spasms.      metoprolol succinate 100 MG Oral Tablet 24 Hr Take 1 tablet (100 mg total) by mouth daily. 90 tablet 3    GABAPENTIN 100 MG Oral Cap TAKE 1 CAPSULE (100 MG) BY MOUTH 2 TIMES DAILY AS NEEDED (IN THE MORNING AND AFTERNOON) (Patient taking differently: Take 1 capsule (100 mg total) by mouth 2 (two) times daily. Per patient: taking 2 tablet in the AM and taking 4 tablet in the HS) 180 capsule 0     HYDROcodone-acetaminophen (NORCO)  MG Oral Tab Take 1 tablet by mouth every 6 (six) hours as needed for Pain. 60 tablet 0    B Complex Vitamins (VITAMIN B COMPLEX) Oral Tab Take 1 tablet by mouth daily.      aspirin 81 MG Oral Tab EC Take 1 tablet (81 mg total) by mouth daily. Never takes, but prescribed per MD  0    Cholecalciferol (VITAMIN D3) 75 MCG (3000 UT) Oral Tab Take 3,000 Units by mouth daily.        cetirizine 10 MG Oral Tab Take 1 tablet (10 mg total) by mouth daily. (Patient not taking: Reported on 2025)        Past Medical History:    Acute sinusitis, unspecified    Anxiety state    Arrhythmia    Irregular heart beat    Asthma (HCC)    Asthmatic/bronchitis 8 years ago, not on Asthma meds or steroids    Asymptomatic postmenopausal status (age-related) (natural)    Asymptomatic varicose veins    Breast CA (HCC)    Left mastctomy for DCIS    Breast CA (HCC)    Right mastectomy with chemo and radiation    Cardiomyopathy (HCC)    Cataract    Congenital pes planus    Depression    Disorder of thyroid    enlarged thyroid    Exposure to radiation    HIGH BLOOD PRESSURE    High cholesterol    Lymph edema    left arm and right arm    Osteoarthritis    Personal history of antineoplastic chemotherapy    PICC (peripherally inserted central catheter) in place    Unspecified asthma(493.90)    Unspecified essential hypertension    Visual impairment    glasses      Past Surgical History:   Procedure Laterality Date    Breast biopsy  2014      1983    Colonoscopy N/A 2023    Procedure: COLONOSCOPY;  Surgeon: Cesar Blum MD;  Location:  ENDOSCOPY    Knee arthroscp harv Left     Danny localization wire 1 site right (cpt=19281)      Mastectomy left      Mastectomy right  2014    Right mastectomy with sentinel lymph node biopsy, injection of blue dye for sentinel lymph node identification, completion axillary lymph node dissection, and advancement flap mastopexy    Shoulder  arthroplasty Left     2020- Dr. Fairchild    Tonsillectomy      Total knee replacement Right 01/2017    Total knee replacement Right 05/10/2017    Revision-Dr. Fairchild    Total knee replacement Right 09/25/2017    Revision  Dr. Fairchild      Family History   Problem Relation Age of Onset    Depression Mother     Diabetes Mother     Breast Cancer Mother 49    Cancer Mother     Psychiatric Mother     Heart Attack Father     Heart Disorder Father     Depression Maternal Aunt     Breast Cancer Maternal Aunt 45    Cancer Maternal Aunt     Breast Cancer Self 41    Ovarian Cancer Cousin     Cancer Cousin     Diabetes Maternal Grandmother       Social History:   Social History     Socioeconomic History    Marital status:     Number of children: 1   Occupational History    Occupation: stay at home mom   Tobacco Use    Smoking status: Never    Smokeless tobacco: Never   Vaping Use    Vaping status: Never Used   Substance and Sexual Activity    Alcohol use: No    Drug use: No    Sexual activity: Not Currently     Partners: Male   Other Topics Concern    Blood Transfusions No    Caffeine Concern Yes    Exercise No    Seat Belt Yes     Social Drivers of Health     Food Insecurity: Low Risk  (10/14/2024)    Received from [facility]    Food Insecurity     Have there been times that your food ran out, and you didn't have money to get more?: No     Are there times that you worry that this might happen?: No   Transportation Needs: Low Risk  (10/14/2024)    Received from [facility]    Transportation Needs     Do you have trouble getting transportation to medical appointments?: No   Housing Stability: Low Risk  (10/14/2024)    Received from [facility]    Housing Stability     Are you worried that your electric, gas, oil, or water might be shut off?: No     Are you concerned about having a safe and reliable place to live?: No          REVIEW OF SYSTEMS:   GENERAL: feels  well otherwise  SKIN: denies any unusual skin lesions  EYES:denies blurred vision or double vision  HEENT: denies nasal congestion, sinus pain or ST  LUNGS: denies shortness of breath with exertion  CARDIOVASCULAR: denies chest pain on exertion  MUSCULOSKELETAL: denies back pain  NEURO: denies headaches  PSYCHE: denies depression or anxiety  HEMATOLOGIC: denies hx of anemia  ENDOCRINE: denies thyroid history  ALL/ASTHMA: denies asthma    EXAM:   /68   Pulse 120   Resp 16   Ht 5' 8\" (1.727 m)   Wt 179 lb (81.2 kg)   LMP  (LMP Unknown)   SpO2 100%   BMI 27.22 kg/m²   Body mass index is 27.22 kg/m².   GENERAL: alert and oriented X 3, well developed, well nourished,in no apparent distress  CARDIO: RRR without murmur  LUNGS: clear to auscultation  NECK: supple,no adenopathy,no JVD, no carotid bruits   HEENT: atraumatic, normocephalic,ears and throat are clear  EYES:PERRLA, EOMI, normal,conjunctiva are clear  SKIN: norashes,no suspicious lesions  MUSCULOSKELETAL: back is not tender,FROM of the back  EXTREMITIES: no cyanosis, clubbing or edema  NEURO: cranial nerves are intact,motor and sensory are grossly intact  PSYCH: normal mood and affect, good eye contact appropriate     ASSESSMENT AND PLAN:   Sally Gonzales is a 74 year old female who presents with     1. Postmenopausal bleeding    - z [facility] US PELVIS (TRANSABDOMINAL AND TRANSVAGINAL) (CPT=76856/44658) [0598542]; Future  - z [facility] US PELVIS (TRANSABDOMINAL AND TRANSVAGINAL) (CPT=76856/57367) [7812077]    2. Long-term current use of tamoxifen    - z [facility] US PELVIS (TRANSABDOMINAL AND TRANSVAGINAL) (CPT=76856/43165) [[id number]]; Future  - z [facility] US PELVIS (TRANSABDOMINAL AND TRANSVAGINAL) (CPT=76856/29004) [[id number]]    3. Hyperglycemia    - Comp Metabolic Panel (14); Future  - Hemoglobin A1C; Future    4. Other insomnia  Trial of meds   - traZODone 50 MG Oral Tab; Take 1 tablet (50 mg total) by mouth nightly.  Dispense: 60 tablet; Refill:  0    5. Vitamin D deficiency    - Vitamin D [E]; Future    6. Lipid screening    - Lipid Panel; Future    7. Other fatigue    - Free T4, (Free Thyroxine); Future  - Assay, Thyroid Stim Hormone; Future  - CBC With Differential With Platelet; Future  - Vitamin B12; Future      Questions answered and patient indicates understanding of these issues and agrees to the plan.  Follow up in 1-2 month or sooner if needed

## 2025-03-15 DIAGNOSIS — F32.0 MILD SINGLE CURRENT EPISODE OF MAJOR DEPRESSIVE DISORDER: ICD-10-CM

## 2025-03-17 RX ORDER — SERTRALINE HYDROCHLORIDE 100 MG/1
150 TABLET, FILM COATED ORAL DAILY
Qty: 135 TABLET | Refills: 0 | Status: SHIPPED | OUTPATIENT
Start: 2025-03-17

## 2025-03-17 NOTE — TELEPHONE ENCOUNTER
A refill request was received for:  Requested Prescriptions     Pending Prescriptions Disp Refills    SERTRALINE 100 MG Oral Tab [Pharmacy Med Name: SERTRALINE 100MG TABLETS] 135 tablet 1     Sig: TAKE 1 AND 1/2 TABLETS(150 MG) BY MOUTH DAILY       Last refill date:   2/27/24    Last office visit: 2/24/25    Follow up due:  Future Appointments   Date Time Provider Department Center   3/27/2025 11:30 AM Mackenzie Harp DO [facility] [address]

## 2025-03-27 ENCOUNTER — OFFICE VISIT (OUTPATIENT)
Dept: FAMILY MEDICINE CLINIC | Facility: CLINIC | Age: 75
End: 2025-03-27
Payer: MEDICARE

## 2025-03-27 VITALS
HEIGHT: 68 IN | OXYGEN SATURATION: 98 % | HEART RATE: 78 BPM | SYSTOLIC BLOOD PRESSURE: 110 MMHG | BODY MASS INDEX: 27.28 KG/M2 | RESPIRATION RATE: 16 BRPM | DIASTOLIC BLOOD PRESSURE: 56 MMHG | WEIGHT: 180 LBS

## 2025-03-27 DIAGNOSIS — I48.0 PAF (PAROXYSMAL ATRIAL FIBRILLATION) (HCC): ICD-10-CM

## 2025-03-27 DIAGNOSIS — Z95.5 STATUS POST CORONARY ARTERY STENT PLACEMENT: ICD-10-CM

## 2025-03-27 DIAGNOSIS — R09.82 PND (POST-NASAL DRIP): ICD-10-CM

## 2025-03-27 DIAGNOSIS — I50.32 CHRONIC DIASTOLIC CHF (CONGESTIVE HEART FAILURE) (HCC): ICD-10-CM

## 2025-03-27 DIAGNOSIS — D70.8 OTHER NEUTROPENIA: ICD-10-CM

## 2025-03-27 DIAGNOSIS — E78.5 DYSLIPIDEMIA: ICD-10-CM

## 2025-03-27 DIAGNOSIS — Z98.890 S/P MITRAL VALVE CLIP IMPLANTATION: ICD-10-CM

## 2025-03-27 DIAGNOSIS — G47.09 OTHER INSOMNIA: ICD-10-CM

## 2025-03-27 DIAGNOSIS — E04.9 GOITER: ICD-10-CM

## 2025-03-27 DIAGNOSIS — Z95.818 S/P MITRAL VALVE CLIP IMPLANTATION: ICD-10-CM

## 2025-03-27 DIAGNOSIS — M54.31 SCIATICA OF RIGHT SIDE: ICD-10-CM

## 2025-03-27 DIAGNOSIS — Z85.3 HISTORY OF BREAST CANCER: ICD-10-CM

## 2025-03-27 DIAGNOSIS — R73.9 HYPERGLYCEMIA: ICD-10-CM

## 2025-03-27 DIAGNOSIS — I34.0 NONRHEUMATIC MITRAL VALVE REGURGITATION: ICD-10-CM

## 2025-03-27 DIAGNOSIS — F32.0 MILD SINGLE CURRENT EPISODE OF MAJOR DEPRESSIVE DISORDER: ICD-10-CM

## 2025-03-27 DIAGNOSIS — R93.89 THICKENED ENDOMETRIUM: ICD-10-CM

## 2025-03-27 DIAGNOSIS — Z00.00 ENCOUNTER FOR ANNUAL HEALTH EXAMINATION: Primary | ICD-10-CM

## 2025-03-27 DIAGNOSIS — M1A.9XX0 CHRONIC GOUT WITHOUT TOPHUS, UNSPECIFIED CAUSE, UNSPECIFIED SITE: ICD-10-CM

## 2025-03-27 DIAGNOSIS — I25.5 ISCHEMIC CARDIOMYOPATHY: ICD-10-CM

## 2025-03-27 DIAGNOSIS — I25.10 CORONARY ARTERY DISEASE INVOLVING NATIVE CORONARY ARTERY OF NATIVE HEART WITHOUT ANGINA PECTORIS: ICD-10-CM

## 2025-03-27 DIAGNOSIS — Z80.3 FAMILY HISTORY OF BREAST CANCER IN FEMALE: ICD-10-CM

## 2025-03-27 DIAGNOSIS — I10 BENIGN ESSENTIAL HTN: ICD-10-CM

## 2025-03-27 DIAGNOSIS — N18.30 STAGE 3 CHRONIC KIDNEY DISEASE, UNSPECIFIED WHETHER STAGE 3A OR 3B CKD (HCC): ICD-10-CM

## 2025-03-27 DIAGNOSIS — I89.0 LYMPHEDEMA OF ARM: ICD-10-CM

## 2025-03-27 PROBLEM — I48.91 ATRIAL FIBRILLATION WITH RAPID VENTRICULAR RESPONSE (HCC): Status: RESOLVED | Noted: 2024-01-24 | Resolved: 2025-03-27

## 2025-03-27 PROBLEM — N95.0 POSTMENOPAUSAL BLEEDING: Status: RESOLVED | Noted: 2022-06-28 | Resolved: 2025-03-27

## 2025-03-27 RX ORDER — SERTRALINE HYDROCHLORIDE 100 MG/1
150 TABLET, FILM COATED ORAL DAILY
Qty: 135 TABLET | Refills: 0 | Status: SHIPPED | OUTPATIENT
Start: 2025-03-27

## 2025-03-27 RX ORDER — ALLOPURINOL 100 MG/1
100 TABLET ORAL DAILY
Qty: 90 TABLET | Refills: 1 | Status: SHIPPED | OUTPATIENT
Start: 2025-03-27

## 2025-03-27 RX ORDER — TRAZODONE HYDROCHLORIDE 50 MG/1
50 TABLET ORAL NIGHTLY
Qty: 90 TABLET | Refills: 0 | Status: SHIPPED | OUTPATIENT
Start: 2025-03-27

## 2025-03-27 RX ORDER — GABAPENTIN 100 MG/1
100 CAPSULE ORAL 2 TIMES DAILY
Qty: 540 CAPSULE | Refills: 1 | Status: SHIPPED | OUTPATIENT
Start: 2025-03-27

## 2025-03-27 RX ORDER — FUROSEMIDE 20 MG/1
TABLET ORAL
COMMUNITY
Start: 2025-02-09

## 2025-03-27 RX ORDER — FLUTICASONE PROPIONATE 50 MCG
2 SPRAY, SUSPENSION (ML) NASAL NIGHTLY
Qty: 3 EACH | Refills: 0 | Status: SHIPPED | OUTPATIENT
Start: 2025-03-27

## 2025-03-27 NOTE — PROGRESS NOTES
.     The following individual(s) verbally consented to be recorded using ambient AI listening technology and understand that they can each withdraw their consent to this listening technology at any point by asking the clinician to turn off or pause the recording:    Patient name: Sally Gonzales  Additional names:  n/a          Subjective:   Sally Gonzales is a 74 year old female who presents for a Medicare Subsequent Annual Wellness visit (Pt already had Initial Annual Wellness) and scheduled follow up of multiple significant but stable problems.   History of Present Illness  Sally Gonzales is a 74 year old female who presents for an annual wellness exam.    She has not received her flu vaccine this year and is seeking information on when to get it. She is also missing her shingles vaccine. She has had a recent colonoscopy with no findings and is up to date on her pneumonia vaccine. She had COVID-19 previously but is otherwise in stable health. No chest pain, shortness of breath, headache, numbness, or weakness.    She has a history of chronic diastolic congestive heart failure and is under the care of a cardiologist. Her recent echocardiogram showed improvement. She was previously on Entresto, which caused dizziness, and was switched to losartan. The dizziness persisted, leading to a reduced dose of losartan, which she tolerates better now. She can now perform daily activities without dizziness and continues physical therapy twice a week.    She reports a recent gout flare-up after not taking allopurinol and consuming shrimp. She has resumed allopurinol and the flare has resolved.    She is seeking a referral to a new eye doctor after her previous optometrist passed away. She has cataracts but denies any vision changes.    She is on trazodone for sleep, which she describes as 'magic,' and reports good sleep quality. She takes gabapentin for sciatic pain, usually three pills at night, and occasionally one or  two during the day if needed.    Pt also here with    Goiter    History of breast cancer    Lymphedema of left arm    Family history of breast cancer in female    Benign essential HTN    Mild single current episode of major depressive disorder    Hyperglycemia    Nonrheumatic mitral valve regurgitation    Coronary artery disease involving native coronary artery of native heart without angina pectoris    Ischemic cardiomyopathy    Dyslipidemia    S/P mitral valve clip implantation    Chronic diastolic CHF (congestive heart failure) (HCC)    Thickened endometrium    Status post coronary artery stent placement    Other neutropenia    PAF (paroxysmal atrial fibrillation) (HCC)       History/Other:   Fall Risk Assessment:   She has been screened for Falls and is High Risk. Fall Prevention information provided to patient in After Visit Summary.    Do you feel unsteady when standing or walking?: (Patient-Rptd) No  Do you worry about falling?: (Patient-Rptd) No  Have you fallen in the past year?: (Patient-Rptd) Yes  How many times have you fallen?: (Patient-Rptd) (P) 1  Were you injured?: (Patient-Rptd) (P) Yes     Cognitive Assessment:   She had a completely normal cognitive assessment - see flowsheet entries     Functional Ability/Status:   Sally Gonzales has some abnormal functions as listed below:  She has Vision problems based on screening of functional status.       Depression Screening (PHQ):  PHQ-2 SCORE: 0  , done 3/30/2025        Advanced Directives: She does have a Living Will but we do NOT have it on file in Epic.    She does have a POA but we do NOT have it on file in Epic.    Discussed Advance Care Planning with patient (and family/surrogate if present). Standard forms made available to patient in After Visit Summary.      Patient Active Problem List   Diagnosis    Goiter- stable     History of breast cancer- stable     Lymphedema of left arm- stable     Family history of breast cancer in female- stable      Benign essential HTN- stable     Mild single current episode of major depressive disorder- stable     Hyperglycemia- stable     Nonrheumatic mitral valve regurgitation- stable     Coronary artery disease involving native coronary artery of native heart without angina pectoris- stable     Ischemic cardiomyopathy- stable     Dyslipidemia- stable     S/P mitral valve clip implantation- stable     Chronic diastolic CHF (congestive heart failure) ([facility])- stable     Thickened endometrium- repeat ultrasound     Status post coronary artery stent placement- stable     Other neutropenia- stable     PAF (paroxysmal atrial fibrillation) ([facility])- stable      Allergies:  She is allergic to prednisone and latex.    Current Medications:  Outpatient Medications Marked as Taking for the 3/27/25 encounter (Office Visit) with Mackenzie Harp DO   Medication Sig    furosemide 20 MG Oral Tab     allopurinol 100 MG Oral Tab Take 1 tablet (100 mg total) by mouth daily.    gabapentin 100 MG Oral Cap Take 1 capsule (100 mg total) by mouth 2 (two) times daily. Per patient: taking 2 tablet in the AM and taking 4 tablet in the HS    sertraline 100 MG Oral Tab Take 1.5 tablets (150 mg total) by mouth daily.    traZODone 50 MG Oral Tab Take 1 tablet (50 mg total) by mouth nightly.    fluticasone propionate 50 MCG/ACT Nasal Suspension 2 sprays by Nasal route nightly.    apixaban 5 MG Oral Tab Take 1 tablet (5 mg total) by mouth 2 (two) times daily.    losartan 25 MG Oral Tab Take 0.5 tablets (12.5 mg total) by mouth daily.    rosuvastatin 40 MG Oral Tab     spironolactone 25 MG Oral Tab     Potassium Chloride ER 20 MEQ Oral Tab CR Take 1 tablet by mouth daily.    cyclobenzaprine 10 MG Oral Tab Take 1 tablet (10 mg total) by mouth 2 (two) times daily as needed for Muscle spasms.    metoprolol succinate 100 MG Oral Tablet 24 Hr Take 1 tablet (100 mg total) by mouth daily.    HYDROcodone-acetaminophen (NORCO)  MG Oral Tab Take 1 tablet by mouth  every 6 (six) hours as needed for Pain.    B Complex Vitamins (VITAMIN B COMPLEX) Oral Tab Take 1 tablet by mouth daily.    aspirin 81 MG Oral Tab EC Take 1 tablet (81 mg total) by mouth daily. Never takes, but prescribed per MD    Cholecalciferol (VITAMIN D3) 75 MCG (3000 UT) Oral Tab Take 3,000 Units by mouth daily.         Medical History:  She  has a past medical history of Acute sinusitis, unspecified, Anxiety state, Arrhythmia, Asthma (), Asymptomatic postmenopausal status (age-related) (natural), Asymptomatic varicose veins, Breast CA (HCC) (, ), Breast CA (HCC) (), Cardiomyopathy (HCC), Cataract, Congenital pes planus, Depression, Disorder of thyroid, Exposure to radiation (), HIGH BLOOD PRESSURE, High cholesterol, Lymph edema, Osteoarthritis, Personal history of antineoplastic chemotherapy (), PICC (peripherally inserted central catheter) in place (2017), Unspecified asthma(493.90), Unspecified essential hypertension, and Visual impairment.  Surgical History:  She  has a past surgical history that includes  (); knee arthroscp harv (Left); tonsillectomy; kya localization wire 1 site right (cpt=19281); mastectomy left (); Breast biopsy (2014); mastectomy right (2014); total knee replacement (Right, 2017); total knee replacement (Right, 05/10/2017); total knee replacement (Right, 2017); shoulder arthroplasty (Left); and colonoscopy (N/A, 2023).   Family History:  Her family history includes Breast Cancer (age of onset: 41) in her self; Breast Cancer (age of onset: 45) in her maternal aunt; Breast Cancer (age of onset: 49) in her mother; Cancer in her cousin, maternal aunt, and mother; Depression in her maternal aunt and mother; Diabetes in her maternal grandmother and mother; Heart Attack in her father; Heart Disorder in her father; Ovarian Cancer in her cousin; Psychiatric in her mother.  Social History:  She  reports that she has never  smoked. She has never used smokeless tobacco. She reports that she does not drink alcohol and does not use drugs.    Tobacco:  She has never smoked tobacco.    CAGE Alcohol Screen:   CAGE screening score of 0 on 3/24/2025, showing low risk of alcohol abuse.    Patient Care Team:  Mackenzie Harp DO as PCP - General (Family Medicine)  Bruna Morales MD (Radiation Oncology)  Lalito Young, RN as Registered Nurse (Registered Nurse)  Becky Stover OT as Occupational Therapist (Occupational Therapist)  Cesar Martino MD (Cardiovascular Diseases)    Review of Systems  GENERAL: feels well otherwise  SKIN: denies any unusual skin lesions  EYES: denies blurred vision or double vision  HEENT: denies nasal congestion, sinus pain or ST  LUNGS: denies shortness of breath with exertion  CARDIOVASCULAR: denies chest pain on exertion  GI: denies abdominal pain, denies heartburn  : denies dysuria, vaginal discharge or itching, no complaint of urinary incontinence   MUSCULOSKELETAL: denies back pain  NEURO: denies headaches  PSYCHE: denies depression or anxiety  HEMATOLOGIC: denies hx of anemia  ENDOCRINE: thyroid history  ALL/ASTHMA: denies asthma    Objective:   Physical Exam  General Appearance:  Alert, cooperative, no distress, appears stated age   Head:  Normocephalic, without obvious abnormality, atraumatic   Eyes:  PERRL, conjunctiva/corneas clear, EOM's intact both eyes   Ears:  Normal TM's and external ear canals, both ears   Nose: Nares normal, septum midline,mucosa normal, no drainage or sinus tenderness   Throat: Lips, mucosa, and tongue normal; teeth and gums normal   Neck: Supple, symmetrical, trachea midline, no adenopathy;  thyroid: enlarged, symmetric, no tenderness/mass/nodules; no carotid bruit or JVD   Back:   Symmetric, no curvature, ROM normal, no CVA tenderness   Lungs:   Clear to auscultation bilaterally, respirations unlabored   Heart:  Regular rate and rhythm, S1 and S2 normal, no murmur,  rub, or gallop   Abdomen:   Soft, non-tender, bowel sounds active all four quadrants,  no masses, no organomegaly   Pelvic: Deferred   Extremities: Extremities normal, atraumatic, no cyanosis or edema   Pulses: 2+ and symmetric   Skin: Skin color, texture, turgor normal, no rashes or lesions   Lymph nodes: Cervical, supraclavicular, and axillary nodes normal   Neurologic: Normal       Physical Exam          /56   Pulse 78   Resp 16   Ht 5' 8\" (1.727 m)   Wt 180 lb (81.6 kg)   LMP  (LMP Unknown)   SpO2 98%   BMI 27.37 kg/m²  Estimated body mass index is 27.37 kg/m² as calculated from the following:    Height as of this encounter: 5' 8\" (1.727 m).    Weight as of this encounter: 180 lb (81.6 kg).    Medicare Hearing Assessment:   Hearing Screening    Screening Method: Whisper Test  Whisper Test Result: Pass         Visual Acuity:   Right Eye Visual Acuity: Uncorrected Right Eye Chart Acuity: 20/40   Left Eye Visual Acuity: Uncorrected Left Eye Chart Acuity: 20/50   Both Eyes Visual Acuity: Uncorrected Both Eyes Chart Acuity: 20/40   Able To Tolerate Visual Acuity: Yes        Assessment & Plan:   Sally Gonzales is a 74 year old female who presents for a Medicare Assessment.     Assessment & Plan  Annual Wellness Exam  74-year-old female presenting for an annual wellness exam. She is up to date with her colonoscopy and pneumonia vaccine. Considering timing and location for flu, shingles, and RSV vaccines.  - Advise flu vaccine in October  - Recommend shingles vaccine at pharmacy, not to be mixed with other vaccines  - Discuss RSV vaccine for next fall, can be taken with flu shot  - Discuss COVID-19 vaccine availability at drive-through clinic or pharmacy    Chronic Diastolic Congestive Heart Failure  Chronic diastolic congestive heart failure managed by cardiology. Recent echocardiogram shows improvement. Previous medication Entresto caused dizziness, switched to losartan with dose adjustment, now  well-managed. Losartan is considered almost as effective as Entresto, with fewer side effects.  - Continue current management with cardiology  - Monitor for symptoms of dizziness and adjust medications as needed    Gout  Recent gout flare due to non-compliance with allopurinol and dietary indiscretion (shrimp consumption). Resumed allopurinol with resolution of symptoms. Discussed the addition of cherry extract for gout management and confirmed her understanding of dietary management for gout.  - Continue allopurinol  - Consider cherry extract for additional management of gout  - Ensure dietary knowledge for gout management    Cataracts  Reports vision changes due to cataracts. Previous optometrist passed away, seeking referral to a new eye doctor.  - Provide referral to a new eye doctor    Insomnia  Insomnia well-controlled with trazodone. Reports significant improvement in sleep quality, describing it as 'magic'.  - Continue trazodone as prescribed    General Health Maintenance  Discussed general health maintenance including vaccinations and screenings. She is up to date with colonoscopy and pneumonia vaccine. Discussed the timing and location for flu, shingles, and RSV vaccines.  - Ensure follow-up for pelvic ultrasound    Follow-up  Mood stable with sertraline, sleep improved with trazodone. Plan to review mood medications in 6 months.  - Schedule follow-up in 6 months for mood medication review  - Discuss sleep medication at next visit            1. Encounter for annual health examination (Primary)  -     Detailed, Mod Complex (50158)  2. PAF (paroxysmal atrial fibrillation) ([facility])- see cardiology   -     Detailed, Mod Complex (90375)  3. Chronic diastolic CHF (congestive heart failure) ([facility])- see cardiology   -     Detailed, Mod Complex (00488)  4. Mild single current episode of major depressive disorder- stable cpm  -     Sertraline HCl; Take 1.5 tablets (150 mg total) by mouth daily.  Dispense: 135 tablet;  Refill: 0  -     Detailed, Mod Complex (18983)  5. Other neutropenia- stable monitor   -     Detailed, Mod Complex (36219)  6. Benign essential HTN- stable cpm  -     Detailed, Mod Complex (78963)  7. Coronary artery disease involving native coronary artery of native heart without angina pectoris- see cardiology   -     Detailed, Mod Complex (58376)  8. Dyslipidemia- stable monitor   -     Detailed, Mod Complex (40663)  9. Ischemic cardiomyopathy- see cardiology   -     Detailed, Mod Complex (85585)  10. Nonrheumatic mitral valve regurgitation- see cardiology   -     Detailed, Mod Complex (94834)  11. S/P mitral valve clip implantation- see cardiology   -     Detailed, Mod Complex (40981)  12. Status post coronary artery stent placement- see cardiology   -     Detailed, Mod Complex (76306)  13. Goiter- stable monitor   -     Detailed, Mod Complex (13802)  14. Hyperglycemia- stable monitor   -     Detailed, Mod Complex (89461)  15. Thickened endometrium- pelvic ultrasound needed   -     Detailed, Mod Complex (25919)  16. Family history of breast cancer in female- stable monitor   -     Detailed, Mod Complex (37213)  17. History of breast cancer- stable monitor   -     Detailed, Mod Complex (47588)  18. Lymphedema of left arm- stable monitor   -     Detailed, Mod Complex (86798)  19. Other insomnia- stable cpm  -     traZODone HCl; Take 1 tablet (50 mg total) by mouth nightly.  Dispense: 90 tablet; Refill: 0  -     Detailed, Mod Complex (86590)  20. Sciatica of right side- stable monitor   -     Gabapentin; Take 1 capsule (100 mg total) by mouth 2 (two) times daily. Per patient: taking 2 tablet in the AM and taking 4 tablet in the HS  Dispense: 540 capsule; Refill: 1  -     Detailed, Mod Complex (60100)  21. Chronic gout without tophus, unspecified cause, unspecified site- stable cpm  -     Allopurinol; Take 1 tablet (100 mg total) by mouth daily.  Dispense: 90 tablet; Refill: 1  -     Detailed, Mod Complex  (91360)  22. PND (post-nasal drip)- stable cpm   -     Fluticasone Propionate; 2 sprays by Nasal route nightly.  Dispense: 3 each; Refill: 0  -     Detailed, Mod Complex (10312)  Assessment & Plan      The patient indicates understanding of these issues and agrees to the plan.  Reinforced healthy diet, lifestyle, and exercise.    Return in 6 months (on 9/27/2025).     Mackenzie Harp DO, 3/27/2025     Supplementary Documentation:   General Health:  In the past six months, have you lost more than 10 pounds without trying?: (Patient-Rptd) 1 - Yes  Has your appetite been poor?: (Patient-Rptd) No  Type of Diet: (Patient-Rptd) Balanced  How does the patient maintain a good energy level?: (Patient-Rptd) Other  How would you describe your daily physical activity?: (Patient-Rptd) Light  How would you describe your current health state?: (Patient-Rptd) Fair  How do you maintain positive mental well-being?: (Patient-Rptd) Social Interaction, Visiting Friends, Visiting Family  On a scale of 0 to 10, with 0 being no pain and 10 being severe pain, what is your pain level?: (Patient-Rptd) 1 - (Mild)  In the past six months, have you experienced urine leakage?: (Patient-Rptd) 0-No  At any time do you feel concerned for the safety/well-being of yourself and/or your children, in your home or elsewhere?: (Patient-Rptd) No  Have you had any immunizations at another office such as Influenza, Hepatitis B, Tetanus, or Pneumococcal?: (Patient-Rptd) No    Health Maintenance   Topic Date Due    Zoster Vaccines (1 of 2) 11/07/2016    COVID-19 Vaccine (4 - 2024-25 season) 09/01/2024    Influenza Vaccine (1) 10/01/2024    Annual Depression Screening  01/01/2025    Annual Physical  02/20/2025    Colorectal Cancer Screening  01/31/2033    DEXA Scan  Completed    Fall Risk Screening (Annual)  Completed    Pneumococcal Vaccine: 50+ Years  Completed    Meningococcal B Vaccine  Aged Out

## 2025-03-30 PROBLEM — N18.30 STAGE 3 CHRONIC KIDNEY DISEASE, UNSPECIFIED WHETHER STAGE 3A OR 3B CKD (HCC): Status: ACTIVE | Noted: 2025-03-30

## 2025-04-07 ENCOUNTER — TELEPHONE (OUTPATIENT)
Dept: FAMILY MEDICINE CLINIC | Facility: CLINIC | Age: 75
End: 2025-04-07

## 2025-04-07 DIAGNOSIS — G47.09 OTHER INSOMNIA: ICD-10-CM

## 2025-04-07 NOTE — TELEPHONE ENCOUNTER
Pt received #60/ 60 days trazodone on 2/24/25.  Has refill, however too early to fill because it states one daily.   Pt states she takes 1-2 per day  Okay to change directions on refill to 1-2 per day? Qty ##180?

## 2025-04-07 NOTE — TELEPHONE ENCOUNTER
Pharmacy said the 3/27 traZODone 50 MG is too early for pt to .  Pt told pharmacy Dr. Harp said okay for 1-2 tabs daily.  Rx is for 1 only.  Pt is out of med and asking for the 3/27 rx to change to 1-2 tabs.

## 2025-04-09 RX ORDER — TRAZODONE HYDROCHLORIDE 50 MG/1
TABLET ORAL
Qty: 180 TABLET | Refills: 0 | Status: SHIPPED | OUTPATIENT
Start: 2025-04-09

## 2025-04-09 NOTE — TELEPHONE ENCOUNTER
Spoke with pharmacist and they state they will need new script for Trazodone.  Script pended below please review and place.

## 2025-05-30 RX ORDER — POTASSIUM CHLORIDE 1500 MG/1
1 TABLET, EXTENDED RELEASE ORAL DAILY
Qty: 90 TABLET | Refills: 0 | Status: SHIPPED | OUTPATIENT
Start: 2025-05-30

## 2025-05-30 NOTE — TELEPHONE ENCOUNTER
A refill request was received for:  Requested Prescriptions     Pending Prescriptions Disp Refills    POTASSIUM CHLORIDE ER 20 MEQ Oral Tab CR [Pharmacy Med Name: POTASSIUM CHLORIDE 20MEQ ER TABLETS] 90 tablet 0     Sig: TAKE 1 TABLET BY MOUTH DAILY         Component  Ref Range & Units (hover) 2/24/25 12:30 PM   Glucose 101 High    Sodium 141   Potassium 4.5   Chloride 105   CO2 27.0   Anion Gap 9   BUN 29 High    Creatinine 1.53 High    Calcium, Total 10.4   Calculated Osmolality 298 High    eGFR-Cr 35 Low    AST 20   ALT 9 Low    Alkaline Phosphatase 78   Bilirubin, Total 0.3   Total Protein 7.6   Albumin 4.9 High    Globulin 2.7   A/G Ratio 1.8   Patient Fasting for CMP? Yes          Last refill date: 1/31/2025      Last office visit: 3/27/2025    Follow up due:  Future Appointments   Date Time Provider Department Center   9/29/2025 11:00 AM Mackenzie Harp DO [facility] [address]

## 2025-06-24 ENCOUNTER — HOSPITAL ENCOUNTER (OUTPATIENT)
Age: 75
Discharge: HOME | End: 2025-06-24
Payer: MEDICARE

## 2025-06-24 VITALS
OXYGEN SATURATION: 97 % | TEMPERATURE: 99 F | BODY MASS INDEX: 27.43 KG/M2 | HEART RATE: 115 BPM | HEIGHT: 68 IN | DIASTOLIC BLOOD PRESSURE: 76 MMHG | SYSTOLIC BLOOD PRESSURE: 137 MMHG | WEIGHT: 181 LBS | RESPIRATION RATE: 22 BRPM

## 2025-06-24 DIAGNOSIS — H65.192 ACUTE EFFUSION OF LEFT EAR: Primary | ICD-10-CM

## 2025-06-24 DIAGNOSIS — J30.9 ALLERGIC RHINITIS, UNSPECIFIED SEASONALITY, UNSPECIFIED TRIGGER: ICD-10-CM

## 2025-06-24 DIAGNOSIS — H81.13 BENIGN PAROXYSMAL POSITIONAL VERTIGO DUE TO BILATERAL VESTIBULAR DISORDER: ICD-10-CM

## 2025-06-24 PROCEDURE — 99213 OFFICE O/P EST LOW 20 MIN: CPT

## 2025-06-24 RX ORDER — MECLIZINE HYDROCHLORIDE 25 MG/1
25 TABLET ORAL 3 TIMES DAILY PRN
Qty: 30 TABLET | Refills: 0 | Status: SHIPPED | OUTPATIENT
Start: 2025-06-24

## 2025-06-24 RX ORDER — METHYLPREDNISOLONE 4 MG/1
TABLET ORAL
Qty: 1 EACH | Refills: 0 | Status: SHIPPED | OUTPATIENT
Start: 2025-06-24

## 2025-06-24 NOTE — DISCHARGE INSTRUCTIONS
VISIT SUMMARY:  Today, you were seen for a clogged ear and dizziness. You mentioned that your ear started getting plugged on Sunday and was completely clogged by Monday, affecting your hearing. You also described dizziness when moving your head. You have a history of heart failure and are on multiple medications. You recently had a tooth removed due to an infection and have a history of allergies.    YOUR PLAN:  -EAR EFFUSION WITH VERTIGO: You have fluid accumulation behind your eardrum, which is causing your ear to feel clogged and making you dizzy. This is likely due to sinus issues. You have been prescribed meclizine to help with the dizziness and a six-day tapering steroid pack to reduce the fluid. A topical nasal steroid spray was also offered to help with your sinus issues. Please monitor for any stroke symptoms and seek emergency care if they occur.    -ALLERGIC RHINITIS: Your allergies are contributing to the fluid in your ear and your dizziness. It was recommended that you change your HVAC filter to help reduce your exposure to allergens.    -CONGESTIVE HEART FAILURE: You have heart failure, which means your heart doesn't pump blood as well as it should. You were advised to maintain your fluid intake as recommended by your cardiologist, which is approximately 50-60 ounces per day.    INSTRUCTIONS:  Please follow the prescribed medication regimen and monitor for any stroke symptoms. If you experience any severe symptoms or have concerns, seek emergency care immediately. Change your HVAC filter to help with your allergies. Maintain your fluid intake as recommended by your cardiologist.    Contains text generated by Vikram

## 2025-06-24 NOTE — ED INITIAL ASSESSMENT (HPI)
C/o left ear clogged and dizziness since Sunday. Completed 4 rounds of antibiotic for tooth infection 4 days ago. Denies chest pain.

## 2025-06-24 NOTE — ED PROVIDER NOTES
Patient Seen in: [facility]       The following individual(s) verbally consented to be recorded using ambient AI listening technology and understand that they can each withdraw their consent to this listening technology at any point by asking the clinician to turn off or pause the recording:    Patient name: Sally Gonzales        History  Chief Complaint   Patient presents with    Ear Problem    Dizziness     Stated Complaint: Lt ear issue/dizzy    Subjective:   HPI     Sally Gonzales is a 74 year old female with heart failure who presents with a clogged ear and dizziness.    Her ear started getting plugged on Sunday, and by Monday it was completely clogged, affecting her hearing. She attempted to alleviate the ear clogging using drops and a syringe, but these methods were ineffective. No previous episodes of ear clogging or dizziness.    She experiences dizziness described as 'I can't under walk' and 'slams' when moving her head. No history of head trauma, visual changes, severe headache, weakness, or numbness.    She has a history of heart failure and is on multiple prescription medications, though the specific medications are not listed. She mentions a past adverse reaction to a high dose of prednisone, which caused dizziness.    She recently had a tooth removed due to a bad infection and reports a history of allergies, though specific allergens are not identified.        Objective:     Past Medical History:    Acute sinusitis, unspecified    Anxiety state    Arrhythmia    Irregular heart beat    Asthma (HCC)    Asthmatic/bronchitis 8 years ago, not on Asthma meds or steroids    Asymptomatic postmenopausal status (age-related) (natural)    Asymptomatic varicose veins    Breast CA (HCC)    Left mastctomy for DCIS    Breast CA (HCC)    Right mastectomy with chemo and radiation    Cardiomyopathy (HCC)    Cataract    Congenital pes planus    Depression    Disorder of thyroid    enlarged thyroid     Exposure to radiation    HIGH BLOOD PRESSURE    High cholesterol    Lymph edema    left arm and right arm    Osteoarthritis    Personal history of antineoplastic chemotherapy    PICC (peripherally inserted central catheter) in place    Unspecified asthma(493.90)    Unspecified essential hypertension    Visual impairment    glasses              Past Surgical History:   Procedure Laterality Date    Breast biopsy  2014      1983    Colonoscopy N/A 2023    Procedure: COLONOSCOPY;  Surgeon: Cesar Blum MD;  Location:  ENDOSCOPY    Knee arthroscp harv Left     Danny localization wire 1 site right (cpt=19281)      Mastectomy left      Mastectomy right  2014    Right mastectomy with sentinel lymph node biopsy, injection of blue dye for sentinel lymph node identification, completion axillary lymph node dissection, and advancement flap mastopexy    Shoulder arthroplasty Left     - Dr. Fairchild    Tonsillectomy      Total knee replacement Right 2017    Total knee replacement Right 05/10/2017    Revision-Dr. Fairchild    Total knee replacement Right 2017    Revision  Dr. Fairchild                Social History     Socioeconomic History    Marital status:     Number of children: 1   Occupational History    Occupation: stay at home mom   Tobacco Use    Smoking status: Never    Smokeless tobacco: Never   Vaping Use    Vaping status: Never Used   Substance and Sexual Activity    Alcohol use: No    Drug use: No    Sexual activity: Not Currently     Partners: Male   Other Topics Concern    Blood Transfusions No    Caffeine Concern Yes    Exercise No    Seat Belt Yes     Social Drivers of Health     Food Insecurity: Low Risk  (10/14/2024)    Received from [facility]    Food Insecurity     Have there been times that your food ran out, and you didn't have money to get more?: No     Are there times that you worry that this might happen?: No   Transportation Needs: Low  Risk  (10/14/2024)    Received from [facility]    Transportation Needs     Do you have trouble getting transportation to medical appointments?: No   Housing Stability: Low Risk  (10/14/2024)    Received from [facility]    Housing Stability     Are you worried that your electric, gas, oil, or water might be shut off?: No     Are you concerned about having a safe and reliable place to live?: No              Review of Systems   Constitutional: Negative.    HENT:  Positive for congestion and hearing loss.    Eyes: Negative.    Respiratory: Negative.     Cardiovascular: Negative.    Gastrointestinal: Negative.    Endocrine: Negative.    Genitourinary: Negative.    Musculoskeletal: Negative.    Skin: Negative.    Allergic/Immunologic: Negative.    Neurological:  Positive for dizziness.   Hematological: Negative.    Psychiatric/Behavioral: Negative.         Positive for stated complaint: Lt ear issue/dizzy  Other systems are as noted in HPI.  Constitutional and vital signs reviewed.      All other systems reviewed and negative except as noted above.                  Physical Exam    ED Triage Vitals [06/24/25 1220]   /76   Pulse 115   Resp 22   Temp 98.6 °F (37 °C)   Temp src Oral   SpO2 97 %   O2 Device None (Room air)       Current Vitals:   Vital Signs  BP: 137/76  Pulse: 115  Resp: 22  Temp: 98.6 °F (37 °C)  Temp src: Oral    Oxygen Therapy  SpO2: 97 %  O2 Device: None (Room air)            Physical Exam  Vitals and nursing note reviewed.   Constitutional:       Appearance: She is well-developed.   HENT:      Head: Normocephalic and atraumatic.      Right Ear: A middle ear effusion is present.      Left Ear: A middle ear effusion is present.      Nose: Congestion present.      Comments: Bilateral pale boggy turbinates noted.     Mouth/Throat:      Mouth: Mucous membranes are moist.   Eyes:      Extraocular Movements: Extraocular movements intact.      Right eye: No  nystagmus.      Left eye: No nystagmus.      Pupils: Pupils are equal, round, and reactive to light.   Cardiovascular:      Rate and Rhythm: Normal rate and regular rhythm.      Heart sounds: Normal heart sounds.   Pulmonary:      Effort: Pulmonary effort is normal.      Breath sounds: Normal breath sounds.   Musculoskeletal:      Cervical back: Normal range of motion and neck supple.   Neurological:      Mental Status: She is alert.   Psychiatric:         Mood and Affect: Mood normal.               ED Course  Labs Reviewed - No data to display                         MDM     Sally Gonzales is a 74 year old female with heart failure who presents with a clogged ear and dizziness.    Her ear started getting plugged on Sunday, and by Monday it was completely clogged, affecting her hearing. She attempted to alleviate the ear clogging using drops and a syringe, but these methods were ineffective. No previous episodes of ear clogging or dizziness.    She experiences dizziness described as 'I can't under walk' and 'slams' when moving her head. No history of head trauma, visual changes, severe headache, weakness, or numbness.    She has a history of heart failure and is on multiple prescription medications, though the specific medications are not listed. She mentions a past adverse reaction to a high dose of prednisone, which caused dizziness.    She recently had a tooth removed due to a bad infection and reports a history of allergies, though specific allergens are not identified.  Vital signs: Please see EMR.  Physical exam: Please see exam.  Differential diagnosis: Positional vertigo, otitis media, cerumen impaction, ear effusion.  Based on physical exam and HPI will diagnosed with allergic rhinitis, ear effusion and vertigo.  Will prescribe a steroid pack with meclizine.  ED precautions given.        Medical Decision Making  Sally Gonzales is a 74 year old female with heart failure who presents with a clogged ear  and dizziness.    Her ear started getting plugged on Sunday, and by Monday it was completely clogged, affecting her hearing. She attempted to alleviate the ear clogging using drops and a syringe, but these methods were ineffective. No previous episodes of ear clogging or dizziness.    She experiences dizziness described as 'I can't under walk' and 'slams' when moving her head. No history of head trauma, visual changes, severe headache, weakness, or numbness.    She has a history of heart failure and is on multiple prescription medications, though the specific medications are not listed. She mentions a past adverse reaction to a high dose of prednisone, which caused dizziness.    She recently had a tooth removed due to a bad infection and reports a history of allergies, though specific allergens are not identified.    Problems Addressed:  Acute effusion of left ear: acute illness or injury  Allergic rhinitis, unspecified seasonality, unspecified trigger: acute illness or injury  Benign paroxysmal positional vertigo due to bilateral vestibular disorder: acute illness or injury    Risk  Prescription drug management.        Disposition and Plan     Clinical Impression:  1. Acute effusion of left ear    2. Allergic rhinitis, unspecified seasonality, unspecified trigger    3. Benign paroxysmal positional vertigo due to bilateral vestibular disorder         Disposition:  Discharge  6/24/2025 12:58 pm    Follow-up:  Mackenzie Harp DO  [address] [place] [id number]  [phone]    In 1 week            Medications Prescribed:  Current Discharge Medication List        START taking these medications    Details   methylPREDNISolone (MEDROL) 4 MG Oral Tablet Therapy Pack Dosepack: take as directed  Qty: 1 each, Refills: 0      meclizine 25 MG Oral Tab Take 1 tablet (25 mg total) by mouth 3 (three) times daily as needed.  Qty: 30 tablet, Refills: 0                   Supplementary Documentation:

## 2025-07-02 ENCOUNTER — TELEMEDICINE (OUTPATIENT)
Dept: FAMILY MEDICINE CLINIC | Facility: CLINIC | Age: 75
End: 2025-07-02
Payer: MEDICARE

## 2025-07-02 ENCOUNTER — TELEPHONE (OUTPATIENT)
Dept: FAMILY MEDICINE CLINIC | Facility: CLINIC | Age: 75
End: 2025-07-02

## 2025-07-02 ENCOUNTER — HOSPITAL ENCOUNTER (OUTPATIENT)
Dept: CT IMAGING | Facility: HOSPITAL | Age: 75
Discharge: HOME | End: 2025-07-02
Attending: FAMILY MEDICINE
Payer: MEDICARE

## 2025-07-02 DIAGNOSIS — R42 VERTIGO: Primary | ICD-10-CM

## 2025-07-02 DIAGNOSIS — K04.7 DENTAL INFECTION: ICD-10-CM

## 2025-07-02 DIAGNOSIS — R42 VERTIGO: ICD-10-CM

## 2025-07-02 DIAGNOSIS — H92.02 OTALGIA OF LEFT EAR: ICD-10-CM

## 2025-07-02 PROCEDURE — 70450 CT HEAD/BRAIN W/O DYE: CPT | Performed by: FAMILY MEDICINE

## 2025-07-02 PROCEDURE — 99214 OFFICE O/P EST MOD 30 MIN: CPT | Performed by: FAMILY MEDICINE

## 2025-07-02 NOTE — TELEPHONE ENCOUNTER
Pt said Dr. Harp was ordering a STAT CT scan for her.  Pt tried to sched and was told it was not ordered STAT

## 2025-07-02 NOTE — TELEPHONE ENCOUNTER
Patient had video visit today.  Patient states she was to have stat CT.Please advise if it was to be ordered stat.

## 2025-07-02 NOTE — PROGRESS NOTES
This visit is conducted using Telemedicine with live, interactive video and audio.    Telehealth outside of [facility]  Telehealth Verbal Consent   I conducted a telehealth visit with Sally Gonzales today, 07/02/25, which was completed using two-way, real-time interactive audio and video communication. This has been done in good nnamdi to provide continuity of care in the best interest of the provider-patient relationship, due to the COVID -19 public health crisis/national emergency where restrictions of face-to-face office visits are ongoing. Every conscious effort was taken to allow for sufficient and adequate time to complete the visit.  The patient was made aware of the limitations of the telehealth visit, including treatment limitations as no physical exam could be performed.  The patient was advised to call 911 or to go to the ER in case there was an emergency.  The patient was also advised of the potential privacy & security concerns related to the telehealth platform.   The patient was made aware of where to find [employer] [facility]'s notice of privacy practices, telehealth consent form and other related consent forms and documents.  which are located on the [employer] [facility] website. The patient verbally agreed to telehealth consent form, related consents and the risks discussed.    Lastly, the patient confirmed that they were in [place].   Included in this visit, time may have been spent reviewing labs, medications, radiology tests and decision making. Appropriate medical decision-making and tests are ordered as detailed in the plan of care above.  Coding/billing information is submitted for this visit based on complexity of care and/or time spent for the visit.    Pt had broken tooth 5/16  Saw dentist -  abx  Oral surgeon 6/12 -  abx  6/21 ear felt blocked and had vertigo / tooth extraction was also on the left side lower  UC last week and was given prednisone / meclizine   Normal home bp   Patient denies chest pain, shortness of  breath, dizziness, and lightheadedness. No exertional symptoms.  No fever  No sinus pain // high pain threshold     Exam  alert, appears stated age and cooperative, Normocephalic, without obvious abnormality, atraumatic, lips, mucosa, and tongue normal; teeth and gums normal, Speaking in full sentences comfortably, Normal work of breathing, Skin color, texture, turgor normal. No rashes or lesions and age appropriate, normal, logical connections, person, place and time/date and no suicidal ideation          A/P  Pt here with     1. Vertigo  Discussed PT   - CT BRAIN OR HEAD (CPT=70450); Future    2. Dental infection  With probiotics   - amoxicillin clavulanate 875-125 MG Oral Tab; Take 1 tablet by mouth 2 (two) times daily for 10 days.  Dispense: 20 tablet; Refill: 0    3. Otalgia of left ear  Monitor         RTC  in one week if not better or sooner if needed  Questions answered and pt  expressed understanding

## 2025-07-10 ENCOUNTER — TELEPHONE (OUTPATIENT)
Dept: FAMILY MEDICINE CLINIC | Facility: CLINIC | Age: 75
End: 2025-07-10

## 2025-07-10 DIAGNOSIS — G47.09 OTHER INSOMNIA: ICD-10-CM

## 2025-07-10 DIAGNOSIS — R09.82 PND (POST-NASAL DRIP): ICD-10-CM

## 2025-07-10 RX ORDER — TRAZODONE HYDROCHLORIDE 50 MG/1
TABLET ORAL NIGHTLY
Qty: 180 TABLET | Refills: 0 | Status: SHIPPED | OUTPATIENT
Start: 2025-07-10

## 2025-07-10 RX ORDER — FLUTICASONE PROPIONATE 50 MCG
2 SPRAY, SUSPENSION (ML) NASAL NIGHTLY
Qty: 48 G | Refills: 0 | Status: SHIPPED | OUTPATIENT
Start: 2025-07-10

## 2025-07-10 NOTE — TELEPHONE ENCOUNTER
Pt has one day of antibiotic left.  Her ear is still blocked,  still has sinus issues and coughing up clear stuff.  What is the next step?

## 2025-07-10 NOTE — TELEPHONE ENCOUNTER
Patient will complete Augmentin tomorrow.  She is still using Fluticasone NS as recommended by Dr Harp. States hearing in Left ear remains completely blocked. Otalgia remains. She has follow up with LR on 07/18/25 but is hoping ear issue can be addressed sooner. Afebrile. Cough is less but productive and she denies need for cough suppressant.  Please advise.  Thank you.

## 2025-07-11 ENCOUNTER — TELEPHONE (OUTPATIENT)
Dept: FAMILY MEDICINE CLINIC | Facility: CLINIC | Age: 75
End: 2025-07-11

## 2025-07-11 ENCOUNTER — OFFICE VISIT (OUTPATIENT)
Dept: FAMILY MEDICINE CLINIC | Facility: CLINIC | Age: 75
End: 2025-07-11
Payer: MEDICARE

## 2025-07-11 VITALS
RESPIRATION RATE: 16 BRPM | SYSTOLIC BLOOD PRESSURE: 118 MMHG | OXYGEN SATURATION: 97 % | HEART RATE: 88 BPM | WEIGHT: 181 LBS | BODY MASS INDEX: 27.43 KG/M2 | HEIGHT: 68 IN | DIASTOLIC BLOOD PRESSURE: 60 MMHG

## 2025-07-11 DIAGNOSIS — R42 VERTIGO: Primary | ICD-10-CM

## 2025-07-11 DIAGNOSIS — J34.89 SINUS PAIN: ICD-10-CM

## 2025-07-11 DIAGNOSIS — H92.02 LEFT EAR PAIN: ICD-10-CM

## 2025-07-11 DIAGNOSIS — R42 DIZZINESS: ICD-10-CM

## 2025-07-11 PROCEDURE — 99215 OFFICE O/P EST HI 40 MIN: CPT | Performed by: FAMILY MEDICINE

## 2025-07-11 RX ORDER — CEFDINIR 300 MG/1
300 CAPSULE ORAL 2 TIMES DAILY
Qty: 20 CAPSULE | Refills: 0 | Status: SHIPPED | OUTPATIENT
Start: 2025-07-11 | End: 2025-07-21

## 2025-07-11 NOTE — PROGRESS NOTES
Pt here to f/u on ear pain and dizziness     Pt had broken tooth 5/16  Saw dentist -  abx  Oral surgeon 6/12 -  abx  6/21 ear felt blocked and had vertigo / tooth extraction was also on the left side lower  UC 6/24 prednisone / meclizine   Normal home bp   Patient denies chest pain, shortness of breath, dizziness, and lightheadedness. No exertional symptoms.  No fever  No sinus pain // high pain threshold   Some post nasal drip   Some coughing - clear sputum   Left ear feels blocked  No fever  Abx did not help  Flonase helping some    Vertigo still present ; s/p normal head CT  No focal neuro symptoms         /60   Pulse 88   Resp 16   Ht 5' 8\" (1.727 m)   Wt 181 lb (82.1 kg)   LMP  (LMP Unknown)   SpO2 97%   BMI 27.52 kg/m²     GENERAL: alert and oriented X 3, well developed, well nourished,in no apparent distress  CARDIO: RRR without murmur  LUNGS: clear to auscultation  NECK: supple,no adenopathy  HEENT: atraumatic, normocephalic   TM's effusion no redness bilaterally    Nl  posterior pharynx    Nl  nasal mucosa    + sinus tenderness   Neg latoya hallpike   EYES:PERRLA, EOMI, normal,conjunctiva are clear  SKIN: norashes,no suspicious lesions      A/P  Pt here with     1. Vertigo    - Neuro Referral - In Network  - Physical Therapy Referral - [facility] Location  - ENT Referral - In [facility]  -  [facility] MRI BRAIN/IAC (ALL W+WO CNTRST) (CPT=70553); Future  -  [facility] MRI BRAIN/IAC (ALL W+WO CNTRST) (CPT=70553)    2. Dizziness  Increase hydration   Cut dose of furosemide in half   - Neuro Referral - In [facility]  -  [facility] MRI BRAIN/IAC (ALL W+WO CNTRST) (CPT=70553); Future  -  [facility] MRI BRAIN/IAC (ALL W+WO CNTRST) (CPT=70553)    3. Sinus pain  Take with probiotic   - ENT Referral - In Network  - cefdinir 300 MG Oral Cap; Take 1 capsule (300 mg total) by mouth 2 (two) times daily for 10 days.  Dispense: 20 capsule; Refill: 0    4. Left ear pain    - ENT Referral - In Network        RTC  in one week if not  better or sooner if needed  Questions answered and pt  expressed understanding

## 2025-07-11 NOTE — H&P
To cut furosemide in 1/2 per LE but she reports she is already taking half dose. Pt said she will speak to cardiologist to confirm and contact our office for further instructions when she's confirmed.

## 2025-07-11 NOTE — TELEPHONE ENCOUNTER
Patient was seen today and calling to verify dosage of furosemide, pt takes  - 1 half of a 20 mg tablet

## 2025-07-24 ENCOUNTER — OFFICE VISIT (OUTPATIENT)
Dept: FAMILY MEDICINE CLINIC | Facility: CLINIC | Age: 75
End: 2025-07-24
Payer: MEDICARE

## 2025-07-24 VITALS
BODY MASS INDEX: 27.28 KG/M2 | HEART RATE: 112 BPM | RESPIRATION RATE: 16 BRPM | DIASTOLIC BLOOD PRESSURE: 70 MMHG | OXYGEN SATURATION: 97 % | HEIGHT: 68 IN | SYSTOLIC BLOOD PRESSURE: 114 MMHG | WEIGHT: 180 LBS

## 2025-07-24 DIAGNOSIS — Z90.13 H/O BILATERAL MASTECTOMY: ICD-10-CM

## 2025-07-24 DIAGNOSIS — Z85.3 HISTORY OF BREAST CANCER: ICD-10-CM

## 2025-07-24 DIAGNOSIS — R42 DIZZINESS: ICD-10-CM

## 2025-07-24 DIAGNOSIS — R42 VERTIGO: ICD-10-CM

## 2025-07-24 DIAGNOSIS — Z80.3 FAMILY HISTORY OF BREAST CANCER IN FEMALE: ICD-10-CM

## 2025-07-24 DIAGNOSIS — E04.9 GOITER: ICD-10-CM

## 2025-07-24 DIAGNOSIS — H91.92 DECREASED HEARING OF LEFT EAR: ICD-10-CM

## 2025-07-24 DIAGNOSIS — R93.89 THICKENED ENDOMETRIUM: Primary | ICD-10-CM

## 2025-07-24 PROBLEM — D69.6 DECREASED PLATELET COUNT: Status: ACTIVE | Noted: 2025-07-24

## 2025-07-24 PROCEDURE — 99214 OFFICE O/P EST MOD 30 MIN: CPT | Performed by: FAMILY MEDICINE

## 2025-07-24 NOTE — PROGRESS NOTES
Pt here to f/u on ear pain and dizziness       Pt had broken tooth 5/16  Saw dentist -  abx  Oral surgeon 6/12 -  abx  6/21 ear felt blocked and had vertigo / tooth extraction was also on the left side lower  UC 6/24 prednisone / meclizine   Normal home bp   Patient denies chest pain, shortness of breath, dizziness, and lightheadedness. No exertional symptoms.  No fever  No sinus pain   Some post nasal drip   No coughing   Left ear still  feels blocked  No fever  Abx did not help  Prednisone did not help   Flonase helping some    Vertigo still present ; s/p normal head CT  No focal neuro symptoms     Will see ent and neuro soon  MRI scheduled         /70   Pulse 112   Resp 16   Ht 5' 8\" (1.727 m)   Wt 180 lb (81.6 kg)   LMP  (LMP Unknown)   SpO2 97%   BMI 27.37 kg/m²     GENERAL: alert and oriented X 3, well developed, well nourished,in no apparent distress  CARDIO: RRR without murmur  LUNGS: clear to auscultation  NECK: supple,no adenopathy  HEENT: atraumatic, normocephalic   TM's effusion no redness bilaterally    Nl  posterior pharynx    Nl  nasal mucosa    + sinus tenderness   Neg latoya hallpike   EYES:PERRLA, EOMI, normal,conjunctiva are clear  SKIN: norashes,no suspicious lesions      A/P  Pt here with     1. Thickened endometrium    - z Insight US PELVIS (TRANSABDOMINAL AND TRANSVAGINAL) (CPT=76856/60395) [[id number]]; Future  - z [facility]ight US PELVIS (TRANSABDOMINAL AND TRANSVAGINAL) (CPT=76856/85727) [[id number]]    2. History of breast cancer    - z [facility]ight US PELVIS (TRANSABDOMINAL AND TRANSVAGINAL) (CPT=76856/06074) [[id number]]; Future  - z [facility]ight US PELVIS (TRANSABDOMINAL AND TRANSVAGINAL) (CPT=76856/76476) [[id number]]    3. Family history of breast cancer in female      4. Vertigo  See ENT / neuro and check MRI     5. Dizziness  Increase hydration     6. Goiter    - z [facility] US HEAD/NECK (CPT=76536); Future  - z [facility] US HEAD/NECK (CPT=76536)    7. H/O bilateral mastectomy      8. Decreased hearing  of left ear      RTC  in one week if not better or sooner if needed  Questions answered and pt  expressed understanding

## 2025-07-30 ENCOUNTER — TELEPHONE (OUTPATIENT)
Facility: LOCATION | Age: 75
End: 2025-07-30

## 2025-08-05 ENCOUNTER — OFFICE VISIT (OUTPATIENT)
Facility: LOCATION | Age: 75
End: 2025-08-05

## 2025-08-05 DIAGNOSIS — J32.0 CHRONIC MAXILLARY SINUSITIS: ICD-10-CM

## 2025-08-05 DIAGNOSIS — R42 VERTIGO: ICD-10-CM

## 2025-08-05 DIAGNOSIS — R09.82 POST-NASAL DRAINAGE: Primary | ICD-10-CM

## 2025-08-05 DIAGNOSIS — H93.293 ABNORMAL AUDITORY PERCEPTION OF BOTH EARS: Primary | ICD-10-CM

## 2025-08-05 PROCEDURE — 92567 TYMPANOMETRY: CPT | Performed by: AUDIOLOGIST

## 2025-08-05 PROCEDURE — 99214 OFFICE O/P EST MOD 30 MIN: CPT | Performed by: OTOLARYNGOLOGY

## 2025-08-05 PROCEDURE — 92557 COMPREHENSIVE HEARING TEST: CPT | Performed by: AUDIOLOGIST

## 2025-08-14 ENCOUNTER — OFFICE VISIT (OUTPATIENT)
Dept: NEUROLOGY | Facility: CLINIC | Age: 75
End: 2025-08-14

## 2025-08-14 VITALS
DIASTOLIC BLOOD PRESSURE: 60 MMHG | SYSTOLIC BLOOD PRESSURE: 110 MMHG | WEIGHT: 180 LBS | HEIGHT: 68 IN | HEART RATE: 84 BPM | OXYGEN SATURATION: 97 % | RESPIRATION RATE: 16 BRPM | BODY MASS INDEX: 27.28 KG/M2

## 2025-08-14 DIAGNOSIS — H93.3X2: Primary | ICD-10-CM

## 2025-08-14 DIAGNOSIS — R26.81 UNSTEADY GAIT: ICD-10-CM

## 2025-08-14 DIAGNOSIS — R20.8 DECREASED VIBRATORY SENSE: ICD-10-CM

## 2025-08-14 PROCEDURE — 99204 OFFICE O/P NEW MOD 45 MIN: CPT | Performed by: OTHER

## 2025-08-27 RX ORDER — POTASSIUM CHLORIDE 1500 MG/1
1 TABLET, EXTENDED RELEASE ORAL DAILY
Qty: 90 TABLET | Refills: 0 | Status: SHIPPED | OUTPATIENT
Start: 2025-08-27

## (undated) DIAGNOSIS — E87.6 CHRONICALLY LOW SERUM POTASSIUM: Primary | ICD-10-CM

## (undated) DEVICE — FLEXIBLE YANKAUER,MEDIUM TIP, NO VACUUM CONTROL: Brand: ARGYLE

## (undated) DEVICE — BANDAGE ROLL,100% COTTON, 6 PLY, LARGE: Brand: KERLIX

## (undated) DEVICE — NEEDLE HPO 18GA 1.5IN ECLPS

## (undated) DEVICE — REM POLYHESIVE ADULT PATIENT RETURN ELECTRODE: Brand: VALLEYLAB

## (undated) DEVICE — DRAPE SHEET LG

## (undated) DEVICE — CHLORAPREP 26ML APPLICATOR

## (undated) DEVICE — GOWN SURG AERO BLUE PERF LG

## (undated) DEVICE — BLADE SAW SAGITTAL 19.5

## (undated) DEVICE — PICO 7 10CM X 40CM: Brand: PICO™ 7

## (undated) DEVICE — 450 ML BOTTLE OF 0.05% CHLORHEXIDINE GLUCONATE IN 99.95% STERILE WATER FOR IRRIGATION, USP AND APPLICATOR.: Brand: IRRISEPT ANTIMICROBIAL WOUND LAVAGE

## (undated) DEVICE — COVER SGL STRL LGHT HNDL BLU

## (undated) DEVICE — 1200CC GUARDIAN II: Brand: GUARDIAN

## (undated) DEVICE — TOTAL KNEE: Brand: MEDLINE INDUSTRIES, INC.

## (undated) DEVICE — FAN SPRAY KIT: Brand: PULSAVAC®

## (undated) DEVICE — STERILE POLYISOPRENE POWDER-FREE SURGICAL GLOVES: Brand: PROTEXIS

## (undated) DEVICE — 3M™ IOBAN™ 2 ANTIMICROBIAL INCISE DRAPE 6640EZ: Brand: IOBAN™ 2

## (undated) DEVICE — PROXIMATE SKIN STAPLERS (35 WIDE) CONTAINS 35 STAINLESS STEEL STAPLES (FIXED HEAD): Brand: PROXIMATE

## (undated) DEVICE — SUTURE PDS II 2-0 CP

## (undated) DEVICE — Device: Brand: JELCO

## (undated) DEVICE — CONTAINER SPEC STR 4OZ GRY LID

## (undated) DEVICE — T5 HOOD WITH PEEL AWAY FACE SHIELD

## (undated) DEVICE — DRESSING SILVERLON 2X12

## (undated) DEVICE — Device: Brand: POWER-FLO®

## (undated) DEVICE — Device

## (undated) DEVICE — TRAY SRGPRP PVP IOD WT SCRB SM

## (undated) DEVICE — KENDALL SCD EXPRESS SLEEVES, KNEE LENGTH, MEDIUM: Brand: KENDALL SCD

## (undated) DEVICE — SOL  .9 1000ML BTL

## (undated) DEVICE — 20 ML SYRINGE LUER-LOCK TIP: Brand: MONOJECT

## (undated) DEVICE — 3M™ TEGADERM™ TRANSPARENT FILM DRESSING, 1626W, 4 IN X 4-3/4 IN (10 CM X 12 CM), 50 EACH/CARTON, 4 CARTON/CASE: Brand: 3M™ TEGADERM™

## (undated) DEVICE — 3M™ IOBAN™ 2 ANTIMICROBIAL INCISE DRAPE 6650EZ: Brand: IOBAN™ 2

## (undated) DEVICE — SUTURE MONOCRYL 2-0 SH

## (undated) DEVICE — KIT DRN 1/8IN PVC 3 SPRG EVAC

## (undated) DEVICE — SOL  .9 1000ML BAG

## (undated) DEVICE — SPONGE LAP 18X18 XRAY STRL

## (undated) DEVICE — Device: Brand: STABLECUT®

## (undated) DEVICE — ENDOSCOPY PACK - LOWER: Brand: MEDLINE INDUSTRIES, INC.

## (undated) DEVICE — 2T11 #2 PDO 36 X 36: Brand: 2T11 #2 PDO 36 X 36

## (undated) DEVICE — BIPOLAR SEALER 23-112-1 AQM 6.0: Brand: AQUAMANTYS™

## (undated) DEVICE — SEAL BIPLR AQUAMANTYS 9.5 XL

## (undated) DEVICE — PICO SINGLE USE NEGATIVE PRESSURE WOUND THERAPY SYSTEM 10CM X 30CM 4IN. X 12IN.: Brand: PICO

## (undated) DEVICE — ZIMMER® STERILE DISPOSABLE TOURNIQUET CUFF WITH PLC, DUAL PORT, SINGLE BLADDER, 30 IN. (76 CM)

## (undated) DEVICE — GAUZE SPONGES,12 PLY: Brand: CURITY

## (undated) DEVICE — BLADE 77.5X11.2MM RECIP SRG

## (undated) DEVICE — INTENDED FOR TISSUE SEPARATION, AND OTHER PROCEDURES THAT REQUIRE A SHARP SURGICAL BLADE TO PUNCTURE OR CUT.: Brand: BARD-PARKER ® STAINLESS STEEL BLADES

## (undated) DEVICE — BATTERY

## (undated) DEVICE — FILTERLINE NASAL ADULT O2/CO2

## (undated) DEVICE — KNEE IMMOBILIZER: Brand: DEROYAL

## (undated) DEVICE — CULTURE COLLECT/TRANSPORT SYS

## (undated) DEVICE — CULTURE TUBE ANAEROBIC

## (undated) DEVICE — 3M™ RED DOT™ MONITORING ELECTRODE WITH FOAM TAPE AND STICKY GEL, 50/BAG, 20/CASE, 72/PLT 2570: Brand: RED DOT™

## (undated) DEVICE — ZIMMER® STERILE DISPOSABLE TOURNIQUET CUFF WITH PLC, DUAL PORT, SINGLE BLADDER, 34 IN. (86 CM)

## (undated) DEVICE — 3M™ COBAN™ NL STERILE NON-LATEX SELF-ADHERENT WRAP, 2084S, 4 IN X 5 YD (10 CM X 4,5 M), 18 ROLLS/CASE: Brand: 3M™ COBAN™

## (undated) DEVICE — Device: Brand: DEFENDO AIR/WATER/SUCTION AND BIOPSY VALVE

## (undated) DEVICE — STERILE LATEX POWDER-FREE SURGICAL GLOVESWITH NITRILE COATING: Brand: PROTEXIS

## (undated) DEVICE — LAWSON - GDWR AMPLATZ SS 035X1X260 ST

## (undated) DEVICE — LEGEND II FIXATION PIN .125IN X 3IN: Brand: LEGEND II

## (undated) NOTE — LETTER
[facility] Department of OB/GYN  After Care Instructions for Endometrial Biopsy      Biopsy Results   You will receive a phone call with your biopsy results in 7 business days.  If you have not received your results in 7 days, please contact our office.  The results of your biopsy will determine if further treatment will be necessary.    Bleeding   You may have some light bleeding or blackish clumpy discharge for several days after your biopsy.    Restrictions    You should avoid intercourse or tampon use for 1 day after your biopsy.    Pain    You may experience mild menstrual cramping after your biopsy.  You may use Ibuprofen, Aleve or Tylenol to relieve your discomfort.  If you experience severe or persistent pain contact our office.      If you have any additional questions, please call us at [phone].

## (undated) NOTE — ED AVS SNAPSHOT
[facility] Emergency Department    [place]  [address] [address]    Phone:  [phone]    Fax:  [place]   MRN: [id number]    Department:  [facility] Emergency Department   Date of Visit:  4/2 IF THERE IS ANY CHANGE OR WORSENING OF YOUR CONDITION, CALL YOUR PRIMARY CARE PHYSICIAN AT ONCE OR RETURN IMMEDIATELY TO THE EMERGENCY DEPARTMENT.     If you have been prescribed any medication(s), please fill your prescription right away and begin taking t

## (undated) NOTE — LETTER
AUTHORIZATION FOR SURGICAL OPERATION OR OTHER PROCEDURE    1. I hereby authorize Dr. Albertina Butler, and [facility] [facility] staff assigned to my case to perform the following operation and/or procedure at the [facility]:    _______________________________________________________________________________________________    Cortisone injection bilateral foot  _______________________________________________________________________________________________    2. My physician has explained the nature and purpose of the operation or other procedure, possible alternative methods of treatment, the risks involved, and the possibility of complication to me. I acknowledge that no guarantee has been made as to the result that may be obtained. 3.  I recognize that, during the course of this operation, or other procedure, unforseen conditions may necessitate additional or different procedure than those listed above. I, therefore, further authorize and request that the above named physician, his/her physician assistants or designees perform such procedures as are, in his/her professional opinion, necessary and desirable. 4.  Any tissue or organs removed in the operation or other procedure may be disposed of by and at the discretion of the [facility] and [facility] AT [facility]. 5.  I understand that in the event of a medical emergency, I will be transported by local paramedics to [facility] or other hospital emergency department. 6.  I certify that I have read and fully understand the above consent to operation and/or other procedure. 7.  I acknowledge that my physician has explained sedation/analgesia administration to me including the risks and benefits. I consent to the administration of sedation/analgesia as may be necessary or desirable in the judgement of my physician.     Witness signature: ___________________________________________________ Date:  ______/______/_____ Time:  ________ A. M.  P.M. Patient Name:  ______________________________________________________  (please print)      Patient signature:  ___________________________________________________             Relationship to Patient:           []  Parent    Responsible person                          []  Spouse  In case of minor or                    [] Other  _____________   Incompetent name:  __________________________________________________                               (please print)      _____________      Responsible person  In case of minor or  Incompetent signature:  _______________________________________________    Statement of Physician  My signature below affirms that prior to the time of the procedure, I have explained to the patient and/or his/her guardian, the risks and benefits involved in the proposed treatment and any reasonable alternative to the proposed treatment. I have also explained the risks and benefits involved in the refusal of the proposed treatment and have answered the patient's questions.                         Date:  ______/______/_______  Provider                      Signature:  __________________________________________________________       Time:  ___________ A.M    P.M.

## (undated) NOTE — LETTER
10/04/22    Patient: Jim Chaves  : 1950 Visit date: 10/4/2022    Dear  Maryam Gamino DO    Thank you for referring Jim Chaves to my practice. Please find my assessment and plan below. Assessment   Encounter for screening colonoscopy  (primary encounter diagnosis)  S/P mitral valve clip implantation  Coronary artery disease involving native coronary artery of native heart without angina pectoris  Chronic diastolic CHF (congestive heart failure) ([address]  Essential hypertension  Status post coronary artery stent placement      Plan   I am seeing this patient in consultation regarding screening colonoscopy. The patient has never had a previous colonoscopy. The patient has no current history of bright red blood per rectum or melena. She has no mucus in the stool. She does have occasional narrowing of the stool, etiology unclear. She does not have diarrhea or constipation. She has no abdominal pain or distention. She has not suffered weight loss as a symptom. She has never been diagnosed with Crohn's disease, ulcerative colitis, or irritable bowel syndrome. She has a mother who had colon polyps at age 76. There are no first-degree or second-degree relatives with either colon cancer, or cancer of the uterus. Her only prior abdominal operation was a . She had a coronary artery stent placed 2 years ago. She had a mitral valve clip placed. She is on clopidogrel. Clinical exam reveals her abdomen to be soft, nontender, nondistended, good bowel sounds. No guarding or rebound. No masses. No ascites. Liver and spleen are not palpable. Body weight is 226 pounds. There are no inguinal, umbilical, or incisional hernias present. She has active bowel sounds, normal pitch. There is a well-healed midline  incision in the hypogastrium. This patient will undergo colonoscopy.     The patient will contact Dr. Jesusita Cobos, cardiology, regarding cessation of clopidogrel. We need her off for at least 7 days.         Sincerely,       Hollice Schaumann, MD   CC: Vero Hernandez MD

## (undated) NOTE — LETTER
December 22, 2020    [address]  [address] [place] [address]      Dear Mau Pulido: The following are the results of your recent tests ordered by [address] [place] [address]. Please review the list of test results.   Your result is the number

## (undated) NOTE — LETTER
05/09/19        [address] [place] [address]      Dear Miguel Calix,    [facility] records indicate that you have outstanding lab work and or testing that was ordered for you and has not yet been completed:  Orders Placed This Encounter

## (undated) NOTE — LETTER
[facility]  Administration of Anesthesia  1.    I Chepe Sheets, or _________________________________ acting on his/her behalf, (Patient) (Dependent/Representative) request to receive anesthesia for my pending procedure/operation/treatme pressure, difficulty urinating, slowing of the baby's heart rate and headache. Rare risks include infections, high spinal         block, spinal bleeding, seizure, cardiac arrest and death.   7.   AWARENESS: I understand that it is possible (but unlike ________________________________________________  (Date) (Time)                                                                                                  (Responsible person in case of minor/ unconscious pt) /Relationship    My signature below affir

## (undated) NOTE — LETTER
AUTHORIZATION FOR SURGICAL OPERATION OR OTHER PROCEDURE    1. I hereby authorize Dr. Barak Shepherd, and [facility] staff assigned to my case to perform the following operation and/or procedure at the [facility]:    _______________________________________________________________________________________________    Bilateral cortisone injection  _______________________________________________________________________________________________    2. My physician has explained the nature and purpose of the operation or other procedure, possible alternative methods of treatment, the risks involved, and the possibility of complication to me. I acknowledge that no guarantee has been made as to the result that may be obtained. 3.  I recognize that, during the course of this operation, or other procedure, unforseen conditions may necessitate additional or different procedure than those listed above. I, therefore, further authorize and request that the above named physician, his/her physician assistants or designees perform such procedures as are, in his/her professional opinion, necessary and desirable. 4.  Any tissue or organs removed in the operation or other procedure may be disposed of by and at the discretion of the [facility] and [address]. 5.  I understand that in the event of a medical emergency, I will be transported by local paramedics to [facility] or other [facility] emergency department. 6.  I certify that I have read and fully understand the above consent to operation and/or other procedure. 7.  I acknowledge that my physician has explained sedation/analgesia administration to me including the risks and benefits. I consent to the administration of sedation/analgesia as may be necessary or desirable in the judgement of my physician.     Witness signature: ___________________________________________________ Date:  ______/______/_____                    Time: ________ A. M.  P.M. Patient Name:  ______________________________________________________  (please print)      Patient signature:  ___________________________________________________             Relationship to Patient:           []  Parent    Responsible person                          []  Spouse  In case of minor or                    [] Other  _____________   Incompetent name:  __________________________________________________                               (please print)      _____________      Responsible person  In case of minor or  Incompetent signature:  _______________________________________________    Statement of Physician  My signature below affirms that prior to the time of the procedure, I have explained to the patient and/or his/her guardian, the risks and benefits involved in the proposed treatment and any reasonable alternative to the proposed treatment. I have also explained the risks and benefits involved in the refusal of the proposed treatment and have answered the patient's questions.                         Date:  ______/______/_______  Provider                      Signature:  __________________________________________________________       Time:  ___________ A.M    P.M.

## (undated) NOTE — LETTER
September 29, 2017        [address] [place] [address]      Dear May Lyons:    I am the Nurse Care Manager with [address] Just reaching out to see how you are doing since your discharge home.    When you have a mi

## (undated) NOTE — Clinical Note
February 27, 2017    [address] [place] [address]      Dear Lilibeth Yang: It was a pleasure speaking with you over the phone recently.  To follow up, I wanted to send you my contact information to utilize when you have a ques

## (undated) NOTE — MR AVS SNAPSHOT
[address]  [address] [facility], [address] [address] [phone]               Thank you for choosing us for your health care visit with Joel Judd DO.   We are glad to serve you and happy to provide you with this Take 1 tablet by mouth every 6 (six) hours as needed for Pain.    Commonly known as:  NORCO           Omeprazole 40 MG Cpdr   TAKE 1 CAPSULE EVERY DAY           Potassium Chloride ER 20 MEQ Tbcr   TAKE 1 TABLET ONE TIME DAILY   Commonly known as:  K-DUR M20 Basic Metabolic Panel (8) [E]    Complete by:  Jan 05, 2017 (Approximate)        PTT, Activated [E]    Complete by:  Jan 05, 2017 (Approximate)        PT/INR (Prothrombin time)    Complete by:  As directed        C-Reactive Protein [E]    Complete by:  Prasanth Cuevas Summaries. If you've been to the Emergency Department or your doctor's office, you can view your past visit information in [employer] by going to Visits < Visit Summaries. [employer] questions? Call [phone] for help.   [employer] is NOT to be used for urge

## (undated) NOTE — Clinical Note
FYI, TCM call made, see notes. NCM attempted to schedule TCM HFU appointment patient states she will schedule herself. Message sent to MD's office.

## (undated) NOTE — LETTER
[facility] [address], [address]  Consent for Procedure/Sedation    Date: 9/20/20    Time: 1630      1.  I authorize the performance upon Sally Gonzales the following:cardiac catheterization, left ventricular cineangiography, bi Signature of person authorized                                     Relationship to  to consent for patient: _________________________ patient: ___________________    Witness: _______________________________ Date: _____________________    Printed: 9/20/20

## (undated) NOTE — ED AVS SNAPSHOT
[facility] Emergency Department    [place]  [address] [address]    Phone:  [phone]    Fax:  Adarsh   MRN: [id number]    Department:  [facility] Emergency Department   Date of Visit:  4/2 Commonly known as:  PERCOCET   Take 1-2 tablets by mouth every 4 (four) hours as needed for Pain.             Where to Get Your Medications      You can get these medications from any pharmacy     Bring a paper prescription for each of these medications visit does not uncover every injury or illness.  If you have been referred to a primary care or a specialist physician for a follow-up visit, please tell this physician (or your personal doctor if your instructions are to return to your personal doctor) abo Krish Wilson [phone] [facility] [place], [facility] by [facility] ([address]) [phone]                Additional Information       We are concerned for your overall well being:    - If you are a smoker or have smoked in the last 12 months, we encourage you to explore options for CHIQUI SALDIVAR [facility]

## (undated) NOTE — IP AVS SNAPSHOT
[address]  [address] [facility], [facility], Ayesha Castellano ~ [phone]                Discharge Summary   5/10/2017    [facility]           Admission Information        Provider Department    5/10/2017 Cristina Cain MD [facility] 4w/Sw/S - how to take this  - when to take this  - additional instructions        TAKE 1 TABLET ONE TIME DAILY    Elena De Leon        9 AM                   Sertraline HCl 100 MG Tabs   Last time this was given:  50 mg on 5/12/2017  9:08 AM   Commonly known as Vitamin D3 3000 units Tabs   Last time this was given:  3,000 Units on 5/12/2017  9:07 AM        Take 1 tablet by mouth daily.          9 AM                     STOP taking these medications     CENTRUM SILVER ULTRA WOMENS Tabs           Tamoxifen Citrate until you are completely healed with no scabs. Pain medication will be given to you to help relieve the pain from surgery or for physical therapy.  The prudent times to take your pain medication are prior to physical therapy and before you go to bed at n make recommendations based off of where you live. After rehab, we recommend [address] or outpatient PT.     List of OTC medications/items you should consider purchasing:    · Stool softener (dulcolax, colace, senokot, Bergeron)  · Constipati 63 (05/11/17)  24 (05/11/17)  10 (05/11/17)  3 (05/11/17)  1 -- (05/11/17)  2.7 (05/11/17)  1.0 (05/11/17)  0.4 (05/11/17)  0.1 (05/11/17)  0.0      Metabolic Lab Results  (Last result in the past 90 days)    HgbA1C Glucose BUN Creatinine Calcium Alkaline You can access your MyChart to more actively manage your health care and view more details from this visit by going to [email or web address].   If you've recently had a stay at the Hospital you can access your discharge instructions in [address] by mark Cholesterol Lowering Medications     SIMVASTATIN 10 MG Oral Tab       Use: Lower cholesterol, protect your heart   Most common side effects: Dizziness, constipation, abnormal liver function   What to report to your healthcare team:  Dizziness, muscle aches What to report to your healthcare team: Changes in thinking, talking or movement, drowsiness, shaking           All Other Medications     Potassium Chloride ER 20 MEQ Oral Tab CR    Cholecalciferol (VITAMIN D3) 3000 UNITS Oral Tab

## (undated) NOTE — LETTER
[facility]  Administration of Anesthesia  1.    I Angy Gordon, or _________________________________ acting on his/her behalf, (Patient) (Dependent/Representative) request to receive anesthesia for my pending procedure/operation/treatme pressure, difficulty urinating, slowing of the baby's heart rate and headache. Rare risks include infections, high spinal         block, spinal bleeding, seizure, cardiac arrest and death.   7.   AWARENESS: I understand that it is possible (but unlike ________________________________________________  (Date) (Time)                                                                                                  (Responsible person in case of minor/ unconscious pt) /Relationship    My signature below affir

## (undated) NOTE — MR AVS SNAPSHOT
[address]  [address] [id number] [phone]               Thank you for choosing us for your health care visit with Ruben Friday, DO.   We are glad to serve you and happy to provide you with this * Sertraline HCl 100 MG Tabs   TAKE 1 TABLET ONE TIME DAILY   Commonly known as:  ZOLOFT           * Sertraline HCl 100 MG Tabs   Take 1.5 tablets (150 mg total) by mouth daily.    Commonly known as:  ZOLOFT           simvastatin 10 MG Tabs   TAKE 1 TABLET DASH eating plan Adopt a diet rich in fruits, vegetables, and low fat dairy products with reduced content of saturated and total fat.    Dietary sodium reduction Reduce dietary sodium intake to <= 100 mmol per day (2.4 g sodium or 6 g sodium chloride)   Aer

## (undated) NOTE — MR AVS SNAPSHOT
[address]  [address] [address] [phone]               Thank you for choosing us for your health care visit with Toby Trejo DO.   We are glad to serve you and happy to provide you with this Dosepack: use as directed on packaging   Commonly known as:  MEDROL DOSEPACK           oxyCODONE-acetaminophen 5-325 MG Tabs   Take 1-2 tablets by mouth every 4 (four) hours as needed for Pain.    Commonly known as:  PERCOCET           Potassium Chloride ER Summaries. If you've been to the Emergency Department or your doctor's office, you can view your past visit information in [employer] by going to Visits < Visit Summaries. [employer] questions? Call [phone] for help.   [employer] is NOT to be used for urge

## (undated) NOTE — LETTER
[facility] [address], [address]  Consent for Procedure/Sedation    Date: 09/18/2020    Time: 1330    1. I authorize the performance upon Maureen Needs the following:  Transesophageal echocardiogram     2.  I authorize Dr. Damon Gave Printed: 2020   1:27 PM  Patient Name: Kel Enriquez        : 1950       Medical Record #: [id number]

## (undated) NOTE — MR AVS SNAPSHOT
[address]  [address] [address] [phone]               Thank you for choosing us for your health care visit with Sally Weller DO.   We are glad to serve you and happy to provide you with this Commonly known as:  NORCO           * HYDROcodone-acetaminophen  MG Tabs   Commonly known as:  NORCO           oxyCODONE-acetaminophen 5-325 MG Tabs   Take 1 tablet by mouth every 4 (four) hours as needed for Pain.    Commonly known as:  PERCOCET Visit [facility] online at  [email or web address]

## (undated) NOTE — LETTER
[address], [place], [place]     AUTHORIZATION FOR SURGICAL OPERATION OR PROCEDURE    1.    I hereby authorize Dr. Basilia Harada, MD, my Physician(s) and whomever may be designated as the doctor's Assistant, to perform the fo to have an autologous transfusion of my own blood, or a directed donor transfusion, I will discuss this with my         Physician.   5.   I consent to the photographing of procedure(s) to be performed for the purposes of advancing medicine, science (Responsible person in case of minor or unconscious patient)   (Relationship to Patient)      _______________________________________________________________ ____________________________  (Witness signature)

## (undated) NOTE — LETTER
Sally Gonzales, :1950    CONSENT FOR PROCEDURE/SEDATION    1. I authorize the performance upon Sally Gonzales  the following: Possible Endometrial Biopsy    2. I authorize Dr. Ana Carroll MD (and whomever is designated as the doctor’s assistant), to perform the above-mentioned procedures.    3. If any unforeseen conditions arise during this procedure calling for additional  procedures, operations, or medications (including anesthesia and blood transfusion), I further request and authorize the doctor to do whatever he/she deems advisable in my interest.    4. I consent to the taking and reproduction of any photographs in the course of this procedure for professional purposes.    5. I consent to the administration of such sedation as may be considered necessary or advisable by the physician responsible for this service, with the exception of ______________________________________________________    6. I have been informed by my doctor of the nature and purpose of this procedure sedation, possible alternative methods of treatment, risk involved and possible complications.    7. If I have a Do Not Resuscitate (DNR) order in place, the physician and I (or the individual authorized to consent on my behalf) will discuss and agree as to whether the Do Not Resuscitate (DNR) order will remain in effect or will be discontinued during the performance of the procedure and the applicable recovery period. If the Do Not Resuscitate (DNR) order is discontinued and is to be reinstated following the procedure/recovery period, the physician will determine when the applicable recovery period ends for purposes of reinstating the Do Not Resuscitate (DNR) order.    Signature of Patient:_______________________________________________    Signature of person authorized to consent for patient:  _______________________________________________________________    Relationship to patient:  ____________________________________________    Witness: _________________________________________ Date:___________     Physician Signature: _______________________________ Date:___________

## (undated) NOTE — LETTER
February 1, 2021    [address] [place] [address]      Dear Evan Patrick: The following are the results of your recent tests ordered by Morelia Hernandez. Please review the list of test results.   Your result is the number on 3.740 mIU/mL   FREE T4 (FREE THYROXINE)   Result Value Ref Range    Free T4 0.7 (L) 0.8 - 1.7 ng/dL   PRO BETA NATRIURETIC PEPTIDE   Result Value Ref Range    Pro-Beta Natriuretic Peptide 820 (H) <125 pg/mL   CBC W/ DIFFERENTIAL   Result Value Ref Range

## (undated) NOTE — LETTER
December 10, 2020    [address]      Dear Lilibeth Yang: The following are the results of your recent tests ordered by [facility]. Please review the list of test results.   Your result is the number

## (undated) NOTE — LETTER
03/24/18        [address] [facility]      Dear Evan Patrick,    [address] records indicate that you have outstanding lab work and or testing that was ordered for you and has not yet been completed:          CBC W Differential W Pl

## (undated) NOTE — LETTER
[address]  [address] [facility] [address], [address] [facility]  Authorization for Surgical Operation and Procedure     Date:___9/17/2020________                                                                                                         Time:__1 potential risks that can occur: fever and allergic reactions, hemolytic reactions, transmission of diseases such as Hepatitis, AIDS and Cytomegalovirus (CMV) and fluid overload.   In the event that I wish to have an autologous transfusion of my own blood, o attending physician will determine when the applicable recovery period ends for purposes of reinstating the DNAR order.   10. Patients having a sterilization procedure: I understand that if the procedure is successful the results will be permanent and it wi a. Allow the anesthesiologist (anesthesia doctor) to give me medicine and do additional procedures as necessary.  Some examples are: Starting or using an “IV” to give me medicine, fluids or blood during my procedure, and having a breathing tube placed to he 7. Regional Anesthesia (“spinal”, “epidural”, & “nerve blocks”): I understand that rare but potential complications include headache, bleeding, infection, seizure, irregular heart rhythms, and nerve injury.     I can change my mind about having anesthesia

## (undated) NOTE — LETTER
[facility] [address], [address]  Consent for Procedure/Sedation    Date: 09/15/2020    Time: 1900      1.  I authorize the performance upon Sally Gonzales the following:cardiac catheterization, left ventricular cineangiography, Signature of Patient: ____________________________________________________    Signature of person authorized                                     Relationship to  to consent for patient: _________________________ patient: ___________________    Witness: ___

## (undated) NOTE — LETTER
18      Patient: Senait Osei  : 1950 Visit date: 2018    Dear  Aurora Figueroa,      I examined your patient in consultation today. She has a small nonhealing area post total knee arthroplasty.   The joint does not appear to

## (undated) NOTE — Clinical Note
TCM call completed. The patient's information was given to Alfonzo Gay for an appointment with the [facility]. Thank you.

## (undated) NOTE — LETTER
AUTHORIZATION FOR SURGICAL OPERATION OR OTHER PROCEDURE    1. I hereby [facility] Dr. Jeanne Gregory, [facility] to my case to perform the following operation and/or procedure at the [facility]:    _______________________________________________________________________________________________    Cortisone Injection Left foot  _______________________________________________________________________________________________    2. My physician has explained the nature and purpose of the operation or other procedure, possible alternative methods of treatment, the risks involved, and the possibility of complication to me. I acknowledge that no guarantee has been made as to the result that may be obtained. 3.  I recognize that, during the course of this operation, or other procedure, unforseen conditions may necessitate additional or different procedure than those listed above. I, therefore, further authorize and request that the above named physician, his/her physician assistants or designees perform such procedures as are, in his/her professional opinion, necessary and desirable. 4.  Any tissue or organs removed in the operation or other procedure may be disposed of by and at the discretion of the [facility] and [facility]. 5.  I understand that in the event of a medical emergency, I will be transported by local paramedics to [facility] or other hospital emergency department. 6.  I certify that I have read and fully understand the above consent to operation and/or other procedure. 7.  I acknowledge that my physician has explained sedation/analgesia administration to me including the risks and benefits. I consent to the administration of sedation/analgesia as may be necessary or desirable in the judgement of my physician.     Witness signature: ___________________________________________________ Date:  ______/______/_____                    Time: ________ A. M.  P.M. Patient Name:  ______________________________________________________  (please print)      Patient signature:  ___________________________________________________             Relationship to Patient:           []  Parent    Responsible person                          []  Spouse  In case of minor or                    [] Other  _____________   Incompetent name:  __________________________________________________                               (please print)      _____________      Responsible person  In case of minor or  Incompetent signature:  _______________________________________________    Statement of Physician  My signature below affirms that prior to the time of the procedure, I have explained to the patient and/or his/her guardian, the risks and benefits involved in the proposed treatment and any reasonable alternative to the proposed treatment. I have also explained the risks and benefits involved in the refusal of the proposed treatment and have answered the patient's questions.                         Date:  ______/______/_______  Provider                      Signature:  __________________________________________________________       Time:  ___________ A.M    P.M.

## (undated) NOTE — LETTER
[address], [place], [place]     AUTHORIZATION FOR SURGICAL OPERATION OR PROCEDURE    1.    I hereby authorize Dr. Andre Aponte MD, my Physician(s) and whomever may be designated as the doctor's Assistant, to perform the fo to have an autologous transfusion of my own blood, or a directed donor transfusion, I will discuss this with my         Physician.   5.   I consent to the photographing of procedure(s) to be performed for the purposes of advancing medicine, science (Responsible person in case of minor or unconscious patient)   (Relationship to Patient)      _______________________________________________________________ ____________________________  (Witness signature)

## (undated) NOTE — Clinical Note
FYI, TCM call made, see notes. HEATHER attempted to schedule TCM HFU by 10/12/17, patient states she will call when she is ambulating better. HEATHER sent message to MD office.

## (undated) NOTE — LETTER
[address], [address] [facility], [facility] [address]  [address] [facility] [address] [facility], [address]   [phone]    [address] [facility], [address] [place] [address], [address] [place] [address]   [phone]       February 1, 2021    Waleska Anne

## (undated) NOTE — IP AVS SNAPSHOT
[address]  [address] [facility], [facility] ~ [phone]                Discharge Summary   6/17/2017    Marcela Console           Admission Information        Provider Department    6/17/2017 Marianne Pimentel MD [facility] 4w/Sw Commonly known as:  ECOTRIN   What changed:  when to take this   Next dose due:  START THIS EVENING        Take 1 tablet (325 mg total) by mouth every 12 (twelve) hours.     Ksenia Trujillo        9 AM           9 PM           Potassium Chloride ER 20 MEQ T Next dose due:  AS NEEDED        Take 1 tablet by mouth every 4 (four) hours as needed for Pain.     Domenica Galo                    TAKE ONE TAB EVERY 4 HOURS AS NEEDED FOR PAIN       simvastatin 10 MG Tabs   Commonly known as:  ZOCOR   Next dose due:  SOLO prevent a blood clot. There is a list at the end of this manual of exercises recommended to perform hourly. Constipation is a common side effect of narcotic pain medications.  It is important to take a stool softener daily as directed after your surgery u addictive and abused. Taking the medication not as directed by a physician, more than indicated on the bottle, is dangerous. If this is found to be the case than pain medication will not be refilled. As time passes, your pain should decrease.  If you wer important to get moving as soon as possible after a joint replacement.      List of OTC medications/items you should consider purchasing:  - stool softener (dulcolax, colace, senokot, Bergeron)   - constipation medication (milk of magnesia, miralax)   - pru Returning to work depends on the type of work you do. We will support your return to work as soon as you feel you are ready. You will possibly be taking narcotic pain medications and not have the ability to drive.    6. What limitations do I have regarding 12. What prescription medications can I continue to take up until surgery? A nurse from the hospital or your primary care physician will inform you which of your medications you can continue to take prior to surgery.  i.e.: blood pressure, heart, thyroid m 85.2    (06/20/17)  114 (L) (06/20/17)  8.5    (06/19/17)  6.6 (06/19/17)  3.19 (L) (06/19/17)  9.0 (L) (06/19/17)  27.2 (L) (06/19/17)  85.5           Recent Hematology Lab Results (cont.)  (Last 3 results in the past 90 days)    Neutrophil % Lymphocyte % coverage. Patient [address] is a Federal Navigator program that can help with your Affordable Care Act coverage, as well as all types of Medicaid plans.   To get signed up and covered, please call [phone] and ask to get set up for an insuran CLONIDINE HCL 0.1 MG Oral Tab         Use: Treat abnormal blood pressure (high or low), cardiac conditions; and/or abnormal heart rates/rhythms   Most common side effects: Dizziness or feeling lightheaded (especially with standing), heart rate changes, he Use:  Nausea/vomiting, acid reflux, low bowel motility, stomach pain   Most common side effects:  Depends on the specific medication but generally include: diarrhea, constipation, headache, allergic reaction (itching, rash, hives)   What to report to your